# Patient Record
Sex: FEMALE | Race: WHITE | NOT HISPANIC OR LATINO
[De-identification: names, ages, dates, MRNs, and addresses within clinical notes are randomized per-mention and may not be internally consistent; named-entity substitution may affect disease eponyms.]

---

## 2017-02-06 ENCOUNTER — APPOINTMENT (OUTPATIENT)
Dept: NEUROLOGY | Facility: CLINIC | Age: 62
End: 2017-02-06
Payer: COMMERCIAL

## 2017-02-06 VITALS
DIASTOLIC BLOOD PRESSURE: 90 MMHG | SYSTOLIC BLOOD PRESSURE: 150 MMHG | BODY MASS INDEX: 44.73 KG/M2 | HEIGHT: 64 IN | WEIGHT: 262 LBS | RESPIRATION RATE: 16 BRPM | HEART RATE: 70 BPM

## 2017-02-06 DIAGNOSIS — F41.9 ANXIETY DISORDER, UNSPECIFIED: ICD-10-CM

## 2017-02-06 PROCEDURE — 99214 OFFICE O/P EST MOD 30 MIN: CPT

## 2017-07-20 ENCOUNTER — RX RENEWAL (OUTPATIENT)
Age: 62
End: 2017-07-20

## 2017-07-21 ENCOUNTER — INPATIENT (INPATIENT)
Facility: HOSPITAL | Age: 62
LOS: 0 days | Discharge: HOME | End: 2017-07-22
Attending: INTERNAL MEDICINE | Admitting: FAMILY MEDICINE
Payer: COMMERCIAL

## 2017-07-21 VITALS
SYSTOLIC BLOOD PRESSURE: 162 MMHG | OXYGEN SATURATION: 100 % | RESPIRATION RATE: 18 BRPM | DIASTOLIC BLOOD PRESSURE: 93 MMHG | TEMPERATURE: 98 F | WEIGHT: 263.89 LBS | HEART RATE: 89 BPM | HEIGHT: 64 IN

## 2017-07-21 LAB
ALBUMIN SERPL ELPH-MCNC: 3.7 G/DL — SIGNIFICANT CHANGE UP (ref 3.3–5)
ALP SERPL-CCNC: 110 U/L — SIGNIFICANT CHANGE UP (ref 40–120)
ALT FLD-CCNC: 26 U/L — SIGNIFICANT CHANGE UP (ref 12–78)
ANION GAP SERPL CALC-SCNC: 9 MMOL/L — SIGNIFICANT CHANGE UP (ref 5–17)
APTT BLD: 24.4 SEC — LOW (ref 27.5–37.4)
AST SERPL-CCNC: 15 U/L — SIGNIFICANT CHANGE UP (ref 15–37)
BASOPHILS # BLD AUTO: 0.2 K/UL — SIGNIFICANT CHANGE UP (ref 0–0.2)
BASOPHILS NFR BLD AUTO: 1.1 % — SIGNIFICANT CHANGE UP (ref 0–2)
BILIRUB SERPL-MCNC: 0.4 MG/DL — SIGNIFICANT CHANGE UP (ref 0.2–1.2)
BUN SERPL-MCNC: 13 MG/DL — SIGNIFICANT CHANGE UP (ref 7–23)
CALCIUM SERPL-MCNC: 10 MG/DL — SIGNIFICANT CHANGE UP (ref 8.5–10.1)
CHLORIDE SERPL-SCNC: 98 MMOL/L — SIGNIFICANT CHANGE UP (ref 96–108)
CK SERPL-CCNC: 71 U/L — SIGNIFICANT CHANGE UP (ref 26–192)
CO2 SERPL-SCNC: 26 MMOL/L — SIGNIFICANT CHANGE UP (ref 22–31)
CREAT SERPL-MCNC: 0.82 MG/DL — SIGNIFICANT CHANGE UP (ref 0.5–1.3)
EOSINOPHIL # BLD AUTO: 0.1 K/UL — SIGNIFICANT CHANGE UP (ref 0–0.5)
EOSINOPHIL NFR BLD AUTO: 0.4 % — SIGNIFICANT CHANGE UP (ref 0–6)
GLUCOSE SERPL-MCNC: 95 MG/DL — SIGNIFICANT CHANGE UP (ref 70–99)
HCT VFR BLD CALC: 38.8 % — SIGNIFICANT CHANGE UP (ref 34.5–45)
HGB BLD-MCNC: 13.2 G/DL — SIGNIFICANT CHANGE UP (ref 11.5–15.5)
INR BLD: 1.09 RATIO — SIGNIFICANT CHANGE UP (ref 0.88–1.16)
LYMPHOCYTES # BLD AUTO: 16.9 % — SIGNIFICANT CHANGE UP (ref 13–44)
LYMPHOCYTES # BLD AUTO: 2.3 K/UL — SIGNIFICANT CHANGE UP (ref 1–3.3)
MCHC RBC-ENTMCNC: 27.8 PG — SIGNIFICANT CHANGE UP (ref 27–34)
MCHC RBC-ENTMCNC: 34.1 GM/DL — SIGNIFICANT CHANGE UP (ref 32–36)
MCV RBC AUTO: 81.7 FL — SIGNIFICANT CHANGE UP (ref 80–100)
MONOCYTES # BLD AUTO: 0.6 K/UL — SIGNIFICANT CHANGE UP (ref 0–0.9)
MONOCYTES NFR BLD AUTO: 4.4 % — SIGNIFICANT CHANGE UP (ref 2–14)
NEUTROPHILS # BLD AUTO: 10.5 K/UL — HIGH (ref 1.8–7.4)
NEUTROPHILS NFR BLD AUTO: 77.2 % — HIGH (ref 43–77)
PLATELET # BLD AUTO: 353 K/UL — SIGNIFICANT CHANGE UP (ref 150–400)
POTASSIUM SERPL-MCNC: 4 MMOL/L — SIGNIFICANT CHANGE UP (ref 3.5–5.3)
POTASSIUM SERPL-SCNC: 4 MMOL/L — SIGNIFICANT CHANGE UP (ref 3.5–5.3)
PROT SERPL-MCNC: 7.3 GM/DL — SIGNIFICANT CHANGE UP (ref 6–8.3)
PROTHROM AB SERPL-ACNC: 11.8 SEC — SIGNIFICANT CHANGE UP (ref 9.8–12.7)
RBC # BLD: 4.75 M/UL — SIGNIFICANT CHANGE UP (ref 3.8–5.2)
RBC # FLD: 13.2 % — SIGNIFICANT CHANGE UP (ref 10.3–14.5)
SODIUM SERPL-SCNC: 133 MMOL/L — LOW (ref 135–145)
TROPONIN I SERPL-MCNC: <0.015 NG/ML — SIGNIFICANT CHANGE UP (ref 0.01–0.04)
TROPONIN I SERPL-MCNC: <0.015 NG/ML — SIGNIFICANT CHANGE UP (ref 0.01–0.04)
WBC # BLD: 13.6 K/UL — HIGH (ref 3.8–10.5)
WBC # FLD AUTO: 13.6 K/UL — HIGH (ref 3.8–10.5)

## 2017-07-21 PROCEDURE — 93010 ELECTROCARDIOGRAM REPORT: CPT

## 2017-07-21 PROCEDURE — 70551 MRI BRAIN STEM W/O DYE: CPT | Mod: 26

## 2017-07-21 PROCEDURE — 70450 CT HEAD/BRAIN W/O DYE: CPT | Mod: 26

## 2017-07-21 PROCEDURE — 99285 EMERGENCY DEPT VISIT HI MDM: CPT

## 2017-07-21 PROCEDURE — 71020: CPT | Mod: 26

## 2017-07-21 RX ORDER — HYDROCHLOROTHIAZIDE 25 MG
25 TABLET ORAL DAILY
Qty: 0 | Refills: 0 | Status: DISCONTINUED | OUTPATIENT
Start: 2017-07-21 | End: 2017-07-22

## 2017-07-21 RX ORDER — HEPARIN SODIUM 5000 [USP'U]/ML
5000 INJECTION INTRAVENOUS; SUBCUTANEOUS EVERY 12 HOURS
Qty: 0 | Refills: 0 | Status: DISCONTINUED | OUTPATIENT
Start: 2017-07-21 | End: 2017-07-22

## 2017-07-21 RX ORDER — MECLIZINE HCL 12.5 MG
12.5 TABLET ORAL
Qty: 0 | Refills: 0 | Status: DISCONTINUED | OUTPATIENT
Start: 2017-07-21 | End: 2017-07-22

## 2017-07-21 RX ORDER — METOPROLOL TARTRATE 50 MG
100 TABLET ORAL DAILY
Qty: 0 | Refills: 0 | Status: DISCONTINUED | OUTPATIENT
Start: 2017-07-21 | End: 2017-07-22

## 2017-07-21 RX ORDER — ASPIRIN/CALCIUM CARB/MAGNESIUM 324 MG
81 TABLET ORAL DAILY
Qty: 0 | Refills: 0 | Status: DISCONTINUED | OUTPATIENT
Start: 2017-07-21 | End: 2017-07-22

## 2017-07-21 RX ORDER — LOSARTAN POTASSIUM 100 MG/1
50 TABLET, FILM COATED ORAL
Qty: 0 | Refills: 0 | Status: DISCONTINUED | OUTPATIENT
Start: 2017-07-21 | End: 2017-07-22

## 2017-07-21 RX ORDER — ATORVASTATIN CALCIUM 80 MG/1
40 TABLET, FILM COATED ORAL AT BEDTIME
Qty: 0 | Refills: 0 | Status: DISCONTINUED | OUTPATIENT
Start: 2017-07-21 | End: 2017-07-22

## 2017-07-21 RX ORDER — CLOPIDOGREL BISULFATE 75 MG/1
75 TABLET, FILM COATED ORAL DAILY
Qty: 0 | Refills: 0 | Status: DISCONTINUED | OUTPATIENT
Start: 2017-07-21 | End: 2017-07-22

## 2017-07-21 RX ADMIN — LOSARTAN POTASSIUM 50 MILLIGRAM(S): 100 TABLET, FILM COATED ORAL at 21:06

## 2017-07-21 RX ADMIN — ATORVASTATIN CALCIUM 40 MILLIGRAM(S): 80 TABLET, FILM COATED ORAL at 22:47

## 2017-07-21 RX ADMIN — HEPARIN SODIUM 5000 UNIT(S): 5000 INJECTION INTRAVENOUS; SUBCUTANEOUS at 22:48

## 2017-07-21 NOTE — H&P ADULT - ASSESSMENT
Pt is admitted w/    I. TIA  - telemetry, eval for arrythmia  - Neurochecks  - Neurology consult Dr. Romero  - ASA , statin    II. Hypertensive Urgency  - metoprolol given in the office    III. DVT prophylaxis Pt is a 62y woman with PMHx HTN, HLD, CVA / TIA on plavix and baby ASA, who presents via EMS from Dr. Martinez office for severe hypertension.   She  reports she has been having increased dizziness, lightheadedness and thought she was having BPV symptoms.  She did have light headedness the day before.   She denies resp symptoms, no fever/chills, N/V/D.  No CP/SOB.  At her PCPs office her pressure was  200s/100s, she was given an additional 50mg of Metoprolol and HCTZ 25mg.   Pt reports her pressure was 160s at her pMDs office 1 year ago and her metoprolol was increased from 50mg to 100mg,  however she has not been taking the higher dose.    Pt is admitted w/    I. Dizziness, possible TIA.   NIHSS: 0  - telemetry, eval for arrythmia  - Neurochecks  - Neurology consult Dr. Iverson  - cont plavix, ASA , statin    II. Hypertensive Urgency  - additional metoprolol, hydrochlorothiazide given in the office  - cont meds, increased dose of metoprolol  - card consult Dr. Llanes  - pt needs outpt stress testing and eval for echo    III. DVT prophylaxis  - heparin

## 2017-07-21 NOTE — ED PROVIDER NOTE - ATTENDING CONTRIBUTION TO CARE
I, Abdelrahman Nicholas DO,  performed the initial face to face bedside interview with this patient regarding history of present illness, review of symptoms and relevant past medical, social and family history.  I completed an independent physical examination.  I was the initial provider who evaluated this patient. I have signed out the follow up of any pending tests (i.e. labs, radiological studies) to the ACP.  I have communicated the patient’s plan of care and disposition with the ACP.

## 2017-07-21 NOTE — H&P ADULT - NSHPREVIEWOFSYSTEMS_GEN_ALL_CORE
ICU Vital Signs Last 24 Hrs    T(F): 98.1 (21 Jul 2017 13:02), Max: 98.1 (21 Jul 2017 13:02)    HR: 89 (21 Jul 2017 13:02) (89 - 89)    BP: 162/93 (21 Jul 2017 13:02) (162/93 - 162/93)    RR: 18  SpO2: 100%

## 2017-07-21 NOTE — ED PROVIDER NOTE - OBJECTIVE STATEMENT
62y F PMH HTN, HLD, CVA/TIA, PMD Dr. Armstrong in [place], Dr. Iverson Neurology, Dr. Llanes cardiology, presents to ER today c/o HTN and being sent by Dr. Armstrong to ER for evaluation.  pt reports she has been having increased vertigo symptoms.  Denies F/C/N/V/D/CP/SOB.  No trauma or travel.  In office today had HTN 200s/100s, was given additional dose of Metoprolol and sent to ER by EMS.

## 2017-07-21 NOTE — ED STATDOCS - PROGRESS NOTE DETAILS
Spoke with Dr. Roger Armstrong [phone], discussed that pt came in to the office for dizziness and lightheadedness sine yesterday but poorly controlled HTN and was told that she was supposed to double her metoprolol dose but has not. Pt was given 2 of her metoprolol medication by her pmd at the office and was not able to lower the BP so sent her in for evaluation. -Winston Person PA-C

## 2017-07-21 NOTE — ED PROVIDER NOTE - MEDICAL DECISION MAKING DETAILS
62y F w/ PMH as noted sent to ER by PMD for eval.  I discussed with patient my concern for worsening vertigo symptoms in light of h/o HTN, TIA, and likely need for thorough evaluation and possible admission.  Patient states she does not have interest in being admitted and was told by PMD she is only coming here for BP meds.  Would plan for labs including CE's, EKG, CXR, CT Head.  Given patient disagreement with plan, will call Dr. Armstrong to discuss.

## 2017-07-21 NOTE — PATIENT PROFILE ADULT. - NS TRANSFER PATIENT BELONGINGS
purse, cash, ipad, 2 pairs glasses/Clothing/Cell Phone/PDA (specify)/Electronic Device (specify)/Other belongings/Wrist Watch

## 2017-07-21 NOTE — ED STATDOCS - OBJECTIVE STATEMENT
62y F PMH HTN, HLD, CVA/TIA, PMD Dr. Armstrong in [place], Dr. Iverson Neurology, Dr. Llanes cardiology, presents to ER today c/o HTN and being sent by Dr. Armstrong to ER for evaluation.  pt reports she has been having increased vertigo symptoms.  Denies F/C/N/V/D/CP/SOB.  No trauma or travel.  In office today had HTN 200s/100s, was given additional dose of Metoprolol and sent to ER by EMS

## 2017-07-21 NOTE — H&P ADULT - HISTORY OF PRESENT ILLNESS
Pt is a 62y F PMH HTN, HLD, CVA/TIA, presents to ER today c/o HTN and being sent by Dr. Armstrong to ER for evaluation.  pt reports she has been having increased vertigo symptoms.  Denies F/C/N/V/D/CP/SOB.  No trauma or travel.  In office today had HTN 200s/100s, was given additional dose of Metoprolol and sent to ER by EMS. Pt is a 62y woman with PMHx HTN, HLD, CVA / TIA on plavix and baby ASA, who presents via EMS from Dr. Martinez office for severe hypertension.   She  reports she has been having increased dizziness, lightheadedness and thought she was having BPV symptoms.  She did have light headedness the day before.   She denies resp symptoms, no fever/chills, N/V/D.  No CP/SOB.  At her PCPs office her pressure was  200s/100s, she was given an additional 50mg of Metoprolol and HCTZ 25mg.   Pt reports her pressure was 160s at her pMDs office 1 year ago and her metoprolol was increased from 50mg to 100mg,  however she has not been taking the higher dose.  Fam HX:  Mother HTN had CVA at 66 Father HTN Brother prostate ca dx at age 40

## 2017-07-21 NOTE — H&P ADULT - NSHPOUTPATIENTPROVIDERS_GEN_ALL_CORE
PMD Dr. Armstrong in [place], Dr. Iverson Neurology, Dr. Llanes cardiology PMD Dr. Bronson Armstrong in [place],   Dr. Iverson Neurology,   Dr. Llanes cardiology

## 2017-07-21 NOTE — ED ADULT NURSE NOTE - OBJECTIVE STATEMENT
Pt presents with HTN from PMD office. Pt bp was systolic 210 in the office. Pt took extra metoporol today. Pt sent for further eval.

## 2017-07-22 ENCOUNTER — TRANSCRIPTION ENCOUNTER (OUTPATIENT)
Age: 62
End: 2017-07-22

## 2017-07-22 VITALS — DIASTOLIC BLOOD PRESSURE: 91 MMHG | HEART RATE: 82 BPM | SYSTOLIC BLOOD PRESSURE: 171 MMHG

## 2017-07-22 LAB
ANION GAP SERPL CALC-SCNC: 8 MMOL/L — SIGNIFICANT CHANGE UP (ref 5–17)
BUN SERPL-MCNC: 14 MG/DL — SIGNIFICANT CHANGE UP (ref 7–23)
CALCIUM SERPL-MCNC: 9.5 MG/DL — SIGNIFICANT CHANGE UP (ref 8.5–10.1)
CHLORIDE SERPL-SCNC: 98 MMOL/L — SIGNIFICANT CHANGE UP (ref 96–108)
CHOLEST SERPL-MCNC: 104 MG/DL — SIGNIFICANT CHANGE UP (ref 10–199)
CO2 SERPL-SCNC: 28 MMOL/L — SIGNIFICANT CHANGE UP (ref 22–31)
CREAT SERPL-MCNC: 0.76 MG/DL — SIGNIFICANT CHANGE UP (ref 0.5–1.3)
GLUCOSE SERPL-MCNC: 85 MG/DL — SIGNIFICANT CHANGE UP (ref 70–99)
HBA1C BLD-MCNC: 5.7 % — HIGH (ref 4–5.6)
HDLC SERPL-MCNC: 31 MG/DL — LOW (ref 40–125)
LIPID PNL WITH DIRECT LDL SERPL: 31 MG/DL — SIGNIFICANT CHANGE UP
POTASSIUM SERPL-MCNC: 3.3 MMOL/L — LOW (ref 3.5–5.3)
POTASSIUM SERPL-SCNC: 3.3 MMOL/L — LOW (ref 3.5–5.3)
SODIUM SERPL-SCNC: 134 MMOL/L — LOW (ref 135–145)
TOTAL CHOLESTEROL/HDL RATIO MEASUREMENT: 3.4 RATIO — SIGNIFICANT CHANGE UP (ref 3.3–7.1)
TRIGL SERPL-MCNC: 212 MG/DL — HIGH (ref 10–149)
TROPONIN I SERPL-MCNC: <0.015 NG/ML — SIGNIFICANT CHANGE UP (ref 0.01–0.04)

## 2017-07-22 RX ORDER — METOPROLOL TARTRATE 50 MG
1 TABLET ORAL
Qty: 0 | Refills: 0 | COMMUNITY

## 2017-07-22 RX ORDER — LOSARTAN POTASSIUM 100 MG/1
1 TABLET, FILM COATED ORAL
Qty: 0 | Refills: 0 | COMMUNITY
Start: 2017-07-22

## 2017-07-22 RX ORDER — METOPROLOL TARTRATE 50 MG
1 TABLET ORAL
Qty: 0 | Refills: 0 | COMMUNITY
Start: 2017-07-22

## 2017-07-22 RX ORDER — LOSARTAN POTASSIUM 100 MG/1
1 TABLET, FILM COATED ORAL
Qty: 0 | Refills: 0 | COMMUNITY

## 2017-07-22 RX ADMIN — Medication 81 MILLIGRAM(S): at 13:12

## 2017-07-22 RX ADMIN — HEPARIN SODIUM 5000 UNIT(S): 5000 INJECTION INTRAVENOUS; SUBCUTANEOUS at 09:52

## 2017-07-22 RX ADMIN — CLOPIDOGREL BISULFATE 75 MILLIGRAM(S): 75 TABLET, FILM COATED ORAL at 13:12

## 2017-07-22 RX ADMIN — Medication 25 MILLIGRAM(S): at 05:47

## 2017-07-22 RX ADMIN — Medication 100 MILLIGRAM(S): at 09:52

## 2017-07-22 RX ADMIN — LOSARTAN POTASSIUM 50 MILLIGRAM(S): 100 TABLET, FILM COATED ORAL at 05:41

## 2017-07-22 NOTE — DISCHARGE NOTE ADULT - PLAN OF CARE
increase metoprolol to 100mg daily. follow up with Dr. Llanes within 1 week for BP monitoring. If worsens and or associated with nausea or vomiting come back to ER. Follow up with Dr. Iverson within a week to discuss MRI results. Your MRI does not reveal any strokes but is changed from previous MRI (shows area of decreased signaling).

## 2017-07-22 NOTE — DISCHARGE NOTE ADULT - HOSPITAL COURSE
Chief Complaint/Reason for Admission: dizziness	  	  Pt is a 62y woman with PMHx HTN, HLD, CVA / TIA on plavix and baby ASA, who presents via EMS from Dr. Martinez office for severe hypertension.   She  reports she has been having increased dizziness, lightheadedness and thought she was having BPV symptoms.  She did have light headedness the day before.   She denies resp symptoms, no fever/chills, N/V/D.  No CP/SOB.  At her PCPs office her pressure was  200s/100s, she was given an additional 50mg of Metoprolol and HCTZ 25mg.   Pt reports her pressure was 160s at her pMDs office 1 year ago and her metoprolol was increased from 50mg to 100mg,  however she has not been taking the higher dose. Pt monitored for 24 hours. Symptoms improved with improvement in BP.  CT shows no acute changes. MRI shows signaling changes from prior MRI.  Will send pt home for outpt MRI w/ gavino contrast and f/u with Dr. Iverson within one week.  REVIEW OF SYSTEMS: All other review of systems is negative unless indicated above.		    Pt is a 62y F PMH HTN, HLD, CVA/TIA, presents to ER today c/o HTN and being sent by Dr. Armstrong to ER for evaluation.  pt reports she has been having increased vertigo symptoms.  Denies F/C/N/V/D/CP/SOB.  No trauma or travel.  In office today had HTN 200s/100s, was given additional dose of Metoprolol and sent to ER by EMS.  Vital Signs Last 24 Hrs  T(C): 36.7 (22 Jul 2017 05:41), Max: 37 (21 Jul 2017 21:40)  T(F): 98 (22 Jul 2017 05:41), Max: 98.6 (21 Jul 2017 21:40)  HR: 82 (22 Jul 2017 09:49) (72 - 82)  BP: 171/91 (22 Jul 2017 09:49) (131/65 - 179/89)  BP(mean): --  RR: 17 (22 Jul 2017 05:41) (16 - 19)  SpO2: 97% (22 Jul 2017 05:41) (96% - 100%)  	Unable to offer due to clinical condition	      Physical Exam:  · Constitutional	detailed exam	  · Constitutional Details	well-nourished	  · Eyes	detailed exam	  · Eyes Details	PERRL; conjunctiva clear	  · ENMT	detailed exam	  · ENMT Details	nose; mouth; pharynx	  · Nose	normal	  · Mouth	moist	  · Pharynx	no redness; no discharge	  · Neck	detailed exam	  · Neck Details	supple; no JVD	  · Breasts	not examined	  · Back	No deformity or limitation of movement	  · Respiratory	detailed exam	  · Respiratory Details	clear to auscultation bilaterally	  · Cardiovascular	detailed exam	  · Cardiovascular Details	regular rate and rhythm 	  · Cardiovascular Details	positive S1; positive S2	  · Gastrointestinal	detailed exam	  · GI Normal	normal; soft; bowel sounds normal	  · Genitourinary	not examined	  · Rectal	not examined	  · Extremities	No cyanosis, clubbing or edema	  · Neurological	Alert & oriented; no sensory, motor or coordination deficits, normal reflexes	  · Skin	No lesions; no rash	      meds/labs/imaging: Reviewed.  CT head negative for acute pathology. MRI w/o con shows signaling changes.    Assessment and Plan:       62y woman with PMHx HTN, HLD, CVA / TIA on plavix and baby ASA, who presents via EMS from Dr. Martinez office for malignant hypetension.   Dizziness, possible TIA: RESOLVED. likely related to underlying uncontrolled BP.  - telemetry, eval for arrythmia - no events.  - Neurology consult Dr. Iverson as outpatient given changes in MRI (signaling changes).  Will give script for MRI head w/gavino and f/u with Dr. Iverson with in one week to discuss results.  - cont plavix, ASA , statin    II. Hypertensive Urgency/malignant hypertension: RESOLVED.  - additional metoprolol, hydrochlorothiazide given in the office  - cont meds, increased dose of metoprolol  - f/u cardiology as outpatient for close bp monitoring  - pt needs outpt stress testing and eval for echo  Pt is admitted w/    I. TIA  - telemetry, eval for arrythmia  - Neurochecks  - Neurology consult Dr. Romero  - ASA , statin    II. Hypertensive Urgency  - metoprolol given in the office    III. DVT prophylaxis      Attending Statement:  40 minutes spent on total encounter; more than 50% of the visit was spent counseling and/or coordinating care by the attending physician.

## 2017-07-22 NOTE — DISCHARGE NOTE ADULT - CARE PROVIDER_API CALL
Eneida Iverson), Neurology  General  [address] [place] [address]  Phone: [phone]  Fax: [phone]

## 2017-07-22 NOTE — DISCHARGE NOTE ADULT - CARE PLAN
Principal Discharge DX:	Malignant hypertension  Goal:	increase metoprolol to 100mg daily.  Instructions for follow-up, activity and diet:	follow up with Dr. Llanes within 1 week for BP monitoring.  Secondary Diagnosis:	Lightheadedness  Goal:	If worsens and or associated with nausea or vomiting come back to ER.  Instructions for follow-up, activity and diet:	Follow up with Dr. Iverson within a week to discuss MRI results. Your MRI does not reveal any strokes but is changed from previous MRI (shows area of decreased signaling).

## 2017-07-22 NOTE — DISCHARGE NOTE ADULT - MEDICATION SUMMARY - MEDICATIONS TO TAKE
I will START or STAY ON the medications listed below when I get home from the hospital:    aspirin 81 mg oral tablet, chewable  -- 1 tab(s) by mouth once a day  -- Indication: For secondary prevention    losartan 50 mg oral tablet  -- 1 tab(s) by mouth 2 times a day  -- Indication: For HYPERTENSION    atorvastatin 40 mg oral tablet  -- 1 tab(s) by mouth once a day (at bedtime)  -- Indication: For HYPERlipidemia    clopidogrel 75 mg oral tablet  -- 1 tab(s) by mouth once a day  -- Indication: For History of stroke    metoprolol succinate 100 mg oral tablet, extended release  -- 1 tab(s) by mouth once a day  -- Indication: For HYPERTENSION    hydroCHLOROthiazide 25 mg oral tablet  -- 1 tab(s) by mouth once a day  -- Indication: For HYPERTENSION    glucosamine  --  by mouth   -- Indication: For vitamins    multivitamin  --     -- Indication: For vitamins

## 2017-07-22 NOTE — DISCHARGE NOTE ADULT - MEDICATION SUMMARY - MEDICATIONS TO CHANGE
I will SWITCH the dose or number of times a day I take the medications listed below when I get home from the hospital:    metoprolol succinate 100 mg oral tablet, extended release  -- 1 tab(s) by mouth once a day

## 2017-07-22 NOTE — CONSULT NOTE ADULT - SUBJECTIVE AND OBJECTIVE BOX
Patient is a 62y old  Female who presents with a chief complaint of dizziness.      HPI:  Pt is a 62y woman with PMHx HTN, HLD, CVA / TIA on plavix and baby ASA, who presents via EMS from Dr. Martinez office for severe hypertension.   She  reports she has been having increased dizziness, lightheadedness and thought she was having BPV symptoms.  She did have light headedness the day before.   She denies resp symptoms, no fever/chills, N/V/D.  No CP/SOB.  At her PCPs office her pressure was  200s/100s, she was given an additional 50mg of Metoprolol and HCTZ 25mg.   Pt reports her pressure was 160s at her pMDs office 1 year ago and her metoprolol was increased from 50mg to 100mg,  however she has not been taking the higher dose.   I reviewed Dr Llanes(primary cardiologist) office notes and pt was noted to have poorly controlled HTN off and on and white coat syndrome as well on top of her anxiety.  Pt currently denies any CP or SOB or dizziness. She would like to go home since feeling ok.   Fam HX:  Mother HTN had CVA at 66 Father HTN Brother prostate ca dx at age 40.      PAST MEDICAL & SURGICAL HISTORY:  Transient cerebral ischemia, unspecified type  Essential hypertension      MEDICATIONS  (STANDING):  heparin  Injectable 5000 Unit(s) SubCutaneous every 12 hours  atorvastatin 40 milliGRAM(s) Oral at bedtime  clopidogrel Tablet 75 milliGRAM(s) Oral daily  aspirin enteric coated 81 milliGRAM(s) Oral daily  losartan 50 milliGRAM(s) Oral two times a day  metoprolol succinate  milliGRAM(s) Oral daily  hydrochlorothiazide 25 milliGRAM(s) Oral daily    MEDICATIONS  (PRN):  meclizine 12.5 milliGRAM(s) Oral two times a day PRN Dizziness      FAMILY HISTORY:  No pertinent family history in first degree relatives      SOCIAL HISTORY:  non smoker, no alcohol use      REVIEW OF SYSTEMS:  CONSTITUTIONAL:  No night sweats.  No fatigue, malaise, lethargy.  No fever or chills.  HEENT:  Eyes:  No visual changes.  No eye pain.      ENT:  No runny nose.  No epistaxis.  No sinus pain.  No sore throat.  No odynophagia.  No ear pain.  No congestion.  RESPIRATORY:  No cough.  No wheeze.  No hemoptysis.  No shortness of breath.  CARDIOVASCULAR:  No chest pains.  No palpitations. No shortness of breath, No orthopnea or PND.  GASTROINTESTINAL:  No abdominal pain.  No nausea or vomiting.  No diarrhea or constipation.  No hematemesis.  No hematochezia.  No melena.  GENITOURINARY:  No urgency.  No frequency.  No dysuria.  No hematuria.  No obstructive symptoms.  No discharge.  No pain.  No significant abnormal bleeding.  MUSCULOSKELETAL:  No musculoskeletal pain.  No joint swelling.  No arthritis.  NEUROLOGICAL:  No tingling or numbness or weakness.  PSYCHIATRIC:  No confusion  SKIN:  No rashes.  No lesions.  No wounds.  ENDOCRINE:  No unexplained weight loss.  No polydipsia.  No polyuria.  No polyphagia.  HEMATOLOGIC:  No anemia.  No purpura.  No petechiae.  No prolonged or excessive bleeding.   ALLERGIC AND IMMUNOLOGIC:  No pruritus.  No swelling.         Vital Signs Last 24 Hrs  T(C): 36.7 (22 Jul 2017 05:41), Max: 37 (21 Jul 2017 21:40)  T(F): 98 (22 Jul 2017 05:41), Max: 98.6 (21 Jul 2017 21:40)  HR: 82 (22 Jul 2017 09:49) (72 - 89)  BP: 171/91 (22 Jul 2017 09:49) (131/65 - 179/89)  BP(mean): --  RR: 17 (22 Jul 2017 05:41) (16 - 19)  SpO2: 97% (22 Jul 2017 05:41) (96% - 100%)    PHYSICAL EXAM-    Constitutional: The patient appears to be normal, well developed, well nourished and alert and oriented to time, place and person. The patient does not appear acutely ill.     Head: Head is normocephalic and atraumatic.      Neck: The patient's neck is supple without enlargement, has no palpable thyromegaly nor thyroid nodules and has no jugular venous distention. No audible carotid bruits. There are strong carotid pulses bilaterally. No JVD.     Cardiovascular: Regular rate and rhythm without S3, S4. No murmurs or rubs are appreciated.      Respiratory: Breath sounds are normal. No rales. No wheezing.    Abdomen: Soft, nontender, nondistended with positive bowel sounds.      Extremity: No tenderness. No  pitting edema No skin discoloration No clubbing No cyanosis.     Neurologic: The patient is alert and oriented.      Skin: No rash, no obvious lesions noted.      Psychiatric: The patient appears to be emotionally stable.      INTERPRETATION OF TELEMETRY: sinus rythm.    ECG: sinus rythm with poor R wave progression. Her EKG from 2014 did not have poor R wave progression but this could be from lead placement as well.    I&O's Detail      LABS:                        13.2   13.6  )-----------( 353      ( 21 Jul 2017 14:12 )             38.8     07-22    134<L>  |  98  |  14  ----------------------------<  85  3.3<L>   |  28  |  0.76    Ca    9.5      22 Jul 2017 06:01    TPro  7.3  /  Alb  3.7  /  TBili  0.4  /  DBili  x   /  AST  15  /  ALT  26  /  AlkPhos  110  07-21    CARDIAC MARKERS ( 21 Jul 2017 23:41 )  <0.015 ng/mL / x     / x     / x     / x      CARDIAC MARKERS ( 21 Jul 2017 18:04 )  <0.015 ng/mL / x     / x     / x     / x      CARDIAC MARKERS ( 21 Jul 2017 14:12 )  <0.015 ng/mL / x     / 71 U/L / x     / x          PT/INR - ( 21 Jul 2017 14:12 )   PT: 11.8 sec;   INR: 1.09 ratio         PTT - ( 21 Jul 2017 14:12 )  PTT:24.4 sec    I&O's Summary    BNP  RADIOLOGY & ADDITIONAL STUDIES:  < from: CT Head No Cont (07.21.17 @ 14:55) >    EXAM:  CT BRAIN                            PROCEDURE DATE:  07/21/2017          INTERPRETATION:  Exam Date: 7/21/2017 2:55 PM    CT head without IV contrast    CLINICAL INFORMATION:  HTN, lightheaded, dizzy        TECHNIQUE: Contiguous axial sections were obtained through the head.     This scan was performed using automatic exposure control (radiation dose   reduction software) to obtain a diagnostic image quality scan with   patient dose as low as reasonably achievable.      COMPARISON: MRI brain March 11, 2014.     FINDINGS:       There is no evidence of intraparenchymal or extraaxial hemorrhage.     There is no CT evidence of large vessel acute infarct. No mass effect is   found in the brain.  No evidence of midline shift or herniation pattern.    The ventricles, sulci and basal cisterns appear unremarkable.         Visualized paranasal sinuses are clear.      IMPRESSION:       No acute intracranial findings.                JEANNINE URBANO M.D., ATTENDING RADIOLOGIST  This document has been electronically signed. Jul 21 2017  3:00PM                < end of copied text >

## 2017-07-22 NOTE — CONSULT NOTE ADULT - ASSESSMENT
Dizziness- MRI results pending.  BP better controlled.  Her toprol was increased by primary team.  Recommend  SBP goal of about 140mm Hg if no CVA noted on MRI.  Would recommend f/u with Dr Llanes as outpt.    HTN- continue current meds.    Hyperlipidemia- continue statin.    Other medical issues- dizziness- neurological evaluation in progress, anxiety- defer management to primary team.  Obesity- advised diet and life style modifications.    Thank you for allowing me to participate in the care of this patient. Please feel free to contact me with any questions.

## 2017-07-22 NOTE — DISCHARGE NOTE ADULT - PATIENT PORTAL LINK FT
“You can access the Follow[employer] Patient Portal, offered by [facility], by registering with the following website: [email or web address]”

## 2017-07-26 DIAGNOSIS — E78.5 HYPERLIPIDEMIA, UNSPECIFIED: ICD-10-CM

## 2017-07-26 DIAGNOSIS — I16.0 HYPERTENSIVE URGENCY: ICD-10-CM

## 2017-07-26 DIAGNOSIS — Z86.73 PERSONAL HISTORY OF TRANSIENT ISCHEMIC ATTACK (TIA), AND CEREBRAL INFARCTION WITHOUT RESIDUAL DEFICITS: ICD-10-CM

## 2017-07-26 DIAGNOSIS — E66.9 OBESITY, UNSPECIFIED: ICD-10-CM

## 2017-07-26 DIAGNOSIS — I10 ESSENTIAL (PRIMARY) HYPERTENSION: ICD-10-CM

## 2017-07-26 DIAGNOSIS — Z79.82 LONG TERM (CURRENT) USE OF ASPIRIN: ICD-10-CM

## 2017-07-26 DIAGNOSIS — F41.9 ANXIETY DISORDER, UNSPECIFIED: ICD-10-CM

## 2017-07-26 DIAGNOSIS — Z79.01 LONG TERM (CURRENT) USE OF ANTICOAGULANTS: ICD-10-CM

## 2017-07-26 DIAGNOSIS — R42 DIZZINESS AND GIDDINESS: ICD-10-CM

## 2017-08-02 PROBLEM — I10 ESSENTIAL (PRIMARY) HYPERTENSION: Chronic | Status: ACTIVE | Noted: 2017-07-21

## 2017-08-04 ENCOUNTER — APPOINTMENT (OUTPATIENT)
Dept: MRI IMAGING | Facility: CLINIC | Age: 62
End: 2017-08-04
Payer: COMMERCIAL

## 2017-08-04 ENCOUNTER — OUTPATIENT (OUTPATIENT)
Dept: OUTPATIENT SERVICES | Facility: HOSPITAL | Age: 62
LOS: 1 days | End: 2017-08-04
Payer: COMMERCIAL

## 2017-08-04 DIAGNOSIS — Z00.8 ENCOUNTER FOR OTHER GENERAL EXAMINATION: ICD-10-CM

## 2017-08-04 PROCEDURE — 70553 MRI BRAIN STEM W/O & W/DYE: CPT

## 2017-08-04 PROCEDURE — A9585: CPT

## 2017-08-04 PROCEDURE — 82565 ASSAY OF CREATININE: CPT

## 2017-08-04 PROCEDURE — 70553 MRI BRAIN STEM W/O & W/DYE: CPT | Mod: 26

## 2017-08-07 ENCOUNTER — INPATIENT (INPATIENT)
Facility: HOSPITAL | Age: 62
LOS: 0 days | Discharge: ANOTHER HOSPITAL | End: 2017-08-08
Attending: FAMILY MEDICINE | Admitting: FAMILY MEDICINE
Payer: COMMERCIAL

## 2017-08-07 VITALS — WEIGHT: 261.91 LBS | HEIGHT: 64 IN

## 2017-08-07 LAB
ALBUMIN SERPL ELPH-MCNC: 3.5 G/DL — SIGNIFICANT CHANGE UP (ref 3.3–5)
ALP SERPL-CCNC: 109 U/L — SIGNIFICANT CHANGE UP (ref 40–120)
ALT FLD-CCNC: 30 U/L — SIGNIFICANT CHANGE UP (ref 12–78)
ANION GAP SERPL CALC-SCNC: 8 MMOL/L — SIGNIFICANT CHANGE UP (ref 5–17)
APTT BLD: 29.1 SEC — SIGNIFICANT CHANGE UP (ref 27.5–37.4)
AST SERPL-CCNC: 16 U/L — SIGNIFICANT CHANGE UP (ref 15–37)
BASOPHILS # BLD AUTO: 0.1 K/UL — SIGNIFICANT CHANGE UP (ref 0–0.2)
BASOPHILS NFR BLD AUTO: 0.5 % — SIGNIFICANT CHANGE UP (ref 0–2)
BILIRUB SERPL-MCNC: 0.5 MG/DL — SIGNIFICANT CHANGE UP (ref 0.2–1.2)
BLD GP AB SCN SERPL QL: SIGNIFICANT CHANGE UP
BUN SERPL-MCNC: 15 MG/DL — SIGNIFICANT CHANGE UP (ref 7–23)
CALCIUM SERPL-MCNC: 9.5 MG/DL — SIGNIFICANT CHANGE UP (ref 8.5–10.1)
CHLORIDE SERPL-SCNC: 99 MMOL/L — SIGNIFICANT CHANGE UP (ref 96–108)
CO2 SERPL-SCNC: 25 MMOL/L — SIGNIFICANT CHANGE UP (ref 22–31)
CREAT SERPL-MCNC: 0.77 MG/DL — SIGNIFICANT CHANGE UP (ref 0.5–1.3)
EOSINOPHIL # BLD AUTO: 0.1 K/UL — SIGNIFICANT CHANGE UP (ref 0–0.5)
EOSINOPHIL NFR BLD AUTO: 0.9 % — SIGNIFICANT CHANGE UP (ref 0–6)
GLUCOSE SERPL-MCNC: 85 MG/DL — SIGNIFICANT CHANGE UP (ref 70–99)
HCT VFR BLD CALC: 36.5 % — SIGNIFICANT CHANGE UP (ref 34.5–45)
HGB BLD-MCNC: 12.3 G/DL — SIGNIFICANT CHANGE UP (ref 11.5–15.5)
INR BLD: 1.15 RATIO — SIGNIFICANT CHANGE UP (ref 0.88–1.16)
LYMPHOCYTES # BLD AUTO: 16.9 % — SIGNIFICANT CHANGE UP (ref 13–44)
LYMPHOCYTES # BLD AUTO: 2.5 K/UL — SIGNIFICANT CHANGE UP (ref 1–3.3)
MCHC RBC-ENTMCNC: 27.5 PG — SIGNIFICANT CHANGE UP (ref 27–34)
MCHC RBC-ENTMCNC: 33.8 GM/DL — SIGNIFICANT CHANGE UP (ref 32–36)
MCV RBC AUTO: 81.4 FL — SIGNIFICANT CHANGE UP (ref 80–100)
MONOCYTES # BLD AUTO: 0.7 K/UL — SIGNIFICANT CHANGE UP (ref 0–0.9)
MONOCYTES NFR BLD AUTO: 4.7 % — SIGNIFICANT CHANGE UP (ref 2–14)
NEUTROPHILS # BLD AUTO: 11.4 K/UL — HIGH (ref 1.8–7.4)
NEUTROPHILS NFR BLD AUTO: 77.1 % — HIGH (ref 43–77)
PLATELET # BLD AUTO: 336 K/UL — SIGNIFICANT CHANGE UP (ref 150–400)
POTASSIUM SERPL-MCNC: 3.6 MMOL/L — SIGNIFICANT CHANGE UP (ref 3.5–5.3)
POTASSIUM SERPL-SCNC: 3.6 MMOL/L — SIGNIFICANT CHANGE UP (ref 3.5–5.3)
PROT SERPL-MCNC: 7.3 GM/DL — SIGNIFICANT CHANGE UP (ref 6–8.3)
PROTHROM AB SERPL-ACNC: 12.5 SEC — SIGNIFICANT CHANGE UP (ref 9.8–12.7)
RBC # BLD: 4.48 M/UL — SIGNIFICANT CHANGE UP (ref 3.8–5.2)
RBC # FLD: 13.3 % — SIGNIFICANT CHANGE UP (ref 10.3–14.5)
SODIUM SERPL-SCNC: 132 MMOL/L — LOW (ref 135–145)
TYPE + AB SCN PNL BLD: SIGNIFICANT CHANGE UP
WBC # BLD: 14.8 K/UL — HIGH (ref 3.8–10.5)
WBC # FLD AUTO: 14.8 K/UL — HIGH (ref 3.8–10.5)

## 2017-08-07 PROCEDURE — 71260 CT THORAX DX C+: CPT | Mod: 26

## 2017-08-07 PROCEDURE — 74177 CT ABD & PELVIS W/CONTRAST: CPT | Mod: 26

## 2017-08-07 RX ORDER — DEXAMETHASONE 0.5 MG/5ML
10 ELIXIR ORAL ONCE
Qty: 0 | Refills: 0 | Status: COMPLETED | OUTPATIENT
Start: 2017-08-07 | End: 2017-08-07

## 2017-08-07 RX ADMIN — Medication 102 MILLIGRAM(S): at 21:42

## 2017-08-07 NOTE — ED STATDOCS - PROGRESS NOTE DETAILS
CECIL Munroe: Patient has been seen, evaluated and orders have been written by the attending in intake. Patient is stable.  I will follow up the results of orders written and I will continue to evaluate/observe the patient. pt seen by dr salazar in ed, requests ct chest/abd/pelvis to r/o mass. pt will be d/c on decadron and  have outpt stress, being set up by neurosurgical pa. neurosurgical pa will be down to speak with pt again once ct's done. signed out to CECIL Simon spoke with Dr. David, pt is tba for workup, pt is amenable to staying, will order us to further characterize left adnexal mass. spoke with Dr. Barone for admission

## 2017-08-07 NOTE — ED STATDOCS - OBJECTIVE STATEMENT
61 y/o female presents to the ED for HTN with onset in the past few days with lightheadedness. States she could shake her head and feel better. PMD is Dr. Ring who was unable to treat HTN, ordered MRI of the head. Had MRI with contrast this past Friday about 3 days ago and was told to visit the ED for visit with Dr. David (neurosurgery) for findings. Denies fever, chills, leg pain, abd pain, N/V/D.

## 2017-08-07 NOTE — ED STATDOCS - ATTENDING CONTRIBUTION TO CARE
I, David Lazar MD,  performed the initial face to face bedside interview with this patient regarding history of present illness, review of symptoms and relevant past medical, social and family history.  I completed an independent physical examination.  I was the initial provider who evaluated this patient. I have signed out the follow up of any pending tests (i.e. labs, radiological studies) to the ACP.  I have communicated the patient’s plan of care and disposition with the ACP.  The history, relevant review of systems, past medical and surgical history, medical decision making, and physical examination was documented by the scribe in my presence and I attest to the accuracy of the documentation.

## 2017-08-07 NOTE — ED ADULT NURSE NOTE - OBJECTIVE STATEMENT
Pt states she had an MRI and CT scan, and PMD sent here for growth on R side of brain. Denies pain at this time. Alert and oriented x3

## 2017-08-08 ENCOUNTER — TRANSCRIPTION ENCOUNTER (OUTPATIENT)
Age: 62
End: 2017-08-08

## 2017-08-08 ENCOUNTER — INPATIENT (INPATIENT)
Facility: HOSPITAL | Age: 62
LOS: 0 days | Discharge: HOME | DRG: 392 | End: 2017-08-09
Attending: OBSTETRICS & GYNECOLOGY | Admitting: OBSTETRICS & GYNECOLOGY
Payer: COMMERCIAL

## 2017-08-08 ENCOUNTER — APPOINTMENT (OUTPATIENT)
Dept: NEUROLOGY | Facility: CLINIC | Age: 62
End: 2017-08-08

## 2017-08-08 VITALS
TEMPERATURE: 98 F | HEART RATE: 72 BPM | DIASTOLIC BLOOD PRESSURE: 84 MMHG | HEIGHT: 64 IN | WEIGHT: 259.93 LBS | SYSTOLIC BLOOD PRESSURE: 164 MMHG | OXYGEN SATURATION: 100 % | RESPIRATION RATE: 18 BRPM

## 2017-08-08 VITALS — WEIGHT: 287.92 LBS

## 2017-08-08 DIAGNOSIS — N94.9 UNSPECIFIED CONDITION ASSOCIATED WITH FEMALE GENITAL ORGANS AND MENSTRUAL CYCLE: ICD-10-CM

## 2017-08-08 DIAGNOSIS — R19.00 INTRA-ABDOMINAL AND PELVIC SWELLING, MASS AND LUMP, UNSPECIFIED SITE: ICD-10-CM

## 2017-08-08 DIAGNOSIS — I10 ESSENTIAL (PRIMARY) HYPERTENSION: ICD-10-CM

## 2017-08-08 DIAGNOSIS — G93.9 DISORDER OF BRAIN, UNSPECIFIED: ICD-10-CM

## 2017-08-08 LAB
ABO RH CONFIRMATION: SIGNIFICANT CHANGE UP
ANION GAP SERPL CALC-SCNC: 10 MMOL/L — SIGNIFICANT CHANGE UP (ref 5–17)
BUN SERPL-MCNC: 16 MG/DL — SIGNIFICANT CHANGE UP (ref 7–23)
CALCIUM SERPL-MCNC: 9.8 MG/DL — SIGNIFICANT CHANGE UP (ref 8.5–10.1)
CANCER AG125 SERPL-ACNC: 39 U/ML — HIGH
CHLORIDE SERPL-SCNC: 98 MMOL/L — SIGNIFICANT CHANGE UP (ref 96–108)
CO2 SERPL-SCNC: 25 MMOL/L — SIGNIFICANT CHANGE UP (ref 22–31)
CREAT SERPL-MCNC: 0.82 MG/DL — SIGNIFICANT CHANGE UP (ref 0.5–1.3)
GLUCOSE SERPL-MCNC: 129 MG/DL — HIGH (ref 70–99)
POTASSIUM SERPL-MCNC: 3.9 MMOL/L — SIGNIFICANT CHANGE UP (ref 3.5–5.3)
POTASSIUM SERPL-SCNC: 3.9 MMOL/L — SIGNIFICANT CHANGE UP (ref 3.5–5.3)
SODIUM SERPL-SCNC: 133 MMOL/L — LOW (ref 135–145)

## 2017-08-08 PROCEDURE — 93010 ELECTROCARDIOGRAM REPORT: CPT

## 2017-08-08 PROCEDURE — 99285 EMERGENCY DEPT VISIT HI MDM: CPT

## 2017-08-08 PROCEDURE — 99223 1ST HOSP IP/OBS HIGH 75: CPT

## 2017-08-08 PROCEDURE — 74183 MRI ABD W/O CNTR FLWD CNTR: CPT | Mod: 26

## 2017-08-08 PROCEDURE — 99284 EMERGENCY DEPT VISIT MOD MDM: CPT

## 2017-08-08 PROCEDURE — 76830 TRANSVAGINAL US NON-OB: CPT | Mod: 26

## 2017-08-08 PROCEDURE — 72197 MRI PELVIS W/O & W/DYE: CPT | Mod: 26

## 2017-08-08 RX ORDER — LOSARTAN POTASSIUM 100 MG/1
50 TABLET, FILM COATED ORAL
Qty: 0 | Refills: 0 | Status: DISCONTINUED | OUTPATIENT
Start: 2017-08-08 | End: 2017-08-08

## 2017-08-08 RX ORDER — ACETAMINOPHEN 500 MG
650 TABLET ORAL EVERY 6 HOURS
Qty: 0 | Refills: 0 | Status: DISCONTINUED | OUTPATIENT
Start: 2017-08-08 | End: 2017-08-08

## 2017-08-08 RX ORDER — DEXAMETHASONE 0.5 MG/5ML
2 ELIXIR ORAL EVERY 8 HOURS
Qty: 0 | Refills: 0 | Status: DISCONTINUED | OUTPATIENT
Start: 2017-08-08 | End: 2017-08-08

## 2017-08-08 RX ORDER — FAMOTIDINE 10 MG/ML
1 INJECTION INTRAVENOUS
Qty: 0 | Refills: 0 | COMMUNITY
Start: 2017-08-08

## 2017-08-08 RX ORDER — HYDROCHLOROTHIAZIDE 25 MG
25 TABLET ORAL DAILY
Qty: 0 | Refills: 0 | Status: DISCONTINUED | OUTPATIENT
Start: 2017-08-08 | End: 2017-08-09

## 2017-08-08 RX ORDER — HEPARIN SODIUM 5000 [USP'U]/ML
5000 INJECTION INTRAVENOUS; SUBCUTANEOUS EVERY 12 HOURS
Qty: 0 | Refills: 0 | Status: DISCONTINUED | OUTPATIENT
Start: 2017-08-08 | End: 2017-08-08

## 2017-08-08 RX ORDER — CLOPIDOGREL BISULFATE 75 MG/1
75 TABLET, FILM COATED ORAL DAILY
Qty: 0 | Refills: 0 | Status: DISCONTINUED | OUTPATIENT
Start: 2017-08-08 | End: 2017-08-08

## 2017-08-08 RX ORDER — HYDROCHLOROTHIAZIDE 25 MG
25 TABLET ORAL DAILY
Qty: 0 | Refills: 0 | Status: DISCONTINUED | OUTPATIENT
Start: 2017-08-08 | End: 2017-08-08

## 2017-08-08 RX ORDER — ATORVASTATIN CALCIUM 80 MG/1
40 TABLET, FILM COATED ORAL AT BEDTIME
Qty: 0 | Refills: 0 | Status: DISCONTINUED | OUTPATIENT
Start: 2017-08-08 | End: 2017-08-08

## 2017-08-08 RX ORDER — ONDANSETRON 8 MG/1
4 TABLET, FILM COATED ORAL EVERY 6 HOURS
Qty: 0 | Refills: 0 | Status: DISCONTINUED | OUTPATIENT
Start: 2017-08-08 | End: 2017-08-09

## 2017-08-08 RX ORDER — FAMOTIDINE 10 MG/ML
20 INJECTION INTRAVENOUS DAILY
Qty: 0 | Refills: 0 | Status: DISCONTINUED | OUTPATIENT
Start: 2017-08-08 | End: 2017-08-09

## 2017-08-08 RX ORDER — DEXAMETHASONE 0.5 MG/5ML
1 ELIXIR ORAL
Qty: 0 | Refills: 0 | COMMUNITY
Start: 2017-08-08

## 2017-08-08 RX ORDER — ACETAMINOPHEN 500 MG
2 TABLET ORAL
Qty: 0 | Refills: 0 | COMMUNITY
Start: 2017-08-08

## 2017-08-08 RX ORDER — ATORVASTATIN CALCIUM 80 MG/1
40 TABLET, FILM COATED ORAL ONCE
Qty: 0 | Refills: 0 | Status: COMPLETED | OUTPATIENT
Start: 2017-08-08 | End: 2017-08-08

## 2017-08-08 RX ORDER — ATORVASTATIN CALCIUM 80 MG/1
40 TABLET, FILM COATED ORAL AT BEDTIME
Qty: 0 | Refills: 0 | Status: DISCONTINUED | OUTPATIENT
Start: 2017-08-08 | End: 2017-08-09

## 2017-08-08 RX ORDER — ATORVASTATIN CALCIUM 80 MG/1
1 TABLET, FILM COATED ORAL
Qty: 0 | Refills: 0 | COMMUNITY
Start: 2017-08-08

## 2017-08-08 RX ORDER — METOPROLOL TARTRATE 50 MG
1 TABLET ORAL
Qty: 0 | Refills: 0 | COMMUNITY
Start: 2017-08-08

## 2017-08-08 RX ORDER — ASPIRIN/CALCIUM CARB/MAGNESIUM 324 MG
81 TABLET ORAL DAILY
Qty: 0 | Refills: 0 | Status: DISCONTINUED | OUTPATIENT
Start: 2017-08-08 | End: 2017-08-08

## 2017-08-08 RX ORDER — ATORVASTATIN CALCIUM 80 MG/1
1 TABLET, FILM COATED ORAL
Qty: 0 | Refills: 0 | COMMUNITY

## 2017-08-08 RX ORDER — METOPROLOL TARTRATE 50 MG
100 TABLET ORAL EVERY 12 HOURS
Qty: 0 | Refills: 0 | Status: DISCONTINUED | OUTPATIENT
Start: 2017-08-08 | End: 2017-08-09

## 2017-08-08 RX ORDER — FAMOTIDINE 10 MG/ML
20 INJECTION INTRAVENOUS DAILY
Qty: 0 | Refills: 0 | Status: DISCONTINUED | OUTPATIENT
Start: 2017-08-08 | End: 2017-08-08

## 2017-08-08 RX ORDER — OXYCODONE AND ACETAMINOPHEN 5; 325 MG/1; MG/1
2 TABLET ORAL EVERY 4 HOURS
Qty: 0 | Refills: 0 | Status: DISCONTINUED | OUTPATIENT
Start: 2017-08-08 | End: 2017-08-09

## 2017-08-08 RX ORDER — LOSARTAN POTASSIUM 100 MG/1
1 TABLET, FILM COATED ORAL
Qty: 0 | Refills: 0 | COMMUNITY
Start: 2017-08-08

## 2017-08-08 RX ORDER — DEXAMETHASONE 0.5 MG/5ML
2 ELIXIR ORAL EVERY 8 HOURS
Qty: 0 | Refills: 0 | Status: DISCONTINUED | OUTPATIENT
Start: 2017-08-08 | End: 2017-08-09

## 2017-08-08 RX ORDER — METOPROLOL TARTRATE 50 MG
100 TABLET ORAL
Qty: 0 | Refills: 0 | Status: DISCONTINUED | OUTPATIENT
Start: 2017-08-08 | End: 2017-08-08

## 2017-08-08 RX ORDER — OXYCODONE AND ACETAMINOPHEN 5; 325 MG/1; MG/1
1 TABLET ORAL EVERY 4 HOURS
Qty: 0 | Refills: 0 | Status: DISCONTINUED | OUTPATIENT
Start: 2017-08-08 | End: 2017-08-09

## 2017-08-08 RX ORDER — LOSARTAN POTASSIUM 100 MG/1
50 TABLET, FILM COATED ORAL EVERY 12 HOURS
Qty: 0 | Refills: 0 | Status: DISCONTINUED | OUTPATIENT
Start: 2017-08-08 | End: 2017-08-09

## 2017-08-08 RX ADMIN — FAMOTIDINE 20 MILLIGRAM(S): 10 INJECTION INTRAVENOUS at 12:49

## 2017-08-08 RX ADMIN — Medication 100 MILLIGRAM(S): at 09:26

## 2017-08-08 RX ADMIN — Medication 2 MILLIGRAM(S): at 12:49

## 2017-08-08 RX ADMIN — ATORVASTATIN CALCIUM 40 MILLIGRAM(S): 80 TABLET, FILM COATED ORAL at 05:31

## 2017-08-08 RX ADMIN — LOSARTAN POTASSIUM 50 MILLIGRAM(S): 100 TABLET, FILM COATED ORAL at 05:32

## 2017-08-08 RX ADMIN — Medication 25 MILLIGRAM(S): at 05:31

## 2017-08-08 NOTE — CONSULT NOTE ADULT - SUBJECTIVE AND OBJECTIVE BOX
Patient is a 62y old  Female who presents with a chief complaint of abnormal head  MRI scan 8/4/17      HPI:  Pt is a 62y woman with PMHx HTN, HLD, CVA / TIA on plavix and baby ASA, with recent hospitalization for Malignant hypertesion, lightheadedness and suspected TIA . Pt reports she had a follow up MRI  with contrast 3 days ago and was told by Dr. Iverson to come to the ER as she had a right sided brain mass. Pt was seen in  for uncontrolled HTN with Dizziness on 7/21/17 admitted over night CT was reported old cVA and mRI done reported abnormal rec f/u with contrast D/c home to f/u with PCP to have contrast scan which was done and yesterday I frecieved report enhancing leision Rt frontal area suggestive of Primary brain Glioma and recommended pt to come to Er for further w/u and admission after discussion with .   Pt denies fever, chills, leg pain, chest pain, abd pain, N/V/D.     In the ER she was seen by Dr. David (neurosurgery).  Pt had CT imaging of chest /abd/pelvis.     Fam HX:   Mother HTN had CVA at 66  Father HTN  Brother prostate ca dx at age 40 (08 Aug 2017 02:16)      PAST MEDICAL & SURGICAL HISTORY:  Transient cerebral ischemia, unspecified type  CVA 2014  Essential hypertension  No significant past surgical history      FAMILY HISTORY:  No pertinent family history in first degree relatives      Social Hx:  Nonsmoker, no drug or alcohol use    MEDICATIONS  (STANDING):  losartan 50 milliGRAM(s) Oral two times a day  atorvastatin 40 milliGRAM(s) Oral at bedtime  metoprolol succinate  milliGRAM(s) Oral two times a day  hydrochlorothiazide 25 milliGRAM(s) Oral daily  dexamethasone     Tablet 2 milliGRAM(s) Oral every 8 hours  famotidine    Tablet 20 milliGRAM(s) Oral daily       Allergies    No Known Allergies    ROS: Pertinent positives in HPI, all other ROS were reviewed and are negative.      Vital Signs Last 24 Hrs  T(C): 37 (08 Aug 2017 04:38), Max: 37 (07 Aug 2017 18:43)  T(F): 98.6 (08 Aug 2017 04:38), Max: 98.6 (07 Aug 2017 18:43)  HR: 88 (08 Aug 2017 09:24) (76 - 88)  BP: 146/88 (08 Aug 2017 09:24) (143/67 - 189/81)  BP(mean): --  RR: 17 (08 Aug 2017 09:24) (16 - 18)  SpO2: 95% (08 Aug 2017 04:38) (95% - 100%)        Constitutional: awake and alert.  HEENT: PERRLA, EOMI,   Neck: Supple.  Respiratory: Breath sounds are clear bilaterally  Cardiovascular: S1 and S2, regular rhythm  Gastrointestinal: soft, nontender  Extremities:  no edema  Vascular:  Carotid Bruit - no  Musculoskeletal: no joint swelling/tenderness, no abnormal movements  Skin: No rashes    Neurological exam:  HF: A x O x 3. Appropriately interactive, normal affect. Speech fluent, No Aphasia or paraphasic errors. Naming /repetition intact   CN: ANGELITA, EOMI, VFF, facial sensation normal, no NLFD, tongue midline, Palate moves equally, SCM equal bilaterally  Motor: No pronator drift, Strength 5/5 in all 4 ext, normal bulk and tone, no tremor, rigidity or bradykinesia.    Sens: Intact to light touch / PP/ VS/ JS    Reflexes: Symmetric and normal . BJ 2+, BR 2+, KJ 2+, AJ 2+, downgoing toes b/l  Coord:  Nodysmetria, IFRAH intact   Gait/Balance: Normal mild imbalance from knee problems      Labs:   08-08    133<L>  |  98  |  16  ----------------------------<  129<H>  3.9   |  25  |  0.82    Ca    9.8      08 Aug 2017 05:30    TPro  7.3  /  Alb  3.5  /  TBili  0.5  /  DBili  x   /  AST  16  /  ALT  30  /  AlkPhos  109  08-07    07-22 Chol 104 LDL 31 HDL 31<L> Trig 212<H>  07-21 [id number] 5.7                          12.3   14.8  )-----------( 336      ( 07 Aug 2017 21:48 )             36.5       Radiology:  - CT Head: 7/21/17    IMPRESSION:       No acute intracranial findings.    - MRI brain7/21/17    IMPRESSION:   2.1 cm abnormal signal within the RIGHT frontal cortex as   well as the RIGHT insular cortex with gyral expansion. This appearance is   worrisome for a primary neoplasm and intravenous gadolinium recommended   for further evaluation.

## 2017-08-08 NOTE — CONSULT NOTE ADULT - SUBJECTIVE AND OBJECTIVE BOX
61 YO WF  WITH KNOWN INTRACRANIAL LESION FOUND TO HAVE A CYSTIC LEFT ADNEXAL MASS AND AN ENLARGED FIBROID UTERUS.   PAST MEDICAL HX: HTN  PAST SURGICAL HX; KNEE SURGERY  OB GYN HX; NULL PAROUS  NO PAP HISTORY  LAST MAMMO >7 YEARS AGO  FAMILY HX; PROSTATE CANCER IN FATHER AND BROTHER. DENIES OVARIAN, COLON OR UTERINE CANCER  ALLERGIES; NKDA  ROS AS ABOVE  GENERAL WD WN WF IN NAD  Vital Signs Last 24 Hrs  T(C): 37 (08 Aug 2017 11:16), Max: 37 (07 Aug 2017 18:43)  T(F): 98.6 (08 Aug 2017 11:16), Max: 98.6 (07 Aug 2017 18:43)  HR: 88 (08 Aug 2017 11:16) (76 - 88)  BP: 154/78 (08 Aug 2017 11:16) (143/67 - 189/81)  BP(mean): --  RR: 16 (08 Aug 2017 11:16) (16 - 18)  SpO2: 95% (08 Aug 2017 11:16) (95% - 100%)  HEENT: NCAT PERRLA EOMI INTACT  CHEST CLEAR  ABDOMEN: DISTENDED, MASS PALPATED ARISING FROM THE PELVIS  EXT -C/C/E                        12.3   14.8  )-----------( 336      ( 07 Aug 2017 21:48 )             36.5   08-    133<L>  |  98  |  16  ----------------------------<  129<H>  3.9   |  25  |  0.82    Ca    9.8      08 Aug 2017 05:30    TPro  7.3  /  Alb  3.5  /  TBili  0.5  /  DBili  x   /  AST  16  /  ALT  30  /  AlkPhos  109  08-07  HPI:  Pt is a 62y woman with PMHx HTN, HLD, CVA / TIA on plavix and baby ASA, with recent hospitalization for Malignant hypertesion, lightheadedness and suspected TIA . Pt reports she had a follow up MRI  with contrast 3 days ago and was told by Dr. Iverson to come to the ER as she had a right sided brain mass.    Pt denies fever, chills, leg pain, chest pain, abd pain, N/V/D.     In the ER she was seen by Dr. David (neurosurgery).  Pt had CT imaging of chest /abd/pelvis.     Fam HX:   Mother HTN had CVA at 66  Father HTN  Brother prostate ca dx at age 40 (08 Aug 2017 02:16)      PAST MEDICAL & SURGICAL HISTORY:  Transient cerebral ischemia, unspecified type  Essential hypertension  No significant past surgical history      REVIEW OF SYSTEMS      General:	    Skin/Breast:  	    Allergic/Immunologic:	    MEDICATIONS  (STANDING):  losartan 50 milliGRAM(s) Oral two times a day  atorvastatin 40 milliGRAM(s) Oral at bedtime  metoprolol succinate  milliGRAM(s) Oral two times a day  hydrochlorothiazide 25 milliGRAM(s) Oral daily  dexamethasone     Tablet 2 milliGRAM(s) Oral every 8 hours  famotidine    Tablet 20 milliGRAM(s) Oral daily    MEDICATIONS  (PRN):  acetaminophen   Tablet. 650 milliGRAM(s) Oral every 6 hours PRN Mild Pain (1 - 3)          Vital Signs Last 24 Hrs  T(C): 37 (08 Aug 2017 11:16), Max: 37 (07 Aug 2017 18:43)  T(F): 98.6 (08 Aug 2017 11:16), Max: 98.6 (07 Aug 2017 18:43)  HR: 88 (08 Aug 2017 11:16) (76 - 88)  BP: 154/78 (08 Aug 2017 11:16) (143/67 - 189/81)  BP(mean): --  RR: 16 (08 Aug 2017 11:16) (16 - 18)  SpO2: 95% (08 Aug 2017 11:16) (95% - 100%)    PHYSICAL EXAM:      Constitutional:    Eyes:    :         LABS:                        12.3   14.8  )-----------( 336      ( 07 Aug 2017 21:48 )             36.5         133<L>  |  98  |  16  ----------------------------<  129<H>  3.9   |  25  |  0.82    Ca    9.8      08 Aug 2017 05:30    TPro  7.3  /  Alb  3.5  /  TBili  0.5  /  DBili  x   /  AST  16  /  ALT  30  /  AlkPhos  109  08-    PT/INR - ( 07 Aug 2017 21:48 )   PT: 12.5 sec;   INR: 1.15 ratio         PTT - ( 07 Aug 2017 21:48 )  PTT:29.1 sec      RADIOLOGY & ADDITIONAL STUDIES:

## 2017-08-08 NOTE — CONSULT NOTE ADULT - SUBJECTIVE AND OBJECTIVE BOX
HPI:63 yo female with a history of TIA in the past had [facility]pt [facility] for follow up with Dr Iverson and was found to have a Right frontal brain mass. Pt denies any numbness, tingling, weakness, or falls. Pt was sent to the ER for further treatment and management. Pt was seen and examined by Dr David in the ER.   Ct C/A/P obtained to r/o mets showing a Large adnexal cystic mass. Would recommend pt have medical admission, Gyn consult, oncology consult and MRI of abdomen to further work up.   Pt currently on ASA and Plavix would recommend pt having these held and pt to have a full cardiac clearance if a brain biopsy and resection is warranted. Will start pt on decadron 2mg q 8 hrs PO. If pt abdominal mass is found to be begin will plan for possible brain biopsy and resection of mass in 7-10 days.   This was all discussed with the pt and her family in the ER who are agreeable to the plan.       PAST MEDICAL & SURGICAL HISTORY:  Transient cerebral ischemia, unspecified type  Essential hypertension  No significant past surgical history      Allergies    No Known Allergies    Intolerances        MEDICATIONS  (STANDING):    MEDICATIONS  (PRN):      SOCIAL HISTORY:    FAMILY HISTORY:  No pertinent family history in first degree relatives      Vital Signs Last 24 Hrs  T(C): 37 (07 Aug 2017 18:43), Max: 37 (07 Aug 2017 18:43)  T(F): 98.6 (07 Aug 2017 18:43), Max: 98.6 (07 Aug 2017 18:43)  HR: 81 (07 Aug 2017 18:43) (81 - 81)  BP: 189/81 (07 Aug 2017 18:43) (189/81 - 189/81)  BP(mean): --  RR: 16 (07 Aug 2017 18:43) (16 - 16)  SpO2: 100% (07 Aug 2017 18:43) (100% - 100%)    LABS:                        12.3   14.8  )-----------( 336      ( 07 Aug 2017 21:48 )             36.5     08-07    132<L>  |  99  |  15  ----------------------------<  85  3.6   |  25  |  0.77    Ca    9.5      07 Aug 2017 21:48    TPro  7.3  /  Alb  3.5  /  TBili  0.5  /  DBili  x   /  AST  16  /  ALT  30  /  AlkPhos  109  08-07    PT/INR - ( 07 Aug 2017 21:48 )   PT: 12.5 sec;   INR: 1.15 ratio         PTT - ( 07 Aug 2017 21:48 )  PTT:29.1 sec      RADIOLOGY & ADDITIONAL STUDIES:  ~~~~~~~~~~~~~~~~~~~~~~~~~~~~~~ HPI:61 yo female with a history of TIA in the past had outpt [facility] for follow up with Dr Iverson and was found to have a Right frontal brain mass. Pt denies any numbness, tingling, weakness, or falls. Pt was sent to the ER for further treatment and management. Pt was seen and examined by Dr David in the ER.   Ct C/A/P obtained to r/o mets showing a Large adnexal cystic mass. Would recommend pt have medical admission, Gyn consult, oncology consult and MRI of abdomen to further work up.   Pt currently on ASA and Plavix would recommend pt having these held and pt to have a full cardiac clearance if a brain biopsy and resection is warranted. Will start pt on decadron 2mg q 8 hrs PO. If pt abdominal mass is found to be begin will plan for possible brain biopsy and resection of mass in 7-10 days.   This was all discussed with the pt and her family in the ER who are agreeable to the plan.       PAST MEDICAL & SURGICAL HISTORY:  Transient cerebral ischemia, unspecified type  Essential hypertension  HLD    Allergies    No Known Allergies    Intolerances        MEDICATIONS  (STANDING):  ASA 81mg  plavix  atorvastatin  HCTZ  Metopropol  losartan     MEDICATIONS  (PRN):      SOCIAL HISTORY:    FAMILY HISTORY:  No pertinent family history in first degree relatives      Vital Signs Last 24 Hrs  T(C): 37 (07 Aug 2017 18:43), Max: 37 (07 Aug 2017 18:43)  T(F): 98.6 (07 Aug 2017 18:43), Max: 98.6 (07 Aug 2017 18:43)  HR: 81 (07 Aug 2017 18:43) (81 - 81)  BP: 189/81 (07 Aug 2017 18:43) (189/81 - 189/81)  BP(mean): --  RR: 16 (07 Aug 2017 18:43) (16 - 16)  SpO2: 100% (07 Aug 2017 18:43) (100% - 100%)    LABS:                        12.3   14.8  )-----------( 336      ( 07 Aug 2017 21:48 )             36.5     08-07    132<L>  |  99  |  15  ----------------------------<  85  3.6   |  25  |  0.77    Ca    9.5      07 Aug 2017 21:48    TPro  7.3  /  Alb  3.5  /  TBili  0.5  /  DBili  x   /  AST  16  /  ALT  30  /  AlkPhos  109  08-07    PT/INR - ( 07 Aug 2017 21:48 )   PT: 12.5 sec;   INR: 1.15 ratio         PTT - ( 07 Aug 2017 21:48 )  PTT:29.1 sec      RADIOLOGY & ADDITIONAL STUDIES:  ~~~~~~~~~~~~~~~~~~~~~~~~~~~~~~  CT Chest/Ab/pelvis- IMPRESSION:    Indeterminate 15.1 x 11.8 x 13.1 cm left adnexal cystic lesion. Neoplasm   is included in the differential diagnosis, particularly in a   postmenopausal patient. Please note ovarian torsion cannot be excluded on   imaging given the size. Recommend clinical correlation.

## 2017-08-08 NOTE — DISCHARGE NOTE ADULT - HOSPITAL COURSE
62y woman with PMHx HTN, HLD, CVA / TIA on plavix and baby ASA, with recent hospitalization for Malignant hypertesion, lightheadedness and suspected TIA . Pt reports she had a follow up MRI  with contrast 3 days ago and was told by Dr. Iverson to come to the ER as she had a right sided brain mass. Patient admitted on 8/7/17      In the ER she was seen by Dr. David (neurosurgery).  Pt had CT imaging of chest /abd/pelvis which show an   Indeterminate 15.1 x 11.8 x 13.1 cm left adnexal cystic lesion.    I. Brain mass,  on recent MRI ,  unknown whether it is primary or secondary.   Initial eval for primary malignancy:   CT scan chest/abd/pelv done tonight.    - apprec Neurosurg consult  - decadron started by Neurosurg  - CT abd pelvis shows 15.1 x 11.8 x 13.1 cm left adnexal cystic lesion.  - pelvic US and MRI abd/pelvis with contrast pending  -  - pending  - GYN consult - will benefits from transfer to  for GYN-oncology evaluation   - Dr. Mathews accepting physician - transfer arranged  - Neurosurgery  d/w dr. David - patient needs cardiac clearance prior surgery given h/o HTN and TIA, ASA and plavix needs to be on hold at least for 4-5 days prior the surgery - brain mass resection  - Plavix and ASA on hold for possible pelvic surgery or biopsy pending GYN-oncology evaluation    II. HTN,   - cont meds    III. h/o TIA  - resume ASA , plavix if no immediate  plan for bx or surgery    III. GYN consult  - GYN- ONcoogy evaluation in     - MRI of abd/pelv - pending      Disposition - patient stable for discharge to  for further GYN-Oncology evaluation to r/o underlying ovarian malignancy   patient advised to follow up with PCP within 1 week, with Dr. David within 1 week as well as GYN-oncology for further evaluation of ovarian mass within 1 week 62y woman with PMHx HTN, HLD, CVA / TIA on plavix and baby ASA, with recent hospitalization for Malignant hypertesion, lightheadedness and suspected TIA . Pt reports she had a follow up MRI  with contrast 3 days ago and was told by Dr. Iverson to come to the ER as she had a right sided brain mass. Patient admitted on 8/7/17      In the ER she was seen by Dr. David (neurosurgery).  Pt had CT imaging of chest /abd/pelvis which show an   Indeterminate 15.1 x 11.8 x 13.1 cm left adnexal cystic lesion.    I. Brain mass,  on recent MRI ,  unknown whether it is primary or secondary.   Initial eval for primary malignancy:   CT scan chest/abd/pelv done tonight.    - apprec Neurosurg consult  - decadron started by Neurosurg  - CT abd pelvis shows 15.1 x 11.8 x 13.1 cm left adnexal cystic lesion.  - pelvic US and MRI abd/pelvis with contrast pending  -  - pending  - GYN consult - will benefits from transfer to  for GYN-oncology evaluation   - Dr. Mathews accepting physician - transfer arranged  - Neurosurgery  d/w dr. David - patient needs cardiac clearance prior surgery given h/o HTN and TIA, ASA and plavix needs to be on hold at least for 4-5 days prior the surgery - brain mass resection  - Plavix and ASA on hold for possible pelvic surgery or biopsy pending GYN-oncology evaluation    II. HTN,   - cont meds    III. h/o TIA  - resume ASA , plavix if no immediate  plan for bx or surgery    III. GYN consult  - GYN- ONcoogy evaluation in     - MRI of abd/pelv - pending      Disposition - patient stable for discharge to  for further GYN-Oncology evaluation to r/o underlying ovarian malignancy   patient advised to follow up with PCP within 1 week, with Dr. David within 1 week as well as GYN-oncology for further evaluation of ovarian mass within 1 week    Notified PCp Dr. Armstrong office , spoke with NP Robert Larios 62y woman with PMHx HTN, HLD, CVA / TIA on plavix and baby ASA, with recent hospitalization for Malignant hypertesion, lightheadedness and suspected TIA . Pt reports she had a follow up MRI  with contrast 3 days ago and was told by Dr. Iverson to come to the ER as she had a right sided brain mass. Patient admitted on 8/7/17      In the ER she was seen by Dr. David (neurosurgery).  Pt had CT imaging of chest /abd/pelvis which show an   Indeterminate 15.1 x 11.8 x 13.1 cm left adnexal cystic lesion.    8/8/17 - pt denies CP, palpitations, dyspnea, abdominal pain  T(C): 37 (08-08-17 @ 11:16), Max: 37 (08-07-17 @ 18:43)  T(F): 98.6 (08-08-17 @ 11:16), Max: 98.6 (08-07-17 @ 18:43)  HR: 88 (08-08-17 @ 11:16) (76 - 88)  BP: 154/78 (08-08-17 @ 11:16) (143/67 - 189/81)  RR: 16 (08-08-17 @ 11:16) (16 - 18)  SpO2: 95% (08-08-17 @ 11:16) (95% - 100%)  Wt(kg): --    PHYSICAL EXAM:    Constitutional: NAD, awake and alert, well-developed  HEENT: PERR, EOMI, Normal Hearing, MMM  Neck: Soft and supple, No LAD, No JVD  Respiratory: Breath sounds are clear bilaterally, No wheezing, rales or rhonchi  Cardiovascular: S1 and S2, regular rate and rhythm, no Murmurs, gallops or rubs  Gastrointestinal: Bowel Sounds present, soft, nontender, nondistended, no guarding, no rebound  Extremities: No peripheral edema  Vascular: 2+ peripheral pulses  Neurological: A/O x 3, no focal deficits  Musculoskeletal: 5/5 strength b/l upper and lower extremities  Skin: No rashes  rectal : deferred, not indicated        I. Brain mass,  on recent MRI ,  unknown whether it is primary or secondary.   Initial eval for primary malignancy:   CT scan chest/abd/pelv done tonight.    - apprec Neurosurg consult  - decadron started by Neurosurg  - CT abd pelvis shows 15.1 x 11.8 x 13.1 cm left adnexal cystic lesion.  - pelvic US and MRI abd/pelvis with contrast pending  -  - pending  - GYN consult - will benefits from transfer to  for GYN-oncology evaluation   - Dr. Mathews accepting physician - transfer arranged  - Neurosurgery  d/w dr. David - patient needs cardiac clearance prior surgery given h/o HTN and TIA, ASA and plavix needs to be on hold at least for 4-5 days prior the surgery - brain mass resection  - Plavix and ASA on hold for possible pelvic surgery or biopsy pending GYN-oncology evaluation    II. HTN,   - cont meds    III. h/o TIA  - resume ASA , plavix if no immediate  plan for bx or surgery    III. GYN consult  - GYN- ONcoogy evaluation in     - MRI of abd/pelv - pending      Disposition - patient stable for discharge to  for further GYN-Oncology evaluation to r/o underlying ovarian malignancy   patient advised to follow up with PCP within 1 week, with Dr. David within 1 week as well as GYN-oncology for further evaluation of ovarian mass within 1 week    Notified PCp Dr. Armstrong office , spoke with NP Robert Larios, discharge note faxed to PCP office 62y woman with PMHx HTN, HLD, CVA / TIA on plavix and baby ASA, with recent hospitalization for Malignant hypertesion, lightheadedness and suspected TIA . Pt reports she had a follow up MRI  with contrast 3 days ago and was told by Dr. Iverson to come to the ER as she had a right sided brain mass. Patient admitted on 8/7/17      In the ER she was seen by Dr. David (neurosurgery).  Pt had CT imaging of chest /abd/pelvis which show an   Indeterminate 15.1 x 11.8 x 13.1 cm left adnexal cystic lesion.    8/8/17 - pt denies CP, palpitations, dyspnea, abdominal pain  T(C): 37 (08-08-17 @ 11:16), Max: 37 (08-07-17 @ 18:43)  T(F): 98.6 (08-08-17 @ 11:16), Max: 98.6 (08-07-17 @ 18:43)  HR: 88 (08-08-17 @ 11:16) (76 - 88)  BP: 154/78 (08-08-17 @ 11:16) (143/67 - 189/81)  RR: 16 (08-08-17 @ 11:16) (16 - 18)  SpO2: 95% (08-08-17 @ 11:16) (95% - 100%)  Wt(kg): --    PHYSICAL EXAM:    Constitutional: NAD, awake and alert, well-developed  HEENT: PERR, EOMI, Normal Hearing, MMM  Neck: Soft and supple, No LAD, No JVD  Respiratory: Breath sounds are clear bilaterally, No wheezing, rales or rhonchi  Cardiovascular: S1 and S2, regular rate and rhythm, no Murmurs, gallops or rubs  Gastrointestinal: Bowel Sounds present, soft, nontender, nondistended, no guarding, no rebound  Extremities: No peripheral edema  Vascular: 2+ peripheral pulses  Neurological: A/O x 3, no focal deficits  Musculoskeletal: 5/5 strength b/l upper and lower extremities  Skin: No rashes  rectal : deferred, not indicated        I. Brain mass,  on recent MRI ,  unknown whether it is primary or secondary.   Initial eval for primary malignancy:   CT scan chest/abd/pelv done tonight.    - apprec Neurosurg consult  - decadron started by Neurosurg  - CT abd pelvis shows 15.1 x 11.8 x 13.1 cm left adnexal cystic lesion.  - pelvic US -18.1 cm cystic left adnexal mass containing a single septation compatible   with ovarian neoplasm.    and MRI abd/pelvis with contrast pending  -  - pending  - GYN consult - will benefits from transfer to  for GYN-oncology evaluation   - Dr. Mathews accepting physician - transfer arranged  - Neurosurgery  d/w dr. David - patient needs cardiac clearance prior surgery given h/o HTN and TIA, ASA and plavix needs to be on hold at least for 4-5 days prior the surgery - brain mass resection  - Plavix and ASA on hold for possible pelvic surgery or biopsy pending GYN-oncology evaluation    II. HTN,   - cont meds    III. h/o TIA  - resume ASA , plavix if no immediate  plan for bx or surgery    III. GYN consult  - GYN- ONcoogy evaluation in     - MRI of abd/pelv - pending      Disposition - patient stable for discharge to  for further GYN-Oncology evaluation to r/o underlying ovarian malignancy   patient advised to follow up with PCP within 1 week, with Dr. David within 1 week as well as GYN-oncology for further evaluation of ovarian mass within 1 week    Notified PCp Dr. Armstrong office , spoke with NP Robert Larios, discharge note faxed to PCP office

## 2017-08-08 NOTE — H&P ADULT - HISTORY OF PRESENT ILLNESS
63 y/o female presents to the ED for HTN with onset in the past few days with lightheadedness. States she could shake her head and feel better. PMD is Dr. Ring who was unable to treat HTN, ordered MRI of the head. Had MRI with contrast this past Friday about 3 days ago and was told to visit the ED for visit with Dr. David (neurosurgery) for findings. Denies fever, chills, leg pain, abd pain, N/V/D. Pt is a 62y woman with PMHx HTN, HLD, CVA / TIA on plavix and baby ASA, with recent hospitalization for Malignant hypertesion, lightheadedness and suspected TIA . Pt reports she had a follow up MRI  with contrast 3 days ago and was told by Dr. Iverson to come to the ER as she had a right sided brain mass.    Pt denies fever, chills, leg pain, chest pain, abd pain, N/V/D.     In the ER she was seen by Dr. David (neurosurgery).  Pt had CT imaging of chest /abd/pelvis.     Fam HX:   Mother HTN had CVA at 66  Father HTN  Brother prostate ca dx at age 40

## 2017-08-08 NOTE — ED PROVIDER NOTE - OBJECTIVE STATEMENT
63 y/o F with hx of brain mass due for upcoming sx was sent for eval with Dr Mathews for newly identified pelvic mass pt has no c/o just wants to get up to her bed

## 2017-08-08 NOTE — DISCHARGE NOTE ADULT - CARE PROVIDERS DIRECT ADDRESSES
,DirectAddress_Unknown,[email or web address]

## 2017-08-08 NOTE — CONSULT NOTE ADULT - ASSESSMENT
PT WITH SUSPECT LEFT ADNEXAL MASS AND FIBROID UTERUS. FINDINGS DISCUSSED WITH PATIENT, GYN ONCOLOGY AND HOSPITALIST. WILL ARRANGE TRANSFER TO [facility] TO THE CARE OF DR. LALI ORTA.  PENDING
Preop- Her primary cardiologist ordered a stress test for her for further evaluation for ischemia.   Pt wants to followup with him as outpt and have the stress test prior to her anticipated brain surgery.    TIA- in the past.  Per neurology team her ASA and plavix can be held for surgery.  NO other cardiac indications for her use of antiplatelet therapy at this time.    HTN - cotinue current meds.    Hyperlipidemia- continue statin.    Thank you for allowing me to participate in the care of this patient. Please feel free to contact me with any questions.
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  REC Continue Decadron 4mg q 6hrs.  Metastatic w/u.Ct abdomen/pelvis/Chest.  Biopsy /Resection depending on test results per .  Will f/u.  Discussed with pt and her Neice at bedside.  Adequate control BP.  F/u with Cardiology.  Will F/u as needed.
63 yo female with Right frontal brain lesion   - all above d/w DR David who personally reviewed pt films and formulated the plan

## 2017-08-08 NOTE — ED ADULT NURSE NOTE - CHIEF COMPLAINT QUOTE
Patient BIBA, transfer from [facility] for adnexal mass for evaluation by Dr Mathews GYN-Oncology.  Patient offering no complaints at this time, denies any pain or discomfort.

## 2017-08-08 NOTE — CONSULT NOTE ADULT - CONSULT REASON
POST MENOPAUSAL ADNEXAL MASS
Rt frontal Tumor/Glioma
preoperative assesment
Right frontal brain mass

## 2017-08-08 NOTE — CONSULT NOTE ADULT - PROBLEM SELECTOR RECOMMENDATION 9
- pt started on Decadron 2mg q8 hrs  - pt needs to have full cardiac clearance and have ASA and Plavix held   - will plan for possible brain biopsy and resection in 7-10 days

## 2017-08-08 NOTE — ED ADULT TRIAGE NOTE - CHIEF COMPLAINT QUOTE
Patient BIBA, transfer from [facility] for adnexal mass for evaluation by Dr Mathews GYN-Oncology.  Patient offering no complaints at this time, denies any pain or discomfort.

## 2017-08-08 NOTE — H&P ADULT - NSHPREVIEWOFSYSTEMS_GEN_ALL_CORE
ICU Vital Signs Last 24 Hrs    T(F): 98.6 (07 Aug 2017 18:43), Max: 98.6 (07 Aug 2017 18:43)    HR: 81 (07 Aug 2017 18:43) (81 - 81)    BP: 189/81 (07 Aug 2017 18:43) (189/81 - 189/81)    RR: 16   SpO2: 100%

## 2017-08-08 NOTE — H&P ADULT - ASSESSMENT
Pt is a 62y woman with PMHx HTN, HLD, CVA / TIA on plavix and baby ASA, with recent hospitalization for Malignant hypertesion  and lightheadedness. Pt reports she had a follow up MRI  with contrast 3 days ago and was told by Dr. Iverson to come to the ER as she had a right sided brain mass.    Pt denies fever, chills, leg pain, abd pain, N/V/D.     In the ER she was seen by Dr. David (neurosurgery).  Pt had CT imaging of chest /abd/pelvis which show an   Indeterminate 15.1 x 11.8 x 13.1 cm left adnexal cystic lesion.    Pt is admitted with    I. Brain mass,  on recent MRI after tx for suspected TIA and Malignant HTN  - apprec Neurosurg consult  - CT abd pelvis shows 15.1 x 11.8 x 13.1 cm left adnexal cystic lesion.  - pelvic US and MRI abd/pelvis with contrast  - Oncology and GYN Oncology consult  - NPO  - will hold ASA, plavix and DVT prevention dose of heparin for possible biopsy of abd mass or brain bx  - team to review plan of care with Dr. David in AM    II. HTN, TIA hx  - cont meds  - resume ASA , plavix if no immed plan for bx    III. GYN consult  - pt needs pap smear     IV. DVT prophylaxis  - SCDS  - resume heparin subQ if pt requires hospitalization and no immed plans for bx Pt is a 62y woman with PMHx HTN, HLD, CVA / TIA on plavix and baby ASA, with recent hospitalization for Malignant hypertension  and lightheadedness. Pt reports she had a follow up MRI  with contrast 3 days ago and was told by Dr. Iverson to come to the ER as she had a right sided brain mass.    Pt denies fever, chills, leg pain, abd pain, N/V/D.     In the ER she was seen by Dr. David (neurosurgery).  Pt had CT imaging of chest /abd/pelvis which show an   Indeterminate 15.1 x 11.8 x 13.1 cm left adnexal cystic lesion.    Pt is admitted with    I. Brain mass,  on recent MRI ,  unknown whether it is primary or secondary.   Initial eval for primary malignancy: CT scan chest/abd/pelv done tonight.    - apprec Neurosurg consult  - decadron started by Neurosurg  - will add protonix  - CT abd pelvis shows 15.1 x 11.8 x 13.1 cm left adnexal cystic lesion.  - pelvic US and MRI abd/pelvis with contrast pending  - Oncology and GYN Oncology consult  - NPO  - will hold ASA, plavix and DVT prevention dose of heparin for possible biopsy of abd mass or brain bx  - team to review plan of care with Dr. David in AM    II. HTN, TIA hx  - cont meds  - resume ASA , plavix if no immed plan for bx    III. GYN consult  - pt needs pap smear     IV. DVT prophylaxis  - SCDS  - resume heparin subQ if pt requires hospitalization and no immed plans for bx

## 2017-08-08 NOTE — DISCHARGE NOTE ADULT - PROVIDER TOKENS
FREE:[LAST:[bull],FIRST:[Roger ABREU],PHONE:[phone]],FAX:[phone]],ADDRESS:[[address] [address]]],TOKEN:'[id number]'

## 2017-08-08 NOTE — PATIENT PROFILE ADULT. - NS TRANSFER PATIENT BELONGINGS
Cell Phone/PDA (specify)/Other belongings/Wrist Watch/ipad, st [employer] medals, ring, watch, credit cards/gift cards in wallet/Clothing/Jewelry/Money (specify)/Electronic Device (specify)

## 2017-08-08 NOTE — ED ADULT NURSE NOTE - OBJECTIVE STATEMENT
Assumed pt care @ 2000. Pt received sitting on stretcher in NAD. Pt AOx3 transfer from [facility]. Pt reports them finding a brain mass after going in for HTN and pelvic mass and tx here for obgyn oncology. Neuro WNL.  Lungs CTA, RR even unlabored. Abd soft non tender, + bowel sounds x 4quads. Denies Nausea, Vomiting, Diarrhea. Skin warm, dry, color appropriate for age and race.

## 2017-08-08 NOTE — PROVIDER CONTACT NOTE (OTHER) - SITUATION
L adnexal mass, possible ovarian ca    left message with answering service;  aware of consult.
Left a message with the answer service
Spoke with Lady in answering service regarding consult
spoke with Azul in office
spoke with Elis in office regarding consult

## 2017-08-08 NOTE — H&P ADULT - NSHPSOCIALHISTORY_GEN_ALL_CORE
Pt lives with her boyfriend.  She does not have children.  She is the president of a company.  No tob/ETOH/drug.

## 2017-08-08 NOTE — CONSULT NOTE ADULT - SUBJECTIVE AND OBJECTIVE BOX
HPI:  This 62 year old female presents to the ER as a transfer from [facility]. Patient was sent there by her neurologist for workup of outpatient MRI of brain showing lesion. Metastatic workup revealed pelvic mass as well. Patient denies any blurry vision, headaches, weakness, numbness, neurological symptoms, pelvic pain or pressure, vaginal bleeding, fevers or any other complaints. Patient reports that her LMP was in 2011. She reports that she last took her ASA and plavix yesterday. (08 Aug 2017 18:36)      PAST MEDICAL & SURGICAL HISTORY:  High cholesterol  Transient cerebral ischemia, unspecified type  Essential hypertension  No significant past surgical history  Breast Lipoma removal  Brain Mass  Pelvic Mass/ Cyst    oxyCODONE    5 mG/acetaminophen 325 mG 1 Tablet(s) Oral every 4 hours PRN  oxyCODONE    5 mG/acetaminophen 325 mG 2 Tablet(s) Oral every 4 hours PRN  ondansetron Injectable 4 milliGRAM(s) IV Push every 6 hours PRN  atorvastatin 40 milliGRAM(s) Oral at bedtime  dexamethasone     Tablet 2 milliGRAM(s) Oral every 8 hours  famotidine    Tablet 20 milliGRAM(s) Oral daily  hydrochlorothiazide 25 milliGRAM(s) Oral daily  metoprolol 100 milliGRAM(s) Oral every 12 hours  losartan 50 milliGRAM(s) Oral every 12 hours    MEDICATIONS  (STANDING):  atorvastatin 40 milliGRAM(s) Oral at bedtime  dexamethasone     Tablet 2 milliGRAM(s) Oral every 8 hours  famotidine    Tablet 20 milliGRAM(s) Oral daily  hydrochlorothiazide 25 milliGRAM(s) Oral daily  metoprolol 100 milliGRAM(s) Oral every 12 hours  losartan 50 milliGRAM(s) Oral every 12 hours    MEDICATIONS  (PRN):  oxyCODONE    5 mG/acetaminophen 325 mG 1 Tablet(s) Oral every 4 hours PRN Moderate Pain  oxyCODONE    5 mG/acetaminophen 325 mG 2 Tablet(s) Oral every 4 hours PRN Severe Pain  ondansetron Injectable 4 milliGRAM(s) IV Push every 6 hours PRN Nausea and/or Vomiting      Allergies    No Known Allergies    Intolerances        SOCIAL HISTORY:  No S/D/IVDU    FAMILY HISTORY:  No pertinent family history in first degree relatives      LABS:                        12.3   14.8  )-----------( 336      ( 07 Aug 2017 21:48 )             36.5     08-08    133<L>  |  98  |  16  ----------------------------<  129<H>  3.9   |  25  |  0.82    Ca    9.8      08 Aug 2017 05:30    TPro  7.3  /  Alb  3.5  /  TBili  0.5  /  DBili  x   /  AST  16  /  ALT  30  /  AlkPhos  109  08-07    PT/INR - ( 07 Aug 2017 21:48 )   PT: 12.5 sec;   INR: 1.15 ratio         PTT - ( 07 Aug 2017 21:48 )  PTT:29.1 sec        ROS    Mild Headache  - Neck Stiffness  - Chest Pain  - SOB  - Abd pain  - Pelvic Pain  - Leg Pain        Vital Signs Last 24 Hrs  T(C): 37.1 (08 Aug 2017 23:27), Max: 37.1 (08 Aug 2017 23:27)  T(F): 98.7 (08 Aug 2017 23:27), Max: 98.7 (08 Aug 2017 23:27)  HR: 69 (08 Aug 2017 23:27) (69 - 88)  BP: 163/84 (08 Aug 2017 23:27) (143/67 - 168/90)  BP(mean): --  RR: 18 (08 Aug 2017 23:27) (16 - 18)  SpO2: 94% (08 Aug 2017 23:27) (94% - 100%)      HEENT: PEARLA  Neck: Supple  Cardio: S1 S2 No Murmur  Pulm: CTA No Rales or Ronchi  Abd: Soft NT ND BS+  Rectal Breast Pelvic - refused  Ext: No DCT  Skin: No Rash No SSL  Neuro: Awake Pleasant    HTN - Cont meds with parameters  Brain Mass  Pelvic Mass - Gyn onc surgery medically cleared for surgery increase risk secondary to multiple comorbidities   TIA - Will D/c Plavix and Asa for surgery risk of CVA accepted patient also received permission from neurologist

## 2017-08-08 NOTE — DISCHARGE NOTE ADULT - OTHER SIGNIFICANT FINDINGS
Comprehensive Metabolic Panel (08.07.17 @ 21:48)    Sodium, Serum: 132 mmol/L    Potassium, Serum: 3.6 mmol/L    Chloride, Serum: 99 mmol/L    Carbon Dioxide, Serum: 25 mmol/L    Anion Gap, Serum: 8 mmol/L    Blood Urea Nitrogen, Serum: 15 mg/dL    Creatinine, Serum: 0.77 mg/dL    Glucose, Serum: 85 mg/dL    Calcium, Total Serum: 9.5 mg/dL    Protein Total, Serum: 7.3 gm/dL    Albumin, Serum: 3.5 g/dL    Bilirubin Total, Serum: 0.5 mg/dL    Alkaline Phosphatase, Serum: 109 U/L    Aspartate Aminotransferase (AST/SGOT): 16 U/L    Alanine Aminotransferase (ALT/SGPT): 30 U/L        Complete Blood Count + Automated Diff (08.07.17 @ 21:48)    WBC Count: 14.8 K/uL    RBC Count: 4.48 M/uL    Hemoglobin: 12.3 g/dL    Hematocrit: 36.5 %    Mean Cell Volume: 81.4 fl    Mean Cell Hemoglobin: 27.5 pg    Mean Cell Hemoglobin Conc: 33.8 gm/dL    Red Cell Distrib Width: 13.3 %    Platelet Count - Automated: 336 K/uL    Auto Neutrophil #: 11.4 K/uL    Auto Lymphocyte #: 2.5 K/uL    Auto Monocyte #: 0.7 K/uL    Auto Eosinophil #: 0.1 K/uL    Auto Basophil #: 0.1 K/uL    Auto Neutrophil %: 77.1: Differential percentages must be correlated with absolute numbers for  clinical significance. %    Auto Lymphocyte %: 16.9 %    Auto Monocyte %: 4.7 %    Auto Eosinophil %: 0.9 %    Auto Basophil %: 0.5 %    Type + Screen (08.07.17 @ 21:48)    Type + Screen: A POS  Prothrombin Time and INR, Plasma in AM (08.07.17 @ 21:48)    Prothrombin Time, Plasma: 12.5: Effective March 21st, the reference range for PT has changed. sec    INR: 1.15 ratio    < from: US Transvaginal (08.08.17 @ 08:23) >  18.1 cm cystic left adnexal mass containing a single septation compatible   with ovarian neoplasm.    < end of copied text >  < from: CT Abdomen and Pelvis w/ IV Cont (08.07.17 @ 23:46) >  IMPRESSION:    Indeterminate 15.1 x 11.8 x 13.1 cm left adnexal cystic lesion. Neoplasm   is included in the differential diagnosis, particularly in a   postmenopausal patient. Please note ovarian torsion cannot be excluded on   imaging given the size. Recommend clinical correlation.    < end of copied text >  < from: 12 Lead ECG (08.08.17 @ 01:21) >  Normal sinus rhythm  Minimal voltage criteria for LVH, may be normal variant  Borderline ECG  When compared with ECG of 21-JUL-2017 14:26,  No significant change was found    < end of copied text > Comprehensive Metabolic Panel (08.07.17 @ 21:48)    Sodium, Serum: 132 mmol/L    Potassium, Serum: 3.6 mmol/L    Chloride, Serum: 99 mmol/L    Carbon Dioxide, Serum: 25 mmol/L    Anion Gap, Serum: 8 mmol/L    Blood Urea Nitrogen, Serum: 15 mg/dL    Creatinine, Serum: 0.77 mg/dL    Glucose, Serum: 85 mg/dL    Calcium, Total Serum: 9.5 mg/dL    Protein Total, Serum: 7.3 gm/dL    Albumin, Serum: 3.5 g/dL    Bilirubin Total, Serum: 0.5 mg/dL    Alkaline Phosphatase, Serum: 109 U/L    Aspartate Aminotransferase (AST/SGOT): 16 U/L    Alanine Aminotransferase (ALT/SGPT): 30 U/L        Complete Blood Count + Automated Diff (08.07.17 @ 21:48)    WBC Count: 14.8 K/uL    RBC Count: 4.48 M/uL    Hemoglobin: 12.3 g/dL    Hematocrit: 36.5 %    Mean Cell Volume: 81.4 fl    Mean Cell Hemoglobin: 27.5 pg    Mean Cell Hemoglobin Conc: 33.8 gm/dL    Red Cell Distrib Width: 13.3 %    Platelet Count - Automated: 336 K/uL    Auto Neutrophil #: 11.4 K/uL    Auto Lymphocyte #: 2.5 K/uL    Auto Monocyte #: 0.7 K/uL    Auto Eosinophil #: 0.1 K/uL    Auto Basophil #: 0.1 K/uL    Auto Neutrophil %: 77.1: Differential percentages must be correlated with absolute numbers for  clinical significance. %    Auto Lymphocyte %: 16.9 %    Auto Monocyte %: 4.7 %    Auto Eosinophil %: 0.9 %    Auto Basophil %: 0.5 %    Type + Screen (08.07.17 @ 21:48)    Type + Screen: A POS  Prothrombin Time and INR, Plasma in AM (08.07.17 @ 21:48)    Prothrombin Time, Plasma: 12.5: Effective March 21st, the reference range for PT has changed. sec    INR: 1.15 ratio    < from: US Transvaginal (08.08.17 @ 08:23) >  18.1 cm cystic left adnexal mass containing a single septation compatible   with ovarian neoplasm.    < end of copied text >  < from: CT Abdomen and Pelvis w/ IV Cont (08.07.17 @ 23:46) >  IMPRESSION:    Indeterminate 15.1 x 11.8 x 13.1 cm left adnexal cystic lesion. Neoplasm   is included in the differential diagnosis, particularly in a   postmenopausal patient. Please note ovarian torsion cannot be excluded on   imaging given the size. Recommend clinical correlation.  < from: CT Abdomen and Pelvis w/ IV Cont (08.07.17 @ 23:46) >  LIVER/BILE DUCTS: No biliary dilatation. Scattered cysts measuring up to   2.9 x 1.7 cm and subcentimeter hypodense foci,too small to characterize.  GALLBLADDER: No radiopaque gallstone, significant gallbladder wall   thickening or pericholecystic fluid.    PANCREAS: Unremarkable.  SPLEEN: Unremarkable.    ADRENALS: Unremarkable.  KIDNEYS/URETERS: No hydronephrosis, hydroureter or perinephric stranding.   BLADDER: Unremarkable.    REPRODUCTIVE ORGANS: Calcified uterine fibroids. A 15.1 x 11.8 x 13.1 cm   cystic left adnexal lesion.    BOWEL: No bowel obstruction. Unremarkable appendix. No significant bowel   wall thickening or inflammatory change. Colon diverticulosis.   PERITONEUM: No drainable fluid collection or intraperitoneal free air.  VESSELS: Atherosclerotic change of the abdominal aorta and its branches.   RETROPERITONEUM: No lymphadenopathy.    ABDOMINAL WALL/SOFT TISSUES: Small fat-containing umbilical hernia.     BONES: Degenerative changes of the spine. Grade 1 anterolisthesis of L4   on L5.        < end of copied text >    < end of copied text >  < from: 12 Lead ECG (08.08.17 @ 01:21) >  Normal sinus rhythm  Minimal voltage criteria for LVH, may be normal variant  Borderline ECG  When compared with ECG of 21-JUL-2017 14:26,  No significant change was found    < end of copied text > Comprehensive Metabolic Panel (08.07.17 @ 21:48)    Sodium, Serum: 132 mmol/L    Potassium, Serum: 3.6 mmol/L    Chloride, Serum: 99 mmol/L    Carbon Dioxide, Serum: 25 mmol/L    Anion Gap, Serum: 8 mmol/L    Blood Urea Nitrogen, Serum: 15 mg/dL    Creatinine, Serum: 0.77 mg/dL    Glucose, Serum: 85 mg/dL    Calcium, Total Serum: 9.5 mg/dL    Protein Total, Serum: 7.3 gm/dL    Albumin, Serum: 3.5 g/dL    Bilirubin Total, Serum: 0.5 mg/dL    Alkaline Phosphatase, Serum: 109 U/L    Aspartate Aminotransferase (AST/SGOT): 16 U/L    Alanine Aminotransferase (ALT/SGPT): 30 U/L        Complete Blood Count + Automated Diff (08.07.17 @ 21:48)    WBC Count: 14.8 K/uL    RBC Count: 4.48 M/uL    Hemoglobin: 12.3 g/dL    Hematocrit: 36.5 %    Mean Cell Volume: 81.4 fl    Mean Cell Hemoglobin: 27.5 pg    Mean Cell Hemoglobin Conc: 33.8 gm/dL    Red Cell Distrib Width: 13.3 %    Platelet Count - Automated: 336 K/uL    Auto Neutrophil #: 11.4 K/uL    Auto Lymphocyte #: 2.5 K/uL    Auto Monocyte #: 0.7 K/uL    Auto Eosinophil #: 0.1 K/uL    Auto Basophil #: 0.1 K/uL    Auto Neutrophil %: 77.1: Differential percentages must be correlated with absolute numbers for  clinical significance. %    Auto Lymphocyte %: 16.9 %    Auto Monocyte %: 4.7 %    Auto Eosinophil %: 0.9 %    Auto Basophil %: 0.5 %    < from: US Transvaginal (08.08.17 @ 08:23) >  18.1 cm cystic left adnexal mass containing a single septation compatible   with ovarian neoplasm.  < from: CT Abdomen and Pelvis w/ IV Cont (08.07.17 @ 23:46) >  < from: CT Abdomen and Pelvis w/ IV Cont (08.07.17 @ 23:46) >  LIVER/BILE DUCTS: No biliary dilatation. Scattered cysts measuring up to   2.9 x 1.7 cm and subcentimeter hypodense foci,too small to characterize.  GALLBLADDER: No radiopaque gallstone, significant gallbladder wall   thickening or pericholecystic fluid.    PANCREAS: Unremarkable.  SPLEEN: Unremarkable.  ADRENALS: Unremarkable.  KIDNEYS/URETERS: No hydronephrosis, hydroureter or perinephric stranding.   BLADDER: Unremarkable.    REPRODUCTIVE ORGANS: Calcified uterine fibroids. A 15.1 x 11.8 x 13.1 cm   cystic left adnexal lesion.  BOWEL: No bowel obstruction. Unremarkable appendix. No significant bowel   wall thickening or inflammatory change. Colon diverticulosis.   PERITONEUM: No drainable fluid collection or intraperitoneal free air.  VESSELS: Atherosclerotic change of the abdominal aorta and its branches.   RETROPERITONEUM: No lymphadenopathy.    ABDOMINAL WALL/SOFT TISSUES: Small fat-containing umbilical hernia.   BONES: Degenerative changes of the spine. Grade 1 anterolisthesis of L4   on L5.      IMPRESSION:  Indeterminate 15.1 x 11.8 x 13.1 cm left adnexal cystic lesion. Neoplasm   is included in the differential diagnosis, particularly in a   postmenopausal patient. Please note ovarian torsion cannot be excluded on   imaging given the size. Recommend clinical correlation.    < from: 12 Lead ECG (08.08.17 @ 01:21) >  Normal sinus rhythm  Minimal voltage criteria for LVH, may be normal variant  Borderline ECG  When compared with ECG of 21-JUL-2017 14:26,  No significant change was found    < from: US Transvaginal (08.08.17 @ 08:23) >  Uterus: Markedly enlarged, measuring approximately 13.4 (TR) x 14.4   (longitudinal) x 3.8 (AP) cm. Contains numerous fibroids. Representative   measurements include a posteriorly located subserosal 4.3 x 3.3 x 3.8 cm   calcified fibroid, an anterior intramural 3.1 x 3.1 x 3.2 cm fibroid and   a right-sided pedunculated 5.0 x 6.3 x 4.6 cm   fibroid.      Endometrium: 5 mm.   Right ovary: Unable to be visualized.  No right adnexal mass visualized.  Left ovary: 18.1 x 11.3 x 12.5 cm cystic mass in the left adnexa   containing a single septation. Normal left ovarian parenchyma is not   definitively visualized.  Free fluid: None.  IMPRESSION:  18.1 cm cystic left adnexal mass containing a single septation compatible   with ovarian neoplasm.

## 2017-08-08 NOTE — H&P ADULT - NSHPPHYSICALEXAM_GEN_ALL_CORE
Gen - WDWN NAD  Neuro - A& O x 3  CV - RRR S1 S2  Pulm - CTA b/l   Abd - ND NT. Positive bowel sounds  LE - no calf tenderness b/l

## 2017-08-08 NOTE — DISCHARGE NOTE ADULT - MEDICATION SUMMARY - MEDICATIONS TO TAKE
I will START or STAY ON the medications listed below when I get home from the hospital:    dexamethasone 2 mg oral tablet  -- 1 tab(s) by mouth every 8 hours  -- Indication: For Brain mass    acetaminophen 325 mg oral tablet  -- 2 tab(s) by mouth every 6 hours, As needed, Mild Pain (1 - 3)  -- Indication: For pain    aspirin 81 mg oral tablet, chewable  -- 1 tab(s) by mouth once a day  -- Indication: For TIA    losartan 50 mg oral tablet  -- 1 tab(s) by mouth 2 times a day  -- Indication: For Essential hypertension    atorvastatin 40 mg oral tablet  -- 1 tab(s) by mouth once a day (at bedtime)  -- Indication: For Hyperlipidemia    clopidogrel 75 mg oral tablet  -- 1 tab(s) by mouth once a day  -- Indication: For TIA    metoprolol succinate 100 mg oral tablet, extended release  -- 1 tab(s) by mouth 2 times a day  -- Indication: For Essential hypertension    hydroCHLOROthiazide 25 mg oral tablet  -- 1 tab(s) by mouth once a day  -- Indication: For Essential hypertension    famotidine 20 mg oral tablet  -- 1 tab(s) by mouth once a day  -- Indication: For stomach protection    glucosamine  --  by mouth   -- Indication: For OA    multivitamin  --     -- Indication: For vitamins

## 2017-08-08 NOTE — DISCHARGE NOTE ADULT - CARE PROVIDER_API CALL
Roger gottlieb  ?[address] [place] [address]  Phone: [phone]  Fax: [phone]    Chaim David; PhD), Neurosurgery  [address] [facility]  [address] [place] [address]  Phone: [phone]  Fax: [phone]

## 2017-08-08 NOTE — DISCHARGE NOTE ADULT - PLAN OF CARE
surgical removal follow up with Dr. David within 1 week for outpatient surgery transfer to  for further evaluation by GYN-ONcology Dr. Mathews c/w home meds, needs cardiac clearance , possible nuclear stress test prior surgery ASA, plavix on hold for possible pelvic surgery or biopsy , also needs to be stopped for at least 4-5 days prior brain surgery

## 2017-08-08 NOTE — CONSULT NOTE ADULT - SUBJECTIVE AND OBJECTIVE BOX
Patient is a 62y old  Female who presents with a chief complaint of abnormal head CT.     HPI:  Pt is a 62y woman with PMHx HTN, HLD, CVA / TIA on plavix and baby ASA, with recent hospitalization for Malignant hypertension, lightheadedness and suspected TIA . Pt reports she had a follow up MRI  with contrast 3 days ago and was told by Dr. Iverson to come to the ER as she had a right sided brain mass.       Pt was noted to have ovarian mass and was being transferred to [facility] for gynonc opinion.  She in the past was evaluated by Dr Llanes her cardiologist and she was to have a stress test per pt and she did not schedule it yet.    Pt denies any CP or SOB or dizziness.       Fam HX:   Mother HTN had CVA at 66  Father HTN  Brother prostate ca dx at age 40 (08 Aug 2017 02:16)      PAST MEDICAL & SURGICAL HISTORY:  Transient cerebral ischemia, unspecified type  Essential hypertension  No significant past surgical history      MEDICATIONS  (STANDING):  losartan 50 milliGRAM(s) Oral two times a day  atorvastatin 40 milliGRAM(s) Oral at bedtime  metoprolol succinate  milliGRAM(s) Oral two times a day  hydrochlorothiazide 25 milliGRAM(s) Oral daily  dexamethasone     Tablet 2 milliGRAM(s) Oral every 8 hours  famotidine    Tablet 20 milliGRAM(s) Oral daily    MEDICATIONS  (PRN):  acetaminophen   Tablet. 650 milliGRAM(s) Oral every 6 hours PRN Mild Pain (1 - 3)      FAMILY HISTORY:  No pertinent family history in first degree relatives      SOCIAL HISTORY:  non smoker, no alcohol use     REVIEW OF SYSTEMS:  CONSTITUTIONAL:  No night sweats.  No fatigue, malaise, lethargy.  No fever or chills.  HEENT:  Eyes:  No visual changes.  No eye pain.      ENT:  No runny nose.  No epistaxis.  No sinus pain.  No sore throat.  No odynophagia.  No ear pain.  No congestion.  RESPIRATORY:  No cough.  No wheeze.  No hemoptysis.  No shortness of breath.  CARDIOVASCULAR:  No chest pains.  No palpitations. No shortness of breath, No orthopnea or PND.  GASTROINTESTINAL:  No abdominal pain.  No nausea or vomiting.  No diarrhea or constipation.  No hematemesis.  No hematochezia.  No melena.  GENITOURINARY:  No urgency.  No frequency.  No dysuria.  No hematuria.  No obstructive symptoms.  No discharge.  No pain.  No significant abnormal bleeding.  MUSCULOSKELETAL:  No musculoskeletal pain.  No joint swelling.  No arthritis.  NEUROLOGICAL:  No tingling or numbness or weakness.  PSYCHIATRIC:  No confusion  SKIN:  No rashes.  No lesions.  No wounds.  ENDOCRINE:  No unexplained weight loss.  No polydipsia.  No polyuria.  No polyphagia.  HEMATOLOGIC:  No anemia.  No purpura.  No petechiae.  No prolonged or excessive bleeding.   ALLERGIC AND IMMUNOLOGIC:  No pruritus.  No swelling.         Vital Signs Last 24 Hrs  T(C): 37 (08 Aug 2017 11:16), Max: 37 (07 Aug 2017 18:43)  T(F): 98.6 (08 Aug 2017 11:16), Max: 98.6 (07 Aug 2017 18:43)  HR: 88 (08 Aug 2017 11:16) (76 - 88)  BP: 154/78 (08 Aug 2017 11:16) (143/67 - 189/81)  BP(mean): --  RR: 16 (08 Aug 2017 11:16) (16 - 18)  SpO2: 95% (08 Aug 2017 11:16) (95% - 100%)    PHYSICAL EXAM-    Constitutional: obese pt in no distress.    Head: Head is normocephalic and atraumatic.      Neck: The patient's neck is supple without enlargement, has no palpable thyromegaly nor thyroid nodules and has no jugular venous distention. No audible carotid bruits. There are strong carotid pulses bilaterally. No JVD.     Cardiovascular: Regular rate and rhythm without S3, S4. No murmurs or rubs are appreciated.      Respiratory: Breath sounds are normal. No rales. No wheezing.    Abdomen: Soft, nontender, nondistended with positive bowel sounds.      Extremity: No tenderness. No  pitting edema No skin discoloration No clubbing No cyanosis.     Neurologic: The patient is alert and oriented.      Skin: No rash, no obvious lesions noted.      Psychiatric: The patient appears to be emotionally stable.      INTERPRETATION OF TELEMETRY:not on     ECG: sinus rythm ,T wave inversion in III. normal QTc.    I&O's Detail      LABS:                        12.3   14.8  )-----------( 336      ( 07 Aug 2017 21:48 )             36.5     08-08    133<L>  |  98  |  16  ----------------------------<  129<H>  3.9   |  25  |  0.82    Ca    9.8      08 Aug 2017 05:30    TPro  7.3  /  Alb  3.5  /  TBili  0.5  /  DBili  x   /  AST  16  /  ALT  30  /  AlkPhos  109  08-07        PT/INR - ( 07 Aug 2017 21:48 )   PT: 12.5 sec;   INR: 1.15 ratio         PTT - ( 07 Aug 2017 21:48 )  PTT:29.1 sec    I&O's Summary    BNP  RADIOLOGY & ADDITIONAL STUDIES:  < from: CT Abdomen and Pelvis w/ IV Cont (08.07.17 @ 23:46) >    EXAM:  CT ABDOMEN AND PELVIS IC                            PROCEDURE DATE:  08/07/2017          INTERPRETATION:  CLINICAL INFORMATION: Brain lesion, assess metastasis.    PROCEDURE:   CT of the chest, abdomen and pelvis was performed with intravenous   contrast. 90 mls of Omnipaque-350 administered without complication. 10   mls discarded. Coronal and sagittal reconstruction images were obtained.      COMPARISON: None.    FINDINGS:    CHEST:     LUNGS AND AIRWAYS: Patent central airways. No focal consolidation.   PLEURA: No pleural effusion or pneumothorax.  HEART: Normal size. No pericardial effusion.  VESSELS: Atherosclerotic change of the thoracic aorta, great vessels and   coronary arteries. Bovine arch.  CHEST WALL AND LOWER NECK: Within normal limits.   MEDIASTINUM AND DES: Subcentimeter mediastinal lymph nodes without   lymphadenopathy.    ABDOMEN/PELVIS:     LIVER/BILE DUCTS: No biliary dilatation. Scattered cysts measuring up to   2.9 x 1.7 cm and subcentimeter hypodense foci,too small to characterize.  GALLBLADDER: No radiopaque gallstone, significant gallbladder wall   thickening or pericholecystic fluid.    PANCREAS: Unremarkable.  SPLEEN: Unremarkable.    ADRENALS: Unremarkable.  KIDNEYS/URETERS: No hydronephrosis, hydroureter or perinephric stranding.   BLADDER: Unremarkable.    REPRODUCTIVE ORGANS: Calcified uterine fibroids. A 15.1 x 11.8 x 13.1 cm   cystic left adnexal lesion.    BOWEL: No bowel obstruction. Unremarkable appendix. No significant bowel   wall thickening or inflammatory change. Colon diverticulosis.   PERITONEUM: No drainable fluid collection or intraperitoneal free air.  VESSELS: Atherosclerotic change of the abdominal aorta and its branches.   RETROPERITONEUM: No lymphadenopathy.    ABDOMINAL WALL/SOFT TISSUES: Small fat-containing umbilical hernia.     BONES: Degenerative changes of the spine. Grade 1 anterolisthesis of L4   on L5.      IMPRESSION:    Indeterminate 15.1 x 11.8 x 13.1 cm left adnexal cystic lesion. Neoplasm   is included in the differential diagnosis, particularly in a   postmenopausal patient. Please note ovarian torsion cannot be excluded on   imaging given the size. Recommend clinical correlation.                LOUIE COLLAZO   This document has been electronically signed. Aug  8 2017  1:12AM                < end of copied text >  < from: MRI Head w/o Cont (07.21.17 @ 20:59) >  EXAM:  BRAIN (MRI) W O CON                            PROCEDURE DATE:  07/21/2017          INTERPRETATION:    MR brain  without gadolinium     CLINICAL INFORMATION:   TIA hypertension and dizziness for 2 days.    TECHNIQUE:   Sagittal and axial T1-weighted images, axial FLAIR images,   axial gradient echo and T2-weighted images and axial diffusion weighted   images of the brain were obtained.         FINDINGS:   MR dated 3/11/2014 available for review.         The brain demonstrates 2.1 cm abnormal signal within the RIGHT frontal   cortex as well as the RIGHT insular cortex with gyral expansion. This   appearance is worrisome for a primary neoplasm and intravenous gadolinium   recommended for further evaluation. No acute cerebral cortical infarct is   found.   No intracranial hemorrhage is recognized. No mass effect is   found in the brain.        The ventricles, sulci and basal cisterns appear unremarkable.    The vertebral and internal carotid arteries demonstrate expected flow   voids indicating their patency.         The orbits are unremarkable.  The paranasal sinuses are clear.  The nasal   cavity appears intact.  The nasopharynx is symmetric.  The central skull   base and temporal bones are intact.  The calvarium appears unremarkable.    IMPRESSION:   2.1 cm abnormal signal within the RIGHT frontal cortex as   well as the RIGHT insular cortex with gyral expansion. This appearance is   worrisome for a primary neoplasm and intravenous gadolinium recommended   for further evaluation.                        SHELLYE CLARKE M.D., ATTENDING RADIOLOGIST  This document has been electronically signed. Jul 22 2017  2:02PM                < end of copied text >

## 2017-08-08 NOTE — DISCHARGE NOTE ADULT - CARE PLAN
Principal Discharge DX:	Brain mass  Goal:	surgical removal  Instructions for follow-up, activity and diet:	follow up with Dr. David within 1 week for outpatient surgery  Secondary Diagnosis:	Adnexal mass  Instructions for follow-up, activity and diet:	transfer to  for further evaluation by GYN-ONcology Dr. Mathews  Secondary Diagnosis:	Essential hypertension  Instructions for follow-up, activity and diet:	c/w home meds, needs cardiac clearance , possible nuclear stress test prior surgery  Secondary Diagnosis:	Transient cerebral ischemia, unspecified type  Instructions for follow-up, activity and diet:	ASA, plavix on hold for possible pelvic surgery or biopsy , also needs to be stopped for at least 4-5 days prior brain surgery

## 2017-08-08 NOTE — CONSULT NOTE ADULT - PROBLEM SELECTOR RECOMMENDATION 2
recommend  -  medical admission and work-up   -oncology consult   - MRI with IV contrast of abdomen and pelvis

## 2017-08-09 ENCOUNTER — TRANSCRIPTION ENCOUNTER (OUTPATIENT)
Age: 62
End: 2017-08-09

## 2017-08-09 VITALS
SYSTOLIC BLOOD PRESSURE: 169 MMHG | TEMPERATURE: 98 F | RESPIRATION RATE: 17 BRPM | HEART RATE: 56 BPM | DIASTOLIC BLOOD PRESSURE: 78 MMHG | OXYGEN SATURATION: 98 %

## 2017-08-09 DIAGNOSIS — R19.00 INTRA-ABDOMINAL AND PELVIC SWELLING, MASS AND LUMP, UNSPECIFIED SITE: ICD-10-CM

## 2017-08-09 LAB
ANION GAP SERPL CALC-SCNC: 17 MMOL/L — SIGNIFICANT CHANGE UP (ref 5–17)
BASOPHILS # BLD AUTO: 0 K/UL — SIGNIFICANT CHANGE UP (ref 0–0.2)
BASOPHILS NFR BLD AUTO: 0.1 % — SIGNIFICANT CHANGE UP (ref 0–2)
BUN SERPL-MCNC: 24 MG/DL — HIGH (ref 8–20)
CALCIUM SERPL-MCNC: 10 MG/DL — SIGNIFICANT CHANGE UP (ref 8.6–10.2)
CEA SERPL-MCNC: 1 NG/ML — SIGNIFICANT CHANGE UP (ref 0–3.8)
CHLORIDE SERPL-SCNC: 93 MMOL/L — LOW (ref 98–107)
CO2 SERPL-SCNC: 26 MMOL/L — SIGNIFICANT CHANGE UP (ref 22–29)
CREAT SERPL-MCNC: 0.88 MG/DL — SIGNIFICANT CHANGE UP (ref 0.5–1.3)
EOSINOPHIL # BLD AUTO: 0 K/UL — SIGNIFICANT CHANGE UP (ref 0–0.5)
EOSINOPHIL NFR BLD AUTO: 0.1 % — SIGNIFICANT CHANGE UP (ref 0–6)
GLUCOSE SERPL-MCNC: 127 MG/DL — HIGH (ref 70–115)
HCT VFR BLD CALC: 36.6 % — LOW (ref 37–47)
HGB BLD-MCNC: 12.1 G/DL — SIGNIFICANT CHANGE UP (ref 12–16)
LYMPHOCYTES # BLD AUTO: 1.6 K/UL — SIGNIFICANT CHANGE UP (ref 1–4.8)
LYMPHOCYTES # BLD AUTO: 10.1 % — LOW (ref 20–55)
MCHC RBC-ENTMCNC: 27.1 PG — SIGNIFICANT CHANGE UP (ref 27–31)
MCHC RBC-ENTMCNC: 33.1 G/DL — SIGNIFICANT CHANGE UP (ref 32–36)
MCV RBC AUTO: 82.1 FL — SIGNIFICANT CHANGE UP (ref 81–99)
MONOCYTES # BLD AUTO: 0.5 K/UL — SIGNIFICANT CHANGE UP (ref 0–0.8)
MONOCYTES NFR BLD AUTO: 3 % — SIGNIFICANT CHANGE UP (ref 3–10)
NEUTROPHILS # BLD AUTO: 13.5 K/UL — HIGH (ref 1.8–8)
NEUTROPHILS NFR BLD AUTO: 86.4 % — HIGH (ref 37–73)
PLATELET # BLD AUTO: 386 K/UL — SIGNIFICANT CHANGE UP (ref 150–400)
POTASSIUM SERPL-MCNC: 3.7 MMOL/L — SIGNIFICANT CHANGE UP (ref 3.5–5.3)
POTASSIUM SERPL-SCNC: 3.7 MMOL/L — SIGNIFICANT CHANGE UP (ref 3.5–5.3)
RBC # BLD: 4.46 M/UL — SIGNIFICANT CHANGE UP (ref 4.4–5.2)
RBC # FLD: 14.7 % — SIGNIFICANT CHANGE UP (ref 11–15.6)
SODIUM SERPL-SCNC: 136 MMOL/L — SIGNIFICANT CHANGE UP (ref 135–145)
WBC # BLD: 15.6 K/UL — HIGH (ref 4.8–10.8)
WBC # FLD AUTO: 15.6 K/UL — HIGH (ref 4.8–10.8)

## 2017-08-09 PROCEDURE — 82378 CARCINOEMBRYONIC ANTIGEN: CPT

## 2017-08-09 PROCEDURE — 86301 IMMUNOASSAY TUMOR CA 19-9: CPT

## 2017-08-09 PROCEDURE — 80048 BASIC METABOLIC PNL TOTAL CA: CPT

## 2017-08-09 PROCEDURE — 36415 COLL VENOUS BLD VENIPUNCTURE: CPT

## 2017-08-09 PROCEDURE — 85027 COMPLETE CBC AUTOMATED: CPT

## 2017-08-09 PROCEDURE — 99285 EMERGENCY DEPT VISIT HI MDM: CPT

## 2017-08-09 RX ORDER — HYDRALAZINE HCL 50 MG
20 TABLET ORAL EVERY 6 HOURS
Qty: 0 | Refills: 0 | Status: DISCONTINUED | OUTPATIENT
Start: 2017-08-09 | End: 2017-08-09

## 2017-08-09 RX ORDER — DEXAMETHASONE 0.5 MG/5ML
1 ELIXIR ORAL
Qty: 42 | Refills: 0 | OUTPATIENT
Start: 2017-08-09 | End: 2017-08-23

## 2017-08-09 RX ADMIN — Medication 2 MILLIGRAM(S): at 07:59

## 2017-08-09 RX ADMIN — Medication 100 MILLIGRAM(S): at 05:51

## 2017-08-09 RX ADMIN — Medication 2 MILLIGRAM(S): at 00:17

## 2017-08-09 RX ADMIN — FAMOTIDINE 20 MILLIGRAM(S): 10 INJECTION INTRAVENOUS at 16:36

## 2017-08-09 RX ADMIN — Medication 25 MILLIGRAM(S): at 05:51

## 2017-08-09 RX ADMIN — LOSARTAN POTASSIUM 50 MILLIGRAM(S): 100 TABLET, FILM COATED ORAL at 05:51

## 2017-08-09 RX ADMIN — Medication 2 MILLIGRAM(S): at 16:37

## 2017-08-09 NOTE — PROGRESS NOTE ADULT - PROBLEM SELECTOR PLAN 1
1. Continue reg diet   2. Continue holding ASA/plavix for now  3. OOB  4. Plan per Dr. Mathews/neurosurgery. Dr. Mathews to evaluate patient this afternoon.

## 2017-08-09 NOTE — DISCHARGE NOTE ADULT - PATIENT PORTAL LINK FT
“You can access the Follow[facility] Patient Portal, offered by [facility], by registering with the following website: [email or web address]”

## 2017-08-09 NOTE — DISCHARGE NOTE ADULT - CARE PROVIDER_API CALL
Chaim David; PhD), Neurosurgery  [address]  [address] [place] [address]  Phone: [phone]  Fax: [phone]    Hannah Francisco), Gynecologic Oncology; Obstetrics and Gynecology  [address]  [address] [place] [address]  Phone: [phone]  Fax: [phone]

## 2017-08-09 NOTE — DISCHARGE NOTE ADULT - CARE PLAN
Principal Discharge DX:	Pelvic mass  Goal:	Improve Condition  Instructions for follow-up, activity and diet:	Please make an appt to follow up with Dr. Francisco of gyn onc.  Secondary Diagnosis:	Brain mass  Goal:	Improve Condition  Instructions for follow-up, activity and diet:	Follow up with Dr. David. Follow up with him regarding steroid prescription.

## 2017-08-09 NOTE — DISCHARGE NOTE ADULT - HOSPITAL COURSE
This 62 year old female was accepted as transfer from [facility] on 8/8/17. Patient was evaluated by Dr. Mathews and determined that pelvic mass can be evaluated outpatient. Patient being discharged with Dr. Riley as well as follow up with her neurosurgeon Dr. David.

## 2017-08-09 NOTE — DISCHARGE NOTE ADULT - MEDICATION SUMMARY - MEDICATIONS TO STOP TAKING
I will STOP taking the medications listed below when I get home from the hospital:    dexamethasone 2 mg oral tablet  -- 1 tab(s) by mouth every 8 hours

## 2017-08-09 NOTE — DISCHARGE NOTE ADULT - MEDICATION SUMMARY - MEDICATIONS TO TAKE
I will START or STAY ON the medications listed below when I get home from the hospital:    dexamethasone 2 mg oral tablet  -- 1 tab(s) by mouth every 8 hours MDD:3 tabs  -- It is very important that you take or use this exactly as directed.  Do not skip doses or discontinue unless directed by your doctor.  Obtain medical advice before taking any non-prescription drugs as some may affect the action of this medication.  Take with food or milk.    -- Indication: For Brain mass    aspirin 81 mg oral tablet, chewable  -- 1 tab(s) by mouth once a day  -- Indication: For Home med    acetaminophen 325 mg oral tablet  -- 2 tab(s) by mouth every 6 hours, As needed, Mild Pain (1 - 3)  -- Indication: For Home med    losartan 50 mg oral tablet  -- 1 tab(s) by mouth 2 times a day  -- Indication: For Home med    atorvastatin 40 mg oral tablet  -- 1 tab(s) by mouth once a day (at bedtime)  -- Indication: For Home med    clopidogrel 75 mg oral tablet  -- 1 tab(s) by mouth once a day  -- Indication: For Home med    metoprolol succinate 100 mg oral tablet, extended release  -- 1 tab(s) by mouth 2 times a day  -- Indication: For Home med    hydroCHLOROthiazide 25 mg oral tablet  -- 1 tab(s) by mouth once a day  -- Indication: For Home med    famotidine 20 mg oral tablet  -- 1 tab(s) by mouth once a day  -- Indication: For Home med    glucosamine  --  by mouth   -- Indication: For Home med    multivitamin  --     -- Indication: For Home med

## 2017-08-09 NOTE — DISCHARGE NOTE ADULT - PLAN OF CARE
Improve Condition Please make an appt to follow up with Dr. Francisco of gyn onc. Follow up with Dr. David. Follow up with him regarding steroid prescription.

## 2017-08-10 DIAGNOSIS — E78.5 HYPERLIPIDEMIA, UNSPECIFIED: ICD-10-CM

## 2017-08-10 DIAGNOSIS — Z86.73 PERSONAL HISTORY OF TRANSIENT ISCHEMIC ATTACK (TIA), AND CEREBRAL INFARCTION WITHOUT RESIDUAL DEFICITS: ICD-10-CM

## 2017-08-10 DIAGNOSIS — D49.6 NEOPLASM OF UNSPECIFIED BEHAVIOR OF BRAIN: ICD-10-CM

## 2017-08-10 DIAGNOSIS — I10 ESSENTIAL (PRIMARY) HYPERTENSION: ICD-10-CM

## 2017-08-10 DIAGNOSIS — Z79.02 LONG TERM (CURRENT) USE OF ANTITHROMBOTICS/ANTIPLATELETS: ICD-10-CM

## 2017-08-10 DIAGNOSIS — D25.9 LEIOMYOMA OF UTERUS, UNSPECIFIED: ICD-10-CM

## 2017-08-10 DIAGNOSIS — G93.6 CEREBRAL EDEMA: ICD-10-CM

## 2017-08-10 DIAGNOSIS — Z79.82 LONG TERM (CURRENT) USE OF ASPIRIN: ICD-10-CM

## 2017-08-10 LAB — CANCER AG19-9 SERPL-ACNC: 25.8 U/ML — SIGNIFICANT CHANGE UP

## 2017-08-10 PROCEDURE — 99233 SBSQ HOSP IP/OBS HIGH 50: CPT

## 2017-08-11 DIAGNOSIS — N83.202 UNSPECIFIED OVARIAN CYST, LEFT SIDE: ICD-10-CM

## 2017-08-14 ENCOUNTER — OUTPATIENT (OUTPATIENT)
Dept: OUTPATIENT SERVICES | Facility: HOSPITAL | Age: 62
LOS: 1 days | Discharge: HOME | End: 2017-08-14
Payer: COMMERCIAL

## 2017-08-14 VITALS
OXYGEN SATURATION: 100 % | WEIGHT: 262.57 LBS | RESPIRATION RATE: 16 BRPM | SYSTOLIC BLOOD PRESSURE: 156 MMHG | HEART RATE: 63 BPM | TEMPERATURE: 98 F | DIASTOLIC BLOOD PRESSURE: 85 MMHG

## 2017-08-14 DIAGNOSIS — K92.2 GASTROINTESTINAL HEMORRHAGE, UNSPECIFIED: ICD-10-CM

## 2017-08-14 DIAGNOSIS — R56.9 UNSPECIFIED CONVULSIONS: ICD-10-CM

## 2017-08-14 DIAGNOSIS — I47.1 SUPRAVENTRICULAR TACHYCARDIA: ICD-10-CM

## 2017-08-14 DIAGNOSIS — D62 ACUTE POSTHEMORRHAGIC ANEMIA: ICD-10-CM

## 2017-08-14 DIAGNOSIS — D49.6 NEOPLASM OF UNSPECIFIED BEHAVIOR OF BRAIN: ICD-10-CM

## 2017-08-14 DIAGNOSIS — Z01.818 ENCOUNTER FOR OTHER PREPROCEDURAL EXAMINATION: ICD-10-CM

## 2017-08-14 DIAGNOSIS — I63.9 CEREBRAL INFARCTION, UNSPECIFIED: ICD-10-CM

## 2017-08-14 DIAGNOSIS — I95.9 HYPOTENSION, UNSPECIFIED: ICD-10-CM

## 2017-08-14 DIAGNOSIS — C71.9 MALIGNANT NEOPLASM OF BRAIN, UNSPECIFIED: ICD-10-CM

## 2017-08-14 LAB
ANION GAP SERPL CALC-SCNC: 8 MMOL/L — SIGNIFICANT CHANGE UP (ref 5–17)
APPEARANCE UR: CLEAR — SIGNIFICANT CHANGE UP
APTT BLD: 24.9 SEC — LOW (ref 27.5–37.4)
BACTERIA # UR AUTO: NEGATIVE — SIGNIFICANT CHANGE UP
BASOPHILS # BLD AUTO: 0.1 K/UL — SIGNIFICANT CHANGE UP (ref 0–0.2)
BASOPHILS NFR BLD AUTO: 0.7 % — SIGNIFICANT CHANGE UP (ref 0–2)
BILIRUB UR-MCNC: NEGATIVE — SIGNIFICANT CHANGE UP
BLD GP AB SCN SERPL QL: SIGNIFICANT CHANGE UP
BUN SERPL-MCNC: 33 MG/DL — HIGH (ref 7–23)
CALCIUM SERPL-MCNC: 9.1 MG/DL — SIGNIFICANT CHANGE UP (ref 8.5–10.1)
CHLORIDE SERPL-SCNC: 98 MMOL/L — SIGNIFICANT CHANGE UP (ref 96–108)
CO2 SERPL-SCNC: 25 MMOL/L — SIGNIFICANT CHANGE UP (ref 22–31)
COLOR SPEC: YELLOW — SIGNIFICANT CHANGE UP
CREAT SERPL-MCNC: 1.04 MG/DL — SIGNIFICANT CHANGE UP (ref 0.5–1.3)
DIFF PNL FLD: (no result)
EOSINOPHIL # BLD AUTO: 0.1 K/UL — SIGNIFICANT CHANGE UP (ref 0–0.5)
EOSINOPHIL NFR BLD AUTO: 0.3 % — SIGNIFICANT CHANGE UP (ref 0–6)
EPI CELLS # UR: SIGNIFICANT CHANGE UP
GLUCOSE SERPL-MCNC: 104 MG/DL — HIGH (ref 70–99)
GLUCOSE UR QL: NEGATIVE MG/DL — SIGNIFICANT CHANGE UP
HCT VFR BLD CALC: 38 % — SIGNIFICANT CHANGE UP (ref 34.5–45)
HGB BLD-MCNC: 13.4 G/DL — SIGNIFICANT CHANGE UP (ref 11.5–15.5)
INR BLD: 1.04 RATIO — SIGNIFICANT CHANGE UP (ref 0.88–1.16)
KETONES UR-MCNC: NEGATIVE — SIGNIFICANT CHANGE UP
LEUKOCYTE ESTERASE UR-ACNC: NEGATIVE — SIGNIFICANT CHANGE UP
LYMPHOCYTES # BLD AUTO: 1.8 K/UL — SIGNIFICANT CHANGE UP (ref 1–3.3)
LYMPHOCYTES # BLD AUTO: 10.5 % — LOW (ref 13–44)
MCHC RBC-ENTMCNC: 28.5 PG — SIGNIFICANT CHANGE UP (ref 27–34)
MCHC RBC-ENTMCNC: 35.1 GM/DL — SIGNIFICANT CHANGE UP (ref 32–36)
MCV RBC AUTO: 81 FL — SIGNIFICANT CHANGE UP (ref 80–100)
MONOCYTES # BLD AUTO: 0.8 K/UL — SIGNIFICANT CHANGE UP (ref 0–0.9)
MONOCYTES NFR BLD AUTO: 4.6 % — SIGNIFICANT CHANGE UP (ref 2–14)
NEUTROPHILS # BLD AUTO: 14.6 K/UL — HIGH (ref 1.8–7.4)
NEUTROPHILS NFR BLD AUTO: 83.9 % — HIGH (ref 43–77)
NITRITE UR-MCNC: NEGATIVE — SIGNIFICANT CHANGE UP
PH UR: 7 — SIGNIFICANT CHANGE UP (ref 5–8)
PLATELET # BLD AUTO: 404 K/UL — HIGH (ref 150–400)
POTASSIUM SERPL-MCNC: 4.2 MMOL/L — SIGNIFICANT CHANGE UP (ref 3.5–5.3)
POTASSIUM SERPL-SCNC: 4.2 MMOL/L — SIGNIFICANT CHANGE UP (ref 3.5–5.3)
PROT UR-MCNC: NEGATIVE MG/DL — SIGNIFICANT CHANGE UP
PROTHROM AB SERPL-ACNC: 11.2 SEC — SIGNIFICANT CHANGE UP (ref 9.8–12.7)
RBC # BLD: 4.7 M/UL — SIGNIFICANT CHANGE UP (ref 3.8–5.2)
RBC # FLD: 13.7 % — SIGNIFICANT CHANGE UP (ref 10.3–14.5)
RBC CASTS # UR COMP ASSIST: SIGNIFICANT CHANGE UP /HPF (ref 0–4)
SODIUM SERPL-SCNC: 131 MMOL/L — LOW (ref 135–145)
SP GR SPEC: 1.01 — SIGNIFICANT CHANGE UP (ref 1.01–1.02)
TYPE + AB SCN PNL BLD: SIGNIFICANT CHANGE UP
UROBILINOGEN FLD QL: NEGATIVE MG/DL — SIGNIFICANT CHANGE UP
WBC # BLD: 17.4 K/UL — HIGH (ref 3.8–10.5)
WBC # FLD AUTO: 17.4 K/UL — HIGH (ref 3.8–10.5)
WBC UR QL: NEGATIVE — SIGNIFICANT CHANGE UP

## 2017-08-14 NOTE — H&P PST ADULT - NSANTHOSAYNRD_GEN_A_CORE
No. LORENA screening performed.  STOP BANG Legend: 0-2 = LOW Risk; 3-4 = INTERMEDIATE Risk; 5-8 = HIGH Risk

## 2017-08-14 NOTE — H&P PST ADULT - ASSESSMENT
61 y/o female scheduled for right frontal craniotomy 8/23/17 with Dr David.    Plan:  1. Labs per Dr David  2. Discussed day of procedure instructions.

## 2017-08-14 NOTE — PATIENT PROFILE ADULT. - VISION (WITH CORRECTIVE LENSES IF THE PATIENT USUALLY WEARS THEM):
Partially impaired: cannot see medication labels or newsprint, but can see obstacles in path, and the surrounding layout; can count fingers at arm's length/Wears contact lenses for vision (presbyopia_

## 2017-08-14 NOTE — H&P PST ADULT - HISTORY OF PRESENT ILLNESS
64 y/o female presents to PST prior to proposed procedure. Reports episodes of dizziness, no syncope. Denies headaches or visual disturbances. No assistance needed with ADLs. No neurological deficits noted. Now for said procedure.

## 2017-08-14 NOTE — PATIENT PROFILE ADULT. - PMH
Essential hypertension    High cholesterol    Transient cerebral ischemia, unspecified type  3 years ago

## 2017-08-16 ENCOUNTER — RX RENEWAL (OUTPATIENT)
Age: 62
End: 2017-08-16

## 2017-08-22 RX ORDER — FAMOTIDINE 10 MG/ML
20 INJECTION INTRAVENOUS ONCE
Qty: 0 | Refills: 0 | Status: COMPLETED | OUTPATIENT
Start: 2017-08-23 | End: 2017-08-23

## 2017-08-22 RX ORDER — SODIUM CHLORIDE 9 MG/ML
1000 INJECTION INTRAMUSCULAR; INTRAVENOUS; SUBCUTANEOUS
Qty: 0 | Refills: 0 | Status: DISCONTINUED | OUTPATIENT
Start: 2017-08-23 | End: 2017-08-23

## 2017-08-23 ENCOUNTER — INPATIENT (INPATIENT)
Facility: HOSPITAL | Age: 62
LOS: 1 days | Discharge: HOME | End: 2017-08-25
Attending: SPECIALIST | Admitting: SPECIALIST
Payer: COMMERCIAL

## 2017-08-23 ENCOUNTER — APPOINTMENT (OUTPATIENT)
Dept: NEUROSURGERY | Facility: HOSPITAL | Age: 62
End: 2017-08-23

## 2017-08-23 ENCOUNTER — RESULT REVIEW (OUTPATIENT)
Age: 62
End: 2017-08-23

## 2017-08-23 VITALS
HEIGHT: 64 IN | SYSTOLIC BLOOD PRESSURE: 140 MMHG | WEIGHT: 261.91 LBS | DIASTOLIC BLOOD PRESSURE: 82 MMHG | RESPIRATION RATE: 16 BRPM | OXYGEN SATURATION: 99 % | HEART RATE: 70 BPM | TEMPERATURE: 99 F

## 2017-08-23 LAB
ANION GAP SERPL CALC-SCNC: 12 MMOL/L — SIGNIFICANT CHANGE UP (ref 5–17)
BUN SERPL-MCNC: 21 MG/DL — SIGNIFICANT CHANGE UP (ref 7–23)
CALCIUM SERPL-MCNC: 8.5 MG/DL — SIGNIFICANT CHANGE UP (ref 8.5–10.1)
CHLORIDE SERPL-SCNC: 97 MMOL/L — SIGNIFICANT CHANGE UP (ref 96–108)
CO2 SERPL-SCNC: 22 MMOL/L — SIGNIFICANT CHANGE UP (ref 22–31)
CREAT SERPL-MCNC: 0.9 MG/DL — SIGNIFICANT CHANGE UP (ref 0.5–1.3)
GLUCOSE SERPL-MCNC: 151 MG/DL — HIGH (ref 70–99)
MAGNESIUM SERPL-MCNC: 1.9 MG/DL — SIGNIFICANT CHANGE UP (ref 1.6–2.6)
PHOSPHATE SERPL-MCNC: 3.7 MG/DL — SIGNIFICANT CHANGE UP (ref 2.5–4.5)
POTASSIUM SERPL-MCNC: 4.2 MMOL/L — SIGNIFICANT CHANGE UP (ref 3.5–5.3)
POTASSIUM SERPL-SCNC: 4.2 MMOL/L — SIGNIFICANT CHANGE UP (ref 3.5–5.3)
SODIUM SERPL-SCNC: 131 MMOL/L — LOW (ref 135–145)

## 2017-08-23 PROCEDURE — 61510 CRNEC TREPH EXC BRN TUM STTL: CPT

## 2017-08-23 PROCEDURE — 88331 PATH CONSLTJ SURG 1 BLK 1SPC: CPT | Mod: 26

## 2017-08-23 PROCEDURE — 70460 CT HEAD/BRAIN W/DYE: CPT | Mod: 26

## 2017-08-23 PROCEDURE — 61781 SCAN PROC CRANIAL INTRA: CPT

## 2017-08-23 PROCEDURE — 88307 TISSUE EXAM BY PATHOLOGIST: CPT | Mod: 26

## 2017-08-23 RX ORDER — CEFAZOLIN SODIUM 1 G
2000 VIAL (EA) INJECTION EVERY 8 HOURS
Qty: 0 | Refills: 0 | Status: COMPLETED | OUTPATIENT
Start: 2017-08-23 | End: 2017-08-24

## 2017-08-23 RX ORDER — SODIUM CHLORIDE 9 MG/ML
1000 INJECTION INTRAMUSCULAR; INTRAVENOUS; SUBCUTANEOUS
Qty: 0 | Refills: 0 | Status: DISCONTINUED | OUTPATIENT
Start: 2017-08-23 | End: 2017-08-24

## 2017-08-23 RX ORDER — HYDROMORPHONE HYDROCHLORIDE 2 MG/ML
1 INJECTION INTRAMUSCULAR; INTRAVENOUS; SUBCUTANEOUS
Qty: 0 | Refills: 0 | Status: DISCONTINUED | OUTPATIENT
Start: 2017-08-23 | End: 2017-08-23

## 2017-08-23 RX ORDER — LOSARTAN POTASSIUM 100 MG/1
50 TABLET, FILM COATED ORAL DAILY
Qty: 0 | Refills: 0 | Status: DISCONTINUED | OUTPATIENT
Start: 2017-08-23 | End: 2017-08-23

## 2017-08-23 RX ORDER — METOPROLOL TARTRATE 50 MG
100 TABLET ORAL
Qty: 0 | Refills: 0 | Status: DISCONTINUED | OUTPATIENT
Start: 2017-08-23 | End: 2017-08-24

## 2017-08-23 RX ORDER — AMLODIPINE BESYLATE 2.5 MG/1
1 TABLET ORAL
Qty: 0 | Refills: 0 | COMMUNITY

## 2017-08-23 RX ORDER — ONDANSETRON 8 MG/1
4 TABLET, FILM COATED ORAL EVERY 6 HOURS
Qty: 0 | Refills: 0 | Status: DISCONTINUED | OUTPATIENT
Start: 2017-08-23 | End: 2017-08-25

## 2017-08-23 RX ORDER — DEXAMETHASONE 0.5 MG/5ML
2 ELIXIR ORAL EVERY 8 HOURS
Qty: 0 | Refills: 0 | Status: DISCONTINUED | OUTPATIENT
Start: 2017-08-23 | End: 2017-08-25

## 2017-08-23 RX ORDER — ATORVASTATIN CALCIUM 80 MG/1
1 TABLET, FILM COATED ORAL
Qty: 0 | Refills: 0 | COMMUNITY

## 2017-08-23 RX ORDER — ONDANSETRON 8 MG/1
4 TABLET, FILM COATED ORAL ONCE
Qty: 0 | Refills: 0 | Status: DISCONTINUED | OUTPATIENT
Start: 2017-08-23 | End: 2017-08-23

## 2017-08-23 RX ORDER — ACETAMINOPHEN 500 MG
1000 TABLET ORAL ONCE
Qty: 0 | Refills: 0 | Status: COMPLETED | OUTPATIENT
Start: 2017-08-23 | End: 2017-08-23

## 2017-08-23 RX ORDER — DOCUSATE SODIUM 100 MG
100 CAPSULE ORAL THREE TIMES A DAY
Qty: 0 | Refills: 0 | Status: DISCONTINUED | OUTPATIENT
Start: 2017-08-23 | End: 2017-08-25

## 2017-08-23 RX ORDER — FENTANYL CITRATE 50 UG/ML
50 INJECTION INTRAVENOUS
Qty: 0 | Refills: 0 | Status: DISCONTINUED | OUTPATIENT
Start: 2017-08-23 | End: 2017-08-23

## 2017-08-23 RX ORDER — SODIUM CHLORIDE 9 MG/ML
3 INJECTION INTRAMUSCULAR; INTRAVENOUS; SUBCUTANEOUS EVERY 8 HOURS
Qty: 0 | Refills: 0 | Status: DISCONTINUED | OUTPATIENT
Start: 2017-08-23 | End: 2017-08-23

## 2017-08-23 RX ORDER — LOSARTAN POTASSIUM 100 MG/1
50 TABLET, FILM COATED ORAL
Qty: 0 | Refills: 0 | Status: DISCONTINUED | OUTPATIENT
Start: 2017-08-23 | End: 2017-08-25

## 2017-08-23 RX ORDER — MEPERIDINE HYDROCHLORIDE 50 MG/ML
12.5 INJECTION INTRAMUSCULAR; INTRAVENOUS; SUBCUTANEOUS
Qty: 0 | Refills: 0 | Status: DISCONTINUED | OUTPATIENT
Start: 2017-08-23 | End: 2017-08-23

## 2017-08-23 RX ORDER — LEVETIRACETAM 250 MG/1
500 TABLET, FILM COATED ORAL EVERY 12 HOURS
Qty: 0 | Refills: 0 | Status: DISCONTINUED | OUTPATIENT
Start: 2017-08-23 | End: 2017-08-25

## 2017-08-23 RX ORDER — METOPROLOL TARTRATE 50 MG
100 TABLET ORAL DAILY
Qty: 0 | Refills: 0 | Status: DISCONTINUED | OUTPATIENT
Start: 2017-08-23 | End: 2017-08-23

## 2017-08-23 RX ORDER — FENTANYL CITRATE 50 UG/ML
15 INJECTION INTRAVENOUS
Qty: 0 | Refills: 0 | Status: DISCONTINUED | OUTPATIENT
Start: 2017-08-23 | End: 2017-08-24

## 2017-08-23 RX ORDER — ATORVASTATIN CALCIUM 80 MG/1
40 TABLET, FILM COATED ORAL AT BEDTIME
Qty: 0 | Refills: 0 | Status: DISCONTINUED | OUTPATIENT
Start: 2017-08-23 | End: 2017-08-25

## 2017-08-23 RX ADMIN — LOSARTAN POTASSIUM 50 MILLIGRAM(S): 100 TABLET, FILM COATED ORAL at 21:16

## 2017-08-23 RX ADMIN — Medication 1000 MILLIGRAM(S): at 14:25

## 2017-08-23 RX ADMIN — Medication 400 MILLIGRAM(S): at 14:25

## 2017-08-23 RX ADMIN — Medication 100 MILLIGRAM(S): at 21:16

## 2017-08-23 RX ADMIN — SODIUM CHLORIDE 75 MILLILITER(S): 9 INJECTION INTRAMUSCULAR; INTRAVENOUS; SUBCUTANEOUS at 14:24

## 2017-08-23 RX ADMIN — Medication 100 MILLIGRAM(S): at 21:17

## 2017-08-23 RX ADMIN — ATORVASTATIN CALCIUM 40 MILLIGRAM(S): 80 TABLET, FILM COATED ORAL at 21:16

## 2017-08-23 RX ADMIN — Medication 100 MILLIGRAM(S): at 17:43

## 2017-08-23 RX ADMIN — LEVETIRACETAM 500 MILLIGRAM(S): 250 TABLET, FILM COATED ORAL at 21:16

## 2017-08-23 RX ADMIN — Medication 2 MILLIGRAM(S): at 21:53

## 2017-08-23 NOTE — PATIENT PROFILE ADULT. - MEDICATION HERBAL REMEDIES, PROFILE
Progress Note  Neuro Critical Care    Admit Date: 3/10/2017   Length of Stay:  LOS: 2 days     SUBJECTIVE:   Follow-up For: <principal problem not specified>    Interval History:  Off precedex this morning, doing well this morning     Brief HPI:  88 yo M with PMHx of SSS and A-fib (on Elaquis), prostate cancer, and dementia who was experienced an unwitnessed fall at home earlier today. Pt was found down in his back yard by his son. CT scan demonstrated multiple parenchymal hemorrhages with superimposed SAH. Pt has dementia at baseline but according to son is markedly more confused than usual    Review of Systems:  Respiratory: no cough or shortness of breath  Cardiovascular: no chest pain or palpitations  Gastrointestinal: no nausea or vomiting, no abdominal pain or change in bowel habits    OBJECTIVE:     Vital Signs (Most Recent):  Temp: 96.8 °F (36 °C) (03/13/17 0705)  Pulse: 63 (03/13/17 0705)  Resp: 16 (03/13/17 0705)  BP: 128/78 (03/13/17 0705)  SpO2: 95 % (03/13/17 0705) Vital Signs Range (Last 24H):  Temp:  [96.8 °F (36 °C)-97.9 °F (36.6 °C)]   Pulse:  [63-98]   Resp:  [16-43]   BP: ()/(53-88)   SpO2:  [80 %-96 %]      Body mass index is 23.9 kg/(m^2).     I & O (Last 24H):    Intake/Output Summary (Last 24 hours) at 03/13/17 0829  Last data filed at 03/13/17 0705   Gross per 24 hour   Intake           875.86 ml   Output              335 ml   Net           540.86 ml        Physical Exam:  · Gen: Well-developed, non-distressed, not diaphoretic  · HEENT/Neck: NC/AT, OP clear, MMM, PERRL, EOMI, no scleral icterus; neck supple, no tracheal deviation, JVD  · CV: RRR, no m/r/g   · Pulm/Chest: CTAB, no w/r/r/crackles  · GI: S/NT/ND, BS normoactive  · Neuro/Psych: alert and oriented to himself, no lateralizing findings in CNs, sensation, strength or tone throughout; nml affect, behavior, thought content and judgement.  · Skin/MSK: warm, dry, no erythema, rash, pallor; no c/c/e, no  atrophy    Laboratory:    Recent Labs  Lab 03/11/17  0238 03/12/17  0345 03/13/17  0401   WBC 5.41 4.83 4.56   HGB 13.5* 12.8* 12.5*   HCT 38.6* 37.8* 36.7*   * 142* 140*         Recent Labs  Lab 03/11/17  0238 03/12/17  0345 03/13/17  0401    139 141   K 4.2 3.9 3.6    104 107   CO2 26 25 26   BUN 15 13 12   CREATININE 0.8 0.8 0.9   CALCIUM 9.3 9.0 8.7   PROT 6.7 6.2 6.1   BILITOT 1.2* 1.7* 1.3*   ALKPHOS 65 62 62   ALT 11 9* 8*   AST 19 15 16         Recent Labs  Lab 03/11/17  0238 03/12/17  0345 03/13/17  0401   MG 1.9 1.7 1.8   PHOS 3.0 3.2 2.7         Recent Labs  Lab 03/11/17  1239 03/12/17  0345 03/13/17  0401   INR 1.1 1.2 1.1         Microbiology Results (last 7 days)     ** No results found for the last 168 hours. **          ASSESSMENT/PLAN:   Hospital Problems:  Active Hospital Problems    Diagnosis  POA    Agitation requiring sedation protocol [R45.1]  Yes    Contusion of left side of brain without loss of consciousness [S06.320A]  Yes    Contusion of right side of brain without loss of consciousness [S06.310A]  Yes    Abnormal coagulation profile [R79.1]  Yes    Traumatic subarachnoid hematoma with loss of consciousness [S06.6X9A]  Yes    Atrial fibrillation [I48.91]  Yes      Resolved Hospital Problems    Diagnosis Date Resolved POA    SAH (subarachnoid hemorrhage) [I60.9] 03/11/2017 Yes       Primary Diagnosis:  Traumatic subarachnoid hematoma with loss of consciousness    Plan:  Neuro:     Subarachnoid bleed  - traumatic; small  - on eliquis at home; received kaycentra on admit  - CT head without contrast (3/12) - stable   - CT spine - no active issues    Vascular dementia  - sees marlyn burns in clinic  - lexapro - continued home med here    Pulmonary:     On RA, no active issues  CXR (3/11): no active issues    Cardiac:   ECHO (3/11): EF 55%, +diastolic dysfunction    A fib:   metoprolol 25mg qd  Holding eliquis    Renal:   I & O (Last 24H):    Intake/Output Summary  (Last 24 hours) at 03/13/17 0829  Last data filed at 03/13/17 0705   Gross per 24 hour   Intake           875.86 ml   Output              335 ml   Net           540.86 ml     BUN/Cr 12/0.9  - output is not accurate - as patient has condom catheter    Infectious Disease:   No leukocytosis, afebrile    Hematology/Oncology:  H/H stable  Platelets: [id number]  PT/INR: 1.1/11.9     Endocrine:   Hba1c 6.0  TSH WNL  POCT glucose     Fluids/Electrolytes/Nutrition/GI:   IVF d/c, on regular diet    Prophylaxis:  lovenox    Lines:  Peripheral IV  Condom catheter 3/11    Dispo: patient to be stepped down to the floor today. PT/OT recommending SNF. Discussed with NSGY; with plans to restart Eliquis 2 weeks from today.    Terrance San  PGY 2         no

## 2017-08-24 LAB
ALBUMIN SERPL ELPH-MCNC: 2.6 G/DL — LOW (ref 3.3–5)
ALP SERPL-CCNC: 72 U/L — SIGNIFICANT CHANGE UP (ref 40–120)
ALT FLD-CCNC: 29 U/L — SIGNIFICANT CHANGE UP (ref 12–78)
ANION GAP SERPL CALC-SCNC: 9 MMOL/L — SIGNIFICANT CHANGE UP (ref 5–17)
AST SERPL-CCNC: 20 U/L — SIGNIFICANT CHANGE UP (ref 15–37)
BASOPHILS # BLD AUTO: 0.2 K/UL — SIGNIFICANT CHANGE UP (ref 0–0.2)
BASOPHILS NFR BLD AUTO: 0.7 % — SIGNIFICANT CHANGE UP (ref 0–2)
BILIRUB SERPL-MCNC: 0.7 MG/DL — SIGNIFICANT CHANGE UP (ref 0.2–1.2)
BUN SERPL-MCNC: 16 MG/DL — SIGNIFICANT CHANGE UP (ref 7–23)
BURR CELLS BLD QL SMEAR: PRESENT — SIGNIFICANT CHANGE UP
CALCIUM SERPL-MCNC: 8.7 MG/DL — SIGNIFICANT CHANGE UP (ref 8.5–10.1)
CHLORIDE SERPL-SCNC: 100 MMOL/L — SIGNIFICANT CHANGE UP (ref 96–108)
CO2 SERPL-SCNC: 24 MMOL/L — SIGNIFICANT CHANGE UP (ref 22–31)
CREAT SERPL-MCNC: 0.84 MG/DL — SIGNIFICANT CHANGE UP (ref 0.5–1.3)
ELLIPTOCYTES BLD QL SMEAR: SLIGHT — SIGNIFICANT CHANGE UP
EOSINOPHIL # BLD AUTO: 0.1 K/UL — SIGNIFICANT CHANGE UP (ref 0–0.5)
EOSINOPHIL NFR BLD AUTO: 0.2 % — SIGNIFICANT CHANGE UP (ref 0–6)
GLUCOSE SERPL-MCNC: 125 MG/DL — HIGH (ref 70–99)
HCT VFR BLD CALC: 41.3 % — SIGNIFICANT CHANGE UP (ref 34.5–45)
HGB BLD-MCNC: 14.2 G/DL — SIGNIFICANT CHANGE UP (ref 11.5–15.5)
LYMPHOCYTES # BLD AUTO: 1.2 K/UL — SIGNIFICANT CHANGE UP (ref 1–3.3)
LYMPHOCYTES # BLD AUTO: 5.3 % — LOW (ref 13–44)
MANUAL DIF COMMENT BLD-IMP: SIGNIFICANT CHANGE UP
MCHC RBC-ENTMCNC: 28.4 PG — SIGNIFICANT CHANGE UP (ref 27–34)
MCHC RBC-ENTMCNC: 34.4 GM/DL — SIGNIFICANT CHANGE UP (ref 32–36)
MCV RBC AUTO: 82.5 FL — SIGNIFICANT CHANGE UP (ref 80–100)
MONOCYTES # BLD AUTO: 1.1 K/UL — HIGH (ref 0–0.9)
MONOCYTES NFR BLD AUTO: 4.9 % — SIGNIFICANT CHANGE UP (ref 2–14)
NEUTROPHILS # BLD AUTO: 19.7 K/UL — HIGH (ref 1.8–7.4)
NEUTROPHILS NFR BLD AUTO: 88.8 % — HIGH (ref 43–77)
OVALOCYTES BLD QL SMEAR: SLIGHT — SIGNIFICANT CHANGE UP
PLAT MORPH BLD: NORMAL — SIGNIFICANT CHANGE UP
PLATELET # BLD AUTO: 344 K/UL — SIGNIFICANT CHANGE UP (ref 150–400)
POTASSIUM SERPL-MCNC: 4.2 MMOL/L — SIGNIFICANT CHANGE UP (ref 3.5–5.3)
POTASSIUM SERPL-SCNC: 4.2 MMOL/L — SIGNIFICANT CHANGE UP (ref 3.5–5.3)
PROT SERPL-MCNC: 5.8 GM/DL — LOW (ref 6–8.3)
RBC # BLD: 5 M/UL — SIGNIFICANT CHANGE UP (ref 3.8–5.2)
RBC # FLD: 13.8 % — SIGNIFICANT CHANGE UP (ref 10.3–14.5)
RBC BLD AUTO: SIGNIFICANT CHANGE UP
SODIUM SERPL-SCNC: 133 MMOL/L — LOW (ref 135–145)
WBC # BLD: 22.2 K/UL — HIGH (ref 3.8–10.5)
WBC # FLD AUTO: 22.2 K/UL — HIGH (ref 3.8–10.5)

## 2017-08-24 PROCEDURE — 70450 CT HEAD/BRAIN W/O DYE: CPT | Mod: 26

## 2017-08-24 RX ORDER — METOPROLOL TARTRATE 50 MG
100 TABLET ORAL EVERY 12 HOURS
Qty: 0 | Refills: 0 | Status: DISCONTINUED | OUTPATIENT
Start: 2017-08-24 | End: 2017-08-25

## 2017-08-24 RX ORDER — OXYCODONE HYDROCHLORIDE 5 MG/1
5 TABLET ORAL EVERY 4 HOURS
Qty: 0 | Refills: 0 | Status: DISCONTINUED | OUTPATIENT
Start: 2017-08-24 | End: 2017-08-25

## 2017-08-24 RX ORDER — SODIUM CHLORIDE 9 MG/ML
3 INJECTION INTRAMUSCULAR; INTRAVENOUS; SUBCUTANEOUS EVERY 8 HOURS
Qty: 0 | Refills: 0 | Status: DISCONTINUED | OUTPATIENT
Start: 2017-08-24 | End: 2017-08-25

## 2017-08-24 RX ORDER — ACETAMINOPHEN 500 MG
650 TABLET ORAL EVERY 6 HOURS
Qty: 0 | Refills: 0 | Status: DISCONTINUED | OUTPATIENT
Start: 2017-08-24 | End: 2017-08-25

## 2017-08-24 RX ADMIN — Medication 100 MILLIGRAM(S): at 01:27

## 2017-08-24 RX ADMIN — SODIUM CHLORIDE 3 MILLILITER(S): 9 INJECTION INTRAMUSCULAR; INTRAVENOUS; SUBCUTANEOUS at 13:15

## 2017-08-24 RX ADMIN — LEVETIRACETAM 500 MILLIGRAM(S): 250 TABLET, FILM COATED ORAL at 21:33

## 2017-08-24 RX ADMIN — LOSARTAN POTASSIUM 50 MILLIGRAM(S): 100 TABLET, FILM COATED ORAL at 17:24

## 2017-08-24 RX ADMIN — SODIUM CHLORIDE 3 MILLILITER(S): 9 INJECTION INTRAMUSCULAR; INTRAVENOUS; SUBCUTANEOUS at 21:33

## 2017-08-24 RX ADMIN — Medication 2 MILLIGRAM(S): at 06:00

## 2017-08-24 RX ADMIN — LOSARTAN POTASSIUM 50 MILLIGRAM(S): 100 TABLET, FILM COATED ORAL at 06:00

## 2017-08-24 RX ADMIN — Medication 100 MILLIGRAM(S): at 21:33

## 2017-08-24 RX ADMIN — LEVETIRACETAM 500 MILLIGRAM(S): 250 TABLET, FILM COATED ORAL at 09:43

## 2017-08-24 RX ADMIN — Medication 100 MILLIGRAM(S): at 06:00

## 2017-08-24 RX ADMIN — Medication 2 MILLIGRAM(S): at 16:01

## 2017-08-24 RX ADMIN — ATORVASTATIN CALCIUM 40 MILLIGRAM(S): 80 TABLET, FILM COATED ORAL at 21:32

## 2017-08-24 RX ADMIN — Medication 100 MILLIGRAM(S): at 17:24

## 2017-08-24 RX ADMIN — Medication 2 MILLIGRAM(S): at 21:33

## 2017-08-24 RX ADMIN — SODIUM CHLORIDE 75 MILLILITER(S): 9 INJECTION INTRAMUSCULAR; INTRAVENOUS; SUBCUTANEOUS at 01:39

## 2017-08-24 RX ADMIN — Medication 100 MILLIGRAM(S): at 09:43

## 2017-08-24 RX ADMIN — Medication 100 MILLIGRAM(S): at 16:01

## 2017-08-24 NOTE — PROGRESS NOTE ADULT - ASSESSMENT
62 y/o female PMH HTN, HLD, TIA presented for elective right frontal craniotomy and resection of tumor with stealth.     -Advance diet  -OOB / increase activities  -PT  -Cont Keppra  -Venodynes for DVT ppx  -possible d/c home in AM    Discussed with

## 2017-08-24 NOTE — PHYSICAL THERAPY INITIAL EVALUATION ADULT - ADDITIONAL COMMENTS
Pt. resides in a one level house w/ 2 JOHNNY w/ HR w/ sig. other. Pt. ambulates independently w/out AD at baseline.

## 2017-08-24 NOTE — CONSULT NOTE ADULT - SUBJECTIVE AND OBJECTIVE BOX
Patient is a 62y old  Female who presents with a chief complaint of right frontal crainotomy and resection of tumor.       HPI:63 yo obese female with prior medical history of HTN, hyperlipidemia, recently diagnosed brain mass presented for tumor resection.  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  She was having poorly controlled HTN postop and cardiology team was consulted.  Xiomara from the past has off and on poorly controlled HTN as far as I know.  She denies any CP or SOB.        PAST MEDICAL & SURGICAL HISTORY:  High cholesterol  Transient cerebral ischemia, unspecified type: 3 years ago  Essential hypertension  No significant past surgical history      MEDICATIONS  (STANDING):  levETIRAcetam 500 milliGRAM(s) Oral every 12 hours  docusate sodium 100 milliGRAM(s) Oral three times a day  dexamethasone     Tablet 2 milliGRAM(s) Oral every 8 hours  atorvastatin 40 milliGRAM(s) Oral at bedtime  ceFAZolin   IVPB 2000 milliGRAM(s) IV Intermittent every 8 hours  losartan 50 milliGRAM(s) Oral two times a day  sodium chloride 0.9% lock flush 3 milliLiter(s) IV Push every 8 hours  metoprolol succinate  milliGRAM(s) Oral every 12 hours    MEDICATIONS  (PRN):  ondansetron Injectable 4 milliGRAM(s) IV Push every 6 hours PRN Nausea and/or Vomiting  acetaminophen   Tablet. 650 milliGRAM(s) Oral every 6 hours PRN Mild Pain (1 - 3)  oxyCODONE    IR 5 milliGRAM(s) Oral every 4 hours PRN Moderate Pain (4 - 6)      FAMILY HISTORY:  No pertinent family history in first degree relatives      SOCIAL HISTORY:  non smoker, no alcohol use   REVIEW OF SYSTEMS:  CONSTITUTIONAL:  No night sweats.  No fatigue, malaise, lethargy.  No fever or chills.  HEENT:  Eyes:  No visual changes.  No eye pain.      ENT:  No runny nose.  No epistaxis.  No sinus pain.  No sore throat.  No odynophagia.  No ear pain.  No congestion.  RESPIRATORY:  No cough.  No wheeze.  No hemoptysis.  No shortness of breath.  CARDIOVASCULAR:  No chest pains.  No palpitations. No shortness of breath, No orthopnea or PND.  GASTROINTESTINAL:  No abdominal pain.  No nausea or vomiting.  No diarrhea or constipation.  No hematemesis.  No hematochezia.  No melena.  GENITOURINARY:  No urgency.  No frequency.  No dysuria.  No hematuria.  No obstructive symptoms.  No discharge.  No pain.  No significant abnormal bleeding.  MUSCULOSKELETAL:  No musculoskeletal pain.  No joint swelling.  No arthritis.  NEUROLOGICAL:  No tingling or numbness or weakness.  PSYCHIATRIC:  No confusion  SKIN:  No rashes.  No lesions.  No wounds.  ENDOCRINE:  No unexplained weight loss.  No polydipsia.  No polyuria.  No polyphagia.  HEMATOLOGIC:  No anemia.  No purpura.  No petechiae.  No prolonged or excessive bleeding.   ALLERGIC AND IMMUNOLOGIC:  No pruritus.  No swelling.         Vital Signs Last 24 Hrs  T(C): 36.1 (24 Aug 2017 08:00), Max: 36.5 (23 Aug 2017 16:15)  T(F): 96.9 (24 Aug 2017 08:00), Max: 97.7 (23 Aug 2017 16:15)  HR: 65 (24 Aug 2017 09:00) (52 - 74)  BP: 155/71 (24 Aug 2017 08:00) (142/53 - 164/67)  BP(mean): 90 (24 Aug 2017 08:00) (75 - 92)  RR: 18 (24 Aug 2017 09:00) (14 - 27)  SpO2: 100% (24 Aug 2017 09:00) (96% - 100%)    PHYSICAL EXAM-    Constitutional: obese, in no distress    Head: wrapped in guaze.    Neck: The patient's neck is supple without enlargement, has no palpable thyromegaly nor thyroid nodules and has no jugular venous distention. No audible carotid bruits. There are strong carotid pulses bilaterally. No JVD.     Cardiovascular: Regular rate and rhythm without S3, S4. No murmurs or rubs are appreciated.      Respiratory: Breath sounds are normal. No rales. No wheezing.    Abdomen: Soft, nontender, nondistended with positive bowel sounds.      Extremity: No tenderness. No  pitting edema No skin discoloration No clubbing No cyanosis.     Neurologic: The patient is alert and oriented.      Skin: No rash, no obvious lesions noted.      Psychiatric: The patient appears to be emotionally stable.      INTERPRETATION OF TELEMETRY: sinus rythm.    ECG: not in chart.  Her EKG from earlier August during stress test showed sinus rythm, T wave inversion in III.   Will order one.    I&O's Detail    23 Aug 2017 07:01  -  24 Aug 2017 07:00  --------------------------------------------------------  IN:    IV PiggyBack: 100 mL    Oral Fluid: 300 mL    sodium chloride 0.9%: 1200 mL  Total IN: 1600 mL    OUT:    Indwelling Catheter - Urethral: 5275 mL    Other: 1400 mL  Total OUT: 6675 mL    Total NET: -5075 mL          LABS:    08-23    131<L>  |  97  |  21  ----------------------------<  151<H>  4.2   |  22  |  0.90    Ca    8.5      23 Aug 2017 19:14  Phos  3.7     08-23  Mg     1.9     08-23              I&O's Summary    23 Aug 2017 07:01  -  24 Aug 2017 07:00  --------------------------------------------------------  IN: 1600 mL / OUT: 6675 mL / NET: -5075 mL      BNP  RADIOLOGY & ADDITIONAL STUDIES:  < from: CT Chest w/ IV Cont (08.07.17 @ 23:46) >  EXAM:  CT CHEST IC                            PROCEDURE DATE:  08/07/2017          INTERPRETATION:  CLINICAL INFORMATION: Brain lesion, assess metastasis.    PROCEDURE:   CT of the chest, abdomen and pelvis was performed with intravenous   contrast. 90 mls of Omnipaque-350 administered without complication. 10   mls discarded. Coronal and sagittal reconstruction images were obtained.      COMPARISON: None.    FINDINGS:    CHEST:     LUNGS AND AIRWAYS: Patent central airways. No focal consolidation.   PLEURA: No pleural effusion or pneumothorax.  HEART: Normal size. No pericardial effusion.  VESSELS: Atherosclerotic change of the thoracic aorta, great vessels and   coronary arteries. Bovine arch.  CHEST WALL AND LOWER NECK: Within normal limits.   MEDIASTINUM AND DES: Subcentimeter mediastinal lymph nodes without   lymphadenopathy.    ABDOMEN/PELVIS:     LIVER/BILE DUCTS: No biliary dilatation. Scattered cysts measuring up to   2.9 x 1.7 cm and subcentimeter hypodense foci, too small tocharacterize.  GALLBLADDER: No radiopaque gallstone, significant gallbladder wall   thickening or pericholecystic fluid.    PANCREAS: Unremarkable.  SPLEEN: Unremarkable.    ADRENALS: Unremarkable.  KIDNEYS/URETERS: No hydronephrosis, hydroureter or perinephric stranding.   BLADDER: Unremarkable.    REPRODUCTIVE ORGANS: Calcified uterine fibroids. A 15.1 x 11.8 x 13.1 cm   cystic left adnexal lesion.    BOWEL: No bowel obstruction. Unremarkable appendix. No significant bowel   wall thickening or inflammatory change. Colon diverticulosis.   PERITONEUM: No drainable fluid collection or intraperitoneal free air.  VESSELS: Atherosclerotic change of the abdominal aorta and its branches.   RETROPERITONEUM: No lymphadenopathy.    ABDOMINAL WALL/SOFTTISSUES: Small fat-containing umbilical hernia.     BONES: Degenerative changes of the spine. Grade 1 anterolisthesis of L4   on L5.      IMPRESSION:    Indeterminate 15.1 x 11.8 x 13.1 cm left adnexal cystic lesion. Neoplasm   is included in the differential diagnosis, particularly in a   postmenopausal patient. Please note ovarian torsion cannot be excluded on   imaging given the size. Recommend clinical correlation.                LOUIE COLLAZO   This document has been electronically signed. Aug  8 2017  1:12AM                < end of copied text >      Cardiac stress test from 8/17- From  did not reveal any ischemia.

## 2017-08-24 NOTE — CONSULT NOTE ADULT - ASSESSMENT
Poorly controlled HTN- will increase losartan to 100 mg po BID. WIll continue toprol at current dose.  Close monitoring for now.  Check EKG.    Hyperlipidemia- continue statin.    Other medical and surgical issues- Management pre primary team.     Thank you for allowing me to participate in the care of this patient. Please feel free to contact me with any questions.

## 2017-08-24 NOTE — PROGRESS NOTE ADULT - SUBJECTIVE AND OBJECTIVE BOX
Patient is a 62y old  Female who presents with a chief complaint of right frontal crainotomy and resection of tumor with stealth (23 Aug 2017 07:20)      HPI:  62 y/o female PMH HTN, HLD, TIA presented for elective right frontal craniotomy and resection of tumor with stealth. Pt had history of TIA in the past had outpt brain MRI for follow up with Dr Iverson and was found to have a Right frontal brain mass. Pt then sent to ER for evaluation where she was seen and examined by Dr David. She was admitted for met w/u and possible sx. CT C/A/P obtained to r/o mets showing a Large adnexal cystic mass. Pt was on ASA and Plavix so it was recommended to postpone surgery for optimization of pt. She was started on decadron and discharged for close outpt f/u.    8/24 - POD#1 Pt seen and examined, in NAD. no overnight events, Denies pain, no new symptoms, has been OOB, tolerating PO.        Allergies  MSG (Rash)        levETIRAcetam 500 milliGRAM(s) Oral every 12 hours  ondansetron Injectable 4 milliGRAM(s) IV Push every 6 hours PRN  docusate sodium 100 milliGRAM(s) Oral three times a day  dexamethasone     Tablet 2 milliGRAM(s) Oral every 8 hours  atorvastatin 40 milliGRAM(s) Oral at bedtime  losartan 50 milliGRAM(s) Oral two times a day  sodium chloride 0.9% lock flush 3 milliLiter(s) IV Push every 8 hours  acetaminophen   Tablet. 650 milliGRAM(s) Oral every 6 hours PRN  oxyCODONE    IR 5 milliGRAM(s) Oral every 4 hours PRN  metoprolol succinate  milliGRAM(s) Oral every 12 hours        Vital Signs Last 24 Hrs  T(C): 36.9 (24 Aug 2017 17:22), Max: 37.2 (24 Aug 2017 10:40)  T(F): 98.4 (24 Aug 2017 17:22), Max: 98.9 (24 Aug 2017 10:40)  HR: 75 (24 Aug 2017 17:22) (52 - 75)  BP: 141/57 (24 Aug 2017 17:22) (135/68 - 164/67)  BP(mean): 90 (24 Aug 2017 08:00) (75 - 92)  RR: 17 (24 Aug 2017 17:22) (16 - 27)  SpO2: 99% (24 Aug 2017 17:22) (96% - 100%)                            14.2   22.2  )-----------( 344      ( 24 Aug 2017 09:11 )             41.3       08-24    133<L>  |  100  |  16  ----------------------------<  125<H>  4.2   |  24  |  0.84    Ca    8.7      24 Aug 2017 09:11  Phos  3.7     08-23  Mg     1.9     08-23    TPro  5.8<L>  /  Alb  2.6<L>  /  TBili  0.7  /  DBili  x   /  AST  20  /  ALT  29  /  AlkPhos  72  08-24        CT head - No significant interval change since the prior exam. Reidentified is the   right frontotemporal craniotomy for resection of a lesion in the right   lateral frontal lobe. Postsurgical air within the surgical cavity and   within the extra-axial space of the right frontal temporal lobes appears   essentially unchanged. Mild mass effect on the underlying parenchyma is   unchanged. No kathryn midline shift is present.         Constitutional: awake and alert.  HEENT: PERRLA, EOMI  Neck: Supple  Respiratory: Breath sounds are clear bilaterally  Cardiovascular: S1 and S2, regular rhythm  Gastrointestinal: morbidly obese, soft, nontender  Extremities:  no edema  Musculoskeletal: no joint swelling/tenderness, no abnormal movements  Skin: No rashes    Neurological exam:  HF: A x O x 3. Appropriately interactive, normal affect. Speech fluent, No Aphasia or paraphasic errors. Naming /repetition intact   CN: ANGELITA, EOMI, VFF, facial sensation normal, no NLFD, tongue midline  Motor: No pronator drift, Strength 5/5 in all 4 ext, normal bulk and tone, no tremor, rigidity or bradykinesia.    Sens: Intact to light touch  Reflexes: Symmetric and normal, downgoing toes b/l  Coord:  No FNFA, dysmetria, IFRAH intact   Gait/Balance: Cannot test          Imaging;

## 2017-08-25 ENCOUNTER — TRANSCRIPTION ENCOUNTER (OUTPATIENT)
Age: 62
End: 2017-08-25

## 2017-08-25 VITALS
HEART RATE: 73 BPM | OXYGEN SATURATION: 99 % | TEMPERATURE: 98 F | RESPIRATION RATE: 18 BRPM | SYSTOLIC BLOOD PRESSURE: 145 MMHG | DIASTOLIC BLOOD PRESSURE: 61 MMHG

## 2017-08-25 RX ORDER — CLOPIDOGREL BISULFATE 75 MG/1
1 TABLET, FILM COATED ORAL
Qty: 0 | Refills: 0 | COMMUNITY

## 2017-08-25 RX ORDER — DEXAMETHASONE 0.5 MG/5ML
1 ELIXIR ORAL
Qty: 4 | Refills: 0 | OUTPATIENT
Start: 2017-08-25 | End: 2017-08-29

## 2017-08-25 RX ORDER — LEVETIRACETAM 250 MG/1
1 TABLET, FILM COATED ORAL
Qty: 60 | Refills: 0 | OUTPATIENT
Start: 2017-08-25 | End: 2017-09-24

## 2017-08-25 RX ORDER — ASPIRIN/CALCIUM CARB/MAGNESIUM 324 MG
1 TABLET ORAL
Qty: 0 | Refills: 0 | COMMUNITY

## 2017-08-25 RX ORDER — ACETAMINOPHEN 500 MG
2 TABLET ORAL
Qty: 0 | Refills: 0 | COMMUNITY
Start: 2017-08-25

## 2017-08-25 RX ADMIN — Medication 100 MILLIGRAM(S): at 06:42

## 2017-08-25 RX ADMIN — Medication 2 MILLIGRAM(S): at 05:21

## 2017-08-25 RX ADMIN — LOSARTAN POTASSIUM 50 MILLIGRAM(S): 100 TABLET, FILM COATED ORAL at 05:21

## 2017-08-25 RX ADMIN — SODIUM CHLORIDE 3 MILLILITER(S): 9 INJECTION INTRAMUSCULAR; INTRAVENOUS; SUBCUTANEOUS at 05:39

## 2017-08-25 RX ADMIN — LEVETIRACETAM 500 MILLIGRAM(S): 250 TABLET, FILM COATED ORAL at 11:51

## 2017-08-25 NOTE — DISCHARGE NOTE ADULT - PATIENT PORTAL LINK FT
“You can access the Follow[employer] Patient Portal, offered by [facility], by registering with the following website: [email or web address]”

## 2017-08-25 NOTE — PROGRESS NOTE ADULT - SUBJECTIVE AND OBJECTIVE BOX
Cardiology Progress Note:    HPI:61 yo obese female with prior medical history of HTN, hyperlipidemia, recently diagnosed brain mass presented for tumor resection.  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 She was having poorly controlled HTN postop and cardiology team was consulted.  She denies any CP or SOB.    8/25- No events last pm. Not on tele. No new complaints. No CP/SOB.     PAST MEDICAL & SURGICAL HISTORY:  High cholesterol  Transient cerebral ischemia, unspecified type: 3 years ago  Essential hypertension  No significant past surgical history    MEDICATIONS  (STANDING):  levETIRAcetam 500 milliGRAM(s) Oral every 12 hours  docusate sodium 100 milliGRAM(s) Oral three times a day  dexamethasone     Tablet 2 milliGRAM(s) Oral every 8 hours  atorvastatin 40 milliGRAM(s) Oral at bedtime  ceFAZolin   IVPB 2000 milliGRAM(s) IV Intermittent every 8 hours  losartan 50 milliGRAM(s) Oral two times a day  sodium chloride 0.9% lock flush 3 milliLiter(s) IV Push every 8 hours  metoprolol succinate  milliGRAM(s) Oral every 12 hours    MEDICATIONS  (PRN):  ondansetron Injectable 4 milliGRAM(s) IV Push every 6 hours PRN Nausea and/or Vomiting  acetaminophen   Tablet. 650 milliGRAM(s) Oral every 6 hours PRN Mild Pain (1 - 3)  oxyCODONE    IR 5 milliGRAM(s) Oral every 4 hours PRN Moderate Pain (4 - 6)    FAMILY HISTORY: No pertinent family history in first degree relatives    SOCIAL HISTORY: non smoker, no alcohol use     REVIEW OF SYSTEMS:  CONSTITUTIONAL:  No night sweats.  No fatigue, malaise, lethargy.  No fever or chills.  HEENT:  Eyes:  No visual changes.  No eye pain.      ENT:  No runny nose.  No epistaxis.  No sinus pain.  No sore throat.  No odynophagia.  No ear pain.  No congestion.  RESPIRATORY:  No cough.  No wheeze.  No hemoptysis.  No shortness of breath.  CARDIOVASCULAR:  No chest pains.  No palpitations. No shortness of breath, No orthopnea or PND.  GASTROINTESTINAL:  No abdominal pain.  No nausea or vomiting.  No diarrhea or constipation.  No hematemesis.  No hematochezia.  No melena.  GENITOURINARY:  No urgency.  No frequency.  No dysuria.  No hematuria.  No obstructive symptoms.  No discharge.  No pain.  No significant abnormal bleeding.  MUSCULOSKELETAL:  No musculoskeletal pain.  No joint swelling.  No arthritis.  NEUROLOGICAL:  No tingling or numbness or weakness.    Vital Signs Last 24 Hrs  T(C): 36.1 (24 Aug 2017 08:00), Max: 36.5 (23 Aug 2017 16:15)  T(F): 96.9 (24 Aug 2017 08:00), Max: 97.7 (23 Aug 2017 16:15)  HR: 65 (24 Aug 2017 09:00) (52 - 74)  BP: 155/71 (24 Aug 2017 08:00) (142/53 - 164/67)  BP(mean): 90 (24 Aug 2017 08:00) (75 - 92)  RR: 18 (24 Aug 2017 09:00) (14 - 27)  SpO2: 100% (24 Aug 2017 09:00) (96% - 100%)    PHYSICAL EXAM-    Constitutional: obese, in no distress  Head: wrapped in guaze.  Neck: The patient's neck is supple without enlargement, has no palpable thyromegaly nor thyroid nodules and has no jugular venous distention. No audible carotid bruits. There are strong carotid pulses bilaterally. No JVD.   Cardiovascular: Regular rate and rhythm without S3, S4. No murmurs or rubs are appreciated.    Respiratory: Breath sounds are normal. No rales. No wheezing.  Abdomen: Soft, nontender, nondistended with positive bowel sounds.    Extremity: No tenderness. No  pitting edema No skin discoloration No clubbing No cyanosis.   Neurologic: The patient is alert and oriented.      INTERPRETATION OF TELEMETRY: sinus rhythm    ECG: Her EKG from earlier August during stress test showed sinus rythm, T wave inversion in III.   Will order one.    I&O's Detail    23 Aug 2017 07:01  -  24 Aug 2017 07:00  --------------------------------------------------------  IN:    IV PiggyBack: 100 mL    Oral Fluid: 300 mL    sodium chloride 0.9%: 1200 mL  Total IN: 1600 mL    OUT:    Indwelling Catheter - Urethral: 5275 mL    Other: 1400 mL  Total OUT: 6675 mL    Total NET: -5075 mL    LABS:    08-23    131<L>  |  97  |  21  ----------------------------<  151<H>  4.2   |  22  |  0.90    Ca    8.5      23 Aug 2017 19:14  Phos  3.7     08-23  Mg     1.9     08-23    I&O's Summary    23 Aug 2017 07:01  -  24 Aug 2017 07:00  --------------------------------------------------------  IN: 1600 mL / OUT: 6675 mL / NET: -5075 mL    BNP  RADIOLOGY & ADDITIONAL STUDIES:  < from: CT Chest w/ IV Cont (08.07.17 @ 23:46) >  EXAM:  CT CHEST IC                        Indeterminate 15.1 x 11.8 x 13.1 cm left adnexal cystic lesion. Neoplasm   is included in the differential diagnosis, particularly in a   postmenopausal patient. Please note ovarian torsion cannot be excluded on   imaging given the size. Recommend clinical correlation.    Cardiac stress test from 8/17- From [facility] did not reveal any ischemia.

## 2017-08-25 NOTE — CDI QUERY NOTE - NSCDIOTHERTXTBX_GEN_ALL_CORE_HH
As per lab values serum sodium level 131. IV normal saline 75 ml/hr ordered. Please clarify the diagnosis treated:  A. hyponatremia  B. hypernatremia  C. other

## 2017-08-25 NOTE — DISCHARGE NOTE ADULT - CARE PROVIDERS DIRECT ADDRESSES
[email or web address]

## 2017-08-25 NOTE — DISCHARGE NOTE ADULT - CARE PLAN
Principal Discharge DX:	Brain tumor  Goal:	Return to normal function  Instructions for follow-up, activity and diet:	Follow up with Dr. David in 10-12 days in his office  May shower, use shampoo, keep incision clean and dry, no dressing required  If you develop fever, chills, nausea, vomiting, or headache please call the office   No heavy lifting, no driving  Secondary Diagnosis:	Essential hypertension  Goal:	Good Blood pressure control  Instructions for follow-up, activity and diet:	Continue home medications,  follow up with cardiology and primary care physician as needed  Secondary Diagnosis:	Transient cerebral ischemia, unspecified type  Goal:	Prevent Stroke  Instructions for follow-up, activity and diet:	May restart Aspirin on Sunday 8/27 and restart Plavix on Wednesday 8/30  Follow up with Dr. Iverson as needed  Secondary Diagnosis:	High cholesterol  Goal:	normal cholesterol  Instructions for follow-up, activity and diet:	Continue current medications

## 2017-08-25 NOTE — DISCHARGE NOTE ADULT - HOSPITAL COURSE
Pt is a 63 year old women who was admitted through [facility] for a planned craniotomy and resection of tumor.  Pt tolerated procedure well, she was transferred to the PACU and then to the ICU for observation overnight.  Repeat CT of the head showed resection of tumor, no hemorrhage was seen, BP was well controlled, pt was transferred out of the ICU to .  Pt was seen by cardiology and physical therapy. She was ambulating well. Complained of minimal pain, no nausea or vomiting.  Post-op day #2 pt is set for discharge home. Will continue Keppra until follow up with Dr. David. Will taper steroids over the next four days.  Pt  instructed not to drive and not to return to work until she follows up with Dr. David in his office in 10-12 days.

## 2017-08-25 NOTE — DISCHARGE NOTE ADULT - MEDICATION SUMMARY - MEDICATIONS TO TAKE
I will START or STAY ON the medications listed below when I get home from the hospital:    dexamethasone 4 mg oral tablet  -- 1 tab(s) by mouth once a day  -- It is very important that you take or use this exactly as directed.  Do not skip doses or discontinue unless directed by your doctor.  Obtain medical advice before taking any non-prescription drugs as some may affect the action of this medication.  Take with food or milk.    -- Indication: For edema/swelling in the brain     acetaminophen 325 mg oral tablet  -- 2 tab(s) by mouth every 6 hours, As needed, Mild Pain (1 - 3)  -- Indication: For Pain     aspirin 81 mg oral tablet, chewable  -- 1 tab(s) by mouth once a day    RESTART ASPIRIN ON SUNDAY 8/27  -- Indication: For cardiovascular disease    losartan 50 mg oral tablet  --  by mouth   -- Indication: For Blood Pressure    losartan 50 mg oral tablet  -- 1 tab(s) by mouth 2 times a day  -- Indication: For Blood Pressure    levETIRAcetam 500 mg oral tablet  -- 1 tab(s) by mouth every 12 hours  -- Indication: For seizure precaustions     atorvastatin 40 mg oral tablet  -- 1 tab(s) by mouth once a day (at bedtime)  -- Indication: For High Cholesterol     clopidogrel 75 mg oral tablet  -- 1 tab(s) by mouth once a day    RESTART PLAVIX ON WEDNEDAY 8/30  -- Indication: For cardiovascular disease    metoprolol succinate 100 mg oral tablet, extended release  -- 1 tab(s) by mouth 2 times a day  -- Indication: For Blood Pressure     famotidine 20 mg oral tablet  -- 1 tab(s) by mouth once a day  -- Indication: For Acid Reflux     glucosamine  --  by mouth   -- Indication: For joint health     multivitamin  --     -- Indication: For Multivitamin

## 2017-08-25 NOTE — DISCHARGE NOTE ADULT - CARE PROVIDER_API CALL
Chaim David; PhD), Neurosurgery  [address]  [address] [place] [address]  Phone: [phone]  Fax: [phone]    Abdelrahman Llanes (DO), Cardiology; Internal Medicine  [address] [place] [address]  Phone: [phone]  Fax: [phone]    Roger Armstrong (MD), Family Medicine  [address] [place] [address]  Phone: [phone]  Fax: [phone]    Eneida Iverson), Neurology  General  [address] [place] [address]  Phone: [phone]  Fax: [phone]

## 2017-08-25 NOTE — DISCHARGE NOTE ADULT - MEDICATION SUMMARY - MEDICATIONS TO CHANGE
I will SWITCH the dose or number of times a day I take the medications listed below when I get home from the hospital:    clopidogrel 75 mg oral tablet  -- 1 tab(s) by mouth once a day    aspirin 81 mg oral tablet, chewable  -- 1 tab(s) by mouth once a day    dexamethasone 2 mg oral tablet  -- 1 tab(s) by mouth every 8 hours MDD:3 tabs  -- It is very important that you take or use this exactly as directed.  Do not skip doses or discontinue unless directed by your doctor.  Obtain medical advice before taking any non-prescription drugs as some may affect the action of this medication.  Take with food or milk.

## 2017-08-25 NOTE — PROGRESS NOTE ADULT - ASSESSMENT
1. HTN- -140s now. Continue current therapy. Will adjust as needed as outpt.    2. Hyperlipidemia- continue statin.    3. Pt tolerate procedure well. D/C planning, outpt f/u with me upon discarge. Pt has recent NST that was negative as outpt.     4. DVT proph.

## 2017-08-25 NOTE — DISCHARGE NOTE ADULT - NS AS ACTIVITY OBS
Do not make important decisions/Walking-Indoors allowed/Showering allowed/Walking-Outdoors allowed/Bathing allowed/Stairs allowed/No Heavy lifting/straining/Do not drive or operate machinery

## 2017-08-25 NOTE — DISCHARGE NOTE ADULT - PLAN OF CARE
Return to normal function Follow up with Dr. David in 10-12 days in his office  May shower, use shampoo, keep incision clean and dry, no dressing required  If you develop fever, chills, nausea, vomiting, or headache please call the office   No heavy lifting, no driving Good Blood pressure control Continue home medications,  follow up with cardiology and primary care physician as needed Prevent Stroke May restart Aspirin on Sunday 8/27 and restart Plavix on Wednesday 8/30  Follow up with Dr. Iverson as needed normal cholesterol Continue current medications

## 2017-08-29 DIAGNOSIS — C71.9 MALIGNANT NEOPLASM OF BRAIN, UNSPECIFIED: ICD-10-CM

## 2017-08-29 DIAGNOSIS — E66.01 MORBID (SEVERE) OBESITY DUE TO EXCESS CALORIES: ICD-10-CM

## 2017-08-29 DIAGNOSIS — E78.5 HYPERLIPIDEMIA, UNSPECIFIED: ICD-10-CM

## 2017-08-29 DIAGNOSIS — D49.6 NEOPLASM OF UNSPECIFIED BEHAVIOR OF BRAIN: ICD-10-CM

## 2017-08-29 DIAGNOSIS — D49.59 NEOPLASM OF UNSPECIFIED BEHAVIOR OF OTHER GENITOURINARY ORGAN: ICD-10-CM

## 2017-08-29 DIAGNOSIS — I10 ESSENTIAL (PRIMARY) HYPERTENSION: ICD-10-CM

## 2017-08-29 DIAGNOSIS — Z88.8 ALLERGY STATUS TO OTHER DRUGS, MEDICAMENTS AND BIOLOGICAL SUBSTANCES: ICD-10-CM

## 2017-08-29 DIAGNOSIS — Z86.73 PERSONAL HISTORY OF TRANSIENT ISCHEMIC ATTACK (TIA), AND CEREBRAL INFARCTION WITHOUT RESIDUAL DEFICITS: ICD-10-CM

## 2017-08-29 LAB — SURGICAL PATHOLOGY FINAL REPORT - CH: SIGNIFICANT CHANGE UP

## 2017-09-05 ENCOUNTER — EMERGENCY (EMERGENCY)
Facility: HOSPITAL | Age: 62
LOS: 0 days | Discharge: HOME | End: 2017-09-06
Attending: FAMILY MEDICINE | Admitting: FAMILY MEDICINE
Payer: COMMERCIAL

## 2017-09-05 VITALS
HEART RATE: 109 BPM | RESPIRATION RATE: 18 BRPM | WEIGHT: 201.94 LBS | TEMPERATURE: 99 F | DIASTOLIC BLOOD PRESSURE: 58 MMHG | SYSTOLIC BLOOD PRESSURE: 116 MMHG | OXYGEN SATURATION: 100 % | HEIGHT: 64 IN

## 2017-09-05 DIAGNOSIS — R04.0 EPISTAXIS: ICD-10-CM

## 2017-09-05 DIAGNOSIS — Z86.73 PERSONAL HISTORY OF TRANSIENT ISCHEMIC ATTACK (TIA), AND CEREBRAL INFARCTION WITHOUT RESIDUAL DEFICITS: ICD-10-CM

## 2017-09-05 PROCEDURE — 99284 EMERGENCY DEPT VISIT MOD MDM: CPT | Mod: 25

## 2017-09-05 PROCEDURE — 30901 CONTROL OF NOSEBLEED: CPT

## 2017-09-05 RX ORDER — LIDOCAINE 4 G/100G
10 CREAM TOPICAL ONCE
Qty: 0 | Refills: 0 | Status: COMPLETED | OUTPATIENT
Start: 2017-09-05 | End: 2017-09-05

## 2017-09-05 RX ORDER — RACEPINEPHRINE HCL 2.25 %
1 SOLUTION, NON-ORAL TOPICAL ONCE
Qty: 0 | Refills: 0 | Status: DISCONTINUED | OUTPATIENT
Start: 2017-09-05 | End: 2017-09-05

## 2017-09-05 RX ORDER — AMOXICILLIN 250 MG/5ML
500 SUSPENSION, RECONSTITUTED, ORAL (ML) ORAL ONCE
Qty: 0 | Refills: 0 | Status: COMPLETED | OUTPATIENT
Start: 2017-09-05 | End: 2017-09-05

## 2017-09-05 RX ADMIN — LIDOCAINE 10 MILLILITER(S): 4 CREAM TOPICAL at 23:48

## 2017-09-05 NOTE — ED ADULT NURSE NOTE - CHIEF COMPLAINT QUOTE
hx of brain tumor removal 10 days ago, pt c/o nose bleed on and off day.  as per EMS brother called EMS because patient sounded confused on phone.  EMS states patient was altered, but no resolved.

## 2017-09-05 NOTE — ED PROVIDER NOTE - OBJECTIVE STATEMENT
61 y/o PMHx TIA in 2013, 2 weeks ago had brain resection from right side, presents to the ED s/p nose bleed. The pt provides that she started having nose bleed today morning. The pt notes that she applied pressure, with no change. Pt adds that she had a similar episode in 2014 and had the area cauterized. No h/o trauma, syncope, headache, fever, chills, dizziness, abd pain, nvd, cp, cough, sob, rash, or urinary incontinence. 63 y/o PMHx TIA in 2013, 2 weeks ago had brain tumor resection from right side, presents to the ED s/p nose bleed. The pt provides that she started having nose bleed today morning. The pt notes that she applied pressure, with no change. Pt adds that she had a similar episode in 2014 and had the area cauterized. No h/o trauma, syncope, headache, fever, chills, dizziness, abd pain, nvd, cp, cough, sob, rash, or urinary incontinence.

## 2017-09-05 NOTE — ED ADULT NURSE NOTE - OBJECTIVE STATEMENT
Pt presents to ER c/o epistaxis. Onset of symptoms began this morning at 9am. Pt reports recent sx for removal of brain tumor. Pt reports anticoagulant use. On arrival there was no active bleeding, pt reports feeling like she is swallowing blood Pt presents to ER c/o epistaxis. Onset of symptoms began this morning at 9am. Pt reports recent sx for removal of brain tumor. Pt reports anticoagulant use. On arrival there was no active bleeding, pt reports feeling like she is swallowing blood. Airway patent, head of bed elevated

## 2017-09-05 NOTE — ED PROVIDER NOTE - ENMT, MLM
Right nare: active bleed over ant nare, Airway patent, Mouth with normal mucosa. Throat has no vesicles, no oropharyngeal exudates and uvula is midline.

## 2017-09-05 NOTE — ED PROVIDER NOTE - CHPI ED SYMPTOMS NEG
no loss of consciousness/no nausea/no weakness/no vomiting/no blurred vision/no fever/no numbness/no syncope/no change in level of consciousness/no chills

## 2017-09-06 ENCOUNTER — INPATIENT (INPATIENT)
Facility: HOSPITAL | Age: 62
LOS: 6 days | Discharge: HOME HEALTH | End: 2017-09-13
Attending: INTERNAL MEDICINE | Admitting: INTERNAL MEDICINE
Payer: COMMERCIAL

## 2017-09-06 VITALS
OXYGEN SATURATION: 99 % | TEMPERATURE: 99 F | SYSTOLIC BLOOD PRESSURE: 128 MMHG | RESPIRATION RATE: 17 BRPM | HEART RATE: 91 BPM | DIASTOLIC BLOOD PRESSURE: 52 MMHG

## 2017-09-06 VITALS
RESPIRATION RATE: 16 BRPM | SYSTOLIC BLOOD PRESSURE: 127 MMHG | HEART RATE: 66 BPM | DIASTOLIC BLOOD PRESSURE: 100 MMHG | TEMPERATURE: 99 F | OXYGEN SATURATION: 97 %

## 2017-09-06 LAB
ADD ON TEST-SPECIMEN IN LAB: SIGNIFICANT CHANGE UP
ALBUMIN SERPL ELPH-MCNC: 2.6 G/DL — LOW (ref 3.3–5)
ALP SERPL-CCNC: 68 U/L — SIGNIFICANT CHANGE UP (ref 40–120)
ALT FLD-CCNC: 22 U/L — SIGNIFICANT CHANGE UP (ref 12–78)
ANION GAP SERPL CALC-SCNC: 9 MMOL/L — SIGNIFICANT CHANGE UP (ref 5–17)
ANISOCYTOSIS BLD QL: SLIGHT — SIGNIFICANT CHANGE UP
APTT BLD: 23.3 SEC — LOW (ref 27.5–37.4)
AST SERPL-CCNC: 10 U/L — LOW (ref 15–37)
BASOPHILS # BLD AUTO: 0.2 K/UL — SIGNIFICANT CHANGE UP (ref 0–0.2)
BILIRUB SERPL-MCNC: 0.2 MG/DL — SIGNIFICANT CHANGE UP (ref 0.2–1.2)
BUN SERPL-MCNC: 51 MG/DL — HIGH (ref 7–23)
CALCIUM SERPL-MCNC: 9.4 MG/DL — SIGNIFICANT CHANGE UP (ref 8.5–10.1)
CHLORIDE SERPL-SCNC: 104 MMOL/L — SIGNIFICANT CHANGE UP (ref 96–108)
CO2 SERPL-SCNC: 26 MMOL/L — SIGNIFICANT CHANGE UP (ref 22–31)
CREAT SERPL-MCNC: 1.02 MG/DL — SIGNIFICANT CHANGE UP (ref 0.5–1.3)
EOSINOPHIL # BLD AUTO: 0 K/UL — SIGNIFICANT CHANGE UP (ref 0–0.5)
GLUCOSE SERPL-MCNC: 131 MG/DL — HIGH (ref 70–99)
HCT VFR BLD CALC: 21.4 % — LOW (ref 34.5–45)
HCT VFR BLD CALC: 25.8 % — LOW (ref 34.5–45)
HGB BLD-MCNC: 7.3 G/DL — LOW (ref 11.5–15.5)
HGB BLD-MCNC: 8.8 G/DL — LOW (ref 11.5–15.5)
HYPOCHROMIA BLD QL: SLIGHT — SIGNIFICANT CHANGE UP
INR BLD: 1.17 RATIO — HIGH (ref 0.88–1.16)
LYMPHOCYTES # BLD AUTO: 1.9 K/UL — SIGNIFICANT CHANGE UP (ref 1–3.3)
LYMPHOCYTES # BLD AUTO: 15 % — SIGNIFICANT CHANGE UP (ref 13–44)
MANUAL DIF COMMENT BLD-IMP: SIGNIFICANT CHANGE UP
MCHC RBC-ENTMCNC: 28.3 PG — SIGNIFICANT CHANGE UP (ref 27–34)
MCHC RBC-ENTMCNC: 34 GM/DL — SIGNIFICANT CHANGE UP (ref 32–36)
MCV RBC AUTO: 83.1 FL — SIGNIFICANT CHANGE UP (ref 80–100)
MICROCYTES BLD QL: SLIGHT — SIGNIFICANT CHANGE UP
MONOCYTES # BLD AUTO: 1.1 K/UL — HIGH (ref 0–0.9)
MONOCYTES NFR BLD AUTO: 3 % — SIGNIFICANT CHANGE UP (ref 2–14)
NEUTROPHILS # BLD AUTO: 16.9 K/UL — HIGH (ref 1.8–7.4)
NEUTROPHILS NFR BLD AUTO: 78 % — HIGH (ref 43–77)
NEUTS BAND # BLD: 4 % — SIGNIFICANT CHANGE UP (ref 0–8)
PLAT MORPH BLD: NORMAL — SIGNIFICANT CHANGE UP
PLATELET # BLD AUTO: 309 K/UL — SIGNIFICANT CHANGE UP (ref 150–400)
POIKILOCYTOSIS BLD QL AUTO: SLIGHT — SIGNIFICANT CHANGE UP
POTASSIUM SERPL-MCNC: 3.6 MMOL/L — SIGNIFICANT CHANGE UP (ref 3.5–5.3)
POTASSIUM SERPL-SCNC: 3.6 MMOL/L — SIGNIFICANT CHANGE UP (ref 3.5–5.3)
PROT SERPL-MCNC: 6.2 GM/DL — SIGNIFICANT CHANGE UP (ref 6–8.3)
PROTHROM AB SERPL-ACNC: 12.7 SEC — SIGNIFICANT CHANGE UP (ref 9.8–12.7)
RBC # BLD: 3.11 M/UL — LOW (ref 3.8–5.2)
RBC # FLD: 14.3 % — SIGNIFICANT CHANGE UP (ref 10.3–14.5)
RBC BLD AUTO: (no result)
SODIUM SERPL-SCNC: 139 MMOL/L — SIGNIFICANT CHANGE UP (ref 135–145)
TROPONIN I SERPL-MCNC: 0.03 NG/ML — SIGNIFICANT CHANGE UP (ref 0.01–0.04)
WBC # BLD: 20.2 K/UL — HIGH (ref 3.8–10.5)
WBC # FLD AUTO: 20.2 K/UL — HIGH (ref 3.8–10.5)

## 2017-09-06 PROCEDURE — 70450 CT HEAD/BRAIN W/O DYE: CPT | Mod: 26

## 2017-09-06 PROCEDURE — 93010 ELECTROCARDIOGRAM REPORT: CPT

## 2017-09-06 PROCEDURE — 99285 EMERGENCY DEPT VISIT HI MDM: CPT

## 2017-09-06 RX ORDER — LOSARTAN POTASSIUM 100 MG/1
0 TABLET, FILM COATED ORAL
Qty: 0 | Refills: 0 | COMMUNITY

## 2017-09-06 RX ORDER — PANTOPRAZOLE SODIUM 20 MG/1
80 TABLET, DELAYED RELEASE ORAL ONCE
Qty: 0 | Refills: 0 | Status: COMPLETED | OUTPATIENT
Start: 2017-09-06 | End: 2017-09-06

## 2017-09-06 RX ORDER — SENNA PLUS 8.6 MG/1
2 TABLET ORAL AT BEDTIME
Qty: 0 | Refills: 0 | Status: DISCONTINUED | OUTPATIENT
Start: 2017-09-06 | End: 2017-09-13

## 2017-09-06 RX ORDER — SODIUM CHLORIDE 9 MG/ML
1000 INJECTION INTRAMUSCULAR; INTRAVENOUS; SUBCUTANEOUS ONCE
Qty: 0 | Refills: 0 | Status: COMPLETED | OUTPATIENT
Start: 2017-09-06 | End: 2017-09-06

## 2017-09-06 RX ORDER — ATORVASTATIN CALCIUM 80 MG/1
40 TABLET, FILM COATED ORAL AT BEDTIME
Qty: 0 | Refills: 0 | Status: DISCONTINUED | OUTPATIENT
Start: 2017-09-06 | End: 2017-09-13

## 2017-09-06 RX ORDER — LEVETIRACETAM 250 MG/1
1000 TABLET, FILM COATED ORAL EVERY 12 HOURS
Qty: 0 | Refills: 0 | Status: DISCONTINUED | OUTPATIENT
Start: 2017-09-06 | End: 2017-09-09

## 2017-09-06 RX ORDER — PANTOPRAZOLE SODIUM 20 MG/1
40 TABLET, DELAYED RELEASE ORAL
Qty: 0 | Refills: 0 | Status: DISCONTINUED | OUTPATIENT
Start: 2017-09-06 | End: 2017-09-13

## 2017-09-06 RX ORDER — SODIUM CHLORIDE 9 MG/ML
1000 INJECTION INTRAMUSCULAR; INTRAVENOUS; SUBCUTANEOUS
Qty: 0 | Refills: 0 | Status: DISCONTINUED | OUTPATIENT
Start: 2017-09-06 | End: 2017-09-08

## 2017-09-06 RX ORDER — LEVETIRACETAM 250 MG/1
1000 TABLET, FILM COATED ORAL ONCE
Qty: 0 | Refills: 0 | Status: COMPLETED | OUTPATIENT
Start: 2017-09-06 | End: 2017-09-06

## 2017-09-06 RX ORDER — AMOXICILLIN 250 MG/5ML
1 SUSPENSION, RECONSTITUTED, ORAL (ML) ORAL
Qty: 15 | Refills: 0 | OUTPATIENT
Start: 2017-09-06 | End: 2017-09-11

## 2017-09-06 RX ORDER — ONDANSETRON 8 MG/1
4 TABLET, FILM COATED ORAL EVERY 6 HOURS
Qty: 0 | Refills: 0 | Status: DISCONTINUED | OUTPATIENT
Start: 2017-09-06 | End: 2017-09-13

## 2017-09-06 RX ORDER — ACETAMINOPHEN 500 MG
650 TABLET ORAL EVERY 6 HOURS
Qty: 0 | Refills: 0 | Status: DISCONTINUED | OUTPATIENT
Start: 2017-09-06 | End: 2017-09-13

## 2017-09-06 RX ADMIN — Medication 500 MILLIGRAM(S): at 00:06

## 2017-09-06 RX ADMIN — PANTOPRAZOLE SODIUM 80 MILLIGRAM(S): 20 TABLET, DELAYED RELEASE ORAL at 14:45

## 2017-09-06 RX ADMIN — SODIUM CHLORIDE 1000 MILLILITER(S): 9 INJECTION INTRAMUSCULAR; INTRAVENOUS; SUBCUTANEOUS at 18:25

## 2017-09-06 RX ADMIN — SODIUM CHLORIDE 1000 MILLILITER(S): 9 INJECTION INTRAMUSCULAR; INTRAVENOUS; SUBCUTANEOUS at 14:08

## 2017-09-06 RX ADMIN — LEVETIRACETAM 400 MILLIGRAM(S): 250 TABLET, FILM COATED ORAL at 15:39

## 2017-09-06 NOTE — ED PROCEDURE NOTE - CPROC ED NASAL DETAIL1
The source of the bleeding was clearly identified. The source of the bleeding was clearly identified./The anatomic location was packed.

## 2017-09-06 NOTE — H&P ADULT - NSHPLABSRESULTS_GEN_ALL_CORE
CT head  IMPRESSION:   RIGHT frontal craniotomy. Minimal pneumocephalus is seen within the surgical bed in the RIGHT frontal lobe.

## 2017-09-06 NOTE — ED ADULT TRIAGE NOTE - CHIEF COMPLAINT QUOTE
collapse in bathroom witnessed by brother, syncope vs seizure, recent brain surgery, here yesterday for nose bleed, nasal rocket in place  EMT reports confusion on scene,now resolved

## 2017-09-06 NOTE — H&P ADULT - NSHPOUTPATIENTPROVIDERS_GEN_ALL_CORE
PCP: Roger Armstrong  Neuro: SEJAL Iverson  Neurosx: Frankie  ENT: Osman Leon  Cardio: Mario Alberto

## 2017-09-06 NOTE — H&P ADULT - HISTORY OF PRESENT ILLNESS
62 y.o. female with PMH brain ca s/p craniotomy (8/23/17), TIA on ASA/plavix 2013, HLD, HTN presents with found on floor in the bathroom. Last night, pt was in ED for eval of epistaxis right nares x 24 hours, was packed and sent home. Last night, per brother at bedside, pt had "eyes rolled back" for ~1 min and resolved. States went to bed and woke up this AM. States this AM, had another episode of eyes rolled back and pt became confused both before and after the episode. Pt denies LH or dizziness. Denies any pain, CP, SOB, abd pain, dysuria, or diarrhea. Didn't have BM today, but states last few days, was brown BM. Pt denies tongue biting, denies incontinence. Per brother, pt was "stiff" during the time, but no extremities movement.    PMH: as above  PSH: Right frontal craniotomy  Social Hx: former smoker, quit>10 yr ago, no EtOH, no drugs  Family Hx: noncontrib to current presentation  ROS: per HPI

## 2017-09-06 NOTE — ED PROVIDER NOTE - MEDICAL DECISION MAKING DETAILS
63 y/o F c/o near syncopal episode with plans to receive CT head, labs, CBC, EKG, IV fluids for further eval and tx.

## 2017-09-06 NOTE — H&P ADULT - NSHPPHYSICALEXAM_GEN_ALL_CORE
Vital Signs Last 24 Hrs  T(C): 36.8 (06 Sep 2017 16:22), Max: 37.3 (05 Sep 2017 21:24)  T(F): 98.3 (06 Sep 2017 16:22), Max: 99.1 (05 Sep 2017 21:24)  HR: 79 (06 Sep 2017 18:18) (65 - 109)  BP: 102/67 (06 Sep 2017 18:18) (80/50 - 128/52)  BP(mean): --  RR: 16 (06 Sep 2017 16:45) (16 - 18)  SpO2: 100% (06 Sep 2017 16:30) (97% - 100%)    GEN: appears comfortable  Neuro: AAOx3, nonfocal at this time  HEENT: NC/AT, EOMI  Neck: no thyroidmegaly, no JVD  Cardiovascular: S1S2 present, regular rhythm, no murmur  Respiratory: breath sounds normal bilaterally, no wheezing, no rales, no rhonchi  Gastrointestinal: bowel sounds normal, soft, no abdominal tenderness  Musculoskeletal: no muscle tenderness  Extremities: No edema  Skin: No rash

## 2017-09-06 NOTE — ED PROVIDER NOTE - OBJECTIVE STATEMENT
61 y/o F with a PMHx of TIA, HTN, brain CA with recent R brain resection by Frankie presents to the ED c/o fall while in shower that occurred earlier this morning PTA. Pt adamant that she did not have LOC, recently seen here last night for epistaxis, currently on ASA, Plavix. Pt currently awake, alert, able to give adequate hx and denies fever, HA, CP, SOB, NVD or any other acute c/o at this time. Pt not a good historian at baseline with short term memory due to brain condition.

## 2017-09-06 NOTE — ED PROCEDURE NOTE - PROCEDURE ADDITIONAL DETAILS
Right nares suctioned, lidocaine viscous applied, installed cotton ball in right nares, neoceptin cotton ball placed  in right nares. 5.5 rapid rhino placed in right nares, pt tolerated well. Right nares suctioned, lidocaine viscous applied, installed cotton ball in right nares, neosynephrine cotton ball placed  in right nares. 5.5 rapid rhino placed in right nares, pt tolerated well.

## 2017-09-06 NOTE — H&P ADULT - ASSESSMENT
62 y.o. female with PMH brain ca s/p craniotomy (8/23/17), TIA on ASA/plavix 2013, HLD, HTN presents with found on floor in the bathroom. Last night, pt was in ED for eval of epistaxis right nares x 24 hours, was packed and sent home. Last night, per brother at bedside, pt had "eyes rolled back" for ~1 min and resolved. States went to bed and woke up this AM. States this AM, had another episode of eyes rolled back and pt became confused both before and after the episode. Pt denies LH or dizziness. Denies any pain, CP, SOB, abd pain, dysuria, or diarrhea. Didn't have BM today, but states last few days, was brown BM. Pt denies tongue biting, denies incontinence. Per brother, pt was "stiff" during the time, but no extremities movement.    #"eyes rolled back", post ictal state suggestive of seizure  #recent brain sx (glioma) s/p craniotomy  -admit to hospitalist service  -keppra increased to 100mg BID  -prn ativan for seizure activities  -CT head noted  -EEG  -neuro checks  -neurosurg consult appreciated  -neuro consult with Dr Iverson    #hypotension   -likely from anemia  -iv hydration  -hold home BP meds (losartan 50mg BID, HCTZ 25mg day, Toprol XL 100mg BID)  -restart home BP meds when BP improves    #anemia, acute likely from Epistaxis, ?GI bleed (guiac positive)  -H/H 14.2 on 8/24, currently 8.8  -serial H/H monitoring and transfuse PRN  -protonix IV BID  -GI consult  -hold ASA, plavix  -ENT consult for epistaxis, with packing, ?cauterization  -check iron panel, ferritin    #HTN, HLD  -hold BP meds  -cont statin    #leukocytosis  -likely from recent brain surgery and on steroids  -monitor CBC  -check UA    #TIA  -hold ASA, plavix  -neuro consult    #DVT ppx  -SCDs

## 2017-09-06 NOTE — ED PROVIDER NOTE - PROGRESS NOTE DETAILS
AJM: Pt feeling well. drop in hgb 5 points from last visit 2 weeks ago. rectal exam shows black stool guaic +. Possible UGIB vs swallowed contents from epistaxix. given history will admit for potential UGIB AJM: Pt feeling well. drop in hgb 5 points from last visit 2 weeks ago. rectal exam shows black stool guaic +. Possible UGIB vs swallowed contents from epistaxix. given history will admit for potential UGIB. chaperoned by PCA cameron de luna AJM: pt seen by KIMBERLY JACOB who requests keppra for possible seizure. admit to med surg. signed out to dr mckeon

## 2017-09-07 LAB
ANION GAP SERPL CALC-SCNC: 10 MMOL/L — SIGNIFICANT CHANGE UP (ref 5–17)
APPEARANCE UR: CLEAR — SIGNIFICANT CHANGE UP
BACTERIA # UR AUTO: (no result)
BASOPHILS # BLD AUTO: 0.2 K/UL — SIGNIFICANT CHANGE UP (ref 0–0.2)
BASOPHILS NFR BLD AUTO: 1.6 % — SIGNIFICANT CHANGE UP (ref 0–2)
BILIRUB UR-MCNC: NEGATIVE — SIGNIFICANT CHANGE UP
BLD GP AB SCN SERPL QL: SIGNIFICANT CHANGE UP
BUN SERPL-MCNC: 38 MG/DL — HIGH (ref 7–23)
CALCIUM SERPL-MCNC: 8.7 MG/DL — SIGNIFICANT CHANGE UP (ref 8.5–10.1)
CHLORIDE SERPL-SCNC: 107 MMOL/L — SIGNIFICANT CHANGE UP (ref 96–108)
CO2 SERPL-SCNC: 22 MMOL/L — SIGNIFICANT CHANGE UP (ref 22–31)
COLOR SPEC: YELLOW — SIGNIFICANT CHANGE UP
CREAT SERPL-MCNC: 0.86 MG/DL — SIGNIFICANT CHANGE UP (ref 0.5–1.3)
DIFF PNL FLD: NEGATIVE — SIGNIFICANT CHANGE UP
EOSINOPHIL # BLD AUTO: 0.1 K/UL — SIGNIFICANT CHANGE UP (ref 0–0.5)
EOSINOPHIL NFR BLD AUTO: 1.5 % — SIGNIFICANT CHANGE UP (ref 0–6)
EPI CELLS # UR: SIGNIFICANT CHANGE UP
FERRITIN SERPL-MCNC: 169 NG/ML — HIGH (ref 15–150)
GLUCOSE SERPL-MCNC: 94 MG/DL — SIGNIFICANT CHANGE UP (ref 70–99)
GLUCOSE UR QL: 50 MG/DL
HCT VFR BLD CALC: 21.7 % — LOW (ref 34.5–45)
HCT VFR BLD CALC: 21.9 % — LOW (ref 34.5–45)
HGB BLD-MCNC: 7.1 G/DL — LOW (ref 11.5–15.5)
HGB BLD-MCNC: 7.3 G/DL — LOW (ref 11.5–15.5)
HYPOCHROMIA BLD QL: SLIGHT — SIGNIFICANT CHANGE UP
IRON SATN MFR SERPL: 31 % — SIGNIFICANT CHANGE UP (ref 14–50)
IRON SATN MFR SERPL: 57 UG/DL — SIGNIFICANT CHANGE UP (ref 30–160)
KETONES UR-MCNC: NEGATIVE — SIGNIFICANT CHANGE UP
LEUKOCYTE ESTERASE UR-ACNC: NEGATIVE — SIGNIFICANT CHANGE UP
LYMPHOCYTES # BLD AUTO: 2.5 K/UL — SIGNIFICANT CHANGE UP (ref 1–3.3)
LYMPHOCYTES # BLD AUTO: 25.7 % — SIGNIFICANT CHANGE UP (ref 13–44)
MANUAL DIF COMMENT BLD-IMP: SIGNIFICANT CHANGE UP
MCHC RBC-ENTMCNC: 27.6 PG — SIGNIFICANT CHANGE UP (ref 27–34)
MCHC RBC-ENTMCNC: 33.2 GM/DL — SIGNIFICANT CHANGE UP (ref 32–36)
MCV RBC AUTO: 83 FL — SIGNIFICANT CHANGE UP (ref 80–100)
MICROCYTES BLD QL: SLIGHT — SIGNIFICANT CHANGE UP
MONOCYTES # BLD AUTO: 0.8 K/UL — SIGNIFICANT CHANGE UP (ref 0–0.9)
MONOCYTES NFR BLD AUTO: 8.3 % — SIGNIFICANT CHANGE UP (ref 2–14)
NEUTROPHILS # BLD AUTO: 6.1 K/UL — SIGNIFICANT CHANGE UP (ref 1.8–7.4)
NEUTROPHILS NFR BLD AUTO: 62.9 % — SIGNIFICANT CHANGE UP (ref 43–77)
NITRITE UR-MCNC: NEGATIVE — SIGNIFICANT CHANGE UP
PH UR: 6 — SIGNIFICANT CHANGE UP (ref 5–8)
PLAT MORPH BLD: NORMAL — SIGNIFICANT CHANGE UP
PLATELET # BLD AUTO: 289 K/UL — SIGNIFICANT CHANGE UP (ref 150–400)
POIKILOCYTOSIS BLD QL AUTO: SLIGHT — SIGNIFICANT CHANGE UP
POLYCHROMASIA BLD QL SMEAR: SLIGHT — SIGNIFICANT CHANGE UP
POTASSIUM SERPL-MCNC: 3.2 MMOL/L — LOW (ref 3.5–5.3)
POTASSIUM SERPL-SCNC: 3.2 MMOL/L — LOW (ref 3.5–5.3)
PROT UR-MCNC: 15 MG/DL
RBC # BLD: 2.64 M/UL — LOW (ref 3.8–5.2)
RBC # FLD: 14.2 % — SIGNIFICANT CHANGE UP (ref 10.3–14.5)
RBC BLD AUTO: (no result)
RBC CASTS # UR COMP ASSIST: NEGATIVE /HPF — SIGNIFICANT CHANGE UP (ref 0–4)
SCHISTOCYTES BLD QL AUTO: SLIGHT — SIGNIFICANT CHANGE UP
SODIUM SERPL-SCNC: 139 MMOL/L — SIGNIFICANT CHANGE UP (ref 135–145)
SP GR SPEC: 1.01 — SIGNIFICANT CHANGE UP (ref 1.01–1.02)
SPHEROCYTES BLD QL SMEAR: SLIGHT — SIGNIFICANT CHANGE UP
TIBC SERPL-MCNC: 183 UG/DL — LOW (ref 220–430)
TYPE + AB SCN PNL BLD: SIGNIFICANT CHANGE UP
UIBC SERPL-MCNC: 126 UG/DL — SIGNIFICANT CHANGE UP (ref 110–370)
UROBILINOGEN FLD QL: NEGATIVE MG/DL — SIGNIFICANT CHANGE UP
WBC # BLD: 9.7 K/UL — SIGNIFICANT CHANGE UP (ref 3.8–10.5)
WBC # FLD AUTO: 9.7 K/UL — SIGNIFICANT CHANGE UP (ref 3.8–10.5)
WBC UR QL: SIGNIFICANT CHANGE UP

## 2017-09-07 PROCEDURE — 70553 MRI BRAIN STEM W/O & W/DYE: CPT | Mod: 26

## 2017-09-07 PROCEDURE — 99223 1ST HOSP IP/OBS HIGH 75: CPT

## 2017-09-07 PROCEDURE — 93010 ELECTROCARDIOGRAM REPORT: CPT

## 2017-09-07 PROCEDURE — 71010: CPT | Mod: 26

## 2017-09-07 PROCEDURE — 95816 EEG AWAKE AND DROWSY: CPT | Mod: 26

## 2017-09-07 RX ORDER — FUROSEMIDE 40 MG
20 TABLET ORAL ONCE
Qty: 0 | Refills: 0 | Status: COMPLETED | OUTPATIENT
Start: 2017-09-07 | End: 2017-09-07

## 2017-09-07 RX ORDER — POTASSIUM CHLORIDE 20 MEQ
40 PACKET (EA) ORAL ONCE
Qty: 0 | Refills: 0 | Status: COMPLETED | OUTPATIENT
Start: 2017-09-07 | End: 2017-09-07

## 2017-09-07 RX ADMIN — SODIUM CHLORIDE 75 MILLILITER(S): 9 INJECTION INTRAMUSCULAR; INTRAVENOUS; SUBCUTANEOUS at 14:21

## 2017-09-07 RX ADMIN — ATORVASTATIN CALCIUM 40 MILLIGRAM(S): 80 TABLET, FILM COATED ORAL at 21:23

## 2017-09-07 RX ADMIN — PANTOPRAZOLE SODIUM 40 MILLIGRAM(S): 20 TABLET, DELAYED RELEASE ORAL at 21:23

## 2017-09-07 RX ADMIN — Medication 20 MILLIGRAM(S): at 14:20

## 2017-09-07 RX ADMIN — PANTOPRAZOLE SODIUM 40 MILLIGRAM(S): 20 TABLET, DELAYED RELEASE ORAL at 05:43

## 2017-09-07 RX ADMIN — Medication 40 MILLIEQUIVALENT(S): at 09:24

## 2017-09-07 RX ADMIN — LEVETIRACETAM 400 MILLIGRAM(S): 250 TABLET, FILM COATED ORAL at 21:23

## 2017-09-07 RX ADMIN — LEVETIRACETAM 400 MILLIGRAM(S): 250 TABLET, FILM COATED ORAL at 05:43

## 2017-09-07 NOTE — PROGRESS NOTE ADULT - SUBJECTIVE AND OBJECTIVE BOX
HOSPITALIST PROGRESS NOTE:  SUBJECTIVE:  PCP: PCP: Roger Armstrong  Neuro: S Kishor  Neurosx: Frankie  ENT: Osman Leon  Cardio: Mario Alberto    Chief Complaint: Patient is a 62y old  Female who presents with a chief complaint of eyes rolled back, post ictal state (06 Sep 2017 18:17)    HPI:  62 y.o. female with PMH brain ca s/p craniotomy (8/23/17), TIA on ASA/plavix 2013, HLD, HTN presents with found on floor in the bathroom. Last night, pt was in ED for eval of epistaxis right nares x 24 hours, was packed and sent home. Last night, per brother at bedside, pt had "eyes rolled back" for ~1 min and resolved. States went to bed and woke up this AM. States this AM, had another episode of eyes rolled back and pt became confused both before and after the episode. Pt denies LH or dizziness. Denies any pain, CP, SOB, abd pain, dysuria, or diarrhea. Didn't have BM today, but states last few days, was brown BM. Pt denies tongue biting, denies incontinence. Per brother, pt was "stiff" during the time, but no extremities movement.      Allergies:  MSG (Rash)  No Known Drug Allergies    REVIEW OF SYSTEMS:  See HPI. All other review of systems is negative unless indicated above.     OBJECTIVE  Physical Exam:  Vital Signs:    Weight (kg): 119.6 (09-06 @ 21:29)  Vital Signs Last 24 Hrs  T(C): 37.1 (07 Sep 2017 05:25), Max: 37.1 (06 Sep 2017 12:58)  T(F): 98.7 (07 Sep 2017 05:25), Max: 98.7 (06 Sep 2017 12:58)  HR: 78 (07 Sep 2017 05:25) (65 - 92)  BP: 133/54 (07 Sep 2017 05:25) (80/50 - 133/54)  BP(mean): --  RR: 18 (07 Sep 2017 05:25) (16 - 19)  SpO2: 100% (07 Sep 2017 05:25) (97% - 100%)  I&O's Summary    GEN: appears comfortable  Neuro: AAOx3, nonfocal at this time  HEENT: NC/AT, EOMI  Neck: no thyroidmegaly, no JVD  Cardiovascular: S1S2 present, regular rhythm, no murmur  Respiratory: breath sounds normal bilaterally, no wheezing, no rales, no rhonchi  Gastrointestinal: bowel sounds normal, soft, no abdominal tenderness  Musculoskeletal: no muscle tenderness  Extremities: No edema  Skin: No rashes  Breast: Deferred  Rectal: Deferred    MEDICATIONS  (STANDING):  levETIRAcetam  IVPB 1000 milliGRAM(s) IV Intermittent every 12 hours  atorvastatin 40 milliGRAM(s) Oral at bedtime  sodium chloride 0.9%. 1000 milliLiter(s) (75 mL/Hr) IV Continuous <Continuous>  pantoprazole  Injectable 40 milliGRAM(s) IV Push two times a day  potassium chloride    Tablet ER 40 milliEquivalent(s) Oral once      LABS: All Labs Reviewed:                        7.3    9.7   )-----------( 289      ( 07 Sep 2017 07:59 )             21.9     09-07    139  |  107  |  38<H>  ----------------------------<  94  3.2<L>   |  22  |  0.86    Ca    8.7      07 Sep 2017 07:59    TPro  6.2  /  Alb  2.6<L>  /  TBili  0.2  /  DBili  x   /  AST  10<L>  /  ALT  22  /  AlkPhos  68  09-06    PT/INR - ( 06 Sep 2017 16:39 )   PT: 12.7 sec;   INR: 1.17 ratio         PTT - ( 06 Sep 2017 16:39 )  PTT:23.3 sec  CARDIAC MARKERS ( 06 Sep 2017 13:52 )  0.026 ng/mL / x     / x     / x     / x          CT head  IMPRESSION:   RIGHT frontal craniotomy. Minimal pneumocephalus is seen within the surgical bed in the RIGHT frontal lobe.      #"eyes rolled back", post ictal state suggestive of seizure  #recent brain sx (glioma) s/p craniotomy  -admit to hospitalist service  -keppra increased to 100mg BID  -prn ativan for seizure activities  -CT head noted  -EEG  -neuro checks  -neurosurg consult appreciated  -neuro consult with Dr Iverson    #hypotension   -likely from anemia  -iv hydration  -hold home BP meds (losartan 50mg BID, HCTZ 25mg day, Toprol XL 100mg BID)  -restart home BP meds when BP improves    #anemia, acute likely from Epistaxis, ?GI bleed (guiac positive)  -H/H 14.2 on 8/24, currently 8.8  -serial H/H monitoring and transfuse PRN  -protonix IV BID  -GI consult  -hold ASA, plavix  -ENT consult for epistaxis, with packing, ?cauterization  -check iron panel, ferritin    #HTN, HLD  -hold BP meds  -cont statin    #leukocytosis  -likely from recent brain surgery and on steroids  -monitor CBC  -check UA    #TIA  -hold ASA, plavix  -neuro consult    #DVT ppx  -SCDsRADIOLOGY/EKG: HOSPITALIST PROGRESS NOTE:  SUBJECTIVE:  PCP: PCP: Roger Armstrong  Neuro: SEJAL Iverson  Neurosx: Frankie  ENT: Osman Leon  Cardio: Mario Alberto    Chief Complaint: Patient is a 62y old  Female who presents with a chief complaint of eyes rolled back, post ictal state (06 Sep 2017 18:17)    HPI:  62 y.o. female with PMH brain ca s/p craniotomy (17), TIA on ASA/plavix , HLD, HTN presents with found on floor in the bathroom. Last night, pt was in ED for eval of epistaxis right nares x 24 hours, was packed and sent home. Last night, per brother at bedside, pt had "eyes rolled back" for ~1 min and resolved. States went to bed and woke up this AM. States this AM, had another episode of eyes rolled back and pt became confused both before and after the episode. Pt denies LH or dizziness. Denies any pain, CP, SOB, abd pain, dysuria, or diarrhea. Didn't have BM today, but states last few days, was brown BM. Pt denies tongue biting, denies incontinence. Per brother, pt was "stiff" during the time, but no extremities movement.    : Unsure of patients baseline. she is not oriented to place or time, only person. She has no complaints. Hgb still low. Discussed plan with son Scotty Reno who states that he is the HCP      Allergies:  MSG (Rash)  No Known Drug Allergies    REVIEW OF SYSTEMS:  See HPI. All other review of systems is negative unless indicated above.     OBJECTIVE  Physical Exam:  ICU Vital Signs Last 24 Hrs  T(C): 37.1 (07 Sep 2017 15:37), Max: 37.2 (07 Sep 2017 10:45)  T(F): 98.8 (07 Sep 2017 15:37), Max: 98.9 (07 Sep 2017 10:45)  HR: 100 (07 Sep 2017 15:37) (65 - 103)  BP: 116/88 (07 Sep 2017 15:37) (80/50 - 140/61)  BP(mean): --  ABP: --  ABP(mean): --  RR: 20 (07 Sep 2017 15:37) (16 - 20)  SpO2: 99% (07 Sep 2017 15:37) (99% - 100%)    GEN: appears comfortable  Neuro: AAOx3, nonfocal at this time  HEENT: NC/AT, EOMI  Neck: no thyroidmegaly, no JVD  Cardiovascular: S1S2 present, regular rhythm, no murmur  Respiratory: breath sounds normal bilaterally, no wheezing, no rales, no rhonchi  Gastrointestinal: bowel sounds normal, soft, no abdominal tenderness  Musculoskeletal: no muscle tenderness  Extremities: No edema  Skin: No rashes  Breast: Deferred  Rectal: Deferred    MEDICATIONS  (STANDING):  levETIRAcetam  IVPB 1000 milliGRAM(s) IV Intermittent every 12 hours  atorvastatin 40 milliGRAM(s) Oral at bedtime  sodium chloride 0.9%. 1000 milliLiter(s) (75 mL/Hr) IV Continuous <Continuous>  pantoprazole  Injectable 40 milliGRAM(s) IV Push two times a day  potassium chloride    Tablet ER 40 milliEquivalent(s) Oral once    Lab Results:  CBC  CBC Full  -  ( 07 Sep 2017 07:59 )  WBC Count : 9.7 K/uL  Hemoglobin : 7.3 g/dL  Hematocrit : 21.9 %  Platelet Count - Automated : 289 K/uL  Mean Cell Volume : 83.0 fl  Mean Cell Hemoglobin : 27.6 pg  Mean Cell Hemoglobin Concentration : 33.2 gm/dL  Auto Neutrophil # : 6.1 K/uL  Auto Lymphocyte # : 2.5 K/uL  Auto Monocyte # : 0.8 K/uL  Auto Eosinophil # : 0.1 K/uL  Auto Basophil # : 0.2 K/uL  Auto Neutrophil % : 62.9 %  Auto Lymphocyte % : 25.7 %  Auto Monocyte % : 8.3 %  Auto Eosinophil % : 1.5 %  Auto Basophil % : 1.6 %    .		Differential:	[] Automated		[] Manual  Chemistry                        7.3    9.7   )-----------( 289      ( 07 Sep 2017 07:59 )             21.9     09-07    139  |  107  |  38<H>  ----------------------------<  94  3.2<L>   |  22  |  0.86    Ca    8.7      07 Sep 2017 07:59    TPro  6.2  /  Alb  2.6<L>  /  TBili  0.2  /  DBili  x   /  AST  10<L>  /  ALT  22  /  AlkPhos  68  09-06    LIVER FUNCTIONS - ( 06 Sep 2017 13:52 )  Alb: 2.6 g/dL / Pro: 6.2 gm/dL / ALK PHOS: 68 U/L / ALT: 22 U/L / AST: 10 U/L / GGT: x           PT/INR - ( 06 Sep 2017 16:39 )   PT: 12.7 sec;   INR: 1.17 ratio         PTT - ( 06 Sep 2017 16:39 )  PTT:23.3 sec  Urinalysis Basic - ( 06 Sep 2017 15:20 )    Color: Yellow / Appearance: Clear / S.015 / pH: x  Gluc: x / Ketone: Negative  / Bili: Negative / Urobili: Negative mg/dL   Blood: x / Protein: 15 mg/dL / Nitrite: Negative   Leuk Esterase: Negative / RBC: x / WBC x   Sq Epi: x / Non Sq Epi: x / Bacteria: x    RADIOLOGY RESULTS:    CT head  IMPRESSION:   RIGHT frontal craniotomy. Minimal pneumocephalus is seen within the surgical bed in the RIGHT frontal lobe.

## 2017-09-07 NOTE — PROGRESS NOTE ADULT - SUBJECTIVE AND OBJECTIVE BOX
Patient is a 62y old  Female who presents with a chief complaint of eyes rolled back, post ictal state (06 Sep 2017 18:17)      HPI:  64 yo female known to our service s/p recent Right frontal craniotomy for tumor resection 8/23/17 (frozen section intraoperative consistent with glioma) with a PMH of HTN, HLD, TIAs on ASA/Plavix presents to the ER with report of questionable seizure x 2 at home. As per son at bedside, last night pt went to the ER for epistaxis which began several days prior and "was never under control". Pt's nose was packed and she was sent home. That night when getting into bed she had a witnessed episode in which "her eyes rolled up in her head and she was shaking a little" and after which she was unresponsive. The whole event lasted ~2 minutes. Pt was helped into bed after and fell asleep. This AM pt appeared to be in her usual state of health. She went to take a shower and afterward, when she was getting out, she states "she felt so tired i just had to sit down". She was found on the floor by her son and again had an episode of her eyes rolling back followed by decreased responsiveness and confusion. CT head in ER with postop changes / no acute path. At the time of my exam pt appears comfortable, denies complaints, no HA, visual changes, focal numb / ting / weak, neck pain. In ER pt found with drop in H/H, black stools, and guaiac Admitted to medicine for GI bleed w/u    9/7 - Patient seen and examined, in NAD. Appears comfortable, denies new complaints, no overnight events. H/H 7/22 - ordered for 2u PRBC         levETIRAcetam  IVPB 1000 milliGRAM(s) IV Intermittent every 12 hours  atorvastatin 40 milliGRAM(s) Oral at bedtime  sodium chloride 0.9%. 1000 milliLiter(s) IV Continuous <Continuous>  acetaminophen   Tablet 650 milliGRAM(s) Oral every 6 hours PRN  ondansetron Injectable 4 milliGRAM(s) IV Push every 6 hours PRN  senna 2 Tablet(s) Oral at bedtime PRN  pantoprazole  Injectable 40 milliGRAM(s) IV Push two times a day  LORazepam   Injectable 2 milliGRAM(s) IV Push every 4 hours PRN  furosemide   Injectable 20 milliGRAM(s) IV Push once        Vital Signs Last 24 Hrs  T(C): 37.2 (07 Sep 2017 10:45), Max: 37.2 (07 Sep 2017 10:45)  T(F): 98.9 (07 Sep 2017 10:45), Max: 98.9 (07 Sep 2017 10:45)  HR: 103 (07 Sep 2017 10:45) (65 - 103)  BP: 119/59 (07 Sep 2017 10:45) (80/50 - 133/54)  BP(mean): --  RR: 18 (07 Sep 2017 10:45) (16 - 19)  SpO2: 103% (07 Sep 2017 10:45) (97% - 103%)                            7.3    9.7   )-----------( 289      ( 07 Sep 2017 07:59 )             21.9     09-07    139  |  107  |  38<H>  ----------------------------<  94  3.2<L>   |  22  |  0.86    Ca    8.7      07 Sep 2017 07:59    TPro  6.2  /  Alb  2.6<L>  /  TBili  0.2  /  DBili  x   /  AST  10<L>  /  ALT  22  /  AlkPhos  68  09-06    PT/INR - ( 06 Sep 2017 16:39 )   PT: 12.7 sec;   INR: 1.17 ratio          Constitutional: awake and alert.  HEENT: PERRLA, EOMI,   Neck: Supple.  Respiratory: Breath sounds are clear bilaterally  Cardiovascular: S1 and S2, regular rhythm  Gastrointestinal: soft, nontender  Extremities:  no edema  Musculoskeletal: no joint swelling/tenderness, no abnormal movements  Skin: No rashes        Neurological exam:  HF: A x O x 3. Appropriately interactive, normal affect. Speech fluent, No Aphasia or paraphasic errors. Naming /repetition intact   CN: ANGELITA, EOMI, VFF, facial sensation normal, no NLFD, tongue midline, Palate moves equally, SCM equal bilaterally  Motor: No pronator drift, Strength 5/5 in all 4 ext, normal bulk and tone, no tremor, rigidity or bradykinesia.    Sens: Intact to light touch / PP/ VS/ JS    Reflexes: UEs 3+, no hoffmans, LEs Symmetric and normal, downgoing toes b/l  Coord:  No FNFA, dysmetria, IFRAH intact   Gait/Balance: Cannot test

## 2017-09-07 NOTE — CHART NOTE - NSCHARTNOTEFT_GEN_A_CORE
Called by radiology about MRI result. 62F with PMH brain ca s/p craniotomy (8/23/17), TIA on ASA/plavix 2013, HLD, HTN admitted for possible seizures. 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 Pt evaluated at bedside Called by radiology about MRI result. 62F with PMH brain ca s/p craniotomy (8/23/17), TIA on ASA/plavix 2013, HLD, HTN admitted for possible seizures. 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 Pt evaluated at bedside. Pt has no focal deficits and was articulate, with only complaint of episodes in the morning of difficulty remembering occasional words. Called by radiology about MRI result. 62F with PMH brain ca s/p craniotomy (8/23/17), TIA on ASA/plavix 2013, HLD, HTN admitted for possible seizures. 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 Pt evaluated at bedside. Pt has no focal deficits and was articulate, with only complaint of episodes in the morning of difficulty remembering occasional words. Denies CP, HA, dizziness, blurred vision. Spoke to Neurosurgery PA who is aware. Spoke to neurologist Dr Iverson who recommended cause of H/H drop to be determined and controlled and then pt be placed back on aspirin and Plavix. No other recommendations for treatment tonight from neurology/neurosurgery.    Vital Signs Last 24 Hrs  T(C): 37.2 (07 Sep 2017 19:42), Max: 37.2 (07 Sep 2017 10:45)  T(F): 99 (07 Sep 2017 19:42), Max: 99 (07 Sep 2017 19:42)  HR: 112 (07 Sep 2017 19:42) (78 - 112)  BP: 126/68 (07 Sep 2017 19:42) (116/88 - 140/61)  BP(mean): --  RR: 18 (07 Sep 2017 19:42) (18 - 20)  SpO2: 98% (07 Sep 2017 19:42) (98% - 100%)    GEN: Pt awake and alert in bed, NAD  NEURO: AOx3, CN 2 – 12 non focal, strength 5/5 and symmetric upper and lower extremities, sensory exam grossly intact  HEENT: PERRLA  HEART: S1S2+, RRR  LUNGS: CTABL    62F s/p 8/23 craniotomy admitted for seizures w/ acute infarct of L. temporal/occipital lobe    #L. temporal/occipital lobe infarct  -No focal deficits noted, patient articulate. Neurology and neurosurgery aware.  -Determine and control H/H drop then place pt back on aspirin and Plavix

## 2017-09-07 NOTE — PROGRESS NOTE ADULT - ASSESSMENT
62 yo female known to our service s/p recent Right frontal craniotomy for tumor resection 8/23/17 (frozen section intraoperative consistent with glioma) with a PMH of HTN, HLD, TIAs on ASA/Plavix presents to the ER with report of questionable seizure x 2 at home. CT head in ER with postop changes / no acute path.     -No acute neurosurgical intervention  -Neurology consult appreciated  -Pt may f/u in office as outpt medically cleared for discharge  -Will sign off for now - reconsult PRN    Discussed with Dr David

## 2017-09-07 NOTE — PROGRESS NOTE ADULT - ASSESSMENT
#"eyes rolled back", post ictal state suggestive of seizure  #recent brain sx (glioma) s/p craniotomy  -keppra increased to 1000mg BID  -prn ativan for seizure activities   -CT head noted  -EEG  -neuro checks  -neurosurg consult appreciated - -No acute neurosurgical intervention  -neuro consult with Dr Iverson    #hypotension   -likely from anemia  -iv hydration  -hold home BP meds (losartan 50mg BID, HCTZ 25mg day, Toprol XL 100mg BID)  -restart home BP meds when BP improves    #Anemia of acute blood looss 2ndry to Epistaxis and GI bleed (guiac positive)   -H/H 14.2 on 8/24, currently 8.8  -transfuse 2 units of PRBC (9/7)  -serial H/H monitoring and transfuse PRN  -protonix IV BID  -GI consult  -hold ASA, plavix  -ENT consult for epistaxis, with packing, ?cauterization  -check iron panel, ferritin    #HTN, HLD  -hold BP meds  -cont statin    #leukocytosis  -likely from recent brain surgery and on steroids  -monitor CBC  -check UA    #TIA  -hold ASA, plavix  -neuro consult    #DVT ppx  -SCDs #"eyes rolled back", post ictal state suggestive of seizure  #recent brain sx (glioma) s/p craniotomy  -keppra increased to 1000mg BID; eventually can be placed on keppra 500mg in AM and 1000mg PO QHS  -prn ativan for seizure activities   -CT head noted  -EEG  -neuro checks  -neurosurg consult appreciated - -No acute neurosurgical intervention  -neuro consult appreciated - If patient develops Side effects from keppra can consider changing to Depakote 500 mg q 12hr.  -FU MRI of Brain with Martin    #hypotension   -likely from anemia  -iv hydration  -hold home BP meds (losartan 50mg BID, HCTZ 25mg day, Toprol XL 100mg BID)  -restart home BP meds when BP improves    #Anemia of acute blood loss 2ndry to Epistaxis and GI bleed (guaiac positive)   -H/H 14.2 on 8/24, currently 8.8  -transfuse 2 units of PRBC (9/7)  -serial H/H monitoring and transfuse PRN  -protonix IV BID  -GI consult  -hold ASA, plavix  -ENT consult for epistaxis, with packing, ?cauterization  -check iron panel, ferritin    #HTN, HLD  -hold BP meds  -cont statin    #leukocytosis  -likely from recent brain surgery and on steroids  -monitor CBC  -UA - neg  -FU CXR    #TIA  -hold ASA, plavix  -neuro consult    #DVT ppx  -SCDs

## 2017-09-07 NOTE — CHART NOTE - NSCHARTNOTEFT_GEN_A_CORE
H/H 7.3/21.4 from 8.8/25.8.  Pt seen at bedside. Pt denies any abdominal pain.  Denies LH or dizziness. Will obtain another H/H and if Hb < 7, will transfuse PRBC. Consented for blood products, risk/benefits explained. All questions answered. Consent in chart.

## 2017-09-08 LAB
ANION GAP SERPL CALC-SCNC: 9 MMOL/L — SIGNIFICANT CHANGE UP (ref 5–17)
APCR PPP: 2.92 RATIO — SIGNIFICANT CHANGE UP
AT III ACT/NOR PPP CHRO: 117 % — SIGNIFICANT CHANGE UP (ref 85–135)
BUN SERPL-MCNC: 31 MG/DL — HIGH (ref 7–23)
CALCIUM SERPL-MCNC: 8.6 MG/DL — SIGNIFICANT CHANGE UP (ref 8.5–10.1)
CHLORIDE SERPL-SCNC: 108 MMOL/L — SIGNIFICANT CHANGE UP (ref 96–108)
CO2 SERPL-SCNC: 26 MMOL/L — SIGNIFICANT CHANGE UP (ref 22–31)
CREAT SERPL-MCNC: 0.75 MG/DL — SIGNIFICANT CHANGE UP (ref 0.5–1.3)
GLUCOSE SERPL-MCNC: 92 MG/DL — SIGNIFICANT CHANGE UP (ref 70–99)
HCT VFR BLD CALC: 25.6 % — LOW (ref 34.5–45)
HCT VFR BLD CALC: 26.3 % — LOW (ref 34.5–45)
HGB BLD-MCNC: 8.6 G/DL — LOW (ref 11.5–15.5)
HGB BLD-MCNC: 9.2 G/DL — LOW (ref 11.5–15.5)
MCHC RBC-ENTMCNC: 28.2 PG — SIGNIFICANT CHANGE UP (ref 27–34)
MCHC RBC-ENTMCNC: 33.6 GM/DL — SIGNIFICANT CHANGE UP (ref 32–36)
MCV RBC AUTO: 84 FL — SIGNIFICANT CHANGE UP (ref 80–100)
PLATELET # BLD AUTO: 266 K/UL — SIGNIFICANT CHANGE UP (ref 150–400)
POTASSIUM SERPL-MCNC: 3.5 MMOL/L — SIGNIFICANT CHANGE UP (ref 3.5–5.3)
POTASSIUM SERPL-SCNC: 3.5 MMOL/L — SIGNIFICANT CHANGE UP (ref 3.5–5.3)
PROT C ACT/NOR PPP: 126 % — SIGNIFICANT CHANGE UP (ref 74–150)
RBC # BLD: 3.05 M/UL — LOW (ref 3.8–5.2)
RBC # FLD: 14.1 % — SIGNIFICANT CHANGE UP (ref 10.3–14.5)
SODIUM SERPL-SCNC: 143 MMOL/L — SIGNIFICANT CHANGE UP (ref 135–145)
TROPONIN I SERPL-MCNC: <0.015 NG/ML — SIGNIFICANT CHANGE UP (ref 0.01–0.04)
TROPONIN I SERPL-MCNC: <0.015 NG/ML — SIGNIFICANT CHANGE UP (ref 0.01–0.04)
WBC # BLD: 9.1 K/UL — SIGNIFICANT CHANGE UP (ref 3.8–10.5)
WBC # FLD AUTO: 9.1 K/UL — SIGNIFICANT CHANGE UP (ref 3.8–10.5)

## 2017-09-08 PROCEDURE — 99233 SBSQ HOSP IP/OBS HIGH 50: CPT

## 2017-09-08 PROCEDURE — 93306 TTE W/DOPPLER COMPLETE: CPT | Mod: 26

## 2017-09-08 RX ORDER — METOPROLOL TARTRATE 50 MG
100 TABLET ORAL ONCE
Qty: 0 | Refills: 0 | Status: COMPLETED | OUTPATIENT
Start: 2017-09-08 | End: 2017-09-08

## 2017-09-08 RX ORDER — METOPROLOL TARTRATE 50 MG
25 TABLET ORAL ONCE
Qty: 0 | Refills: 0 | Status: COMPLETED | OUTPATIENT
Start: 2017-09-08 | End: 2017-09-08

## 2017-09-08 RX ORDER — LOSARTAN POTASSIUM 100 MG/1
50 TABLET, FILM COATED ORAL DAILY
Qty: 0 | Refills: 0 | Status: DISCONTINUED | OUTPATIENT
Start: 2017-09-08 | End: 2017-09-13

## 2017-09-08 RX ORDER — METOPROLOL TARTRATE 50 MG
100 TABLET ORAL ONCE
Qty: 0 | Refills: 0 | Status: DISCONTINUED | OUTPATIENT
Start: 2017-09-08 | End: 2017-09-08

## 2017-09-08 RX ORDER — ASPIRIN/CALCIUM CARB/MAGNESIUM 324 MG
81 TABLET ORAL DAILY
Qty: 0 | Refills: 0 | Status: DISCONTINUED | OUTPATIENT
Start: 2017-09-08 | End: 2017-09-13

## 2017-09-08 RX ADMIN — ATORVASTATIN CALCIUM 40 MILLIGRAM(S): 80 TABLET, FILM COATED ORAL at 21:28

## 2017-09-08 RX ADMIN — PANTOPRAZOLE SODIUM 40 MILLIGRAM(S): 20 TABLET, DELAYED RELEASE ORAL at 17:13

## 2017-09-08 RX ADMIN — Medication 100 MILLIGRAM(S): at 18:49

## 2017-09-08 RX ADMIN — LEVETIRACETAM 400 MILLIGRAM(S): 250 TABLET, FILM COATED ORAL at 18:27

## 2017-09-08 RX ADMIN — Medication 81 MILLIGRAM(S): at 09:15

## 2017-09-08 RX ADMIN — PANTOPRAZOLE SODIUM 40 MILLIGRAM(S): 20 TABLET, DELAYED RELEASE ORAL at 05:26

## 2017-09-08 RX ADMIN — Medication 25 MILLIGRAM(S): at 02:02

## 2017-09-08 RX ADMIN — LEVETIRACETAM 400 MILLIGRAM(S): 250 TABLET, FILM COATED ORAL at 05:26

## 2017-09-08 NOTE — PROGRESS NOTE ADULT - ASSESSMENT
#"eyes rolled back", post ictal state suggestive of seizure  #recent brain sx (glioma) s/p craniotomy  -keppra increased to 1000mg BID; eventually can be placed on keppra 500mg in AM and 1000mg PO QHS  -prn ativan for seizure activities   -CT head noted  -EEG  -neurosurg consult appreciated - -No acute neurosurgical intervention  -neuro consult appreciated - If patient develops Side effects from keppra can consider changing to Depakote 500 mg q 12hr.    #Acute Stroke  -transfer to tele (9/8)  -MRI of Brain - Acute infarction involving the left posterior temporal-lateral occipital   -Neuro checks  -restart ASA  -started on Statin  -Hold plavix till seen by ENT in AM  -PT/OT  -FU Hypercoagulable work up    #Anemia of acute blood loss 2ndry to Epistaxis and GI bleed (guaiac positive)   -H/H 14.2 on 8/24, during hospital stay patients hgb dropped to 7.1  -transfused 2 units of PRBC (9/7)  -serial H/H monitoring and transfuse PRN  -protonix IV BID  -hold plavix, resume ASA  -ENT consult appreciated   -GI consult appreciated - Will need EGD, timing to be determined based on clinical course  -check iron panel, ferritin    #hypotension   -likely from anemia  -iv hydration  -hold home BP meds (losartan 50mg BID, HCTZ 25mg day, Toprol XL 100mg BID)  -restart home BP meds when BP improves    #HTN, HLD  -hold BP meds  -cont statin    #leukocytosis - Resolved   -likely from recent brain surgery and on steroids  -monitor CBC  -UA - neg  -CXR - clear    #TIA  -hold plavix  -continue ASA    #DVT ppx  -SCDs

## 2017-09-08 NOTE — SWALLOW BEDSIDE ASSESSMENT ADULT - PHARYNGEAL PHASE
Swallow triggered in an acceptable time frame for age. Laryngeal lift on palpation during swallow trials was functional and within age acceptable parameters. No behavioral aspiration signs were exhibited on exam. Odynophagia was denied.

## 2017-09-08 NOTE — SWALLOW BEDSIDE ASSESSMENT ADULT - ORAL PREPARATORY PHASE
Pt was oriented to feeding situ, readily accepted Po and demonstrated functional labial grading on utensils. Pt was oriented to feeding situ, readily accepted PO and demonstrated functional labial grading on utensils.

## 2017-09-08 NOTE — SWALLOW BEDSIDE ASSESSMENT ADULT - COMMENTS
Pt was admitted to  with onset eye rolling/possible seizure activity. Hospital course was notable for epistaxis, and communication difficulties. Brain MRI was notable for an acute left parietal occipital infarct. This profile is superimposed upon a history of recent c/o black stools which resolved, HLD, HTN, seizures, glioma status post resection/craniotomy in 8/17 and past TIA. Pt was admitted to [facility] with onset eye rolling/possible seizure activity. Hospital course was notable for epistaxis, and communication difficulties. Brain MRI was notable for an acute left parietal occipital infarct. This profile is superimposed upon a history of recent c/o black stools which resolved, HLD, HTN, seizures, glioma status post resection/craniotomy in 8/17 and past TIA.

## 2017-09-08 NOTE — PROGRESS NOTE ADULT - SUBJECTIVE AND OBJECTIVE BOX
HOSPITALIST PROGRESS NOTE:  SUBJECTIVE:  PCP: PCP: Roger Armstrong  Neuro: SEJAL Iverson  Neurosx: Frankie  ENT: Osman Leon  Cardio: Mario Alberto    Chief Complaint: Patient is a 62y old  Female who presents with a chief complaint of eyes rolled back, post ictal state (06 Sep 2017 18:17)    HPI:  62 y.o. female with PMH brain ca s/p craniotomy (17), TIA on ASA/plavix , HLD, HTN presents with found on floor in the bathroom. Last night, pt was in ED for eval of epistaxis right nares x 24 hours, was packed and sent home. Last night, per brother at bedside, pt had "eyes rolled back" for ~1 min and resolved. States went to bed and woke up this AM. States this AM, had another episode of eyes rolled back and pt became confused both before and after the episode. Pt denies LH or dizziness. Denies any pain, CP, SOB, abd pain, dysuria, or diarrhea. Didn't have BM today, but states last few days, was brown BM. Pt denies tongue biting, denies incontinence. Per brother, pt was "stiff" during the time, but no extremities movement.    : Unsure of patients baseline. she is not oriented to place or time, only person. She has no complaints. Hgb still low. Discussed plan with son Scotty Reno who states that he is the HCP  : Last night patient had an MRI that was +Stroke.  Patient was then trasnferred to Tele this morning.  Spoke with Dr Deleon who prefers we start ASA now and then manuel after he takes out the rhino rocketn then start plavix.  Patient is awake but confused. Not able to tell me place or time. Discussed plan with brother who is still undecided about her advance directives.     Allergies:  MSG (Rash)  No Known Drug Allergies    REVIEW OF SYSTEMS:  See HPI. All other review of systems is negative unless indicated above.     OBJECTIVE  Physical Exam:  ICU Vital Signs Last 24 Hrs  T(C): 36.8 (08 Sep 2017 10:24), Max: 37.2 (07 Sep 2017 19:42)  T(F): 98.2 (08 Sep 2017 10:24), Max: 99 (07 Sep 2017 19:42)  HR: 102 (08 Sep 2017 10:24) (102 - 128)  BP: 145/58 (08 Sep 2017 10:24) (120/58 - 145/58)  BP(mean): --  ABP: --  ABP(mean): --  RR: 18 (08 Sep 2017 10:24) (16 - 18)  SpO2: 100% (08 Sep 2017 10:24) (96% - 100%)    GEN: appears comfortable  Neuro: AAOx1, confused,  nonfocal at this time  HEENT: NC/AT, EOMI  Neck: no thyroidmegaly, no JVD  Cardiovascular: S1S2 present, regular rhythm, no murmur  Respiratory: breath sounds normal bilaterally, no wheezing, no rales, no rhonchi  Gastrointestinal: bowel sounds normal, soft, no abdominal tenderness, obese  Musculoskeletal: no muscle tenderness  Extremities: No edema  Skin: No rashes  Breast: Deferred  Rectal: Deferred    MEDICATIONS  (STANDING):  levETIRAcetam  IVPB 1000 milliGRAM(s) IV Intermittent every 12 hours  atorvastatin 40 milliGRAM(s) Oral at bedtime  sodium chloride 0.9%. 1000 milliLiter(s) (75 mL/Hr) IV Continuous <Continuous>  pantoprazole  Injectable 40 milliGRAM(s) IV Push two times a day  potassium chloride    Tablet ER 40 milliEquivalent(s) Oral once    Lab Results:  CBC  CBC Full  -  ( 08 Sep 2017 05:39 )  WBC Count : 9.1 K/uL  Hemoglobin : 8.6 g/dL  Hematocrit : 25.6 %  Platelet Count - Automated : 266 K/uL  Mean Cell Volume : 84.0 fl  Mean Cell Hemoglobin : 28.2 pg  Mean Cell Hemoglobin Concentration : 33.6 gm/dL  Auto Neutrophil # : x  Auto Lymphocyte # : x  Auto Monocyte # : x  Auto Eosinophil # : x  Auto Basophil # : x  Auto Neutrophil % : x  Auto Lymphocyte % : x  Auto Monocyte % : x  Auto Eosinophil % : x  Auto Basophil % : x    .		Differential:	[] Automated		[] Manual  Chemistry                        8.6    9.1   )-----------( 266      ( 08 Sep 2017 05:39 )             25.6     -    143  |  108  |  31<H>  ----------------------------<  92  3.5   |  26  |  0.75    Ca    8.6      08 Sep 2017 05:39        PT/INR - ( 06 Sep 2017 16:39 )   PT: 12.7 sec;   INR: 1.17 ratio         PTT - ( 06 Sep 2017 16:39 )  PTT:23.3 sec    Urinalysis Basic - ( 06 Sep 2017 15:20 )    Color: Yellow / Appearance: Clear / S.015 / pH: x  Gluc: x / Ketone: Negative  / Bili: Negative / Urobili: Negative mg/dL   Blood: x / Protein: 15 mg/dL / Nitrite: Negative   Leuk Esterase: Negative / RBC: x / WBC x   Sq Epi: x / Non Sq Epi: x / Bacteria: x    RADIOLOGY RESULTS:    CT head  IMPRESSION:   RIGHT frontal craniotomy. Minimal pneumocephalus is seen within the surgical bed in the RIGHT frontal lobe.      < from: MRI Head w/wo Cont (17 @ 21:16) >    IMPRESSION:    Acute infarction involves the left posterior temporal-lateral occipital   location, without hemorrhagic transformation.    Evolving postoperative changes as described in the right frontal lobe   status post resection of glioma.    < end of copied text >    < from: Xray Chest 1 View AP/PA. (17 @ 17:56) >    IMPRESSION:    Clear lungs    < end of copied text >

## 2017-09-08 NOTE — PROGRESS NOTE ADULT - SUBJECTIVE AND OBJECTIVE BOX
· Reason for Referral/Consultation:	Seizure/Epistaxis s/p Brain tumor resection	      · Subjective and Objective: 	  Patient is a 62y old  Female who presents with a chief complaint of eyes rolled back,  with possible seizure and Nose bleed.Admitted for evaluation.      HPI:  62 y.o. female with PMH brain ca s/p craniotomy (8/23/17), TIA on ASA/plavix 2013, HLD, HTN presents with found on floor in the bathroom. Last night, pt was in ED for eval of epistaxis right nares x 24 hours, was packed and sent home. Last night, per brother at bedside, pt had "eyes rolled back" for ~1 min and resolved. States went to bed and woke up this AM. States this AM, had another episode of eyes rolled back and pt became confused both before and after the episode. Pt denies LH or dizziness. Denies any pain, CP, SOB, abd pain, dysuria, or diarrhea. Didn't have BM Yesterday  but states last few days, was brown BM. Pt denies tongue biting, denies incontinence. Per brother, pt was "stiff" during the time, but no extremities movement. Was given Ativan and Keppra in ED after seen by KIMBERLY JACOB  and Noted to have nose bleed with low H& H .     9/8/17 : Pt feeling better Nose bleed stopped. Still has mild speech difficulties. No confusion .No recurrent seizures. No focal weakness. MRI scan done yesterday reported Acute CVA left parieto occipital area. No Headaches dizziness.     :PMH: as above  PSH: Right frontal craniotomy  Social Hx: former smoker, quit>10 yr ago, no EtOH, no drugs  Family Hx: noncontrib to current presentation    ROS: per HPI (06 Sep 2017 18:17)      PAST MEDICAL & SURGICAL HISTORY:  High cholesterol  Transient cerebral ischemia, unspecified type: 3 years ago  Essential hypertension  No significant past surgical history      FAMILY HISTORY:  No pertinent family history in first degree relatives      Social Hx:  Nonsmoker, no drug or alcohol use     MEDICATIONS  (STANDING):  levETIRAcetam  IVPB 1000 milliGRAM(s) IV Intermittent every 12 hours  atorvastatin 40 milliGRAM(s) Oral at bedtime  sodium chloride 0.9%. 1000 milliLiter(s) (75 mL/Hr) IV Continuous <Continuous>  pantoprazole  Injectable 40 milliGRAM(s) IV Push two times a day    Allergies    MSG (Rash)  No Known Drug Allergies      ROS: Pertinent positives in HPI, all other ROS were reviewed and are negative.      Vital Signs Last 24 Hrs  T(C): 37.1 (08 Sep 2017 04:52), Max: 37.2 (07 Sep 2017 10:45)  T(F): 98.8 (08 Sep 2017 04:52), Max: 99 (07 Sep 2017 19:42)  HR: 104 (08 Sep 2017 04:52) (96 - 128)  BP: 139/72 (08 Sep 2017 04:52) (116/88 - 142/62)  BP(mean): --  RR: 17 (08 Sep 2017 04:52) (16 - 20)  SpO2: 97% (08 Sep 2017 04:52) (96% - 99%)    Constitutional: awake and alert with some speech difficulties noted.  HEENT: PERRLA, EOMI, Rt nose bleed with nose pack  Neck: Supple.  Respiratory: Breath sounds are clear bilaterally  Cardiovascular: S1 and S2, regular  rhythm  Gastrointestinal: soft, nontender  Extremities:  no edema  Vascular:  Carotid Bruit - no  Musculoskeletal: no joint swelling/tenderness, no abnormal movements  Skin: No rashes    Neurological exam:  HF: A x O x 3. Anxious Appropriately interactive, normal affect. Speech  some word finding difficulties noted.   CN: ANGELITA, EOMI, VFF, facial sensation normal, no NLFD, tongue midline, Palate moves equally, SCM equal bilaterally  Motor: No pronator drift, Strength 5/5 in all 4 ext, normal bulk and tone, no tremor, rigidity or bradykinesia.    Sens: Intact to light touch / PP/ VS/ JS    Reflexes: Symmetric and normal .  Brisk left > than Rt. Plantars down going.  Coord:  No  dysmetria, IFRAH intact   Gait/Balance: Cannot test                        8.6    9.1   )-----------( 266      ( 08 Sep 2017 05:39 )             25.6     09-08    143  |  108  |  31<H>  ----------------------------<  92  3.5   |  26  |  0.75    Ca    8.6      08 Sep 2017 05:39    TPro  6.2  /  Alb  2.6<L>  /  TBili  0.2  /  DBili  x   /  AST  10<L>  /  ALT  22  /  AlkPhos  68  09-06          Radiology report:  - CT Head 9/6/17    IMPRESSION:   RIGHT frontal craniotomy. Minimal pneumocephalus is seen   within thesurgical bed in the RIGHT frontal lobe.                - MRI brain 9/7/17    Official results pending.    Acute Left side infarct F/u with official results.    - EEG 9/7/17    Impression : Abnormal EEG with patient awake and drowsy because of the   right hemispheric slow activity with intermittent rare sharp activity   suggestive of underlying structural pathology. Clinical correlation   recommended.

## 2017-09-08 NOTE — PROGRESS NOTE ADULT - ASSESSMENT
Assessment and Recommendation:   · Assessment		  Pt with H/o TIAs ,HTN, S/P  Brain tumor resection 8/17 RT frontal area admitted with Possible seizure and Epistaxis agree with   increasing Keppra for now.    Pt not c/o any side effects at this time. If need to change meds consider changing to Depakote 500 mg q 12hr.      EEG pending.    H& H low being given Transfusion.    Noted to have some speech difficulties ?  MRI + ve for acute Left CVA .    Needs Tele monitoring and cardiology F/u with .    Restart ASA/Plavix.    F/u with H& H.    Needs clearance from ENT.    Hyper coag w/u.    Speech and swallow consult.    Discussed with Pt and Dr. Vogel.

## 2017-09-08 NOTE — SWALLOW BEDSIDE ASSESSMENT ADULT - SWALLOW EVAL: RECOMMENDED DIET
Continue Regular Texture Diet with Thin Liquid Consistencies as tolerated as patient appears clinically tolerant of these food consistencies from an oropharyngeal swallowing perspective at this time.

## 2017-09-08 NOTE — SWALLOW BEDSIDE ASSESSMENT ADULT - ORAL PHASE
Bolus formation/transfer were mechanically functional for age and no overt oral motor focality was evident. Pt cleared oral debris on won after age acceptable piecemeal deglutition. Bolus formation/transfer were mechanically functional for age and no overt oral motor focality was evident. Pt cleared oral debris on own after age acceptable piecemeal deglutition.

## 2017-09-08 NOTE — SWALLOW BEDSIDE ASSESSMENT ADULT - SLP GENERAL OBSERVATIONS
Pt was alert and interactive but somewhat anxious. She was able to follow 1 step verbal commands and comprehend concrete Wh/yes-no questions. However, she followed 2 step commands with less then 50% accuracy and task comprehension of moderately complex interrogatives was often decreased. Reading was commensurate with auditory comprehension. Expressively, pt's motor speech abilities were preserved when attempting to verbalize during tasks. She was able to produce automatized verbal sequences/repeat simple phrases without error. Though, periodic verbal fragmentations/perseverations were noted when asked to repeat lengthier sentences Pt was alert and interactive but somewhat anxious. She was able to follow 1 step verbal commands and comprehend concrete Wh/yes-no questions. However, she followed 2 step commands with less then 50% accuracy and task comprehension of moderately complex interrogatives was often decreased. Reading was commensurate with auditory comprehension. Expressively, pt's motor speech abilities were preserved when attempting to verbalize during tasks. She was able to produce automatized verbal sequences/repeat simple phrases without error. Though, periodic verbal fragmentations/perseverations were noted when asked to repeat lengthier sentences. Additionally, pt was able to confrontationally name common objects with approximately 65% accuracy and perseverations were often noted during naming tasks. Verbalizations during more elaborative tasks/in conversation were typically fluent but often brief/vague and marked by periodic formulation halts/perseverations. Writing was commensurate with verbal expression. Pt's awareness of communicative deficits seemed variably reduced. Pt seen at bedside, On encounter, nasal packing was noted to be in place. She was alert and interactive but somewhat anxious at times.  She was able to follow 1 step verbal commands and comprehend concrete Wh/yes-no questions. However, she followed 2 step commands with less then 50% accuracy and task comprehension of moderately complex interrogatives was often decreased. Reading was commensurate with auditory comprehension. Expressively, pt's motor speech abilities were preserved when attempting to verbalize during tasks. She was able to produce automatized verbal sequences/repeat simple phrases without error. Though, periodic verbal fragmentations/perseverations were noted when asked to repeat lengthier sentences. Additionally, pt was able to confrontationally name common objects with approximately 65% accuracy and perseverations were often noted during naming tasks. Verbalizations during more elaborative tasks/in conversation were typically fluent but often brief/vague and marked by periodic formulation halts/perseverations. Writing was commensurate with verbal expression. Pt's awareness of communicative deficits seemed variably reduced.

## 2017-09-08 NOTE — PROGRESS NOTE ADULT - ASSESSMENT
61 yo woman admitted with epistaxis, anemia  Possible seizure activity at home with concern for acute CVA    No more dark stools  Hb stable  Continue PPI  Patient needs to restart asa and plavix  Monitor CBC  Will need EGD, timing to be determined based on clinical course. Will follow.

## 2017-09-08 NOTE — PROGRESS NOTE ADULT - SUBJECTIVE AND OBJECTIVE BOX
HPI:  61 yo woman was admitted with possible seizure activity. 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 Also seen in ED last night for evaluation of epistaxis over the past 24 hours. Hb decreased from baseline of 13 to ~8. Patient reports that she believes she had black stools 1-2 weeks ago, but over the past few days, she has had brown bowel movements. Has been on ASA and Plavix. Never had endoscopy or colonoscopy. No heartburn, n/v. No abdominal pain. No diarrhea or constipation.    Concern for acute CVA. Restarting asa and plavix.      MEDICATIONS  (STANDING):  levETIRAcetam  IVPB 1000 milliGRAM(s) IV Intermittent every 12 hours  atorvastatin 40 milliGRAM(s) Oral at bedtime  sodium chloride 0.9%. 1000 milliLiter(s) (75 mL/Hr) IV Continuous <Continuous>  pantoprazole  Injectable 40 milliGRAM(s) IV Push two times a day  aspirin  chewable 81 milliGRAM(s) Oral daily    MEDICATIONS  (PRN):  acetaminophen   Tablet 650 milliGRAM(s) Oral every 6 hours PRN For Temp greater than 38 C (100.4 F)  ondansetron Injectable 4 milliGRAM(s) IV Push every 6 hours PRN Nausea  senna 2 Tablet(s) Oral at bedtime PRN Constipation  LORazepam   Injectable 2 milliGRAM(s) IV Push every 4 hours PRN seizure activities      Allergies    MSG (Rash)  No Known Drug Allergies    Intolerances        REVIEW OF SYSTEMS    General: no fever    HEENT: no icterus    Respiratory and Thorax: no SOB  	  Cardiovascular: no CP    Gastrointestinal: as above    Skin: no jaundice      Vital Signs Last 24 Hrs  T(C): 36.8 (08 Sep 2017 10:24), Max: 37.2 (07 Sep 2017 10:45)  T(F): 98.2 (08 Sep 2017 10:24), Max: 99 (07 Sep 2017 19:42)  HR: 102 (08 Sep 2017 10:24) (96 - 128)  BP: 145/58 (08 Sep 2017 10:24) (116/88 - 145/58)  BP(mean): --  RR: 18 (08 Sep 2017 10:24) (16 - 20)  SpO2: 100% (08 Sep 2017 10:24) (96% - 100%)    PHYSICAL EXAM:    Constitutional: NAD    HEENT: anicteric    Respiratory: CTA BL    Cardiovascular:  RRR    Gastrointestinal: soft ND +BS NTTP    Extremities: no LE edema    Skin: no jaundice      LABS:                        8.6    9.1   )-----------( 266      ( 08 Sep 2017 05:39 )             25.6     09-08    143  |  108  |  31<H>  ----------------------------<  92  3.5   |  26  |  0.75    Ca    8.6      08 Sep 2017 05:39    TPro  6.2  /  Alb  2.6<L>  /  TBili  0.2  /  DBili  x   /  AST  10<L>  /  ALT  22  /  AlkPhos  68  09-06    PT/INR - ( 06 Sep 2017 16:39 )   PT: 12.7 sec;   INR: 1.17 ratio         PTT - ( 06 Sep 2017 16:39 )  PTT:23.3 sec  LIVER FUNCTIONS - ( 06 Sep 2017 13:52 )  Alb: 2.6 g/dL / Pro: 6.2 gm/dL / ALK PHOS: 68 U/L / ALT: 22 U/L / AST: 10 U/L / GGT: x             RADIOLOGY & ADDITIONAL STUDIES:

## 2017-09-08 NOTE — SWALLOW BEDSIDE ASSESSMENT ADULT - SWALLOW EVAL: DIAGNOSIS
1) Pt demonstrates acute mixed Fluent Aphasia with underlying motor speech preservation in setting of acute left CVA.  Expressive deficits > receptive deficits,  Awareness of deficits was variably reduced.  2) Patient demonstrates Oropharyngeal Swallowing which subjectively appears to be stable and within functional parameters for age, despite acute CVA. No behavioral aspiration signs were exhibited on exam.

## 2017-09-09 LAB
HCT VFR BLD CALC: 24.2 % — LOW (ref 34.5–45)
HCT VFR BLD CALC: 24.7 % — LOW (ref 34.5–45)
HCT VFR BLD CALC: 28 % — LOW (ref 34.5–45)
HCT VFR BLD CALC: 29.2 % — LOW (ref 34.5–45)
HGB BLD-MCNC: 8.3 G/DL — LOW (ref 11.5–15.5)
HGB BLD-MCNC: 8.3 G/DL — LOW (ref 11.5–15.5)
HGB BLD-MCNC: 9.6 G/DL — LOW (ref 11.5–15.5)
HGB BLD-MCNC: 9.9 G/DL — LOW (ref 11.5–15.5)
LACTATE SERPL-SCNC: 1.2 MMOL/L — SIGNIFICANT CHANGE UP (ref 0.7–2)
MCHC RBC-ENTMCNC: 28.1 PG — SIGNIFICANT CHANGE UP (ref 27–34)
MCHC RBC-ENTMCNC: 33.6 GM/DL — SIGNIFICANT CHANGE UP (ref 32–36)
MCV RBC AUTO: 83.5 FL — SIGNIFICANT CHANGE UP (ref 80–100)
PLATELET # BLD AUTO: 271 K/UL — SIGNIFICANT CHANGE UP (ref 150–400)
RBC # BLD: 2.96 M/UL — LOW (ref 3.8–5.2)
RBC # FLD: 14.2 % — SIGNIFICANT CHANGE UP (ref 10.3–14.5)
TROPONIN I SERPL-MCNC: <0.015 NG/ML — SIGNIFICANT CHANGE UP (ref 0.01–0.04)
WBC # BLD: 9.6 K/UL — SIGNIFICANT CHANGE UP (ref 3.8–10.5)
WBC # FLD AUTO: 9.6 K/UL — SIGNIFICANT CHANGE UP (ref 3.8–10.5)

## 2017-09-09 PROCEDURE — 93010 ELECTROCARDIOGRAM REPORT: CPT

## 2017-09-09 PROCEDURE — 99233 SBSQ HOSP IP/OBS HIGH 50: CPT

## 2017-09-09 RX ORDER — LEVETIRACETAM 250 MG/1
1000 TABLET, FILM COATED ORAL AT BEDTIME
Qty: 0 | Refills: 0 | Status: DISCONTINUED | OUTPATIENT
Start: 2017-09-09 | End: 2017-09-13

## 2017-09-09 RX ORDER — CLOPIDOGREL BISULFATE 75 MG/1
75 TABLET, FILM COATED ORAL DAILY
Qty: 0 | Refills: 0 | Status: DISCONTINUED | OUTPATIENT
Start: 2017-09-09 | End: 2017-09-13

## 2017-09-09 RX ORDER — LEVETIRACETAM 250 MG/1
500 TABLET, FILM COATED ORAL DAILY
Qty: 0 | Refills: 0 | Status: DISCONTINUED | OUTPATIENT
Start: 2017-09-10 | End: 2017-09-13

## 2017-09-09 RX ORDER — METOPROLOL TARTRATE 50 MG
100 TABLET ORAL
Qty: 0 | Refills: 0 | Status: DISCONTINUED | OUTPATIENT
Start: 2017-09-09 | End: 2017-09-13

## 2017-09-09 RX ADMIN — LOSARTAN POTASSIUM 50 MILLIGRAM(S): 100 TABLET, FILM COATED ORAL at 12:50

## 2017-09-09 RX ADMIN — CLOPIDOGREL BISULFATE 75 MILLIGRAM(S): 75 TABLET, FILM COATED ORAL at 14:44

## 2017-09-09 RX ADMIN — PANTOPRAZOLE SODIUM 40 MILLIGRAM(S): 20 TABLET, DELAYED RELEASE ORAL at 16:41

## 2017-09-09 RX ADMIN — PANTOPRAZOLE SODIUM 40 MILLIGRAM(S): 20 TABLET, DELAYED RELEASE ORAL at 12:50

## 2017-09-09 RX ADMIN — Medication 100 MILLIGRAM(S): at 16:43

## 2017-09-09 RX ADMIN — ATORVASTATIN CALCIUM 40 MILLIGRAM(S): 80 TABLET, FILM COATED ORAL at 22:20

## 2017-09-09 RX ADMIN — LEVETIRACETAM 400 MILLIGRAM(S): 250 TABLET, FILM COATED ORAL at 12:50

## 2017-09-09 RX ADMIN — Medication 81 MILLIGRAM(S): at 11:47

## 2017-09-09 RX ADMIN — LEVETIRACETAM 1000 MILLIGRAM(S): 250 TABLET, FILM COATED ORAL at 22:20

## 2017-09-09 NOTE — PROGRESS NOTE ADULT - ASSESSMENT
Pt with H/o TIAs ,HTN, S/P  Brain tumor resection 8/17 RT frontal area admitted with Possible seizure and Epistaxis agree with   increasing Keppra for now.    Pt not c/o any side effects at this time. If need to change meds consider changing to Depakote 500 mg q 12hr.      EEG as above. continue keppra    H& H low being given Transfusion.    Noted to have some speech difficulties ?  MRI + ve for acute Left CVA .    Needs Tele monitoring and cardiology F/u with .    Restart ASA/Plavix.    F/u with H& H.    Needs clearance from ENT.    Hyper coag w/u.    Speech and swallow consult.    F/u In Office after D/c.    D/c planning.

## 2017-09-09 NOTE — PROGRESS NOTE ADULT - SUBJECTIVE AND OBJECTIVE BOX
Patient is a 62y old  Female who presents with a chief complaint of eyes rolled back,  with possible seizure and Nose bleed.Admitted for evaluation.      HPI:  62 y.o. female with PMH brain ca s/p craniotomy (8/23/17), TIA on ASA/plavix 2013, HLD, HTN presents with found on floor in the bathroom. Last night, pt was in ED for eval of epistaxis right nares x 24 hours, was packed and sent home. Last night, per brother at bedside, pt had "eyes rolled back" for ~1 min and resolved. States went to bed and woke up this AM. States this AM, had another episode of eyes rolled back and pt became confused both before and after the episode. Pt denies LH or dizziness. Denies any pain, CP, SOB, abd pain, dysuria, or diarrhea. Didn't have BM Yesterday  but states last few days, was brown BM. Pt denies tongue biting, denies incontinence. Per brother, pt was "stiff" during the time, but no extremities movement. Was given Ativan and Keppra in ED after seen by KIMBERLY JACOB  and Noted to have nose bleed with low H& H .     9/8/17 : Pt feeling better Nose bleed stopped. Still has mild speech difficulties. No confusion .No recurrent seizures. No focal weakness. MRI scan done yesterday reported Acute CVA left parieto occipital area. No Headaches dizziness.     9/9/17 : Pt OOB, feeling better. No new c/o. still has speech difficulties. NO Ha, dizziness focal weakness.    :PMH: as above  PSH: Right frontal craniotomy  Social Hx: former smoker, quit>10 yr ago, no EtOH, no drugs  Family Hx: noncontrib to current presentation  PAST MEDICAL & SURGICAL HISTORY:  High cholesterol  Transient cerebral ischemia, unspecified type: 3 years ago  Essential hypertension  No significant past surgical history      FAMILY HISTORY:  No pertinent family history in first degree relatives      Social Hx:  Nonsmoker, no drug or alcohol use   ROS: per HPI (06 Sep 2017 18:17)  MEDICATIONS  (STANDING):  levETIRAcetam  IVPB 1000 milliGRAM(s) IV Intermittent every 12 hours  atorvastatin 40 milliGRAM(s) Oral at bedtime  pantoprazole  Injectable 40 milliGRAM(s) IV Push two times a day  aspirin  chewable 81 milliGRAM(s) Oral daily  losartan 50 milliGRAM(s) Oral daily      Vital Signs Last 24 Hrs  T(C): 37.1 (09 Sep 2017 11:10), Max: 37.1 (08 Sep 2017 20:12)  T(F): 98.7 (09 Sep 2017 11:10), Max: 98.8 (08 Sep 2017 20:12)  HR: 90 (09 Sep 2017 11:10) (90 - 127)  BP: 134/55 (09 Sep 2017 11:10) (134/55 - 173/74)  BP(mean): --  RR: 16 (09 Sep 2017 11:10) (16 - 18)  SpO2: 100% (09 Sep 2017 11:10) (99% - 100%)      Constitutional: awake and alert with some speech difficulties noted.  HEENT: PERRLA, EOMI, Rt nose bleed with nose pack  Neck: Supple.  Respiratory: Breath sounds are clear bilaterally  Cardiovascular: S1 and S2, regular  rhythm  Gastrointestinal: soft, nontender  Extremities:  no edema  Vascular:  Carotid Bruit - no  Musculoskeletal: no joint swelling/tenderness, no abnormal movements  Skin: No rashes    Neurological exam:  HF: A x O x 3. Anxious Appropriately interactive, normal affect. Speech  some word finding difficulties noted.   CN: ANGELITA, EOMI, VFF, facial sensation normal, no NLFD, tongue midline, Palate moves equally, SCM equal bilaterally  Motor: No pronator drift, Strength 5/5 in all 4 ext, normal bulk and tone, no tremor, rigidity or bradykinesia.    Sens: Intact to light touch / PP/ VS/ JS    Reflexes: Symmetric and normal .  Brisk left > than Rt. Plantars down going.  Coord:  No  dysmetria, IFRAH intact   Gait/Balance: Normal                  8.3    x     )-----------( x        ( 09 Sep 2017 03:42 )             24.2     09-08    143  |  108  |  31<H>  ----------------------------<  92  3.5   |  26  |  0.75    Ca    8.6      08 Sep 2017 05:39            Radiology report:  - CT Head 9/6/17  IMPRESSION:   RIGHT frontal craniotomy. Minimal pneumocephalus is seen   within thesurgical bed in the RIGHT frontal lobe.                - MRI brain 9/7/17    IMPRESSION:    Acute infarction involves the left posterior temporal-lateral occipital   location, without hemorrhagic transformation.    Evolving postoperative changes as described in the right frontal lobe   status post resection of glioma.    - EEG 9/7/17  Impression : Abnormal EEG with patient awake and drowsy because of the   right hemispheric slow activity with intermittent rare sharp activity   suggestive of underlying structural pathology. Clinical correlation   recommended.

## 2017-09-09 NOTE — PROGRESS NOTE ADULT - SUBJECTIVE AND OBJECTIVE BOX
HOSPITALIST PROGRESS NOTE:  SUBJECTIVE:  PCP: PCP: Roger Armstrong  Neuro: SEJAL Iverson  Neurosx: Frankie  ENT: Osman Leon  Cardio: Mario Alberto    Chief Complaint: Patient is a 62y old  Female who presents with a chief complaint of eyes rolled back, post ictal state (06 Sep 2017 18:17)    HPI:  62 y.o. female with PMH brain ca s/p craniotomy (17), TIA on ASA/plavix , HLD, HTN presents with found on floor in the bathroom. Last night, pt was in ED for eval of epistaxis right nares x 24 hours, was packed and sent home. Last night, per brother at bedside, pt had "eyes rolled back" for ~1 min and resolved. States went to bed and woke up this AM. States this AM, had another episode of eyes rolled back and pt became confused both before and after the episode. Pt denies LH or dizziness. Denies any pain, CP, SOB, abd pain, dysuria, or diarrhea. Didn't have BM today, but states last few days, was brown BM. Pt denies tongue biting, denies incontinence. Per brother, pt was "stiff" during the time, but no extremities movement.    : Unsure of patients baseline. she is not oriented to place or time, only person. She has no complaints. Hgb still low. Discussed plan with son Scotty Reno who states that he is the HCP  : Last night patient had an MRI that was +Stroke.  Patient was then trasnferred to Tele this morning.  Spoke with Dr Deleon who prefers we start ASA now and then manuel after he takes out the rhino rocketn then start plavix.  Patient is awake but confused. Not able to tell me place or time. Discussed plan with brother who is still undecided about her advance directives.   : mental status much improved; Seen by ENT this morning and rhino rocket removed. No complaints.     Allergies:  MSG (Rash)  No Known Drug Allergies    REVIEW OF SYSTEMS:  See HPI. All other review of systems is negative unless indicated above.     OBJECTIVE  Physical Exam:  ICU Vital Signs Last 24 Hrs  T(C): 37.1 (09 Sep 2017 11:10), Max: 37.1 (08 Sep 2017 20:12)  T(F): 98.7 (09 Sep 2017 11:10), Max: 98.8 (08 Sep 2017 20:12)  HR: 90 (09 Sep 2017 11:10) (90 - 127)  BP: 134/55 (09 Sep 2017 11:10) (134/55 - 173/74)  BP(mean): --  ABP: --  ABP(mean): --  RR: 16 (09 Sep 2017 11:10) (16 - 18)  SpO2: 100% (09 Sep 2017 11:10) (99% - 100%)    GEN: appears comfortable  Neuro: AAOx1, confused,  nonfocal at this time  HEENT: NC/AT, EOMI  Neck: no thyroidmegaly, no JVD  Cardiovascular: S1S2 present, regular rhythm, no murmur  Respiratory: breath sounds normal bilaterally, no wheezing, no rales, no rhonchi  Gastrointestinal: bowel sounds normal, soft, no abdominal tenderness, obese  Musculoskeletal: no muscle tenderness  Extremities: No edema  Skin: No rashes  Breast: Deferred  Rectal: Deferred    MEDICATIONS  (STANDING):  levETIRAcetam  IVPB 1000 milliGRAM(s) IV Intermittent every 12 hours  atorvastatin 40 milliGRAM(s) Oral at bedtime  sodium chloride 0.9%. 1000 milliLiter(s) (75 mL/Hr) IV Continuous <Continuous>  pantoprazole  Injectable 40 milliGRAM(s) IV Push two times a day  potassium chloride    Tablet ER 40 milliEquivalent(s) Oral once    Lab Results:  Lab Results:  CBC  CBC Full  -  ( 09 Sep 2017 12:37 )  WBC Count : x  Hemoglobin : 9.9 g/dL  Hematocrit : 29.2 %  Platelet Count - Automated : x  Mean Cell Volume : x  Mean Cell Hemoglobin : x  Mean Cell Hemoglobin Concentration : x  Auto Neutrophil # : x  Auto Lymphocyte # : x  Auto Monocyte # : x  Auto Eosinophil # : x  Auto Basophil # : x  Auto Neutrophil % : x  Auto Lymphocyte % : x  Auto Monocyte % : x  Auto Eosinophil % : x  Auto Basophil % : x    .		Differential:	[] Automated		[] Manual  Chemistry                        9.9    x     )-----------( x        ( 09 Sep 2017 12:37 )             29.2     -    143  |  108  |  31<H>  ----------------------------<  92  3.5   |  26  |  0.75    Ca    8.6      08 Sep 2017 05:39      PT/INR - ( 06 Sep 2017 16:39 )   PT: 12.7 sec;   INR: 1.17 ratio         PTT - ( 06 Sep 2017 16:39 )  PTT:23.3 sec    Urinalysis Basic - ( 06 Sep 2017 15:20 )    Color: Yellow / Appearance: Clear / S.015 / pH: x  Gluc: x / Ketone: Negative  / Bili: Negative / Urobili: Negative mg/dL   Blood: x / Protein: 15 mg/dL / Nitrite: Negative   Leuk Esterase: Negative / RBC: x / WBC x   Sq Epi: x / Non Sq Epi: x / Bacteria: x    RADIOLOGY RESULTS:    CT head  IMPRESSION:   RIGHT frontal craniotomy. Minimal pneumocephalus is seen within the surgical bed in the RIGHT frontal lobe.      < from: MRI Head w/wo Cont (17 @ 21:16) >    IMPRESSION:    Acute infarction involves the left posterior temporal-lateral occipital   location, without hemorrhagic transformation.    Evolving postoperative changes as described in the right frontal lobe   status post resection of glioma.    < end of copied text >    < from: Xray Chest 1 View AP/PA. (17 @ 17:56) >    IMPRESSION:    Clear lungs    < end of copied text >

## 2017-09-10 LAB
HCT VFR BLD CALC: 25.9 % — LOW (ref 34.5–45)
HGB BLD-MCNC: 8.6 G/DL — LOW (ref 11.5–15.5)

## 2017-09-10 PROCEDURE — 93010 ELECTROCARDIOGRAM REPORT: CPT

## 2017-09-10 RX ADMIN — PANTOPRAZOLE SODIUM 40 MILLIGRAM(S): 20 TABLET, DELAYED RELEASE ORAL at 17:34

## 2017-09-10 RX ADMIN — LEVETIRACETAM 1000 MILLIGRAM(S): 250 TABLET, FILM COATED ORAL at 21:45

## 2017-09-10 RX ADMIN — PANTOPRAZOLE SODIUM 40 MILLIGRAM(S): 20 TABLET, DELAYED RELEASE ORAL at 06:09

## 2017-09-10 RX ADMIN — Medication 81 MILLIGRAM(S): at 11:33

## 2017-09-10 RX ADMIN — LOSARTAN POTASSIUM 50 MILLIGRAM(S): 100 TABLET, FILM COATED ORAL at 06:09

## 2017-09-10 RX ADMIN — Medication 100 MILLIGRAM(S): at 06:09

## 2017-09-10 RX ADMIN — LEVETIRACETAM 500 MILLIGRAM(S): 250 TABLET, FILM COATED ORAL at 11:34

## 2017-09-10 RX ADMIN — ATORVASTATIN CALCIUM 40 MILLIGRAM(S): 80 TABLET, FILM COATED ORAL at 21:45

## 2017-09-10 RX ADMIN — CLOPIDOGREL BISULFATE 75 MILLIGRAM(S): 75 TABLET, FILM COATED ORAL at 11:33

## 2017-09-10 NOTE — CONSULT NOTE ADULT - SUBJECTIVE AND OBJECTIVE BOX
63 y/o F with a PMHx of TIA, HTN, brain CA with recent R brain resection by Frankie presents to the ED c/o fall while in shower that occurred earlier this morning PTA. Pt adamant that she did not have LOC, recently seen here last night for epistaxis, currently on ASA, Plavix. Pt currently awake, alert, able to give adequate hx and denies fever, HA, CP, SOB, NVD or any other acute c/o at this time. Pt not a good historian at baseline with short term memory due to brain condition.  rx rhinorocket 9/5 er  returned 9/6 patient states ther was prolonged period of bleeding.  However poor historian    right rhinoballoon in place, no active bleeding, np dry
CHIEF COMPLAINT: Patient is a 62y old  Female who presents with a chief complaint of eyes rolled back, post ictal state (06 Sep 2017 18:17)      HPI:  62 y.o. female with PMH brain ca s/p craniotomy (8/23/17), TIA on ASA/plavix 2013, HLD, HTN presents with found on floor in the bathroom. Last night, pt was in ED for eval of epistaxis right nares x 24 hours, was packed and sent home. Last night, per brother at bedside, pt had "eyes rolled back" for ~1 min and resolved. States went to bed and woke up this AM. States this AM, had another episode of eyes rolled back and pt became confused both before and after the episode. Pt denies LH or dizziness. Denies any pain, CP, SOB, abd pain, dysuria, or diarrhea. Didn't have BM today, but states last few days, was brown BM. Pt denies tongue biting, denies incontinence. Per brother, pt was "stiff" during the time, but no extremities movement.    9/8/17 : Pt feeling better Nose bleed stopped. Still has mild speech difficulties. No confusion .No recurrent seizures. No focal weakness. MRI scan done yesterday reported Acute CVA left parieto occipital area. No Headaches dizziness.  Unsure of patients baseline. she is not oriented to place or time, only person. She has no complaints. Hgb still low. Discussed plan with son Scotty Reno who states that he is the HCP    9/8: Pt feeling better Nose bleed stopped. Still has mild speech difficulties. No confusion .No recurrent seizures. No focal weakness. MRI scan done yesterday reported Acute CVA left parieto occipital area. No Headaches dizziness.  Last night patient had an MRI that was +Stroke.  Patient was then trasnferred to Tele this morning.  Spoke with Dr Deleon who prefers we start ASA now and then manuel after he takes out the rhino rocketn then start plavix.  Patient is awake but confused. Not able to tell me place or time. Discussed plan with brother who is still undecided about her advance directives.     9/9: mental status much improved; Seen by ENT this morning and rhino rocket removed. No complaints.  Pt OOB, feeling better, still has speech difficulties. NO Ha, dizziness focal weakness.    9/10: No further complaints. Patient with difficulty with memory overnight-not able to state why she is here- on tele patient had 7 beats of ventricular Aberrancy. Sinus tachycaria resolved. No bleeding noticed. Plavix restarted yesterday.  Called today for further cardiac evaluation for VT.  States she had nuclear with Dr. Llanes 4 weeks ago-normal.  No recent ECHO.    PMH: as above  PSH: Right frontal craniotomy  Social Hx: former smoker, quit>10 yr ago, no EtOH, no drugs  Family Hx: noncontrib to current presentation  ROS: per HPI (06 Sep 2017 18:17)      PMHx: PAST MEDICAL & SURGICAL HISTORY:  High cholesterol  Transient cerebral ischemia, unspecified type: 3 years ago  Essential hypertension    Soc Hx:       Allergies: Allergies    MSG (Rash)  No Known Drug Allergies    Intolerances          REVIEW OF SYSTEMS:    CONSTITUTIONAL: No weakness, fevers or chills  RESPIRATORY: No cough, wheezing, hemoptysis; No shortness of breath  CARDIOVASCULAR: No chest pain or palpitations  GASTROINTESTINAL: No abdominal or epigastric pain. No nausea, vomiting, or hematemesis; No diarrhea or constipation. No melena or hematochezia.  GENITOURINARY: No dysuria, frequency or hematuria      Vital Signs Last 24 Hrs  T(C): 36.6 (10 Sep 2017 11:29), Max: 37.1 (09 Sep 2017 16:43)  T(F): 97.8 (10 Sep 2017 11:29), Max: 98.8 (09 Sep 2017 16:43)  HR: 95 (10 Sep 2017 11:29) (95 - 106)  BP: 140/54 (10 Sep 2017 11:29) (139/69 - 151/68)  BP(mean): 70 (10 Sep 2017 11:29) (70 - 70)  RR: 17 (10 Sep 2017 05:55) (17 - 17)  SpO2: 98% (10 Sep 2017 11:29) (97% - 99%)    I&O's Summary      CAPILLARY BLOOD GLUCOSE          PHYSICAL EXAM:   Constitutional: NAD, awake and alert, well-developed  HEENT: PERR, EOMI, Normal Hearing, MMM  Neck:  JVD  Respiratory: Breath sounds are clear bilaterally, No wheezing, rales or rhonchi  Cardiovascular: S1 and S2, regular rate and rhythm,  Murmurs,   Gastrointestinal: Bowel Sounds present, soft, nontender, nondistended, no guarding, no rebound  Extremities:  peripheral edema  Vascular: 2+ peripheral pulses  Neurological: A/O x 1-2      MEDICATIONS:  MEDICATIONS  (STANDING):  atorvastatin 40 milliGRAM(s) Oral at bedtime  pantoprazole  Injectable 40 milliGRAM(s) IV Push two times a day  aspirin  chewable 81 milliGRAM(s) Oral daily  losartan 50 milliGRAM(s) Oral daily  clopidogrel Tablet 75 milliGRAM(s) Oral daily  levETIRAcetam 1000 milliGRAM(s) Oral at bedtime  levETIRAcetam 500 milliGRAM(s) Oral daily  metoprolol succinate  milliGRAM(s) Oral two times a day      LABS: All Labs Reviewed:                        8.6    x     )-----------( x        ( 10 Sep 2017 05:49 )             25.9       CARDIAC MARKERS ( 09 Sep 2017 03:42 )  <0.015 ng/mL / x     / x     / x     / x      CARDIAC MARKERS ( 08 Sep 2017 18:06 )  <0.015 ng/mL / x     / x     / x     / x            Blood Culture:   lipid profile       RADIOLOGY:    EKG:    Telemetry:    ECHO:   Normal appearing mitral valve structure and function.   Moderate posterior mitral annular calcification is present.   Mild (1+) mitral regurgitation is present.   Fibrocalcific changes noted to the Aortic valve leaflets with preserved   leaflet excursion.   No aortic regurgitation is present.   Mild (1+) tricuspid valve regurgitation is present.   Pulmonic valve not well seen, probably normal pulmonic valve function.   Normal appearing left atrium.   The left ventricle is normal in size, wall thickness, wall motion and   contractility.   Estimated left ventricular ejection fraction is 60-65 %.   Normal appearing right atrium.   Normal appearing right ventricle structure and function.
HPI:  63 yo woman was admitted with possible seizure activity. 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 Also seen in ED last night for evaluation of epistaxis over the past 24 hours. Hb decreased from baseline of 13 to ~8. Patient reports that she believes she had black stools 1-2 weeks ago, but over the past few days, she has had brown bowel movements. Has been on ASA and Plavix. Never had endoscopy or colonoscopy. No heartburn, n/v. No abdominal pain. No diarrhea or constipation.      PAST MEDICAL & SURGICAL HISTORY:  High cholesterol  Transient cerebral ischemia, unspecified type: 3 years ago  Essential hypertension  No significant past surgical history      MEDICATIONS  (STANDING):  levETIRAcetam  IVPB 1000 milliGRAM(s) IV Intermittent every 12 hours  atorvastatin 40 milliGRAM(s) Oral at bedtime  sodium chloride 0.9%. 1000 milliLiter(s) (75 mL/Hr) IV Continuous <Continuous>  pantoprazole  Injectable 40 milliGRAM(s) IV Push two times a day  furosemide   Injectable 20 milliGRAM(s) IV Push once    MEDICATIONS  (PRN):  acetaminophen   Tablet 650 milliGRAM(s) Oral every 6 hours PRN For Temp greater than 38 C (100.4 F)  ondansetron Injectable 4 milliGRAM(s) IV Push every 6 hours PRN Nausea  senna 2 Tablet(s) Oral at bedtime PRN Constipation  LORazepam   Injectable 2 milliGRAM(s) IV Push every 4 hours PRN seizure activities      Allergies    MSG (Rash)  No Known Drug Allergies    Intolerances        SOCIAL HISTORY:  No smoking, social alcohol use, no recreational drug use    FAMILY HISTORY:  No pertinent family history in first degree relatives      REVIEW OF SYSTEMS    General: no fever    HEENT: no icterus    Respiratory and Thorax: no SOB  	  Cardiovascular: no CP    Gastrointestinal: as above    : no dysuria    Musculoskeletal: no myalgias    Skin: no jaundice    Neuro: as above    Vital Signs Last 24 Hrs  T(C): 37.1 (07 Sep 2017 05:25), Max: 37.1 (06 Sep 2017 12:58)  T(F): 98.7 (07 Sep 2017 05:25), Max: 98.7 (06 Sep 2017 12:58)  HR: 78 (07 Sep 2017 05:25) (65 - 92)  BP: 133/54 (07 Sep 2017 05:25) (80/50 - 133/54)  BP(mean): --  RR: 18 (07 Sep 2017 05:25) (16 - 19)  SpO2: 100% (07 Sep 2017 05:25) (97% - 100%)    PHYSICAL EXAM:    Constitutional: NAD    Head: NCAT    HEENT: anicteric    Neck: no abnormal lymphadenopathy    Respiratory: CTA BL    Cardiovascular:  RRR    Gastrointestinal: soft ND +BS NTTP    Extremities: no LE edema    Skin: no jaundice      LABS:                        7.3    9.7   )-----------( 289      ( 07 Sep 2017 07:59 )             21.9     09-07    139  |  107  |  38<H>  ----------------------------<  94  3.2<L>   |  22  |  0.86    Ca    8.7      07 Sep 2017 07:59    TPro  6.2  /  Alb  2.6<L>  /  TBili  0.2  /  DBili  x   /  AST  10<L>  /  ALT  22  /  AlkPhos  68  09-06    PT/INR - ( 06 Sep 2017 16:39 )   PT: 12.7 sec;   INR: 1.17 ratio         PTT - ( 06 Sep 2017 16:39 )  PTT:23.3 sec  LIVER FUNCTIONS - ( 06 Sep 2017 13:52 )  Alb: 2.6 g/dL / Pro: 6.2 gm/dL / ALK PHOS: 68 U/L / ALT: 22 U/L / AST: 10 U/L / GGT: x             RADIOLOGY & ADDITIONAL STUDIES:
Patient is a 62y old  Female who presents with a chief complaint of Hx of recent brain sx now with falls x 2    HPI:  62 yo female known to our service s/p recent Right frontal craniotomy for tumor resection 8/23/17 (frozen section intraoperative consistent with glioma) with a PMH of HTN, HLD, TIAs on ASA/Plavix presents to the ER with report of questionable seizure x 2 at home. As per son at bedside, last night pt went to the ER for epistaxis which began several days prior and "was never under control". Pt's nose was packed and she was sent home. That night when getting into bed she had a witnessed episode in which "her eyes rolled up in her head and she was shaking a little" and after which she was unresponsive. The whole event lasted ~2 minutes. Pt was helped into bed after and fell asleep. This AM pt appeared to be in her usual state of health. She went to take a shower and afterward, when she was getting out, she states "she felt so tired i just had to sit down". She was found on the floor by her son and again had an episode of her eyes rolling back followed by decreased responsiveness and confusion. CT head in ER with postop changes / no acute path. At the time of my exam pt appears comfortable, denies complaints, no HA, visual changes, focal numb / ting / weak, neck pain.      In ER pt found with drop in H/H, black stools, and guaiac Admitted to medicine for GI bleed w/u      PAST MEDICAL & SURGICAL HISTORY:  High cholesterol  Transient cerebral ischemia, unspecified type: 3 years ago  Essential hypertension  Right frontal craniotomy for tumor resection 8/23/17 (frozen section intraoperative consistent with glioma        FAMILY HISTORY:  No pertinent family history in first degree relatives          Social Hx:  Nonsmoker, no drug or alcohol use      Allergies  MSG (Rash)        MEDICATIONS Reviewed        ROS: Pertinent positives in HPI, all other ROS were reviewed and are negative.          Vital Signs Last 24 Hrs  T(C): 37.1 (06 Sep 2017 12:58), Max: 37.3 (05 Sep 2017 21:24)  T(F): 98.7 (06 Sep 2017 12:58), Max: 99.1 (05 Sep 2017 21:24)  HR: 66 (06 Sep 2017 12:58) (66 - 109)  BP: 127/100 (06 Sep 2017 12:58) (116/58 - 128/52)  BP(mean): --  RR: 16 (06 Sep 2017 12:58) (16 - 18)  SpO2: 97% (06 Sep 2017 12:58) (97% - 100%)        Labs:                        8.8    20.2  )-----------( 309      ( 06 Sep 2017 13:52 )             25.8     09-06    139  |  104  |  51<H>  ----------------------------<  131<H>  3.6   |  26  |  1.02    Ca    9.4      06 Sep 2017 13:52    TPro  6.2  /  Alb  2.6<L>  /  TBili  0.2  /  DBili  x   /  AST  10<L>  /  ALT  22  /  AlkPhos  68  09-06        CT head:  RIGHT frontal craniotomy. Minimal pneumocephalus is seen   within thesurgical bed in the RIGHT frontal lobe.          Constitutional: awake and alert.  HEENT: PERRLA, EOMI,   Neck: Supple.  Respiratory: Breath sounds are clear bilaterally  Cardiovascular: S1 and S2, regular rhythm  Gastrointestinal: soft, nontender  Extremities:  no edema  Musculoskeletal: no joint swelling/tenderness, no abnormal movements  Skin: No rashes        Neurological exam:  HF: A x O x 3. Appropriately interactive, normal affect. Speech fluent, No Aphasia or paraphasic errors. Naming /repetition intact   CN: ANGELITA, EOMI, VFF, facial sensation normal, no NLFD, tongue midline, Palate moves equally, SCM equal bilaterally  Motor: No pronator drift, Strength 5/5 in all 4 ext, normal bulk and tone, no tremor, rigidity or bradykinesia.    Sens: Intact to light touch / PP/ VS/ JS    Reflexes: UEs 3+, no hoffmans, LEs Symmetric and normal, downgoing toes b/l  Coord:  No FNFA, dysmetria, IFRAH intact   Gait/Balance: Cannot test
Patient is a 62y old  Female who presents with a chief complaint of eyes rolled back,  with possible seizure and Nose bleed.Admitted for evaluation.      HPI:  62 y.o. female with PMH brain ca s/p craniotomy (8/23/17), TIA on ASA/plavix 2013, HLD, HTN presents with found on floor in the bathroom. Last night, pt was in ED for eval of epistaxis right nares x 24 hours, was packed and sent home. Last night, per brother at bedside, pt had "eyes rolled back" for ~1 min and resolved. States went to bed and woke up this AM. States this AM, had another episode of eyes rolled back and pt became confused both before and after the episode. Pt denies LH or dizziness. Denies any pain, CP, SOB, abd pain, dysuria, or diarrhea. Didn't have BM Yesterday  but states last few days, was brown BM. Pt denies tongue biting, denies incontinence. Per brother, pt was "stiff" during the time, but no extremities movement. Was given Ativan and Keppra in ED after seen by KIMBERLY JACOB  and Noted to have nose bleed with low H& H .     PMH: as above  PSH: Right frontal craniotomy  Social Hx: former smoker, quit>10 yr ago, no EtOH, no drugs  Family Hx: noncontrib to current presentation  ROS: per HPI (06 Sep 2017 18:17)      PAST MEDICAL & SURGICAL HISTORY:  High cholesterol  Transient cerebral ischemia, unspecified type: 3 years ago  Essential hypertension  No significant past surgical history      FAMILY HISTORY:  No pertinent family history in first degree relatives      Social Hx:  Nonsmoker, no drug or alcohol use    MEDICATIONS  (STANDING):  levETIRAcetam  IVPB 1000 milliGRAM(s) IV Intermittent every 12 hours  atorvastatin 40 milliGRAM(s) Oral at bedtime  sodium chloride 0.9%. 1000 milliLiter(s) (75 mL/Hr) IV Continuous <Continuous>  pantoprazole  Injectable 40 milliGRAM(s) IV Push two times a day  furosemide   Injectable 20 milliGRAM(s) IV Push once       Allergies    MSG (Rash)  No Known Drug Allergies      ROS: Pertinent positives in HPI, all other ROS were reviewed and are negative.      Vital Signs Last 24 Hrs  T(C): 37.2 (07 Sep 2017 10:45), Max: 37.2 (07 Sep 2017 10:45)  T(F): 98.9 (07 Sep 2017 10:45), Max: 98.9 (07 Sep 2017 10:45)  HR: 103 (07 Sep 2017 10:45) (65 - 103)  BP: 119/59 (07 Sep 2017 10:45) (80/50 - 133/54)  BP(mean): --  RR: 18 (07 Sep 2017 10:45) (16 - 19)  SpO2: 103% (07 Sep 2017 10:45) (97% - 103%)        Constitutional: awake and alert with some speech difficulties noted.  HEENT: PERRLA, EOMI, Rt nose bleed with nose pack  Neck: Supple.  Respiratory: Breath sounds are clear bilaterally  Cardiovascular: S1 and S2, regular  rhythm  Gastrointestinal: soft, nontender  Extremities:  no edema  Vascular:  Carotid Bruit - no  Musculoskeletal: no joint swelling/tenderness, no abnormal movements  Skin: No rashes    Neurological exam:  HF: A x O x 3. Anxious Appropriately interactive, normal affect. Speech  some word finding difficulties noted.   CN: ANGELITA, EOMI, VFF, facial sensation normal, no NLFD, tongue midline, Palate moves equally, SCM equal bilaterally  Motor: No pronator drift, Strength 5/5 in all 4 ext, normal bulk and tone, no tremor, rigidity or bradykinesia.    Sens: Intact to light touch / PP/ VS/ JS    Reflexes: Symmetric and normal .  Brisk left > than Rt. Plantars down going.  Coord:  No  dysmetria, IFRAH intact   Gait/Balance: Cannot test      Labs:   09-07    139  |  107  |  38<H>  ----------------------------<  94  3.2<L>   |  22  |  0.86    Ca    8.7      07 Sep 2017 07:59    TPro  6.2  /  Alb  2.6<L>  /  TBili  0.2  /  DBili  x   /  AST  10<L>  /  ALT  22  /  AlkPhos  68  09-06                              7.3    9.7   )-----------( 289      ( 07 Sep 2017 07:59 )             21.9       Radiology:  - CT Head: 9/6/17    IMPRESSION:   RIGHT frontal craniotomy. Minimal pneumocephalus is seen   within thesurgical bed in the RIGHT frontal lobe.

## 2017-09-10 NOTE — CONSULT NOTE ADULT - CONSULT REASON
epistaxis
Anemia
CVA
Hx of recent brain sx now with falls x 2
Seizure/Epistaxis s/p Brain tumor resection

## 2017-09-10 NOTE — PROGRESS NOTE ADULT - SUBJECTIVE AND OBJECTIVE BOX
HOSPITALIST PROGRESS NOTE:  SUBJECTIVE:  PCP: PCP: Roger Armstrong  Neuro: SEJAL Iverson  Neurosx: Frankie  ENT: Osman Leon  Cardio: Mario Alberto    Chief Complaint: Patient is a 62y old  Female who presents with a chief complaint of eyes rolled back, post ictal state (06 Sep 2017 18:17)    HPI:  62 y.o. female with PMH brain ca s/p craniotomy (17), TIA on ASA/plavix , HLD, HTN presents with found on floor in the bathroom. Last night, pt was in ED for eval of epistaxis right nares x 24 hours, was packed and sent home. Last night, per brother at bedside, pt had "eyes rolled back" for ~1 min and resolved. States went to bed and woke up this AM. States this AM, had another episode of eyes rolled back and pt became confused both before and after the episode. Pt denies LH or dizziness. Denies any pain, CP, SOB, abd pain, dysuria, or diarrhea. Didn't have BM today, but states last few days, was brown BM. Pt denies tongue biting, denies incontinence. Per brother, pt was "stiff" during the time, but no extremities movement.    : Unsure of patients baseline. she is not oriented to place or time, only person. She has no complaints. Hgb still low. Discussed plan with son Scotty Reno who states that he is the HCP  : Last night patient had an MRI that was +Stroke.  Patient was then trasnferred to Tele this morning.  Spoke with Dr Deleon who prefers we start ASA now and then manuel after he takes out the rhino rocketn then start plavix.  Patient is awake but confused. Not able to tell me place or time. Discussed plan with brother who is still undecided about her advance directives.   : mental status much improved; Seen by ENT this morning and rhino rocket removed. No complaints.   9/10: No further complaints. overnight on tele patient had 7 beats of ventricular Aberrancy. she is also sinus tachy. No bleeding noticed. Plavix restarted yesterday    Allergies:  MSG (Rash)  No Known Drug Allergies    REVIEW OF SYSTEMS:  See HPI. All other review of systems is negative unless indicated above.     OBJECTIVE  Physical Exam:  ICU Vital Signs Last 24 Hrs  T(C): 37.1 (10 Sep 2017 05:55), Max: 37.1 (09 Sep 2017 16:43)  T(F): 98.7 (10 Sep 2017 05:55), Max: 98.8 (09 Sep 2017 16:43)  HR: 95 (10 Sep 2017 05:55) (95 - 106)  BP: 139/69 (10 Sep 2017 05:55) (139/69 - 151/68)  BP(mean): --  ABP: --  ABP(mean): --  RR: 17 (10 Sep 2017 05:55) (17 - 17)  SpO2: 97% (10 Sep 2017 05:55) (97% - 99%)      GEN: appears comfortable  Neuro: AAOx1, confused,  nonfocal at this time  HEENT: NC/AT, EOMI  Neck: no thyroidmegaly, no JVD  Cardiovascular: S1S2 present, regular rhythm, no murmur  Respiratory: breath sounds normal bilaterally, no wheezing, no rales, no rhonchi  Gastrointestinal: bowel sounds normal, soft, no abdominal tenderness, obese  Musculoskeletal: no muscle tenderness  Extremities: No edema  Skin: No rashes  Breast: Deferred  Rectal: Deferred    MEDICATIONS  (STANDING):  levETIRAcetam  IVPB 1000 milliGRAM(s) IV Intermittent every 12 hours  atorvastatin 40 milliGRAM(s) Oral at bedtime  sodium chloride 0.9%. 1000 milliLiter(s) (75 mL/Hr) IV Continuous <Continuous>  pantoprazole  Injectable 40 milliGRAM(s) IV Push two times a day  potassium chloride    Tablet ER 40 milliEquivalent(s) Oral once    Lab Results:  Lab Results:  CBC  CBC Full  -  ( 09 Sep 2017 12:37 )  WBC Count : x  Hemoglobin : 9.9 g/dL  Hematocrit : 29.2 %  Platelet Count - Automated : x  Mean Cell Volume : x  Mean Cell Hemoglobin : x  Mean Cell Hemoglobin Concentration : x  Auto Neutrophil # : x  Auto Lymphocyte # : x  Auto Monocyte # : x  Auto Eosinophil # : x  Auto Basophil # : x  Auto Neutrophil % : x  Auto Lymphocyte % : x  Auto Monocyte % : x  Auto Eosinophil % : x  Auto Basophil % : x    .		Differential:	[] Automated		[] Manual  Chemistry                        9.9    x     )-----------( x        ( 09 Sep 2017 12:37 )             29.2     09-08    143  |  108  |  31<H>  ----------------------------<  92  3.5   |  26  |  0.75    Ca    8.6      08 Sep 2017 05:39      PT/INR - ( 06 Sep 2017 16:39 )   PT: 12.7 sec;   INR: 1.17 ratio         PTT - ( 06 Sep 2017 16:39 )  PTT:23.3 sec    Urinalysis Basic - ( 06 Sep 2017 15:20 )    Color: Yellow / Appearance: Clear / S.015 / pH: x  Gluc: x / Ketone: Negative  / Bili: Negative / Urobili: Negative mg/dL   Blood: x / Protein: 15 mg/dL / Nitrite: Negative   Leuk Esterase: Negative / RBC: x / WBC x   Sq Epi: x / Non Sq Epi: x / Bacteria: x    RADIOLOGY RESULTS:    CT head  IMPRESSION:   RIGHT frontal craniotomy. Minimal pneumocephalus is seen within the surgical bed in the RIGHT frontal lobe.      < from: MRI Head w/wo Cont (17 @ 21:16) >    IMPRESSION:    Acute infarction involves the left posterior temporal-lateral occipital   location, without hemorrhagic transformation.    Evolving postoperative changes as described in the right frontal lobe   status post resection of glioma.    < end of copied text >    < from: Xray Chest 1 View AP/PA. (17 @ 17:56) >    IMPRESSION:    Clear lungs    < end of copied text >    < from: Transthoracic Echocardiogram (17 @ 09:58) >     Impression     Summary     Normal appearing mitral valve structure and function.   Moderate posterior mitral annular calcification is present.   Mild (1+) mitral regurgitation is present.   Fibrocalcific changes noted to the Aortic valve leaflets with preserved   leaflet excursion.   No aortic regurgitation is present.   Mild (1+) tricuspid valve regurgitation is present.   Pulmonic valve not well seen, probably normal pulmonic valve function.   Normal appearing left atrium.   The left ventricle is normal in size, wall thickness, wall motion and   contractility.   Estimated left ventricular ejection fraction is 60-65 %.   Normal appearing right atrium.   Normal appearing right ventricle structure and function.     Signature    < end of copied text >

## 2017-09-10 NOTE — PROGRESS NOTE ADULT - ASSESSMENT
#"eyes rolled back", post ictal state suggestive of seizure  #recent brain sx (glioma) s/p craniotomy  -S/P keppra 1000mg IV BID; switched to keppra 500mg in AM and 1000mg PO QHS  -prn ativan for seizure activities   -CT head noted  -EEG noted  -neurosurg consult appreciated - -No acute neurosurgical intervention  -neuro consult appreciated - If patient develops Side effects from keppra can consider changing to Depakote 500 mg q 12hr.    #Acute Stroke  -transfer to [facility] (9/8) -  7 beats of ventricular Aberrancy (9/9)  -MRI of Brain - Acute infarction involving the left posterior temporal-lateral occipital   -Neuro checks  -restart ASA and plavix   -started on Statin  -PT/OT  -FU Hypercoagulable work up; factor V leiden still not drawn yet; Brother has to give consent  -ECHO - Estimated left ventricular ejection fraction is 60-65 %.    #Anemia of acute blood loss 2ndry to Epistaxis and GI bleed (guaiac positive)   -H/H 14.2 on 8/24, during hospital stay patients hgb dropped to 7.1  -transfused 2 units of PRBC (9/7)  -serial H/H monitoring and transfuse PRN  -protonix IV BID  -resume plavix and ASA  -ENT consult appreciated   -GI consult appreciated - Will need EGD, timing to be determined based on clinical course  -check iron panel, ferritin    #hypotension - resolved  -likely from anemia  -iv hydration  -home BP meds initially held (losartan 50mg BID, HCTZ 25mg day, Toprol XL 100mg BID)    #HTN, HLD  -resume Toprol XL 100mg BID and losartan 50mg po QD  -patients home BP meds (losartan 50mg BID, HCTZ 25mg day, Toprol XL 100mg BID)  -cont statin    #leukocytosis - Resolved   -likely from recent brain surgery and on steroids  -monitor CBC  -UA - neg  -CXR - clear    #TIA  -continue ASA and plavix    #DVT ppx  -SCDs

## 2017-09-10 NOTE — CONSULT NOTE ADULT - ASSESSMENT
right epistaxis now under control x 24 h, hard to access if of all blood loss is from epistaxis.  Off plavix and asa now  Will leave pack in place for another 48 h and remove 9/9 am.
62 yo female known to our service s/p recent Right frontal craniotomy for tumor resection 8/23/17 (frozen section intraoperative consistent with glioma) with a PMH of HTN, HLD, TIAs on ASA/Plavix presents to the ER with report of questionable seizure x 2 at home. CT head in ER with postop changes / no acute path.     -No acute neurosurgical intervention  -Would load with Keppra, 1 gram IV, then cont 1 gram IV 2 x day while in hospital  -Neurology consult to determine if a different AED would be better suited  -DVT ppx  -no neurosurgical contraindication to Ambien or other sleep aid (pt c/o insomnia)    Discussed with Dr David
63 yo woman admitted with possible seizure activity.  Epistaxis over the past 24 hours requiring packing  New anemia  Recent black stools  Recommend ENT eval  Hold asa, plavix  Monitor CBC and tranfuse prn  PPI  Diet as tolerated  Patient should have EGD when acute issues resolve, either as inpt later next week or as outpt next week.
Patient with admission for nose bleed-anemia.  Mental status is limited.  Neurological episodes at home-evluated and found to be CVA.  Patient at home on ASA 81 and plavix, but in hospital, antiplatelets were held due to bleeding.    1-CVA-should get carotid US if not done.  Continue tele although no Afib noted.  May need AC.  Get hypercoag w/u.  If AC needed, should figure out why she had 7 day nose bleed.  2-WCT-normal ECHo and reported normal nuclear.  WCT appears to be VT(but may be afib with aberrancy-slightly changing cycle length.   Would ask EPS to see tmr.  If afib, would then need AC without question.   Dr. Llanes to see patient tomorrow
Pt with H/o TIAs ,HTN, S/P  Brain tumor resection 8/17 RT frontal area admitted with Possible seizure and Epistaxis agree with   increasing Keppra for now.    Pt not c/o any side effects at this time. If need to change meds consider changing to Depakote 500 mg q 12hr.      EEG pending.    H& H low being given Transfusion.    Noted to have some speech difficulties ? new Rec repeat MRI with gavino .    will f/u.    Discussed with Pt and .

## 2017-09-11 LAB
ANION GAP SERPL CALC-SCNC: 9 MMOL/L — SIGNIFICANT CHANGE UP (ref 5–17)
BUN SERPL-MCNC: 18 MG/DL — SIGNIFICANT CHANGE UP (ref 7–23)
CALCIUM SERPL-MCNC: 8.4 MG/DL — LOW (ref 8.5–10.1)
CHLORIDE SERPL-SCNC: 105 MMOL/L — SIGNIFICANT CHANGE UP (ref 96–108)
CO2 SERPL-SCNC: 26 MMOL/L — SIGNIFICANT CHANGE UP (ref 22–31)
CREAT SERPL-MCNC: 0.76 MG/DL — SIGNIFICANT CHANGE UP (ref 0.5–1.3)
GLUCOSE SERPL-MCNC: 100 MG/DL — HIGH (ref 70–99)
HCT VFR BLD CALC: 25.4 % — LOW (ref 34.5–45)
HGB BLD-MCNC: 8.4 G/DL — LOW (ref 11.5–15.5)
MCHC RBC-ENTMCNC: 28.3 PG — SIGNIFICANT CHANGE UP (ref 27–34)
MCHC RBC-ENTMCNC: 33 GM/DL — SIGNIFICANT CHANGE UP (ref 32–36)
MCV RBC AUTO: 85.8 FL — SIGNIFICANT CHANGE UP (ref 80–100)
PLATELET # BLD AUTO: 332 K/UL — SIGNIFICANT CHANGE UP (ref 150–400)
POTASSIUM SERPL-MCNC: 3.8 MMOL/L — SIGNIFICANT CHANGE UP (ref 3.5–5.3)
POTASSIUM SERPL-SCNC: 3.8 MMOL/L — SIGNIFICANT CHANGE UP (ref 3.5–5.3)
PROT S FREE AG PPP IA-ACNC: 74 % — SIGNIFICANT CHANGE UP (ref 61–131)
RBC # BLD: 2.96 M/UL — LOW (ref 3.8–5.2)
RBC # FLD: 15.1 % — HIGH (ref 10.3–14.5)
SODIUM SERPL-SCNC: 140 MMOL/L — SIGNIFICANT CHANGE UP (ref 135–145)
WBC # BLD: 10.3 K/UL — SIGNIFICANT CHANGE UP (ref 3.8–10.5)
WBC # FLD AUTO: 10.3 K/UL — SIGNIFICANT CHANGE UP (ref 3.8–10.5)

## 2017-09-11 PROCEDURE — 93880 EXTRACRANIAL BILAT STUDY: CPT | Mod: 26

## 2017-09-11 PROCEDURE — 93010 ELECTROCARDIOGRAM REPORT: CPT

## 2017-09-11 PROCEDURE — 99233 SBSQ HOSP IP/OBS HIGH 50: CPT

## 2017-09-11 RX ADMIN — CLOPIDOGREL BISULFATE 75 MILLIGRAM(S): 75 TABLET, FILM COATED ORAL at 11:14

## 2017-09-11 RX ADMIN — LEVETIRACETAM 1000 MILLIGRAM(S): 250 TABLET, FILM COATED ORAL at 21:18

## 2017-09-11 RX ADMIN — Medication 100 MILLIGRAM(S): at 17:18

## 2017-09-11 RX ADMIN — Medication 100 MILLIGRAM(S): at 05:06

## 2017-09-11 RX ADMIN — ATORVASTATIN CALCIUM 40 MILLIGRAM(S): 80 TABLET, FILM COATED ORAL at 21:18

## 2017-09-11 RX ADMIN — PANTOPRAZOLE SODIUM 40 MILLIGRAM(S): 20 TABLET, DELAYED RELEASE ORAL at 05:06

## 2017-09-11 RX ADMIN — PANTOPRAZOLE SODIUM 40 MILLIGRAM(S): 20 TABLET, DELAYED RELEASE ORAL at 17:18

## 2017-09-11 RX ADMIN — Medication 81 MILLIGRAM(S): at 11:14

## 2017-09-11 RX ADMIN — LEVETIRACETAM 500 MILLIGRAM(S): 250 TABLET, FILM COATED ORAL at 11:14

## 2017-09-11 RX ADMIN — LOSARTAN POTASSIUM 50 MILLIGRAM(S): 100 TABLET, FILM COATED ORAL at 05:06

## 2017-09-11 NOTE — PROGRESS NOTE ADULT - SUBJECTIVE AND OBJECTIVE BOX
CHIEF COMPLAINT: Patient is a 62y old  Female who presents with a chief complaint of eyes rolled back, post ictal state (06 Sep 2017 18:17)    HPI: 62 y.o. female with PMH brain ca s/p craniotomy (8/23/17), TIA on ASA/plavix 2013, HLD, HTN presents with found on floor in the bathroom. Last night, pt was in ED for eval of epistaxis right nares x 24 hours, was packed and sent home. Last night, per brother at bedside, pt had "eyes rolled back" for ~1 min and resolved. States went to bed and woke up this AM. States this AM, had another episode of eyes rolled back and pt became confused both before and after the episode. Pt denies LH or dizziness. Denies any pain, CP, SOB, abd pain, dysuria, or diarrhea. Didn't have BM today, but states last few days, was brown BM. Pt denies tongue biting, denies incontinence. Per brother, pt was "stiff" during the time, but no extremities movement.    9/8/17 : Pt feeling better Nose bleed stopped. Still has mild speech difficulties. No confusion .No recurrent seizures. No focal weakness. MRI scan done yesterday reported Acute CVA left parieto occipital area. No Headaches dizziness.  Unsure of patients baseline. she is not oriented to place or time, only person. She has no complaints. Hgb still low. Discussed plan with son Scotty Reno who states that he is the HCP.     9/8: Pt feeling better Nose bleed stopped. Still has mild speech difficulties. No confusion .No recurrent seizures. No focal weakness. MRI scan done yesterday reported Acute CVA left parieto occipital area. No Headaches dizziness.  Last night patient had an MRI that was +Stroke.  Patient was then trasnferred to Tele this morning.  Spoke with Dr Deleon who prefers we start ASA now and then manuel after he takes out the rhino rocketn then start plavix.      9/9: mental status much improved; Seen by ENT this morning and rhino rocket removed. No complaints.  Pt OOB, feeling better, still has speech difficulties. NO Ha, dizziness focal weakness.    9/10: No further complaints. Patient with difficulty with memory overnight-not able to state why she is here- on tele patient had 7 beats of ventricular Aberrancy. Sinus tachycaria resolved. No bleeding noticed. Plavix restarted yesterday.  Called today for further cardiac evaluation for VT.  States she had nuclear with Dr. Llanes 4 weeks ago-normal.  No recent ECHO.    9/11- Feels well today. No CP/SOB, no further epistaxis. SR on tele.     PMH: as above  PSH: Right frontal craniotomy  Social Hx: former smoker, quit>10 yr ago, no EtOH, no drugs  Family Hx: noncontrib to current presentation  ROS: per HPI (06 Sep 2017 18:17)    PMHx: PAST MEDICAL & SURGICAL HISTORY:  High cholesterol  Transient cerebral ischemia, unspecified type: 3 years ago  Essential hypertension    Allergies: Allergies  MSG (Rash)  No Known Drug Allergies    REVIEW OF SYSTEMS:    CONSTITUTIONAL: No weakness, fevers or chills  RESPIRATORY: No cough, wheezing, hemoptysis; No shortness of breath  CARDIOVASCULAR: No chest pain or palpitations  GASTROINTESTINAL: No abdominal or epigastric pain. No nausea, vomiting, or hematemesis; No diarrhea or constipation. No melena or hematochezia.  GENITOURINARY: No dysuria, frequency or hematuria    Vital Signs Last 24 Hrs  T(C): 36.6 (10 Sep 2017 11:29), Max: 37.1 (09 Sep 2017 16:43)  T(F): 97.8 (10 Sep 2017 11:29), Max: 98.8 (09 Sep 2017 16:43)  HR: 95 (10 Sep 2017 11:29) (95 - 106)  BP: 140/54 (10 Sep 2017 11:29) (139/69 - 151/68)  BP(mean): 70 (10 Sep 2017 11:29) (70 - 70)  RR: 17 (10 Sep 2017 05:55) (17 - 17)  SpO2: 98% (10 Sep 2017 11:29) (97% - 99%)    PHYSICAL EXAM:   Constitutional: NAD, awake and alert, well-developed  HEENT: PERR, EOMI, Normal Hearing, MMM  Neck:  JVD  Respiratory: Breath sounds are clear bilaterally, No wheezing, rales or rhonchi  Cardiovascular: S1 and S2, regular rate and rhythm,  Murmurs,   Gastrointestinal: Bowel Sounds present, soft, nontender, nondistended, no guarding, no rebound  Extremities:  peripheral edema  Vascular: 2+ peripheral pulses  Neurological: A/O x 3    MEDICATIONS:  MEDICATIONS  (STANDING):  atorvastatin 40 milliGRAM(s) Oral at bedtime  pantoprazole  Injectable 40 milliGRAM(s) IV Push two times a day  aspirin  chewable 81 milliGRAM(s) Oral daily  losartan 50 milliGRAM(s) Oral daily  clopidogrel Tablet 75 milliGRAM(s) Oral daily  levETIRAcetam 1000 milliGRAM(s) Oral at bedtime  levETIRAcetam 500 milliGRAM(s) Oral daily  metoprolol succinate  milliGRAM(s) Oral two times a day    LABS: All Labs Reviewed:                        8.6    x     )-----------( x        ( 10 Sep 2017 05:49 )             25.9     CARDIAC MARKERS ( 09 Sep 2017 03:42 )  <0.015 ng/mL / x     / x     / x     / x      CARDIAC MARKERS ( 08 Sep 2017 18:06 )  <0.015 ng/mL / x     / x     / x     / x        RADIOLOGY: MRI brain- < from: MRI Head w/wo Cont (09.07.17 @ 21:16) >  Acute infarction involves the left posterior temporal-lateral occipital   location, without hemorrhagic transformation.  Evolving postoperative changes as described in the right frontal lobe   status post resection of glioma.    EKG: < from: 12 Lead ECG (09.11.17 @ 06:58) >  Normal sinus rhythm  Normal ECG    Telemetry: SR    ECHO:   Normal appearing mitral valve structure and function.   Moderate posterior mitral annular calcification is present.   Mild (1+) mitral regurgitation is present.   Fibrocalcific changes noted to the Aortic valve leaflets with preserved   leaflet excursion.   No aortic regurgitation is present.   Mild (1+) tricuspid valve regurgitation is present.   Pulmonic valve not well seen, probably normal pulmonic valve function.   Normal appearing left atrium.   The left ventricle is normal in size, wall thickness, wall motion and   contractility.   Estimated left ventricular ejection fraction is 60-65 %.   Normal appearing right atrium.   Normal appearing right ventricle structure and function.

## 2017-09-11 NOTE — PROGRESS NOTE ADULT - ASSESSMENT
#"eyes rolled back", post ictal state suggestive of seizure  #recent brain sx (glioma) s/p craniotomy  -S/P keppra 1000mg IV BID; switched to keppra 500mg in AM and 1000mg PO QHS  -prn ativan for seizure activities   -CT head noted  -EEG noted  -neurosurg consult appreciated - -No acute neurosurgical intervention  -neuro consult appreciated - If patient develops Side effects from keppra can consider changing to Depakote 500 mg q 12hr.    #Acute Stroke  -transfer to tele (9/8) -  WCT (9/9)  -MRI of Brain - Acute infarction involving the left posterior temporal-lateral occipital   -Neuro checks  -restart ASA and plavix   -started on Statin  -PT/OT  -ECHO - Estimated left ventricular ejection fraction is 60-65 %.  -FU Hypercoagulable work up; factor V leiden, Protein C&S  -FU carotid Doppler    #Wide complex Tachycardia on tele  -WCT appears to be NSVT(but may be afib with aberrancy-slightly changing cycle length)   -EP consult appreciated   -NPO after MN for KRISHNA, LINQ placment.     #Anemia of acute blood loss 2ndry to Epistaxis and GI bleed (guaiac positive)   -H/H 14.2 on 8/24, during hospital stay patients hgb dropped to 7.1  -transfused 2 units of PRBC (9/7)  -serial H/H monitoring and transfuse PRN  -protonix IV BID  -resume plavix and ASA  -ENT consult appreciated   -GI consult appreciated - Will need EGD is patient continues to drop in H/H, timing to be determined based on clinical course  -check iron panel, ferritin    #hypotension - resolved  -likely from anemia  -iv hydration  -home BP meds initially held (losartan 50mg BID, HCTZ 25mg day, Toprol XL 100mg BID)    #HTN, HLD  -resume Toprol XL 100mg BID and losartan 50mg po QD  -patients home BP meds (losartan 50mg BID, HCTZ 25mg day, Toprol XL 100mg BID)  -cont statin    #leukocytosis - Resolved   -likely from recent brain surgery and on steroids  -monitor CBC  -UA - neg  -CXR - clear    #TIA  -continue ASA and plavix    #DVT ppx  -SCDs

## 2017-09-11 NOTE — PROGRESS NOTE ADULT - SUBJECTIVE AND OBJECTIVE BOX
HOSPITALIST PROGRESS NOTE:  SUBJECTIVE:  PCP: PCP: Roger Armstrong  Neuro: SEJAL Iverson  Neurosx: Frankie  ENT: Osmna Leon  Cardio: Mario Alberto    Chief Complaint: Patient is a 62y old  Female who presents with a chief complaint of eyes rolled back, post ictal state (06 Sep 2017 18:17)    HPI:  62 y.o. female with PMH brain ca s/p craniotomy (17), TIA on ASA/plavix , HLD, HTN presents with found on floor in the bathroom. Last night, pt was in ED for eval of epistaxis right nares x 24 hours, was packed and sent home. Last night, per brother at bedside, pt had "eyes rolled back" for ~1 min and resolved. States went to bed and woke up this AM. States this AM, had another episode of eyes rolled back and pt became confused both before and after the episode. Pt denies LH or dizziness. Denies any pain, CP, SOB, abd pain, dysuria, or diarrhea. Didn't have BM today, but states last few days, was brown BM. Pt denies tongue biting, denies incontinence. Per brother, pt was "stiff" during the time, but no extremities movement.    : Unsure of patients baseline. she is not oriented to place or time, only person. She has no complaints. Hgb still low. Discussed plan with son Scotty Reno who states that he is the HCP  : Last night patient had an MRI that was +Stroke.  Patient was then trasnferred to Tele this morning.  Spoke with Dr Deleon who prefers we start ASA now and then manuel after he takes out the rhino rocketn then start plavix.  Patient is awake but confused. Not able to tell me place or time. Discussed plan with brother who is still undecided about her advance directives.   : mental status much improved; Seen by ENT this morning and rhino rocket removed. No complaints.   9/10: No further complaints. overnight on tele patient had 7 beats of ventricular Aberrancy. she is also sinus tachy. No bleeding noticed. Plavix restarted yesterday  :  No complaints. Seen by cardio ?AFib; Patient will be NPO for KRISHNA and Link manuel    Allergies:  MSG (Rash)  No Known Drug Allergies    REVIEW OF SYSTEMS:  See HPI. All other review of systems is negative unless indicated above.     OBJECTIVE  Physical Exam:  Vital Signs Last 24 Hrs  T(C): 36.8 (11 Sep 2017 11:10), Max: 36.9 (11 Sep 2017 04:52)  T(F): 98.2 (11 Sep 2017 11:10), Max: 98.4 (11 Sep 2017 04:52)  HR: 100 (11 Sep 2017 11:10) (92 - 102)  BP: 145/65 (11 Sep 2017 11:10) (115/71 - 145/65)  BP(mean): --  RR: 18 (11 Sep 2017 11:10) (16 - 18)  SpO2: 100% (11 Sep 2017 11:10) (95% - 100%)      GEN: appears comfortable  Neuro: AAOx1, confused,  nonfocal at this time  HEENT: NC/AT, EOMI  Neck: no thyroidmegaly, no JVD  Cardiovascular: S1S2 present, regular rhythm, no murmur  Respiratory: breath sounds normal bilaterally, no wheezing, no rales, no rhonchi  Gastrointestinal: bowel sounds normal, soft, no abdominal tenderness, obese  Musculoskeletal: no muscle tenderness  Extremities: No edema  Skin: No rashes  Breast: Deferred  Rectal: Deferred    MEDICATIONS  (STANDING):  levETIRAcetam  IVPB 1000 milliGRAM(s) IV Intermittent every 12 hours  atorvastatin 40 milliGRAM(s) Oral at bedtime  sodium chloride 0.9%. 1000 milliLiter(s) (75 mL/Hr) IV Continuous <Continuous>  pantoprazole  Injectable 40 milliGRAM(s) IV Push two times a day  potassium chloride    Tablet ER 40 milliEquivalent(s) Oral once    Lab Results:  CBC  CBC Full  -  ( 11 Sep 2017 06:35 )  WBC Count : 10.3 K/uL  Hemoglobin : 8.4 g/dL  Hematocrit : 25.4 %  Platelet Count - Automated : 332 K/uL  Mean Cell Volume : 85.8 fl  Mean Cell Hemoglobin : 28.3 pg  Mean Cell Hemoglobin Concentration : 33.0 gm/dL  Auto Neutrophil # : x  Auto Lymphocyte # : x  Auto Monocyte # : x  Auto Eosinophil # : x  Auto Basophil # : x  Auto Neutrophil % : x  Auto Lymphocyte % : x  Auto Monocyte % : x  Auto Eosinophil % : x  Auto Basophil % : x    .		Differential:	[] Automated		[] Manual  Chemistry                        8.4    10.3  )-----------( 332      ( 11 Sep 2017 06:35 )             25.4     -    140  |  105  |  18  ----------------------------<  100<H>  3.8   |  26  |  0.76    Ca    8.4<L>      11 Sep 2017 06:35        Urinalysis Basic - ( 06 Sep 2017 15:20 )    Color: Yellow / Appearance: Clear / S.015 / pH: x  Gluc: x / Ketone: Negative  / Bili: Negative / Urobili: Negative mg/dL   Blood: x / Protein: 15 mg/dL / Nitrite: Negative   Leuk Esterase: Negative / RBC: x / WBC x   Sq Epi: x / Non Sq Epi: x / Bacteria: x    RADIOLOGY RESULTS:    CT head  IMPRESSION:   RIGHT frontal craniotomy. Minimal pneumocephalus is seen within the surgical bed in the RIGHT frontal lobe.      < from: MRI Head w/wo Cont (17 @ 21:16) >    IMPRESSION:    Acute infarction involves the left posterior temporal-lateral occipital   location, without hemorrhagic transformation.    Evolving postoperative changes as described in the right frontal lobe   status post resection of glioma.    < end of copied text >    < from: Xray Chest 1 View AP/PA. (17 @ 17:56) >    IMPRESSION:    Clear lungs    < end of copied text >    < from: Transthoracic Echocardiogram (17 @ 09:58) >     Impression     Summary     Normal appearing mitral valve structure and function.   Moderate posterior mitral annular calcification is present.   Mild (1+) mitral regurgitation is present.   Fibrocalcific changes noted to the Aortic valve leaflets with preserved   leaflet excursion.   No aortic regurgitation is present.   Mild (1+) tricuspid valve regurgitation is present.   Pulmonic valve not well seen, probably normal pulmonic valve function.   Normal appearing left atrium.   The left ventricle is normal in size, wall thickness, wall motion and   contractility.   Estimated left ventricular ejection fraction is 60-65 %.   Normal appearing right atrium.   Normal appearing right ventricle structure and function.     Signature    < end of copied text >

## 2017-09-11 NOTE — PROGRESS NOTE ADULT - SUBJECTIVE AND OBJECTIVE BOX
· Subjective and Objective: 	  Patient is a 62y old  Female who presents with a chief complaint of eyes rolled back,  with possible seizure and Nose bleed.Admitted for evaluation.      HPI:  62 y.o. female with PMH brain ca s/p craniotomy (8/23/17), TIA on ASA/plavix 2013, HLD, HTN presents with found on floor in the bathroom. Last night, pt was in ED for eval of epistaxis right nares x 24 hours, was packed and sent home. Last night, per brother at bedside, pt had "eyes rolled back" for ~1 min and resolved. States went to bed and woke up this AM. States this AM, had another episode of eyes rolled back and pt became confused both before and after the episode. Pt denies LH or dizziness. Denies any pain, CP, SOB, abd pain, dysuria, or diarrhea. Didn't have BM Yesterday  but states last few days, was brown BM. Pt denies tongue biting, denies incontinence. Per brother, pt was "stiff" during the time, but no extremities movement. Was given Ativan and Keppra in ED after seen by KIMBERLY JACOB  and Noted to have nose bleed with low H& H .     9/8/17 : Pt feeling better Nose bleed stopped. Still has mild speech difficulties. No confusion .No recurrent seizures. No focal weakness. MRI scan done yesterday reported Acute CVA left parieto occipital area. No Headaches dizziness.     9/9/17 : Pt OOB, feeling better. No new c/o. still has speech difficulties. NO Ha, dizziness focal weakness.    9/11/17 : Pt awake ,alert, Nose bleed stopped ,nose angela removed. Offers no c/o. Still has difficulties with speech. No weakness.    :PMH: as above  PSH: Right frontal craniotomy  Social Hx: former smoker, quit>10 yr ago, no EtOH, no drugs  Family Hx: non contrib to current presentation  PAST MEDICAL & SURGICAL HISTORY:  High cholesterol  Transient cerebral ischemia, unspecified type: 3 years ago  Essential hypertension  No significant past surgical history      FAMILY HISTORY:  No pertinent family history in first degree relatives      Social Hx:  Nonsmoker, no drug or alcohol use   ROS: per HPI (06 Sep 2017 18:17)MEDICATIONS  (STANDING):  atorvastatin 40 milliGRAM(s) Oral at bedtime  pantoprazole  Injectable 40 milliGRAM(s) IV Push two times a day  aspirin  chewable 81 milliGRAM(s) Oral daily  losartan 50 milliGRAM(s) Oral daily  clopidogrel Tablet 75 milliGRAM(s) Oral daily  levETIRAcetam 1000 milliGRAM(s) Oral at bedtime  levETIRAcetam 500 milliGRAM(s) Oral daily  metoprolol succinate  milliGRAM(s) Oral two times a day      Vital Signs Last 24 Hrs  T(C): 36.8 (11 Sep 2017 11:10), Max: 36.9 (11 Sep 2017 04:52)  T(F): 98.2 (11 Sep 2017 11:10), Max: 98.4 (11 Sep 2017 04:52)  HR: 100 (11 Sep 2017 11:10) (92 - 102)  BP: 145/65 (11 Sep 2017 11:10) (115/71 - 145/65)  BP(mean): 70 (10 Sep 2017 11:29) (70 - 70)  RR: 18 (11 Sep 2017 11:10) (16 - 18)  SpO2: 100% (11 Sep 2017 11:10) (95% - 100%)         Constitutional: awake and alert with some speech difficulties noted.  HEENT: PERRLA, EOMI, Rt nose bleed with nose pack  Neck: Supple.  Respiratory: Breath sounds are clear bilaterally  Cardiovascular: S1 and S2, regular  rhythm  Gastrointestinal: soft, nontender  Extremities:  no edema  Vascular:  Carotid Bruit - no  Musculoskeletal: no joint swelling/tenderness, no abnormal movements  Skin: No rashes    Neurological exam:  HF: A x O x 3. Anxious Appropriately interactive, normal affect. Speech  some word finding difficulties noted.   CN: ANGELITA, EOMI, VFF, facial sensation normal, no NLFD, tongue midline, Palate moves equally, SCM equal bilaterally  Motor: No pronator drift, Strength 5/5 in all 4 ext, normal bulk and tone, no tremor, rigidity or bradykinesia.    Sens: Intact to light touch / PP/ VS/ JS    Reflexes: Symmetric and normal .  Brisk left > than Rt. Plantars down going.  Coord:  No  dysmetria, IFRAH intact   Gait/Balance: Normal                            8.4    10.3  )-----------( 332      ( 11 Sep 2017 06:35 )             25.4     09-11    140  |  105  |  18  ----------------------------<  100<H>  3.8   |  26  |  0.76    Ca    8.4<L>      11 Sep 2017 06:35            Radiology report:  - CT Head 9/6/17    IMPRESSION:   RIGHT frontal craniotomy. Minimal pneumocephalus is seen   within thesurgical bed in the RIGHT frontal lobe.              - MRI brain 9/7/17    IMPRESSION:    Acute infarction involves the left posterior temporal-lateral occipital   location, without hemorrhagic transformation.    Evolving postoperative changes as described in the right frontal lobe   status post resection of glioma.    - EEG 9/7/17  Impression : Abnormal EEG with patient awake and drowsy because of the   right hemispheric slow activity with intermittent rare sharp activity   suggestive of underlying structural pathology. Clinical correlation   recommended.

## 2017-09-11 NOTE — PROGRESS NOTE ADULT - ASSESSMENT
62 y.o. female with PMH brain ca s/p craniotomy (8/23/17), TIA on ASA/plavix 2013, HLD, HTN presents with found on floor in the bathroom. Last night, pt was in ED for eval of epistaxis right nares x 24 hours, was packed and sent home. Pt returned to hos[pital with confusion.  MRI scan done yesterday reported Acute CVA left parieto occipital area.     1. Epistaxis- ENT following, no further bleeding. Continue asa/plavix in setting of acute CVA.     2. CVA- Patient with admission for nose bleed-anemia.  Mental status is limited.  Neurological episodes at home-evluated and found to be CVA.  Patient at home on ASA 81 and plavix, but in hospital, antiplatelets were held due to bleeding. Will need carotid imaging. Neuro following. This is her second CVA- suggest hypercoag w/u if not already done. Continue tele- will likely need KRISHNA, LINQ placment. Keep NPO after MN.     3. WCT- normal LV fxn and no ischemic on recent NST. WCT appears to be NSVT(but may be afib with aberrancy-slightly changing cycle length). EP consult pending. If afib, would then need AC without question and can hold on KRISHNA.     4. DVT proph, replete lytes as needed

## 2017-09-11 NOTE — PROGRESS NOTE ADULT - ASSESSMENT
Assessment and Plan:   · Assessment		  Pt with H/o TIAs ,HTN, S/P  Brain tumor resection 8/17 RT frontal area admitted with Possible seizure and Epistaxis agree with   increasing Keppra for now.    Pt not c/o any side effects at this time. If need to change meds consider changing to Depakote 500 mg q 12hr.      EEG as above. continue keppra    H& H low being given Transfusion.    Noted to have some speech difficulties ?  MRI + ve for acute Left CVA .    Needs Tele monitoring and cardiology F/u with .    Restart ASA/Plavix.    F/u with H& H.    Needs clearance from ENT.    Hyper coag w/u.    Discussed with Pt and hospitalist.    D/c planning when Stable.

## 2017-09-12 LAB
HCT VFR BLD CALC: 24.6 % — LOW (ref 34.5–45)
HGB BLD-MCNC: 8.5 G/DL — LOW (ref 11.5–15.5)

## 2017-09-12 PROCEDURE — 99233 SBSQ HOSP IP/OBS HIGH 50: CPT

## 2017-09-12 PROCEDURE — 93312 ECHO TRANSESOPHAGEAL: CPT | Mod: 26

## 2017-09-12 RX ORDER — CEFAZOLIN SODIUM 1 G
1000 VIAL (EA) INJECTION ONCE
Qty: 0 | Refills: 0 | Status: COMPLETED | OUTPATIENT
Start: 2017-09-12 | End: 2017-09-12

## 2017-09-12 RX ADMIN — PANTOPRAZOLE SODIUM 40 MILLIGRAM(S): 20 TABLET, DELAYED RELEASE ORAL at 17:30

## 2017-09-12 RX ADMIN — LEVETIRACETAM 1000 MILLIGRAM(S): 250 TABLET, FILM COATED ORAL at 21:07

## 2017-09-12 RX ADMIN — CLOPIDOGREL BISULFATE 75 MILLIGRAM(S): 75 TABLET, FILM COATED ORAL at 11:32

## 2017-09-12 RX ADMIN — Medication 100 MILLIGRAM(S): at 08:25

## 2017-09-12 RX ADMIN — ATORVASTATIN CALCIUM 40 MILLIGRAM(S): 80 TABLET, FILM COATED ORAL at 21:08

## 2017-09-12 RX ADMIN — LEVETIRACETAM 500 MILLIGRAM(S): 250 TABLET, FILM COATED ORAL at 11:32

## 2017-09-12 RX ADMIN — Medication 81 MILLIGRAM(S): at 11:32

## 2017-09-12 RX ADMIN — Medication 100 MILLIGRAM(S): at 17:30

## 2017-09-12 NOTE — BEDSIDE PROCEDURE TIME OUT CHECKLIST - NSPOSTCOMMENTSFT_GEN_ALL_CORE
Sedation initiated at 0806. Probe passed at 812. Sedation initiated at 0806. Probe passed at 812. Procedure ends 820. transferred to EP lab 2 for loop recorder

## 2017-09-12 NOTE — PHYSICAL THERAPY INITIAL EVALUATION ADULT - PERTINENT HX OF CURRENT PROBLEM, REHAB EVAL
Pt. presents to  after being found on floor in bathroom. Pt. was in ED for evaluation of epistaxis for 24 hours and was sent home. At home, pt. had "eyes roll back" x2 which resolved and pt. returned to  and was admitted. MRI reveals acute infarct of L temporal/occipital lobe. Pt. also had recent craniotomy.

## 2017-09-12 NOTE — PROGRESS NOTE ADULT - SUBJECTIVE AND OBJECTIVE BOX
HOSPITALIST PROGRESS NOTE:  SUBJECTIVE:  PCP: PCP: Roger Armstrong  Neuro: SEJAL Iverson  Neurosx: Frankie  ENT: Osman Leon  Cardio: Mario Alberto    Chief Complaint: Patient is a 62y old  Female who presents with a chief complaint of eyes rolled back, post ictal state (06 Sep 2017 18:17)    HPI:  62 y.o. female with PMH brain ca s/p craniotomy (8/23/17), TIA on ASA/plavix 2013, HLD, HTN presents with found on floor in the bathroom. Last night, pt was in ED for eval of epistaxis right nares x 24 hours, was packed and sent home. Last night, per brother at bedside, pt had "eyes rolled back" for ~1 min and resolved. States went to bed and woke up this AM. States this AM, had another episode of eyes rolled back and pt became confused both before and after the episode. Pt denies LH or dizziness. Denies any pain, CP, SOB, abd pain, dysuria, or diarrhea. Didn't have BM today, but states last few days, was brown BM. Pt denies tongue biting, denies incontinence. Per brother, pt was "stiff" during the time, but no extremities movement.    9/7: Unsure of patients baseline. she is not oriented to place or time, only person. She has no complaints. Hgb still low. Discussed plan with son Scotty Reno who states that he is the HCP  9/8: Last night patient had an MRI that was +Stroke.  Patient was then trasnferred to Tele this morning.  Spoke with Dr Deleon who prefers we start ASA now and then manuel after he takes out the rhino rocketn then start plavix.  Patient is awake but confused. Not able to tell me place or time. Discussed plan with brother who is still undecided about her advance directives.   9/9: mental status much improved; Seen by ENT this morning and rhino rocket removed. No complaints.   9/10: No further complaints. overnight on tele patient had 7 beats of ventricular Aberrancy. she is also sinus tachy. No bleeding noticed. Plavix restarted yesterday  9/11:  No complaints. Seen by cardio ?AFib; Patient will be NPO for KRISHNA and Link manuel  09/12/17: Patient seen and examined. She denies any new complaints. S/P KRISHNA in am.     Allergies:  MSG (Rash)  No Known Drug Allergies    REVIEW OF SYSTEMS:  See HPI. All other review of systems is negative unless indicated above.     OBJECTIVE  Physical Exam:    Vital Signs Last 24 Hrs  T(C): 36.7 (12 Sep 2017 10:44), Max: 36.9 (12 Sep 2017 05:13)  T(F): 98 (12 Sep 2017 10:44), Max: 98.5 (12 Sep 2017 05:13)  HR: 93 (12 Sep 2017 10:44) (93 - 110)  BP: 149/64 (12 Sep 2017 10:44) (123/54 - 162/62)  BP(mean): --  RR: 17 (12 Sep 2017 10:44) (16 - 17)  SpO2: 96% (12 Sep 2017 10:44) (96% - 97%)      GEN: appears comfortable  Neuro: AAOx1, confused,  nonfocal at this time  HEENT: NC/AT, EOMI  Neck: no thyroidmegaly, no JVD  Cardiovascular: S1S2 present, regular rhythm, no murmur  Respiratory: breath sounds normal bilaterally, no wheezing, no rales, no rhonchi  Gastrointestinal: bowel sounds normal, soft, no abdominal tenderness, obese  Musculoskeletal: no muscle tenderness  Extremities: No edema  Skin: No rashes  Breast: Deferred  Rectal: Deferred    MEDICATIONS  (STANDING):  levETIRAcetam  IVPB 1000 milliGRAM(s) IV Intermittent every 12 hours  atorvastatin 40 milliGRAM(s) Oral at bedtime  sodium chloride 0.9%. 1000 milliLiter(s) (75 mL/Hr) IV Continuous <Continuous>  pantoprazole  Injectable 40 milliGRAM(s) IV Push two times a day  potassium chloride    Tablet ER 40 milliEquivalent(s) Oral once    Lab Results:        .		          CT head  IMPRESSION:   RIGHT frontal craniotomy. Minimal pneumocephalus is seen within the surgical bed in the RIGHT frontal lobe.      < from: MRI Head w/wo Cont (09.07.17 @ 21:16) >    IMPRESSION:    Acute infarction involves the left posterior temporal-lateral occipital   location, without hemorrhagic transformation.    Evolving postoperative changes as described in the right frontal lobe   status post resection of glioma.    < end of copied text >    < from: Xray Chest 1 View AP/PA. (09.07.17 @ 17:56) >    IMPRESSION:    Clear lungs    < end of copied text >    < from: Transthoracic Echocardiogram (09.08.17 @ 09:58) >     Impression     Summary     Normal appearing mitral valve structure and function.   Moderate posterior mitral annular calcification is present.   Mild (1+) mitral regurgitation is present.   Fibrocalcific changes noted to the Aortic valve leaflets with preserved   leaflet excursion.   No aortic regurgitation is present.   Mild (1+) tricuspid valve regurgitation is present.   Pulmonic valve not well seen, probably normal pulmonic valve function.   Normal appearing left atrium.   The left ventricle is normal in size, wall thickness, wall motion and   contractility.   Estimated left ventricular ejection fraction is 60-65 %.   Normal appearing right atrium.   Normal appearing right ventricle structure and function.     Signature    < end of copied text >

## 2017-09-12 NOTE — PROGRESS NOTE ADULT - ASSESSMENT
#"eyes rolled back", post ictal state suggestive of seizure  #recent brain sx (glioma) s/p craniotomy  -S/P keppra 1000mg IV BID; switched to keppra 500mg in AM and 1000mg PO QHS  -prn ativan for seizure activities   -CT head noted  -EEG noted  -neurosurg consult appreciated - -No acute neurosurgical intervention  -neuro consult appreciated - If patient develops Side effects from keppra can consider changing to Depakote 500 mg q 12hr.    #Acute Stroke  -transfer to tele (9/8) -  WCT (9/9)  -MRI of Brain - Acute infarction involving the left posterior temporal-lateral occipital   -Neuro checks  -restarted ASA and plavix   -started on Statin  -PT/OT  -ECHO - Estimated left ventricular ejection fraction is 60-65 %.  -FU Hypercoagulable work up; factor V leiden, Protein C&S    #Wide complex Tachycardia on tele  -WCT appears to be NSVT(but may be afib with aberrancy-slightly changing cycle length)   -EP consult appreciated       #Anemia of acute blood loss 2ndry to Epistaxis and GI bleed (guaiac positive)   -H/H 14.2 on 8/24, during hospital stay patients hgb dropped to 7.1  -transfused 2 units of PRBC (9/7)  -serial H/H monitoring and transfuse PRN  -protonix IV BID  -resume plavix and ASA  -ENT consult appreciated   -GI consult appreciated - Will need EGD is patient continues to drop in H/H, timing to be determined based on clinical course    #hypotension - resolved  -likely from anemia  -iv hydration  -home BP meds initially held (losartan 50mg BID, HCTZ 25mg day, Toprol XL 100mg BID)    #HTN, HLD  -resume Toprol XL 100mg BID and losartan 50mg po QD  -patients home BP meds (losartan 50mg BID, HCTZ 25mg day, Toprol XL 100mg BID)  -cont statin    #leukocytosis - Resolved   -likely from recent brain surgery and on steroids  -monitor CBC  -UA - neg  -CXR - clear      #DVT ppx  -SCDs

## 2017-09-12 NOTE — PROGRESS NOTE ADULT - ASSESSMENT
· Assessment		  Pt with H/o TIAs ,HTN, S/P  Brain tumor resection 8/17 RT frontal area admitted with Possible seizure and Epistaxis agree with   increasing Keppra for now.    Pt not c/o any side effects at this time. If need to change meds consider changing to Depakote 500 mg q 12hr.      EEG as above. continue keppra    H& H low being given Transfusion.    Noted to have some speech difficulties ?  MRI + ve for acute Left CVA .    Needs Tele monitoring and cardiology F/u with .    Restart ASA/Plavix.    F/u with H& H.    Needs clearance from ENT.    Hyper coag w/u.    Discussed with Pt and hospitalist.    D/c planning when Stable.

## 2017-09-12 NOTE — PROGRESS NOTE ADULT - SUBJECTIVE AND OBJECTIVE BOX
· Subjective and Objective: 	     · Subjective and Objective: 	  Patient is a 62y old  Female who presents with a chief complaint of eyes rolled back,  with possible seizure and Nose bleed.Admitted for evaluation.      HPI:  62 y.o. female with PMH brain ca s/p craniotomy (8/23/17), TIA on ASA/plavix 2013, HLD, HTN presents with found on floor in the bathroom. Last night, pt was in ED for eval of epistaxis right nares x 24 hours, was packed and sent home. Last night, per brother at bedside, pt had "eyes rolled back" for ~1 min and resolved. States went to bed and woke up this AM. States this AM, had another episode of eyes rolled back and pt became confused both before and after the episode. Pt denies LH or dizziness. Denies any pain, CP, SOB, abd pain, dysuria, or diarrhea. Didn't have BM Yesterday  but states last few days, was brown BM. Pt denies tongue biting, denies incontinence. Per brother, pt was "stiff" during the time, but no extremities movement. Was given Ativan and Keppra in ED after seen by KIMBERLY JACOB  and Noted to have nose bleed with low H& H .     9/8/17 : Pt feeling better Nose bleed stopped. Still has mild speech difficulties. No confusion .No recurrent seizures. No focal weakness. MRI scan done yesterday reported Acute CVA left parieto occipital area. No Headaches dizziness.     9/9/17 : Pt OOB, feeling better. No new c/o. still has speech difficulties. NO Ha, dizziness focal weakness.    9/11/17 : Pt awake ,alert, Nose bleed stopped ,nose angela removed. Offers no c/o. Still has difficulties with speech. No weakness.    9/12/17 : Pt not in the room went for cath lab for KRISHNA and Linq monitor placement. Hyper coag w/u pending.      :PMH: as above  PSH: Right frontal craniotomy  Social Hx: former smoker, quit>10 yr ago, no EtOH, no drugs  Family Hx: non contrib to current presentation  PAST MEDICAL & SURGICAL HISTORY:  High cholesterol  Transient cerebral ischemia, unspecified type: 3 years ago  Essential hypertension  No significant past surgical history      FAMILY HISTORY:  No pertinent family history in first degree relatives      Social Hx:  Nonsmoker, no drug or alcohol use   MEDICATIONS  (STANDING):  atorvastatin 40 milliGRAM(s) Oral at bedtime  pantoprazole  Injectable 40 milliGRAM(s) IV Push two times a day  aspirin  chewable 81 milliGRAM(s) Oral daily  losartan 50 milliGRAM(s) Oral daily  clopidogrel Tablet 75 milliGRAM(s) Oral daily  levETIRAcetam 1000 milliGRAM(s) Oral at bedtime  levETIRAcetam 500 milliGRAM(s) Oral daily  metoprolol succinate  milliGRAM(s) Oral two times a day  ceFAZolin   IVPB 1000 milliGRAM(s) IV Intermittent once      Vital Signs Last 24 Hrs  T(C): 36.7 (12 Sep 2017 07:52), Max: 36.9 (12 Sep 2017 05:13)  T(F): 98 (12 Sep 2017 07:52), Max: 98.5 (12 Sep 2017 05:13)  HR: 110 (12 Sep 2017 07:52) (100 - 110)  BP: 154/66 (12 Sep 2017 07:52) (123/54 - 154/66)  BP(mean): --  RR: 16 (12 Sep 2017 05:13) (16 - 18)  SpO2: 97% (12 Sep 2017 07:52) (96% - 100%)      Constitutional: awake and alert with some speech difficulties noted.  HEENT: PERRLA, EOMI, Rt nose bleed with nose pack  Neck: Supple.  Respiratory: Breath sounds are clear bilaterally  Cardiovascular: S1 and S2, regular  rhythm  Gastrointestinal: soft, nontender  Extremities:  no edema  Vascular:  Carotid Bruit - no  Musculoskeletal: no joint swelling/tenderness, no abnormal movements  Skin: No rashes    Neurological exam:  HF: A x O x 3. Anxious Appropriately interactive, normal affect. Speech  some word finding difficulties noted.   CN: ANGELITA, EOMI, VFF, facial sensation normal, no NLFD, tongue midline, Palate moves equally, SCM equal bilaterally  Motor: No pronator drift, Strength 5/5 in all 4 ext, normal bulk and tone, no tremor, rigidity or bradykinesia.    Sens: Intact to light touch / PP/ VS/ JS    Reflexes: Symmetric and normal .  Brisk left > than Rt. Plantars down going.  Coord:  No  dysmetria, IFRAH intact   Gait/Balance: Normal                        8.5    x     )-----------( x        ( 12 Sep 2017 06:00 )             24.6     09-11    140  |  105  |  18  ----------------------------<  100<H>  3.8   |  26  |  0.76    Ca    8.4<L>      11 Sep 2017 06:35            Radiology report:  - CT Head 9/6/17  IMPRESSION:   RIGHT frontal craniotomy. Minimal pneumocephalus is seen   within thesurgical bed in the RIGHT frontal lobe.                 - MRI brain 9/7/17  IMPRESSION:    Acute infarction involves the left posterior temporal-lateral occipital   location, without hemorrhagic transformation.    Evolving postoperative changes as described in the right frontal lobe   status post resection of glioma.    - EEG 9/7/17    Impression : Abnormal EEG with patient awake and drowsy because of the   right hemispheric slow activity with intermittent rare sharp activity   suggestive of underlying structural pathology. Clinical correlation   recommended.

## 2017-09-13 ENCOUNTER — TRANSCRIPTION ENCOUNTER (OUTPATIENT)
Age: 62
End: 2017-09-13

## 2017-09-13 VITALS
TEMPERATURE: 98 F | SYSTOLIC BLOOD PRESSURE: 146 MMHG | HEART RATE: 86 BPM | RESPIRATION RATE: 18 BRPM | OXYGEN SATURATION: 100 % | DIASTOLIC BLOOD PRESSURE: 65 MMHG

## 2017-09-13 LAB
ANION GAP SERPL CALC-SCNC: 2 MMOL/L — LOW (ref 5–17)
BUN SERPL-MCNC: 19 MG/DL — SIGNIFICANT CHANGE UP (ref 7–23)
CALCIUM SERPL-MCNC: 8.7 MG/DL — SIGNIFICANT CHANGE UP (ref 8.5–10.1)
CHLORIDE SERPL-SCNC: 108 MMOL/L — SIGNIFICANT CHANGE UP (ref 96–108)
CO2 SERPL-SCNC: 28 MMOL/L — SIGNIFICANT CHANGE UP (ref 22–31)
CREAT SERPL-MCNC: 0.67 MG/DL — SIGNIFICANT CHANGE UP (ref 0.5–1.3)
GLUCOSE SERPL-MCNC: 92 MG/DL — SIGNIFICANT CHANGE UP (ref 70–99)
HCT VFR BLD CALC: 26.3 % — LOW (ref 34.5–45)
HGB BLD-MCNC: 8.7 G/DL — LOW (ref 11.5–15.5)
MCHC RBC-ENTMCNC: 28.2 PG — SIGNIFICANT CHANGE UP (ref 27–34)
MCHC RBC-ENTMCNC: 33.1 GM/DL — SIGNIFICANT CHANGE UP (ref 32–36)
MCV RBC AUTO: 85.3 FL — SIGNIFICANT CHANGE UP (ref 80–100)
PLATELET # BLD AUTO: 386 K/UL — SIGNIFICANT CHANGE UP (ref 150–400)
POTASSIUM SERPL-MCNC: 4.1 MMOL/L — SIGNIFICANT CHANGE UP (ref 3.5–5.3)
POTASSIUM SERPL-SCNC: 4.1 MMOL/L — SIGNIFICANT CHANGE UP (ref 3.5–5.3)
RBC # BLD: 3.08 M/UL — LOW (ref 3.8–5.2)
RBC # FLD: 14.7 % — HIGH (ref 10.3–14.5)
SODIUM SERPL-SCNC: 138 MMOL/L — SIGNIFICANT CHANGE UP (ref 135–145)
WBC # BLD: 10.5 K/UL — SIGNIFICANT CHANGE UP (ref 3.8–10.5)
WBC # FLD AUTO: 10.5 K/UL — SIGNIFICANT CHANGE UP (ref 3.8–10.5)

## 2017-09-13 PROCEDURE — 93010 ELECTROCARDIOGRAM REPORT: CPT

## 2017-09-13 RX ORDER — LEVETIRACETAM 250 MG/1
1 TABLET, FILM COATED ORAL
Qty: 30 | Refills: 0 | OUTPATIENT
Start: 2017-09-13 | End: 2017-10-13

## 2017-09-13 RX ORDER — PANTOPRAZOLE SODIUM 20 MG/1
1 TABLET, DELAYED RELEASE ORAL
Qty: 30 | Refills: 0 | OUTPATIENT
Start: 2017-09-13 | End: 2017-10-13

## 2017-09-13 RX ORDER — LEVETIRACETAM 250 MG/1
1 TABLET, FILM COATED ORAL
Qty: 0 | Refills: 0 | COMMUNITY
Start: 2017-09-13 | End: 2017-10-13

## 2017-09-13 RX ADMIN — Medication 100 MILLIGRAM(S): at 05:32

## 2017-09-13 RX ADMIN — Medication 81 MILLIGRAM(S): at 11:53

## 2017-09-13 RX ADMIN — LOSARTAN POTASSIUM 50 MILLIGRAM(S): 100 TABLET, FILM COATED ORAL at 05:32

## 2017-09-13 RX ADMIN — CLOPIDOGREL BISULFATE 75 MILLIGRAM(S): 75 TABLET, FILM COATED ORAL at 11:53

## 2017-09-13 RX ADMIN — LEVETIRACETAM 500 MILLIGRAM(S): 250 TABLET, FILM COATED ORAL at 11:53

## 2017-09-13 RX ADMIN — PANTOPRAZOLE SODIUM 40 MILLIGRAM(S): 20 TABLET, DELAYED RELEASE ORAL at 05:34

## 2017-09-13 NOTE — DISCHARGE NOTE ADULT - MEDICATION SUMMARY - MEDICATIONS TO STOP TAKING
I will STOP taking the medications listed below when I get home from the hospital:    levETIRAcetam 500 mg oral tablet  -- 1 tab(s) by mouth every 12 hours

## 2017-09-13 NOTE — PROGRESS NOTE ADULT - ASSESSMENT
62 y.o. female with PMH brain ca s/p craniotomy (8/23/17), TIA on ASA/plavix 2013, HLD, HTN presents with found on floor in the bathroom. Last night, pt was in ED for eval of epistaxis right nares x 24 hours, was packed and sent home. Pt returned to hos[pital with confusion.  MRI scan done yesterday reported Acute CVA left parieto occipital area.     1. Epistaxis- ENT following, no further bleeding. Continue asa/plavix in setting of acute CVA. Follow H/H closely.     2. CVA- Patient with admission for nose bleed-anemia.  Neurological episodes at home-evluated and found to be CVA.  Patient at home on ASA 81 and plavix, but in hospital, antiplatelets were held due to bleeding. Will need carotid imaging. Neuro following. Pt is now s/p KRISHNA and LINQ placement. EP following as well.     3. WCT- normal LV fxn and no ischemic on recent NST. WCT appears to be NSVT. For now, continue asa/plavix. No further events noted.     4. DVT proph, replete lytes as needed.

## 2017-09-13 NOTE — DISCHARGE NOTE ADULT - MEDICATION SUMMARY - MEDICATIONS TO TAKE
I will START or STAY ON the medications listed below when I get home from the hospital:    aspirin 81 mg oral tablet, chewable  -- 1 tab(s) by mouth once a day  -- Indication: For CVA    losartan 50 mg oral tablet  -- 1 tab(s) by mouth 2 times a day  -- Indication: For HTN    Keppra 500 mg oral tablet  -- 1 tab(s) by mouth once a day in am  -- Check with your doctor before becoming pregnant.  It is very important that you take or use this exactly as directed.  Do not skip doses or discontinue unless directed by your doctor.  May cause drowsiness or dizziness.  Obtain medical advice before taking any non-prescription drugs as some may affect the action of this medication.  Swallow whole.  Do not crush.  This drug may impair the ability to drive or operate machinery.  Use care until you become familiar with its effects.    -- Indication: For Seizure    Keppra 1000 mg oral tablet  -- 1 tab(s) by mouth once a day (at bedtime)   -- Check with your doctor before becoming pregnant.  It is very important that you take or use this exactly as directed.  Do not skip doses or discontinue unless directed by your doctor.  May cause drowsiness or dizziness.  Obtain medical advice before taking any non-prescription drugs as some may affect the action of this medication.  Swallow whole.  Do not crush.  This drug may impair the ability to drive or operate machinery.  Use care until you become familiar with its effects.    -- Indication: For seizure    atorvastatin 40 mg oral tablet  -- 1 tab(s) by mouth once a day (at bedtime)  -- Indication: For CVA    clopidogrel 75 mg oral tablet  -- 1 tab(s) by mouth once a day  -- Indication: For CVA    metoprolol succinate 100 mg oral tablet, extended release  -- 1 tab(s) by mouth 2 times a day  -- Indication: For HTN    hydroCHLOROthiazide 25 mg oral tablet  -- 1 tab(s) by mouth once a day  -- Indication: For HTN    Protonix 40 mg oral delayed release tablet  -- 1 tab(s) by mouth once a day   -- It is very important that you take or use this exactly as directed.  Do not skip doses or discontinue unless directed by your doctor.  Obtain medical advice before taking any non-prescription drugs as some may affect the action of this medication.  Swallow whole.  Do not crush.    -- Indication: For Gastrointestinal hemorrhage, unspecified gastrointestinal hemorrhage type    multivitamin  -- 1 tab(s) by mouth once a day  -- Indication: For supplement

## 2017-09-13 NOTE — DISCHARGE NOTE ADULT - NS AS DC STROKE ED MATERIALS
Stroke Education Booklet/Call 911 for Stroke/Prescribed Medications/Stroke Warning Signs and Symptoms/Risk Factors for Stroke/Need for Followup After Discharge

## 2017-09-13 NOTE — DIETITIAN INITIAL EVALUATION ADULT. - OTHER INFO
Pt with good po and appetite. Tolerating current diet well with no n/v/d/c-noted. Pt reports over consumption of high fat/cholesterol/simple carb/calorie foods with a BMI of 45.5. Skin intact with no edema documented. S/P SLP with recommendations of regular consistency and thin liquids. Diet instruction provided with educational materials on DASH/TLC diet.

## 2017-09-13 NOTE — DISCHARGE NOTE ADULT - CARE PROVIDER_API CALL
Eneida Iverson), Neurology  General  [address] [place] [id number]  Phone: [phone]  Fax: [phone]    Roger Armstrong), Family Medicine  [address] [place] [address]  Phone: [phone]  Fax: [phone]

## 2017-09-13 NOTE — PROGRESS NOTE ADULT - ASSESSMENT
63 yo female with multiple medical issues; no evidence of ongoing bleeding and stable hct, Patient at high risk for elective upper endoscopy. Would pursue if evidence of ongoing bleeding. Please call with questions - thanks!

## 2017-09-13 NOTE — DIETITIAN INITIAL EVALUATION ADULT. - ETIOLOGY
Pt reports consumes foods high in sodium/calories/fat/cholesterol/simple carbs. No diet followed PTA

## 2017-09-13 NOTE — PROGRESS NOTE ADULT - SUBJECTIVE AND OBJECTIVE BOX
Patient is a 62y old  Female who presents with a chief complaint of eyes rolled back, post ictal state (06 Sep 2017 18:17)      HPI:  62 y.o. female with PMH brain ca s/p craniotomy (8/23/17), TIA on ASA/plavix 2013, HLD, HTN presents with found on floor in the bathroom. Last night, pt was in ED for eval of epistaxis right nares x 24 hours, was packed and sent home. Last night, per brother at bedside, pt had "eyes rolled back" for ~1 min and resolved. States went to bed and woke up this AM. States this AM, had another episode of eyes rolled back and pt became confused both before and after the episode. Pt denies LH or dizziness. Denies any pain, CP, SOB, abd pain, dysuria, or diarrhea. Didn't have BM today, but states last few days, was brown BM. Pt denies tongue biting, denies incontinence. Per brother, pt was "stiff" during the time, but no extremities movement.    Patient currently out of room. No bleeding by report. Hct unchanged.     PMH: as above  PSH: Right frontal craniotomy  Social Hx: former smoker, quit>10 yr ago, no EtOH, no drugs  Family Hx: noncontrib to current presentation  ROS: per HPI (06 Sep 2017 18:17)      PAST MEDICAL & SURGICAL HISTORY:  High cholesterol  Transient cerebral ischemia, unspecified type: 3 years ago  Essential hypertension  No significant past surgical history      MEDICATIONS  (STANDING):  atorvastatin 40 milliGRAM(s) Oral at bedtime  pantoprazole  Injectable 40 milliGRAM(s) IV Push two times a day  aspirin  chewable 81 milliGRAM(s) Oral daily  losartan 50 milliGRAM(s) Oral daily  clopidogrel Tablet 75 milliGRAM(s) Oral daily  levETIRAcetam 1000 milliGRAM(s) Oral at bedtime  levETIRAcetam 500 milliGRAM(s) Oral daily  metoprolol succinate  milliGRAM(s) Oral two times a day    MEDICATIONS  (PRN):  acetaminophen   Tablet 650 milliGRAM(s) Oral every 6 hours PRN For Temp greater than 38 C (100.4 F)  ondansetron Injectable 4 milliGRAM(s) IV Push every 6 hours PRN Nausea  senna 2 Tablet(s) Oral at bedtime PRN Constipation  LORazepam   Injectable 2 milliGRAM(s) IV Push every 4 hours PRN seizure activities      Allergies    MSG (Rash)  No Known Drug Allergies    Intolerances        REVIEW OF SYSTEMS:    CONSTITUTIONAL: No weakness, fevers or chills  RESPIRATORY: No cough, wheezing, hemoptysis; No shortness of breath  CARDIOVASCULAR: No chest pain or palpitations  GASTROINTESTINAL: No abdominal or epigastric pain. No nausea, vomiting, or hematemesis; No diarrhea or constipation. No melena or hematochezia.  All other review of systems is negative unless indicated above.    Vital Signs Last 24 Hrs  T(C): 37.1 (13 Sep 2017 05:28), Max: 37.1 (13 Sep 2017 05:28)  T(F): 98.7 (13 Sep 2017 05:28), Max: 98.7 (13 Sep 2017 05:28)  HR: 97 (13 Sep 2017 05:28) (93 - 97)  BP: 152/74 (13 Sep 2017 05:28) (146/69 - 162/62)  BP(mean): 95 (13 Sep 2017 05:28) (89 - 95)  RR: 18 (12 Sep 2017 17:49) (16 - 18)  SpO2: 95% (13 Sep 2017 05:28) (95% - 97%)      LABS:                        8.7    10.5  )-----------( 386      ( 13 Sep 2017 05:47 )             26.3     09-13    138  |  108  |  19  ----------------------------<  92  4.1   |  28  |  0.67    Ca    8.7      13 Sep 2017 05:47            RADIOLOGY & ADDITIONAL STUDIES:

## 2017-09-13 NOTE — PROGRESS NOTE ADULT - PROVIDER SPECIALTY LIST ADULT
Cardiology
Gastroenterology
Gastroenterology
Hospitalist
Neurology
Neurosurgery
Cardiology

## 2017-09-13 NOTE — DISCHARGE NOTE ADULT - CARE PLAN
Principal Discharge DX:	Gastrointestinal hemorrhage, unspecified gastrointestinal hemorrhage type  Goal:	Follow with your GI physician  Instructions for follow-up, activity and diet:	activity as tolerated

## 2017-09-13 NOTE — DISCHARGE NOTE ADULT - PATIENT PORTAL LINK FT
“You can access the [facility] Patient Portal, offered by [facility], by registering with the following website: [email or web address]”

## 2017-09-13 NOTE — DISCHARGE NOTE ADULT - HOSPITAL COURSE
62 y.o. female with PMH brain ca s/p craniotomy (8/23/17), TIA on ASA/plavix 2013, HLD, HTN presents with found on floor in the bathroom. Last night, pt was in ED for eval of epistaxis right nares x 24 hours, was packed and sent home. Last night, per brother at bedside, pt had "eyes rolled back" for ~1 min and resolved. States went to bed and woke up this AM. States this AM, had another episode of eyes rolled back and pt became confused both before and after the episode. Pt denies LH or dizziness. Denies any pain, CP, SOB, abd pain, dysuria, or diarrhea. Didn't have BM today, but states last few days, was brown BM. Pt denies tongue biting, denies incontinence. Per brother, pt was "stiff" during the time, but no extremities movement.      09/13/17: Patient seen and examined. She denies any new complaints. Discussed with patient in length regarding d/c plan.       Vital Signs Last 24 Hrs  T(C): 36.7 (13 Sep 2017 10:35), Max: 37.1 (13 Sep 2017 05:28)  T(F): 98.1 (13 Sep 2017 10:35), Max: 98.7 (13 Sep 2017 05:28)  HR: 86 (13 Sep 2017 10:35) (86 - 97)  BP: 146/65 (13 Sep 2017 10:35) (146/65 - 152/74)  BP(mean): 95 (13 Sep 2017 05:28) (89 - 95)  RR: 18 (13 Sep 2017 10:35) (18 - 18)  SpO2: 100% (13 Sep 2017 10:35) (95% - 100%)          GEN: appears comfortable  Neuro: AAOx1, confused,  nonfocal at this time  HEENT: NC/AT, EOMI  Neck: no thyromegaly, no JVD  Cardiovascular: S1S2 present, regular rhythm, no murmur  Respiratory: breath sounds normal bilaterally, no wheezing, no rales, no rhonchi  Gastrointestinal: bowel sounds normal, soft, no abdominal tenderness, obese  Musculoskeletal: no muscle tenderness  Extremities: No edema  Skin: No rashes  Breast: Deferred  Rectal: Deferred                Assessment and Plan:   · Assessment		    #"eyes rolled back", post ictal state suggestive of seizure  #recent brain sx (glioma) s/p craniotomy  -S/P keppra 1000mg IV BID; switched to keppra 500mg in AM and 1000mg PO QHS  -EEG noted  -neurosurg consult appreciated - -No acute neurosurgical intervention  -neuro consult appreciated     #Acute Stroke  -MRI of Brain - Acute infarction involving the left posterior temporal-lateral occipital   -Continue ASA and plavix   and Statin  -ECHO - Estimated left ventricular ejection fraction is 60-65 %.  -FU Hypercoagulable work up as an outpatient  factor V leiden, Protein C&S    #Wide complex Tachycardia on tele  -WCT appears to be NSVT(but may be afib with aberrancy-slightly changing cycle length)   -EP consult appreciated  -S/P KRISHNA and lynq yesterday  -Follow up with EP as an outpatient, patient was instructed       #Anemia of acute blood loss 2ndry to Epistaxis and GI bleed (guaiac positive)   -H/H 14.2 on 8/24, during hospital stay patients hgb dropped to 7.1  -transfused 2 units of PRBC (9/7)  stable  -protonix   -On plavix and ASA  -ENT consult appreciated   -GI consult appreciated - out patient follow up    #hypotension - resolved  -likely from anemia      #HTN, HLD  -continue Toprol XL 100mg BID and losartan 50mg po QD  -cont statin    #leukocytosis - Resolved   -likely from recent brain surgery and on steroids  -monitor CBC  -CXR - clear      Home today  Spent more than 30 minutes to prepare the discharge.

## 2017-09-13 NOTE — PROGRESS NOTE ADULT - SUBJECTIVE AND OBJECTIVE BOX
Cardiology Progress Note:    HPI: 62 y.o. female with PMH brain ca s/p craniotomy (8/23/17), TIA on ASA/plavix 2013, HLD, HTN presents with found on floor in the bathroom. Last night, pt was in ED for eval of epistaxis right nares x 24 hours, was packed and sent home. Last night, per brother at bedside, pt had "eyes rolled back" for ~1 min and resolved. States went to bed and woke up this AM. States this AM, had another episode of eyes rolled back and pt became confused both before and after the episode. Pt denies LH or dizziness. Denies any pain, CP, SOB, abd pain, dysuria, or diarrhea. Didn't have BM today, but states last few days, was brown BM. Pt denies tongue biting, denies incontinence. Per brother, pt was "stiff" during the time, but no extremities movement.    9/8/17 : Pt feeling better Nose bleed stopped. Still has mild speech difficulties. No confusion .No recurrent seizures. No focal weakness. MRI scan done yesterday reported Acute CVA left parieto occipital area. No Headaches dizziness.  Unsure of patients baseline. she is not oriented to place or time, only person. She has no complaints. Hgb still low. Discussed plan with son Scotty Reno who states that he is the HCP.     9/8: Pt feeling better Nose bleed stopped. Still has mild speech difficulties. No confusion .No recurrent seizures. No focal weakness. MRI scan done yesterday reported Acute CVA left parieto occipital area. No Headaches dizziness.  Last night patient had an MRI that was +Stroke.  Patient was then trasnferred to Tele this morning.  Spoke with Dr Deleon who prefers we start ASA now and then manuel after he takes out the rhino rocketn then start plavix.      9/9: mental status much improved; Seen by ENT this morning and rhino rocket removed. No complaints.  Pt OOB, feeling better, still has speech difficulties. NO Ha, dizziness focal weakness.    9/10: No further complaints. Patient with difficulty with memory overnight-not able to state why she is here- on tele patient had 7 beats of ventricular Aberrancy. Sinus tachycaria resolved. No bleeding noticed. Plavix restarted yesterday.  Called today for further cardiac evaluation for VT.  States she had nuclear with Dr. Llanes 4 weeks ago-normal.  No recent ECHO.    9/11- Feels well today. No CP/SOB, no further epistaxis. SR on tele.     9/13- No CP/SOB. Anxious. SR on tele.     PMH: as above  PSH: Right frontal craniotomy  Social Hx: former smoker, quit>10 yr ago, no EtOH, no drugs  Family Hx: noncontrib to current presentation  ROS: per HPI (06 Sep 2017 18:17)    PMHx: PAST MEDICAL & SURGICAL HISTORY:  High cholesterol  Transient cerebral ischemia, unspecified type: 3 years ago  Essential hypertension    Allergies: Allergies  MSG (Rash)  No Known Drug Allergies    REVIEW OF SYSTEMS:    CONSTITUTIONAL: No weakness, fevers or chills  RESPIRATORY: No cough, wheezing, hemoptysis; No shortness of breath  CARDIOVASCULAR: No chest pain or palpitations  GASTROINTESTINAL: No abdominal or epigastric pain. No nausea, vomiting, or hematemesis; No diarrhea or constipation. No melena or hematochezia.  GENITOURINARY: No dysuria, frequency or hematuria    Vital Signs Last 24 Hrs  T(C): 36.6 (10 Sep 2017 11:29), Max: 37.1 (09 Sep 2017 16:43)  T(F): 97.8 (10 Sep 2017 11:29), Max: 98.8 (09 Sep 2017 16:43)  HR: 95 (10 Sep 2017 11:29) (95 - 106)  BP: 140/54 (10 Sep 2017 11:29) (139/69 - 151/68)  BP(mean): 70 (10 Sep 2017 11:29) (70 - 70)  RR: 17 (10 Sep 2017 05:55) (17 - 17)  SpO2: 98% (10 Sep 2017 11:29) (97% - 99%)    PHYSICAL EXAM:   Constitutional: NAD, awake and alert, well-developed  HEENT: PERR, EOMI, Normal Hearing, MMM  Neck:  JVD  Respiratory: Breath sounds are clear bilaterally, No wheezing, rales or rhonchi  Cardiovascular: S1 and S2, regular rate and rhythm,  Murmurs,   Gastrointestinal: Bowel Sounds present, soft, nontender, nondistended, no guarding, no rebound  Extremities:  peripheral edema  Vascular: 2+ peripheral pulses  Neurological: A/O x 3    MEDICATIONS:  MEDICATIONS  (STANDING):  atorvastatin 40 milliGRAM(s) Oral at bedtime  pantoprazole  Injectable 40 milliGRAM(s) IV Push two times a day  aspirin  chewable 81 milliGRAM(s) Oral daily  losartan 50 milliGRAM(s) Oral daily  clopidogrel Tablet 75 milliGRAM(s) Oral daily  levETIRAcetam 1000 milliGRAM(s) Oral at bedtime  levETIRAcetam 500 milliGRAM(s) Oral daily  metoprolol succinate  milliGRAM(s) Oral two times a day    LABS: All Labs Reviewed:                        8.6    x     )-----------( x        ( 10 Sep 2017 05:49 )             25.9     CARDIAC MARKERS ( 09 Sep 2017 03:42 )  <0.015 ng/mL / x     / x     / x     / x      CARDIAC MARKERS ( 08 Sep 2017 18:06 )  <0.015 ng/mL / x     / x     / x     / x        RADIOLOGY: MRI brain- < from: MRI Head w/wo Cont (09.07.17 @ 21:16) >  Acute infarction involves the left posterior temporal-lateral occipital   location, without hemorrhagic transformation.  Evolving postoperative changes as described in the right frontal lobe   status post resection of glioma.    EKG: < from: 12 Lead ECG (09.11.17 @ 06:58) >  Normal sinus rhythm  Normal ECG    Telemetry: SR    ECHO:   Normal appearing mitral valve structure and function.   Moderate posterior mitral annular calcification is present.   Mild (1+) mitral regurgitation is present.   Fibrocalcific changes noted to the Aortic valve leaflets with preserved   leaflet excursion.   No aortic regurgitation is present.   Mild (1+) tricuspid valve regurgitation is present.   Pulmonic valve not well seen, probably normal pulmonic valve function.   Normal appearing left atrium.   The left ventricle is normal in size, wall thickness, wall motion and   contractility.   Estimated left ventricular ejection fraction is 60-65 %.   Normal appearing right atrium.   Normal appearing right ventricle structure and function.

## 2017-09-18 DIAGNOSIS — I34.0 NONRHEUMATIC MITRAL (VALVE) INSUFFICIENCY: ICD-10-CM

## 2017-09-18 DIAGNOSIS — Z86.73 PERSONAL HISTORY OF TRANSIENT ISCHEMIC ATTACK (TIA), AND CEREBRAL INFARCTION WITHOUT RESIDUAL DEFICITS: ICD-10-CM

## 2017-09-18 DIAGNOSIS — R56.9 UNSPECIFIED CONVULSIONS: ICD-10-CM

## 2017-09-18 DIAGNOSIS — E78.00 PURE HYPERCHOLESTEROLEMIA, UNSPECIFIED: ICD-10-CM

## 2017-09-18 DIAGNOSIS — Z79.52 LONG TERM (CURRENT) USE OF SYSTEMIC STEROIDS: ICD-10-CM

## 2017-09-18 DIAGNOSIS — I48.91 UNSPECIFIED ATRIAL FIBRILLATION: ICD-10-CM

## 2017-09-18 DIAGNOSIS — Z79.02 LONG TERM (CURRENT) USE OF ANTITHROMBOTICS/ANTIPLATELETS: ICD-10-CM

## 2017-09-18 DIAGNOSIS — K92.2 GASTROINTESTINAL HEMORRHAGE, UNSPECIFIED: ICD-10-CM

## 2017-09-18 DIAGNOSIS — I47.1 SUPRAVENTRICULAR TACHYCARDIA: ICD-10-CM

## 2017-09-18 DIAGNOSIS — Z79.82 LONG TERM (CURRENT) USE OF ASPIRIN: ICD-10-CM

## 2017-09-18 DIAGNOSIS — Z87.891 PERSONAL HISTORY OF NICOTINE DEPENDENCE: ICD-10-CM

## 2017-09-18 DIAGNOSIS — R04.0 EPISTAXIS: ICD-10-CM

## 2017-09-18 DIAGNOSIS — I63.9 CEREBRAL INFARCTION, UNSPECIFIED: ICD-10-CM

## 2017-09-18 DIAGNOSIS — D72.829 ELEVATED WHITE BLOOD CELL COUNT, UNSPECIFIED: ICD-10-CM

## 2017-09-18 DIAGNOSIS — D62 ACUTE POSTHEMORRHAGIC ANEMIA: ICD-10-CM

## 2017-09-18 DIAGNOSIS — I95.9 HYPOTENSION, UNSPECIFIED: ICD-10-CM

## 2017-09-18 DIAGNOSIS — I10 ESSENTIAL (PRIMARY) HYPERTENSION: ICD-10-CM

## 2017-09-18 DIAGNOSIS — C71.9 MALIGNANT NEOPLASM OF BRAIN, UNSPECIFIED: ICD-10-CM

## 2017-09-20 ENCOUNTER — APPOINTMENT (OUTPATIENT)
Dept: NEUROSURGERY | Facility: CLINIC | Age: 62
End: 2017-09-20
Payer: COMMERCIAL

## 2017-09-20 VITALS
WEIGHT: 257 LBS | DIASTOLIC BLOOD PRESSURE: 85 MMHG | SYSTOLIC BLOOD PRESSURE: 137 MMHG | HEIGHT: 64 IN | RESPIRATION RATE: 16 BRPM | BODY MASS INDEX: 43.87 KG/M2 | HEART RATE: 78 BPM | TEMPERATURE: 98.1 F

## 2017-09-20 PROCEDURE — 99024 POSTOP FOLLOW-UP VISIT: CPT

## 2017-09-25 ENCOUNTER — APPOINTMENT (OUTPATIENT)
Dept: HEMATOLOGY ONCOLOGY | Facility: CLINIC | Age: 62
End: 2017-09-25
Payer: COMMERCIAL

## 2017-09-25 ENCOUNTER — LABORATORY RESULT (OUTPATIENT)
Age: 62
End: 2017-09-25

## 2017-09-25 ENCOUNTER — APPOINTMENT (OUTPATIENT)
Dept: MRI IMAGING | Facility: CLINIC | Age: 62
End: 2017-09-25
Payer: COMMERCIAL

## 2017-09-25 VITALS
SYSTOLIC BLOOD PRESSURE: 146 MMHG | TEMPERATURE: 98.2 F | WEIGHT: 265.38 LBS | HEIGHT: 64 IN | DIASTOLIC BLOOD PRESSURE: 85 MMHG | BODY MASS INDEX: 45.3 KG/M2 | HEART RATE: 106 BPM

## 2017-09-25 DIAGNOSIS — E66.01 MORBID (SEVERE) OBESITY DUE TO EXCESS CALORIES: ICD-10-CM

## 2017-09-25 DIAGNOSIS — Z78.9 OTHER SPECIFIED HEALTH STATUS: ICD-10-CM

## 2017-09-25 DIAGNOSIS — S83.209A UNSPECIFIED TEAR OF UNSPECIFIED MENISCUS, CURRENT INJURY, UNSPECIFIED KNEE, INITIAL ENCOUNTER: ICD-10-CM

## 2017-09-25 DIAGNOSIS — Z87.19 PERSONAL HISTORY OF OTHER DISEASES OF THE DIGESTIVE SYSTEM: ICD-10-CM

## 2017-09-25 DIAGNOSIS — Z80.42 FAMILY HISTORY OF MALIGNANT NEOPLASM OF PROSTATE: ICD-10-CM

## 2017-09-25 DIAGNOSIS — Z87.891 PERSONAL HISTORY OF NICOTINE DEPENDENCE: ICD-10-CM

## 2017-09-25 DIAGNOSIS — Z92.89 PERSONAL HISTORY OF OTHER MEDICAL TREATMENT: ICD-10-CM

## 2017-09-25 DIAGNOSIS — Z86.19 PERSONAL HISTORY OF OTHER INFECTIOUS AND PARASITIC DISEASES: ICD-10-CM

## 2017-09-25 DIAGNOSIS — Z86.73 PERSONAL HISTORY OF TRANSIENT ISCHEMIC ATTACK (TIA), AND CEREBRAL INFARCTION W/OUT RESIDUAL DEFICITS: ICD-10-CM

## 2017-09-25 LAB
HCT VFR BLD CALC: 32.2 %
HGB BLD-MCNC: 10.8 G/DL
MCHC RBC-ENTMCNC: 28 PG
MCHC RBC-ENTMCNC: 33.4 GM/DL
MCV RBC AUTO: 83.8 FL
PLATELET # BLD AUTO: 476 K/UL
RBC # BLD: 3.84 M/UL
RBC # FLD: 14.8 %
WBC # FLD AUTO: 10.3 K/UL

## 2017-09-25 PROCEDURE — 36415 COLL VENOUS BLD VENIPUNCTURE: CPT

## 2017-09-25 PROCEDURE — 85025 COMPLETE CBC W/AUTO DIFF WBC: CPT

## 2017-09-25 PROCEDURE — 99244 OFF/OP CNSLTJ NEW/EST MOD 40: CPT | Mod: 25

## 2017-09-25 RX ORDER — METOPROLOL SUCCINATE 200 MG/1
200 TABLET, EXTENDED RELEASE ORAL
Qty: 30 | Refills: 0 | Status: DISCONTINUED | COMMUNITY
Start: 2017-08-20 | End: 2017-09-25

## 2017-09-25 RX ORDER — AMLODIPINE BESYLATE 5 MG/1
5 TABLET ORAL
Qty: 30 | Refills: 0 | Status: DISCONTINUED | COMMUNITY
Start: 2017-08-11 | End: 2017-09-25

## 2017-09-25 RX ORDER — METOPROLOL SUCCINATE 100 MG/1
100 TABLET, EXTENDED RELEASE ORAL
Qty: 30 | Refills: 0 | Status: DISCONTINUED | COMMUNITY
Start: 2017-07-10 | End: 2017-09-25

## 2017-09-25 RX ORDER — DEXAMETHASONE 4 MG/1
4 TABLET ORAL
Qty: 4 | Refills: 0 | Status: DISCONTINUED | COMMUNITY
Start: 2017-08-25 | End: 2017-09-25

## 2017-09-25 RX ORDER — LOSARTAN POTASSIUM 50 MG/1
50 TABLET, FILM COATED ORAL TWICE DAILY
Refills: 0 | Status: ACTIVE | COMMUNITY

## 2017-09-25 RX ORDER — DEXAMETHASONE 2 MG/1
2 TABLET ORAL 3 TIMES DAILY
Qty: 90 | Refills: 0 | Status: DISCONTINUED | COMMUNITY
Start: 2017-08-16 | End: 2017-09-25

## 2017-09-26 ENCOUNTER — APPOINTMENT (OUTPATIENT)
Dept: MRI IMAGING | Facility: CLINIC | Age: 62
End: 2017-09-26
Payer: COMMERCIAL

## 2017-09-26 ENCOUNTER — RX RENEWAL (OUTPATIENT)
Age: 62
End: 2017-09-26

## 2017-09-26 LAB
ANION GAP SERPL CALC-SCNC: 16 MMOL/L
BUN SERPL-MCNC: 17 MG/DL
CALCIUM SERPL-MCNC: 9.6 MG/DL
CHLORIDE SERPL-SCNC: 100 MMOL/L
CO2 SERPL-SCNC: 26 MMOL/L
CREAT SERPL-MCNC: 1.02 MG/DL
GLUCOSE SERPL-MCNC: 104 MG/DL
POTASSIUM SERPL-SCNC: 3.6 MMOL/L
SODIUM SERPL-SCNC: 142 MMOL/L

## 2017-09-27 ENCOUNTER — RX RENEWAL (OUTPATIENT)
Age: 62
End: 2017-09-27

## 2017-09-27 ENCOUNTER — FORM ENCOUNTER (OUTPATIENT)
Age: 62
End: 2017-09-27

## 2017-09-28 ENCOUNTER — OUTPATIENT (OUTPATIENT)
Dept: OUTPATIENT SERVICES | Facility: HOSPITAL | Age: 62
LOS: 1 days | End: 2017-09-28
Payer: COMMERCIAL

## 2017-09-28 ENCOUNTER — APPOINTMENT (OUTPATIENT)
Dept: MRI IMAGING | Facility: CLINIC | Age: 62
End: 2017-09-28
Payer: COMMERCIAL

## 2017-09-28 ENCOUNTER — APPOINTMENT (OUTPATIENT)
Dept: HEMATOLOGY ONCOLOGY | Facility: CLINIC | Age: 62
End: 2017-09-28
Payer: COMMERCIAL

## 2017-09-28 VITALS
DIASTOLIC BLOOD PRESSURE: 87 MMHG | HEIGHT: 64 IN | WEIGHT: 266 LBS | HEART RATE: 82 BPM | BODY MASS INDEX: 45.41 KG/M2 | TEMPERATURE: 97.6 F | SYSTOLIC BLOOD PRESSURE: 157 MMHG

## 2017-09-28 DIAGNOSIS — D64.9 ANEMIA, UNSPECIFIED: ICD-10-CM

## 2017-09-28 DIAGNOSIS — Z00.8 ENCOUNTER FOR OTHER GENERAL EXAMINATION: ICD-10-CM

## 2017-09-28 PROCEDURE — 70553 MRI BRAIN STEM W/O & W/DYE: CPT

## 2017-09-28 PROCEDURE — 70553 MRI BRAIN STEM W/O & W/DYE: CPT | Mod: 26

## 2017-09-28 PROCEDURE — 99215 OFFICE O/P EST HI 40 MIN: CPT

## 2017-09-28 PROCEDURE — A9585: CPT

## 2017-09-29 PROBLEM — D64.9 ANEMIA: Status: ACTIVE | Noted: 2017-09-29

## 2017-10-03 ENCOUNTER — RX RENEWAL (OUTPATIENT)
Age: 62
End: 2017-10-03

## 2017-10-20 ENCOUNTER — APPOINTMENT (OUTPATIENT)
Dept: HEMATOLOGY ONCOLOGY | Facility: CLINIC | Age: 62
End: 2017-10-20
Payer: COMMERCIAL

## 2017-10-20 ENCOUNTER — LABORATORY RESULT (OUTPATIENT)
Age: 62
End: 2017-10-20

## 2017-10-20 VITALS — TEMPERATURE: 98.5 F | DIASTOLIC BLOOD PRESSURE: 84 MMHG | HEART RATE: 75 BPM | SYSTOLIC BLOOD PRESSURE: 154 MMHG

## 2017-10-20 LAB
HCT VFR BLD CALC: 37.5 %
HGB BLD-MCNC: 12.4 G/DL
MCHC RBC-ENTMCNC: 27.7 PG
MCHC RBC-ENTMCNC: 32.9 GM/DL
MCV RBC AUTO: 84.1 FL
PLATELET # BLD AUTO: 414 K/UL
RBC # BLD: 4.46 M/UL
RBC # FLD: 15.1 %
WBC # FLD AUTO: 10.2 K/UL

## 2017-10-20 PROCEDURE — 36415 COLL VENOUS BLD VENIPUNCTURE: CPT

## 2017-10-20 PROCEDURE — 85025 COMPLETE CBC W/AUTO DIFF WBC: CPT

## 2017-10-23 LAB
ERYTHROCYTE [SEDIMENTATION RATE] IN BLOOD BY WESTERGREN METHOD: 41 MM/HR
FERRITIN SERPL-MCNC: 101 NG/ML
FOLATE SERPL-MCNC: 19.1 NG/ML
IRON SATN MFR SERPL: 12 %
IRON SERPL-MCNC: 38 UG/DL
TIBC SERPL-MCNC: 320 UG/DL
UIBC SERPL-MCNC: 282 UG/DL
VIT B12 SERPL-MCNC: 729 PG/ML

## 2017-10-24 ENCOUNTER — RX RENEWAL (OUTPATIENT)
Age: 62
End: 2017-10-24

## 2017-10-27 ENCOUNTER — LABORATORY RESULT (OUTPATIENT)
Age: 62
End: 2017-10-27

## 2017-10-27 ENCOUNTER — APPOINTMENT (OUTPATIENT)
Dept: HEMATOLOGY ONCOLOGY | Facility: CLINIC | Age: 62
End: 2017-10-27
Payer: COMMERCIAL

## 2017-10-27 VITALS — HEART RATE: 71 BPM | SYSTOLIC BLOOD PRESSURE: 145 MMHG | TEMPERATURE: 98.4 F | DIASTOLIC BLOOD PRESSURE: 86 MMHG

## 2017-10-27 PROCEDURE — 36415 COLL VENOUS BLD VENIPUNCTURE: CPT

## 2017-10-27 PROCEDURE — 85025 COMPLETE CBC W/AUTO DIFF WBC: CPT

## 2017-10-31 ENCOUNTER — APPOINTMENT (OUTPATIENT)
Dept: HEMATOLOGY ONCOLOGY | Facility: CLINIC | Age: 62
End: 2017-10-31
Payer: COMMERCIAL

## 2017-10-31 VITALS
DIASTOLIC BLOOD PRESSURE: 82 MMHG | HEIGHT: 64 IN | SYSTOLIC BLOOD PRESSURE: 161 MMHG | BODY MASS INDEX: 45.75 KG/M2 | HEART RATE: 86 BPM | TEMPERATURE: 98.3 F | WEIGHT: 268 LBS

## 2017-10-31 LAB
ALBUMIN SERPL ELPH-MCNC: 4.5 G/DL
ALP BLD-CCNC: 123 U/L
ALT SERPL-CCNC: 39 U/L
ANION GAP SERPL CALC-SCNC: 17 MMOL/L
AST SERPL-CCNC: 31 U/L
BILIRUB SERPL-MCNC: 0.2 MG/DL
BUN SERPL-MCNC: 16 MG/DL
CALCIUM SERPL-MCNC: 9.9 MG/DL
CHLORIDE SERPL-SCNC: 96 MMOL/L
CO2 SERPL-SCNC: 25 MMOL/L
CREAT SERPL-MCNC: 0.94 MG/DL
GLUCOSE SERPL-MCNC: 88 MG/DL
HCT VFR BLD CALC: 38 %
HGB BLD-MCNC: 12.7 G/DL
MCHC RBC-ENTMCNC: 27.7 PG
MCHC RBC-ENTMCNC: 33.5 GM/DL
MCV RBC AUTO: 82.9 FL
PLATELET # BLD AUTO: 409 K/UL
POTASSIUM SERPL-SCNC: 4.2 MMOL/L
PROT SERPL-MCNC: 7.4 G/DL
RBC # BLD: 4.58 M/UL
RBC # FLD: 14.9 %
SODIUM SERPL-SCNC: 138 MMOL/L
WBC # FLD AUTO: 9.5 K/UL

## 2017-10-31 PROCEDURE — 99215 OFFICE O/P EST HI 40 MIN: CPT

## 2017-10-31 RX ORDER — LEVETIRACETAM 500 MG/1
500 TABLET, FILM COATED ORAL
Qty: 60 | Refills: 0 | Status: DISCONTINUED | COMMUNITY
Start: 2017-08-25 | End: 2017-10-31

## 2017-11-02 ENCOUNTER — RX RENEWAL (OUTPATIENT)
Age: 62
End: 2017-11-02

## 2017-11-03 ENCOUNTER — LABORATORY RESULT (OUTPATIENT)
Age: 62
End: 2017-11-03

## 2017-11-03 ENCOUNTER — APPOINTMENT (OUTPATIENT)
Dept: HEMATOLOGY ONCOLOGY | Facility: CLINIC | Age: 62
End: 2017-11-03
Payer: COMMERCIAL

## 2017-11-03 VITALS — SYSTOLIC BLOOD PRESSURE: 152 MMHG | TEMPERATURE: 98.6 F | DIASTOLIC BLOOD PRESSURE: 85 MMHG | HEART RATE: 76 BPM

## 2017-11-03 LAB
HCT VFR BLD CALC: 35.2 %
HGB BLD-MCNC: 11.7 G/DL
MCHC RBC-ENTMCNC: 27.8 PG
MCHC RBC-ENTMCNC: 33.4 GM/DL
MCV RBC AUTO: 83.2 FL
PLATELET # BLD AUTO: 429 K/UL
RBC # BLD: 4.22 M/UL
RBC # FLD: 15.3 %
WBC # FLD AUTO: 10.2 K/UL

## 2017-11-03 PROCEDURE — 36415 COLL VENOUS BLD VENIPUNCTURE: CPT

## 2017-11-03 PROCEDURE — 85025 COMPLETE CBC W/AUTO DIFF WBC: CPT

## 2017-11-10 ENCOUNTER — LABORATORY RESULT (OUTPATIENT)
Age: 62
End: 2017-11-10

## 2017-11-10 ENCOUNTER — APPOINTMENT (OUTPATIENT)
Dept: HEMATOLOGY ONCOLOGY | Facility: CLINIC | Age: 62
End: 2017-11-10
Payer: COMMERCIAL

## 2017-11-10 VITALS — SYSTOLIC BLOOD PRESSURE: 134 MMHG | TEMPERATURE: 98.4 F | HEART RATE: 71 BPM | DIASTOLIC BLOOD PRESSURE: 80 MMHG

## 2017-11-10 PROCEDURE — 85025 COMPLETE CBC W/AUTO DIFF WBC: CPT

## 2017-11-10 PROCEDURE — 36415 COLL VENOUS BLD VENIPUNCTURE: CPT

## 2017-11-13 LAB
HCT VFR BLD CALC: 37.3 %
HGB BLD-MCNC: 11.9 G/DL
MCHC RBC-ENTMCNC: 27.8 PG
MCHC RBC-ENTMCNC: 31.9 GM/DL
MCV RBC AUTO: 87.2 FL
PLATELET # BLD AUTO: 361 K/UL
RBC # BLD: 4.28 M/UL
RBC # FLD: 15.3 %
WBC # FLD AUTO: 8.9 K/UL

## 2017-11-17 ENCOUNTER — APPOINTMENT (OUTPATIENT)
Dept: HEMATOLOGY ONCOLOGY | Facility: CLINIC | Age: 62
End: 2017-11-17
Payer: COMMERCIAL

## 2017-11-17 ENCOUNTER — LABORATORY RESULT (OUTPATIENT)
Age: 62
End: 2017-11-17

## 2017-11-17 VITALS — DIASTOLIC BLOOD PRESSURE: 77 MMHG | HEART RATE: 86 BPM | TEMPERATURE: 98.4 F | SYSTOLIC BLOOD PRESSURE: 150 MMHG

## 2017-11-17 LAB
HCT VFR BLD CALC: 36.1 %
HGB BLD-MCNC: 11.6 G/DL
MCHC RBC-ENTMCNC: 27.3 PG
MCHC RBC-ENTMCNC: 32.1 GM/DL
MCV RBC AUTO: 85.1 FL
PLATELET # BLD AUTO: 258 K/UL
RBC # BLD: 4.25 M/UL
RBC # FLD: 15.8 %
WBC # FLD AUTO: 8.4 K/UL

## 2017-11-17 PROCEDURE — 36415 COLL VENOUS BLD VENIPUNCTURE: CPT

## 2017-11-17 PROCEDURE — 85025 COMPLETE CBC W/AUTO DIFF WBC: CPT

## 2017-11-20 LAB
ALBUMIN SERPL ELPH-MCNC: 4 G/DL
ALP BLD-CCNC: 136 U/L
ALT SERPL-CCNC: 69 U/L
ANION GAP SERPL CALC-SCNC: 16 MMOL/L
AST SERPL-CCNC: 42 U/L
BILIRUB SERPL-MCNC: 0.3 MG/DL
BUN SERPL-MCNC: 22 MG/DL
CALCIUM SERPL-MCNC: 10.4 MG/DL
CHLORIDE SERPL-SCNC: 103 MMOL/L
CO2 SERPL-SCNC: 23 MMOL/L
CREAT SERPL-MCNC: 1.1 MG/DL
GLUCOSE SERPL-MCNC: 112 MG/DL
POTASSIUM SERPL-SCNC: 4 MMOL/L
PROT SERPL-MCNC: 6.7 G/DL
SODIUM SERPL-SCNC: 142 MMOL/L

## 2017-11-21 ENCOUNTER — APPOINTMENT (OUTPATIENT)
Dept: HEMATOLOGY ONCOLOGY | Facility: CLINIC | Age: 62
End: 2017-11-21
Payer: COMMERCIAL

## 2017-11-21 VITALS
HEART RATE: 78 BPM | SYSTOLIC BLOOD PRESSURE: 141 MMHG | BODY MASS INDEX: 46.44 KG/M2 | DIASTOLIC BLOOD PRESSURE: 79 MMHG | WEIGHT: 272 LBS | HEIGHT: 64 IN | TEMPERATURE: 98.4 F

## 2017-11-21 PROCEDURE — 99215 OFFICE O/P EST HI 40 MIN: CPT

## 2017-11-21 RX ORDER — TEMOZOLOMIDE 140 MG/1
140 CAPSULE ORAL
Qty: 42 | Refills: 0 | Status: DISCONTINUED | COMMUNITY
Start: 2017-09-25 | End: 2017-11-21

## 2017-11-21 RX ORDER — SULFAMETHOXAZOLE AND TRIMETHOPRIM 800; 160 MG/1; MG/1
800-160 TABLET ORAL
Qty: 36 | Refills: 1 | Status: DISCONTINUED | COMMUNITY
Start: 2017-09-25 | End: 2017-11-21

## 2017-11-21 RX ORDER — TEMOZOLOMIDE 5 MG/1
5 CAPSULE ORAL
Qty: 21 | Refills: 0 | Status: DISCONTINUED | COMMUNITY
Start: 2017-09-25 | End: 2017-11-21

## 2017-11-21 RX ORDER — TEMOZOLOMIDE 20 MG/1
20 CAPSULE ORAL
Qty: 21 | Refills: 0 | Status: DISCONTINUED | COMMUNITY
Start: 2017-09-25 | End: 2017-11-21

## 2017-11-24 ENCOUNTER — APPOINTMENT (OUTPATIENT)
Dept: HEMATOLOGY ONCOLOGY | Facility: CLINIC | Age: 62
End: 2017-11-24

## 2017-11-27 ENCOUNTER — RX RENEWAL (OUTPATIENT)
Age: 62
End: 2017-11-27

## 2017-11-29 ENCOUNTER — APPOINTMENT (OUTPATIENT)
Dept: NEUROSURGERY | Facility: CLINIC | Age: 62
End: 2017-11-29
Payer: COMMERCIAL

## 2017-11-29 PROCEDURE — 99214 OFFICE O/P EST MOD 30 MIN: CPT

## 2017-12-07 ENCOUNTER — RX RENEWAL (OUTPATIENT)
Age: 62
End: 2017-12-07

## 2017-12-11 ENCOUNTER — RX RENEWAL (OUTPATIENT)
Age: 62
End: 2017-12-11

## 2017-12-11 ENCOUNTER — FORM ENCOUNTER (OUTPATIENT)
Age: 62
End: 2017-12-11

## 2017-12-12 ENCOUNTER — APPOINTMENT (OUTPATIENT)
Dept: MRI IMAGING | Facility: CLINIC | Age: 62
End: 2017-12-12
Payer: COMMERCIAL

## 2017-12-12 ENCOUNTER — OUTPATIENT (OUTPATIENT)
Dept: OUTPATIENT SERVICES | Facility: HOSPITAL | Age: 62
LOS: 1 days | End: 2017-12-12
Payer: COMMERCIAL

## 2017-12-12 DIAGNOSIS — C71.9 MALIGNANT NEOPLASM OF BRAIN, UNSPECIFIED: ICD-10-CM

## 2017-12-12 PROCEDURE — 70553 MRI BRAIN STEM W/O & W/DYE: CPT

## 2017-12-12 PROCEDURE — 70553 MRI BRAIN STEM W/O & W/DYE: CPT | Mod: 26

## 2017-12-12 PROCEDURE — A9585: CPT

## 2017-12-15 VITALS
HEART RATE: 73 BPM | BODY MASS INDEX: 46.44 KG/M2 | RESPIRATION RATE: 16 BRPM | HEIGHT: 64 IN | DIASTOLIC BLOOD PRESSURE: 90 MMHG | TEMPERATURE: 98.4 F | WEIGHT: 272 LBS | SYSTOLIC BLOOD PRESSURE: 161 MMHG

## 2017-12-18 ENCOUNTER — APPOINTMENT (OUTPATIENT)
Dept: HEMATOLOGY ONCOLOGY | Facility: CLINIC | Age: 62
End: 2017-12-18

## 2017-12-18 VITALS
SYSTOLIC BLOOD PRESSURE: 139 MMHG | HEIGHT: 64 IN | DIASTOLIC BLOOD PRESSURE: 75 MMHG | TEMPERATURE: 98.1 F | HEART RATE: 69 BPM | WEIGHT: 270 LBS | BODY MASS INDEX: 46.1 KG/M2

## 2017-12-18 DIAGNOSIS — Z79.899 OTHER LONG TERM (CURRENT) DRUG THERAPY: ICD-10-CM

## 2017-12-18 DIAGNOSIS — Z78.9 OTHER LONG TERM (CURRENT) DRUG THERAPY: ICD-10-CM

## 2017-12-18 DIAGNOSIS — Z86.79 PERSONAL HISTORY OF OTHER DISEASES OF THE CIRCULATORY SYSTEM: ICD-10-CM

## 2017-12-18 RX ORDER — PANTOPRAZOLE 40 MG/1
40 TABLET, DELAYED RELEASE ORAL
Refills: 0 | Status: DISCONTINUED | COMMUNITY
End: 2017-12-18

## 2017-12-18 RX ORDER — LEVETIRACETAM 500 MG/1
500 TABLET, FILM COATED ORAL DAILY
Qty: 30 | Refills: 0 | Status: DISCONTINUED | COMMUNITY
Start: 2017-10-03 | End: 2017-12-18

## 2017-12-27 ENCOUNTER — OTHER (OUTPATIENT)
Age: 62
End: 2017-12-27

## 2018-01-01 ENCOUNTER — RX RENEWAL (OUTPATIENT)
Age: 63
End: 2018-01-01

## 2018-01-01 ENCOUNTER — RESULT REVIEW (OUTPATIENT)
Age: 63
End: 2018-01-01

## 2018-01-01 ENCOUNTER — LABORATORY RESULT (OUTPATIENT)
Age: 63
End: 2018-01-01

## 2018-01-01 ENCOUNTER — APPOINTMENT (OUTPATIENT)
Dept: HEMATOLOGY ONCOLOGY | Facility: CLINIC | Age: 63
End: 2018-01-01

## 2018-01-01 ENCOUNTER — APPOINTMENT (OUTPATIENT)
Dept: INFUSION THERAPY | Facility: HOSPITAL | Age: 63
End: 2018-01-01

## 2018-01-01 ENCOUNTER — FORM ENCOUNTER (OUTPATIENT)
Age: 63
End: 2018-01-01

## 2018-01-01 ENCOUNTER — TRANSCRIPTION ENCOUNTER (OUTPATIENT)
Age: 63
End: 2018-01-01

## 2018-01-01 ENCOUNTER — OUTPATIENT (OUTPATIENT)
Dept: OUTPATIENT SERVICES | Facility: HOSPITAL | Age: 63
LOS: 1 days | Discharge: HOME | End: 2018-01-01

## 2018-01-01 ENCOUNTER — APPOINTMENT (OUTPATIENT)
Dept: MRI IMAGING | Facility: CLINIC | Age: 63
End: 2018-01-01
Payer: COMMERCIAL

## 2018-01-01 ENCOUNTER — APPOINTMENT (OUTPATIENT)
Dept: NEUROSURGERY | Facility: CLINIC | Age: 63
End: 2018-01-01
Payer: COMMERCIAL

## 2018-01-01 ENCOUNTER — APPOINTMENT (OUTPATIENT)
Dept: MRI IMAGING | Facility: CLINIC | Age: 63
End: 2018-01-01

## 2018-01-01 ENCOUNTER — EMERGENCY (EMERGENCY)
Facility: HOSPITAL | Age: 63
LOS: 0 days | Discharge: HOME | End: 2018-07-07
Attending: EMERGENCY MEDICINE | Admitting: EMERGENCY MEDICINE
Payer: COMMERCIAL

## 2018-01-01 ENCOUNTER — CHART COPY (OUTPATIENT)
Age: 63
End: 2018-01-01

## 2018-01-01 ENCOUNTER — OUTPATIENT (OUTPATIENT)
Dept: OUTPATIENT SERVICES | Facility: HOSPITAL | Age: 63
LOS: 1 days | End: 2018-01-01
Payer: COMMERCIAL

## 2018-01-01 ENCOUNTER — APPOINTMENT (OUTPATIENT)
Dept: NEUROLOGY | Facility: CLINIC | Age: 63
End: 2018-01-01
Payer: COMMERCIAL

## 2018-01-01 ENCOUNTER — OUTPATIENT (OUTPATIENT)
Dept: OUTPATIENT SERVICES | Facility: HOSPITAL | Age: 63
LOS: 1 days | Discharge: HOME | End: 2018-01-01
Payer: COMMERCIAL

## 2018-01-01 ENCOUNTER — INPATIENT (INPATIENT)
Facility: HOSPITAL | Age: 63
LOS: 1 days | Discharge: HOME | End: 2018-06-09
Attending: SPECIALIST | Admitting: SPECIALIST
Payer: COMMERCIAL

## 2018-01-01 ENCOUNTER — APPOINTMENT (OUTPATIENT)
Dept: NEUROSURGERY | Facility: HOSPITAL | Age: 63
End: 2018-01-01

## 2018-01-01 ENCOUNTER — INPATIENT (INPATIENT)
Facility: HOSPITAL | Age: 63
LOS: 3 days | Discharge: NURSING, REHAB OR LONG-TERM CARE | End: 2018-08-07
Attending: FAMILY MEDICINE | Admitting: FAMILY MEDICINE
Payer: MEDICARE

## 2018-01-01 ENCOUNTER — APPOINTMENT (OUTPATIENT)
Dept: NEUROLOGY | Facility: CLINIC | Age: 63
End: 2018-01-01

## 2018-01-01 ENCOUNTER — INPATIENT (INPATIENT)
Facility: HOSPITAL | Age: 63
LOS: 7 days | Discharge: NURSING, REHAB OR LONG-TERM CARE | End: 2018-12-28
Attending: FAMILY MEDICINE | Admitting: FAMILY MEDICINE
Payer: COMMERCIAL

## 2018-01-01 VITALS
HEART RATE: 71 BPM | DIASTOLIC BLOOD PRESSURE: 67 MMHG | HEART RATE: 115 BPM | RESPIRATION RATE: 16 BRPM | OXYGEN SATURATION: 100 % | DIASTOLIC BLOOD PRESSURE: 106 MMHG | TEMPERATURE: 98 F | TEMPERATURE: 99 F | SYSTOLIC BLOOD PRESSURE: 168 MMHG | OXYGEN SATURATION: 95 % | RESPIRATION RATE: 18 BRPM | SYSTOLIC BLOOD PRESSURE: 157 MMHG

## 2018-01-01 VITALS
OXYGEN SATURATION: 98 % | BODY MASS INDEX: 45.41 KG/M2 | HEART RATE: 88 BPM | DIASTOLIC BLOOD PRESSURE: 78 MMHG | SYSTOLIC BLOOD PRESSURE: 136 MMHG | WEIGHT: 266 LBS | RESPIRATION RATE: 16 BRPM | HEIGHT: 64 IN

## 2018-01-01 VITALS
RESPIRATION RATE: 18 BRPM | DIASTOLIC BLOOD PRESSURE: 74 MMHG | WEIGHT: 272.05 LBS | TEMPERATURE: 98 F | HEIGHT: 64 IN | SYSTOLIC BLOOD PRESSURE: 140 MMHG | HEART RATE: 89 BPM | OXYGEN SATURATION: 100 %

## 2018-01-01 VITALS
OXYGEN SATURATION: 95 % | RESPIRATION RATE: 16 BRPM | WEIGHT: 245 LBS | DIASTOLIC BLOOD PRESSURE: 86 MMHG | BODY MASS INDEX: 41.83 KG/M2 | HEART RATE: 92 BPM | HEIGHT: 64 IN | SYSTOLIC BLOOD PRESSURE: 138 MMHG

## 2018-01-01 VITALS
SYSTOLIC BLOOD PRESSURE: 148 MMHG | OXYGEN SATURATION: 98 % | TEMPERATURE: 97 F | RESPIRATION RATE: 18 BRPM | HEART RATE: 77 BPM | DIASTOLIC BLOOD PRESSURE: 76 MMHG

## 2018-01-01 VITALS
HEART RATE: 87 BPM | SYSTOLIC BLOOD PRESSURE: 138 MMHG | RESPIRATION RATE: 16 BRPM | DIASTOLIC BLOOD PRESSURE: 90 MMHG | WEIGHT: 245 LBS | HEIGHT: 64 IN | OXYGEN SATURATION: 97 % | BODY MASS INDEX: 41.83 KG/M2

## 2018-01-01 VITALS
WEIGHT: 264 LBS | BODY MASS INDEX: 45.07 KG/M2 | OXYGEN SATURATION: 96 % | SYSTOLIC BLOOD PRESSURE: 140 MMHG | RESPIRATION RATE: 16 BRPM | HEIGHT: 64 IN | DIASTOLIC BLOOD PRESSURE: 86 MMHG | HEART RATE: 86 BPM

## 2018-01-01 VITALS
HEIGHT: 64 IN | DIASTOLIC BLOOD PRESSURE: 87 MMHG | WEIGHT: 265 LBS | TEMPERATURE: 98.6 F | SYSTOLIC BLOOD PRESSURE: 167 MMHG | BODY MASS INDEX: 45.24 KG/M2 | RESPIRATION RATE: 16 BRPM | HEART RATE: 66 BPM

## 2018-01-01 VITALS
WEIGHT: 259.93 LBS | OXYGEN SATURATION: 98 % | TEMPERATURE: 98 F | HEIGHT: 65 IN | HEART RATE: 84 BPM | DIASTOLIC BLOOD PRESSURE: 75 MMHG | SYSTOLIC BLOOD PRESSURE: 107 MMHG | RESPIRATION RATE: 18 BRPM

## 2018-01-01 VITALS
HEIGHT: 64 IN | WEIGHT: 272.05 LBS | SYSTOLIC BLOOD PRESSURE: 125 MMHG | DIASTOLIC BLOOD PRESSURE: 68 MMHG | TEMPERATURE: 98 F | RESPIRATION RATE: 16 BRPM | HEART RATE: 84 BPM | OXYGEN SATURATION: 100 %

## 2018-01-01 VITALS
HEART RATE: 95 BPM | RESPIRATION RATE: 16 BRPM | TEMPERATURE: 98 F | OXYGEN SATURATION: 100 % | WEIGHT: 261.91 LBS | SYSTOLIC BLOOD PRESSURE: 156 MMHG | DIASTOLIC BLOOD PRESSURE: 79 MMHG | HEIGHT: 64 IN

## 2018-01-01 VITALS — SYSTOLIC BLOOD PRESSURE: 143 MMHG | DIASTOLIC BLOOD PRESSURE: 85 MMHG

## 2018-01-01 VITALS — RESPIRATION RATE: 16 BRPM | OXYGEN SATURATION: 98 % | HEART RATE: 78 BPM

## 2018-01-01 VITALS — HEART RATE: 95 BPM | RESPIRATION RATE: 16 BRPM

## 2018-01-01 DIAGNOSIS — S43.004A UNSPECIFIED DISLOCATION OF RIGHT SHOULDER JOINT, INITIAL ENCOUNTER: ICD-10-CM

## 2018-01-01 DIAGNOSIS — C71.9 MALIGNANT NEOPLASM OF BRAIN, UNSPECIFIED: ICD-10-CM

## 2018-01-01 DIAGNOSIS — Z00.8 ENCOUNTER FOR OTHER GENERAL EXAMINATION: ICD-10-CM

## 2018-01-01 DIAGNOSIS — Z98.890 OTHER SPECIFIED POSTPROCEDURAL STATES: Chronic | ICD-10-CM

## 2018-01-01 DIAGNOSIS — D49.0 NEOPLASM OF UNSPECIFIED BEHAVIOR OF DIGESTIVE SYSTEM: ICD-10-CM

## 2018-01-01 DIAGNOSIS — Z79.82 LONG TERM (CURRENT) USE OF ASPIRIN: ICD-10-CM

## 2018-01-01 DIAGNOSIS — G81.94 HEMIPLEGIA, UNSPECIFIED AFFECTING LEFT NONDOMINANT SIDE: ICD-10-CM

## 2018-01-01 DIAGNOSIS — M17.0 BILATERAL PRIMARY OSTEOARTHRITIS OF KNEE: ICD-10-CM

## 2018-01-01 DIAGNOSIS — E66.01 MORBID (SEVERE) OBESITY DUE TO EXCESS CALORIES: ICD-10-CM

## 2018-01-01 DIAGNOSIS — E66.9 OBESITY, UNSPECIFIED: ICD-10-CM

## 2018-01-01 DIAGNOSIS — Z51.11 ENCOUNTER FOR ANTINEOPLASTIC CHEMOTHERAPY: ICD-10-CM

## 2018-01-01 DIAGNOSIS — I10 ESSENTIAL (PRIMARY) HYPERTENSION: ICD-10-CM
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
## 2018-01-01 DIAGNOSIS — Z86.73 PERSONAL HISTORY OF TRANSIENT ISCHEMIC ATTACK (TIA), AND CEREBRAL INFARCTION WITHOUT RESIDUAL DEFICITS: ICD-10-CM

## 2018-01-01 DIAGNOSIS — F41.9 ANXIETY DISORDER, UNSPECIFIED: ICD-10-CM

## 2018-01-01 DIAGNOSIS — C71.3 MALIGNANT NEOPLASM OF PARIETAL LOBE: ICD-10-CM

## 2018-01-01 DIAGNOSIS — G40.909 EPILEPSY, UNSPECIFIED, NOT INTRACTABLE, WITHOUT STATUS EPILEPTICUS: ICD-10-CM

## 2018-01-01 DIAGNOSIS — Z45.2 ENCOUNTER FOR ADJUSTMENT AND MANAGEMENT OF VASCULAR ACCESS DEVICE: ICD-10-CM

## 2018-01-01 DIAGNOSIS — E78.00 PURE HYPERCHOLESTEROLEMIA, UNSPECIFIED: ICD-10-CM

## 2018-01-01 DIAGNOSIS — Z87.440 PERSONAL HISTORY OF URINARY (TRACT) INFECTIONS: ICD-10-CM

## 2018-01-01 DIAGNOSIS — E78.5 HYPERLIPIDEMIA, UNSPECIFIED: ICD-10-CM

## 2018-01-01 DIAGNOSIS — D69.9 HEMORRHAGIC CONDITION, UNSPECIFIED: ICD-10-CM

## 2018-01-01 DIAGNOSIS — I63.9 CEREBRAL INFARCTION, UNSPECIFIED: ICD-10-CM

## 2018-01-01 DIAGNOSIS — Z98.890 OTHER SPECIFIED POSTPROCEDURAL STATES: ICD-10-CM

## 2018-01-01 DIAGNOSIS — G93.6 CEREBRAL EDEMA: ICD-10-CM

## 2018-01-01 LAB
ADD ON TEST-SPECIMEN IN LAB: SIGNIFICANT CHANGE UP
ADD ON TEST-SPECIMEN IN LAB: SIGNIFICANT CHANGE UP
ALBUMIN SERPL ELPH-MCNC: 2.4 G/DL — LOW (ref 3.3–5)
ALBUMIN SERPL ELPH-MCNC: 2.6 G/DL — LOW (ref 3.3–5)
ALBUMIN SERPL ELPH-MCNC: 2.9 G/DL — LOW (ref 3.3–5)
ALBUMIN SERPL ELPH-MCNC: 3 G/DL — LOW (ref 3.3–5)
ALBUMIN SERPL ELPH-MCNC: 3.1 G/DL — LOW (ref 3.3–5)
ALBUMIN SERPL ELPH-MCNC: 3.1 G/DL — LOW (ref 3.3–5)
ALBUMIN SERPL ELPH-MCNC: 4.1 G/DL
ALP BLD-CCNC: 117 U/L
ALP SERPL-CCNC: 100 U/L — SIGNIFICANT CHANGE UP (ref 40–120)
ALP SERPL-CCNC: 110 U/L — SIGNIFICANT CHANGE UP (ref 40–120)
ALP SERPL-CCNC: 86 U/L — SIGNIFICANT CHANGE UP (ref 40–120)
ALP SERPL-CCNC: 94 U/L — SIGNIFICANT CHANGE UP (ref 40–120)
ALP SERPL-CCNC: 95 U/L — SIGNIFICANT CHANGE UP (ref 40–120)
ALT FLD-CCNC: 18 U/L — SIGNIFICANT CHANGE UP (ref 12–78)
ALT FLD-CCNC: 23 U/L — SIGNIFICANT CHANGE UP (ref 12–78)
ALT FLD-CCNC: 24 U/L — SIGNIFICANT CHANGE UP (ref 12–78)
ALT FLD-CCNC: 27 U/L — SIGNIFICANT CHANGE UP (ref 12–78)
ALT FLD-CCNC: 34 U/L — SIGNIFICANT CHANGE UP (ref 12–78)
ALT SERPL-CCNC: 20 U/L
ANION GAP SERPL CALC-SCNC: 10 MMOL/L — SIGNIFICANT CHANGE UP (ref 5–17)
ANION GAP SERPL CALC-SCNC: 10 MMOL/L — SIGNIFICANT CHANGE UP (ref 5–17)
ANION GAP SERPL CALC-SCNC: 12 MMOL/L — SIGNIFICANT CHANGE UP (ref 5–17)
ANION GAP SERPL CALC-SCNC: 17 MMOL/L
ANION GAP SERPL CALC-SCNC: 6 MMOL/L — SIGNIFICANT CHANGE UP (ref 5–17)
ANION GAP SERPL CALC-SCNC: 6 MMOL/L — SIGNIFICANT CHANGE UP (ref 5–17)
ANION GAP SERPL CALC-SCNC: 7 MMOL/L — SIGNIFICANT CHANGE UP (ref 5–17)
ANION GAP SERPL CALC-SCNC: 7 MMOL/L — SIGNIFICANT CHANGE UP (ref 5–17)
ANION GAP SERPL CALC-SCNC: 8 MMOL/L — SIGNIFICANT CHANGE UP (ref 5–17)
ANION GAP SERPL CALC-SCNC: 9 MMOL/L — SIGNIFICANT CHANGE UP (ref 5–17)
ANISOCYTOSIS BLD QL: SLIGHT — SIGNIFICANT CHANGE UP
ANISOCYTOSIS BLD QL: SLIGHT — SIGNIFICANT CHANGE UP
APPEARANCE UR: CLEAR — SIGNIFICANT CHANGE UP
APPEARANCE UR: CLEAR — SIGNIFICANT CHANGE UP
APTT BLD: 22.5 SEC — LOW (ref 27.5–37.4)
APTT BLD: 28 SEC — SIGNIFICANT CHANGE UP (ref 27.5–37.4)
AST SERPL-CCNC: 12 U/L — LOW (ref 15–37)
AST SERPL-CCNC: 12 U/L — LOW (ref 15–37)
AST SERPL-CCNC: 18 U/L
AST SERPL-CCNC: 23 U/L — SIGNIFICANT CHANGE UP (ref 15–37)
AST SERPL-CCNC: 25 U/L — SIGNIFICANT CHANGE UP (ref 15–37)
AST SERPL-CCNC: 26 U/L — SIGNIFICANT CHANGE UP (ref 15–37)
BACTERIA # UR AUTO: ABNORMAL
BACTERIA # UR AUTO: ABNORMAL
BASOPHILS # BLD AUTO: 0 K/UL — SIGNIFICANT CHANGE UP (ref 0–0.2)
BASOPHILS # BLD AUTO: 0.01 K/UL — SIGNIFICANT CHANGE UP (ref 0–0.2)
BASOPHILS # BLD AUTO: 0.02 K/UL — SIGNIFICANT CHANGE UP (ref 0–0.2)
BASOPHILS # BLD AUTO: 0.03 K/UL
BASOPHILS # BLD AUTO: 0.1 K/UL — SIGNIFICANT CHANGE UP (ref 0–0.2)
BASOPHILS # BLD AUTO: 0.1 K/UL — SIGNIFICANT CHANGE UP (ref 0–0.2)
BASOPHILS NFR BLD AUTO: 0.1 % — SIGNIFICANT CHANGE UP (ref 0–2)
BASOPHILS NFR BLD AUTO: 0.2 % — SIGNIFICANT CHANGE UP (ref 0–2)
BASOPHILS NFR BLD AUTO: 0.3 %
BASOPHILS NFR BLD AUTO: 0.6 % — SIGNIFICANT CHANGE UP (ref 0–2)
BASOPHILS NFR BLD AUTO: 0.6 % — SIGNIFICANT CHANGE UP (ref 0–2)
BILIRUB SERPL-MCNC: 0.3 MG/DL — SIGNIFICANT CHANGE UP (ref 0.2–1.2)
BILIRUB SERPL-MCNC: 0.3 MG/DL — SIGNIFICANT CHANGE UP (ref 0.2–1.2)
BILIRUB SERPL-MCNC: 0.4 MG/DL
BILIRUB SERPL-MCNC: 0.5 MG/DL — SIGNIFICANT CHANGE UP (ref 0.2–1.2)
BILIRUB UR-MCNC: NEGATIVE — SIGNIFICANT CHANGE UP
BILIRUB UR-MCNC: NEGATIVE — SIGNIFICANT CHANGE UP
BLD GP AB SCN SERPL QL: SIGNIFICANT CHANGE UP
BUN SERPL-MCNC: 14 MG/DL — SIGNIFICANT CHANGE UP (ref 7–23)
BUN SERPL-MCNC: 16 MG/DL — SIGNIFICANT CHANGE UP (ref 7–23)
BUN SERPL-MCNC: 17 MG/DL — SIGNIFICANT CHANGE UP (ref 7–23)
BUN SERPL-MCNC: 18 MG/DL
BUN SERPL-MCNC: 18 MG/DL — SIGNIFICANT CHANGE UP (ref 7–23)
BUN SERPL-MCNC: 26 MG/DL — HIGH (ref 7–23)
BUN SERPL-MCNC: 31 MG/DL — HIGH (ref 7–23)
BUN SERPL-MCNC: 32 MG/DL — HIGH (ref 7–23)
CALCIUM SERPL-MCNC: 10 MG/DL
CALCIUM SERPL-MCNC: 10.2 MG/DL — HIGH (ref 8.5–10.1)
CALCIUM SERPL-MCNC: 8 MG/DL — LOW (ref 8.5–10.1)
CALCIUM SERPL-MCNC: 8.2 MG/DL — LOW (ref 8.5–10.1)
CALCIUM SERPL-MCNC: 8.6 MG/DL — SIGNIFICANT CHANGE UP (ref 8.5–10.1)
CALCIUM SERPL-MCNC: 9.1 MG/DL — SIGNIFICANT CHANGE UP (ref 8.5–10.1)
CALCIUM SERPL-MCNC: 9.1 MG/DL — SIGNIFICANT CHANGE UP (ref 8.5–10.1)
CALCIUM SERPL-MCNC: 9.2 MG/DL — SIGNIFICANT CHANGE UP (ref 8.5–10.1)
CALCIUM SERPL-MCNC: 9.2 MG/DL — SIGNIFICANT CHANGE UP (ref 8.5–10.1)
CALCIUM SERPL-MCNC: 9.4 MG/DL — SIGNIFICANT CHANGE UP (ref 8.5–10.1)
CHLORIDE SERPL-SCNC: 102 MMOL/L — SIGNIFICANT CHANGE UP (ref 96–108)
CHLORIDE SERPL-SCNC: 103 MMOL/L — SIGNIFICANT CHANGE UP (ref 96–108)
CHLORIDE SERPL-SCNC: 104 MMOL/L — SIGNIFICANT CHANGE UP (ref 96–108)
CHLORIDE SERPL-SCNC: 104 MMOL/L — SIGNIFICANT CHANGE UP (ref 96–108)
CHLORIDE SERPL-SCNC: 105 MMOL/L — SIGNIFICANT CHANGE UP (ref 96–108)
CHLORIDE SERPL-SCNC: 110 MMOL/L — HIGH (ref 96–108)
CHLORIDE SERPL-SCNC: 110 MMOL/L — HIGH (ref 96–108)
CHLORIDE SERPL-SCNC: 111 MMOL/L — HIGH (ref 96–108)
CHLORIDE SERPL-SCNC: 111 MMOL/L — HIGH (ref 96–108)
CHLORIDE SERPL-SCNC: 99 MMOL/L
CHOLEST SERPL-MCNC: 111 MG/DL — SIGNIFICANT CHANGE UP (ref 10–199)
CHOLEST SERPL-MCNC: 163 MG/DL — SIGNIFICANT CHANGE UP (ref 10–199)
CO2 SERPL-SCNC: 23 MMOL/L — SIGNIFICANT CHANGE UP (ref 22–31)
CO2 SERPL-SCNC: 24 MMOL/L
CO2 SERPL-SCNC: 24 MMOL/L — SIGNIFICANT CHANGE UP (ref 22–31)
CO2 SERPL-SCNC: 25 MMOL/L — SIGNIFICANT CHANGE UP (ref 22–31)
CO2 SERPL-SCNC: 25 MMOL/L — SIGNIFICANT CHANGE UP (ref 22–31)
CO2 SERPL-SCNC: 26 MMOL/L — SIGNIFICANT CHANGE UP (ref 22–31)
CO2 SERPL-SCNC: 26 MMOL/L — SIGNIFICANT CHANGE UP (ref 22–31)
CO2 SERPL-SCNC: 27 MMOL/L — SIGNIFICANT CHANGE UP (ref 22–31)
CO2 SERPL-SCNC: 27 MMOL/L — SIGNIFICANT CHANGE UP (ref 22–31)
CO2 SERPL-SCNC: 28 MMOL/L — SIGNIFICANT CHANGE UP (ref 22–31)
COLOR SPEC: YELLOW — SIGNIFICANT CHANGE UP
COLOR SPEC: YELLOW — SIGNIFICANT CHANGE UP
CREAT SERPL-MCNC: 0.66 MG/DL — SIGNIFICANT CHANGE UP (ref 0.5–1.3)
CREAT SERPL-MCNC: 0.8 MG/DL — SIGNIFICANT CHANGE UP (ref 0.5–1.3)
CREAT SERPL-MCNC: 0.81 MG/DL — SIGNIFICANT CHANGE UP (ref 0.5–1.3)
CREAT SERPL-MCNC: 0.82 MG/DL — SIGNIFICANT CHANGE UP (ref 0.5–1.3)
CREAT SERPL-MCNC: 0.83 MG/DL — SIGNIFICANT CHANGE UP (ref 0.5–1.3)
CREAT SERPL-MCNC: 0.85 MG/DL — SIGNIFICANT CHANGE UP (ref 0.5–1.3)
CREAT SERPL-MCNC: 0.85 MG/DL — SIGNIFICANT CHANGE UP (ref 0.5–1.3)
CREAT SERPL-MCNC: 0.88 MG/DL
CREAT SERPL-MCNC: 0.91 MG/DL — SIGNIFICANT CHANGE UP (ref 0.5–1.3)
CREAT SERPL-MCNC: 1.1 MG/DL — SIGNIFICANT CHANGE UP (ref 0.5–1.3)
CULTURE RESULTS: SIGNIFICANT CHANGE UP
CULTURE RESULTS: SIGNIFICANT CHANGE UP
DIFF PNL FLD: ABNORMAL
DIFF PNL FLD: ABNORMAL
ELLIPTOCYTES BLD QL SMEAR: SLIGHT — SIGNIFICANT CHANGE UP
EOSINOPHIL # BLD AUTO: 0 K/UL — SIGNIFICANT CHANGE UP (ref 0–0.5)
EOSINOPHIL # BLD AUTO: 0.01 K/UL — SIGNIFICANT CHANGE UP (ref 0–0.5)
EOSINOPHIL # BLD AUTO: 0.03 K/UL — SIGNIFICANT CHANGE UP (ref 0–0.5)
EOSINOPHIL # BLD AUTO: 0.07 K/UL
EOSINOPHIL # BLD AUTO: 0.07 K/UL — SIGNIFICANT CHANGE UP (ref 0–0.5)
EOSINOPHIL # BLD AUTO: 0.1 K/UL — SIGNIFICANT CHANGE UP (ref 0–0.5)
EOSINOPHIL # BLD AUTO: 0.1 K/UL — SIGNIFICANT CHANGE UP (ref 0–0.5)
EOSINOPHIL # BLD AUTO: 0.2 K/UL — SIGNIFICANT CHANGE UP (ref 0–0.5)
EOSINOPHIL # BLD AUTO: 0.3 K/UL — SIGNIFICANT CHANGE UP (ref 0–0.5)
EOSINOPHIL # BLD AUTO: 0.3 K/UL — SIGNIFICANT CHANGE UP (ref 0–0.5)
EOSINOPHIL NFR BLD AUTO: 0 % — SIGNIFICANT CHANGE UP (ref 0–6)
EOSINOPHIL NFR BLD AUTO: 0.1 % — SIGNIFICANT CHANGE UP (ref 0–6)
EOSINOPHIL NFR BLD AUTO: 0.3 % — SIGNIFICANT CHANGE UP (ref 0–6)
EOSINOPHIL NFR BLD AUTO: 0.7 % — SIGNIFICANT CHANGE UP (ref 0–6)
EOSINOPHIL NFR BLD AUTO: 0.8 %
EOSINOPHIL NFR BLD AUTO: 1 % — SIGNIFICANT CHANGE UP (ref 0–6)
EOSINOPHIL NFR BLD AUTO: 1.3 % — SIGNIFICANT CHANGE UP (ref 0–6)
EOSINOPHIL NFR BLD AUTO: 1.6 % — SIGNIFICANT CHANGE UP (ref 0–6)
EOSINOPHIL NFR BLD AUTO: 2 % — SIGNIFICANT CHANGE UP (ref 0–6)
EOSINOPHIL NFR BLD AUTO: 2.4 % — SIGNIFICANT CHANGE UP (ref 0–6)
EOSINOPHIL NFR BLD AUTO: 3.7 % — SIGNIFICANT CHANGE UP (ref 0–6)
EPI CELLS # UR: ABNORMAL
EPI CELLS # UR: SIGNIFICANT CHANGE UP
FERRITIN SERPL-MCNC: 73 NG/ML — SIGNIFICANT CHANGE UP (ref 15–150)
FERRITIN SERPL-MCNC: 86 NG/ML — SIGNIFICANT CHANGE UP (ref 15–150)
FOLATE SERPL-MCNC: 17.7 NG/ML — SIGNIFICANT CHANGE UP
FOLATE SERPL-MCNC: >20 NG/ML — SIGNIFICANT CHANGE UP
GLUCOSE BLDC GLUCOMTR-MCNC: 100 MG/DL — HIGH (ref 70–99)
GLUCOSE BLDC GLUCOMTR-MCNC: 103 MG/DL — HIGH (ref 70–99)
GLUCOSE BLDC GLUCOMTR-MCNC: 104 MG/DL — HIGH (ref 70–99)
GLUCOSE BLDC GLUCOMTR-MCNC: 105 MG/DL — HIGH (ref 70–99)
GLUCOSE BLDC GLUCOMTR-MCNC: 106 MG/DL — HIGH (ref 70–99)
GLUCOSE BLDC GLUCOMTR-MCNC: 107 MG/DL — HIGH (ref 70–99)
GLUCOSE BLDC GLUCOMTR-MCNC: 107 MG/DL — HIGH (ref 70–99)
GLUCOSE BLDC GLUCOMTR-MCNC: 108 MG/DL — HIGH (ref 70–99)
GLUCOSE BLDC GLUCOMTR-MCNC: 109 MG/DL — HIGH (ref 70–99)
GLUCOSE BLDC GLUCOMTR-MCNC: 112 MG/DL — HIGH (ref 70–99)
GLUCOSE BLDC GLUCOMTR-MCNC: 115 MG/DL — HIGH (ref 70–99)
GLUCOSE BLDC GLUCOMTR-MCNC: 117 MG/DL — HIGH (ref 70–99)
GLUCOSE BLDC GLUCOMTR-MCNC: 119 MG/DL — HIGH (ref 70–99)
GLUCOSE BLDC GLUCOMTR-MCNC: 121 MG/DL — HIGH (ref 70–99)
GLUCOSE BLDC GLUCOMTR-MCNC: 123 MG/DL — HIGH (ref 70–99)
GLUCOSE BLDC GLUCOMTR-MCNC: 125 MG/DL — HIGH (ref 70–99)
GLUCOSE BLDC GLUCOMTR-MCNC: 125 MG/DL — HIGH (ref 70–99)
GLUCOSE BLDC GLUCOMTR-MCNC: 126 MG/DL — HIGH (ref 70–99)
GLUCOSE BLDC GLUCOMTR-MCNC: 130 MG/DL — HIGH (ref 70–99)
GLUCOSE BLDC GLUCOMTR-MCNC: 134 MG/DL — HIGH (ref 70–99)
GLUCOSE BLDC GLUCOMTR-MCNC: 134 MG/DL — HIGH (ref 70–99)
GLUCOSE BLDC GLUCOMTR-MCNC: 135 MG/DL — HIGH (ref 70–99)
GLUCOSE BLDC GLUCOMTR-MCNC: 138 MG/DL — HIGH (ref 70–99)
GLUCOSE BLDC GLUCOMTR-MCNC: 139 MG/DL — HIGH (ref 70–99)
GLUCOSE BLDC GLUCOMTR-MCNC: 143 MG/DL — HIGH (ref 70–99)
GLUCOSE BLDC GLUCOMTR-MCNC: 147 MG/DL — HIGH (ref 70–99)
GLUCOSE BLDC GLUCOMTR-MCNC: 159 MG/DL — HIGH (ref 70–99)
GLUCOSE BLDC GLUCOMTR-MCNC: 172 MG/DL — HIGH (ref 70–99)
GLUCOSE BLDC GLUCOMTR-MCNC: 80 MG/DL — SIGNIFICANT CHANGE UP (ref 70–99)
GLUCOSE BLDC GLUCOMTR-MCNC: 88 MG/DL — SIGNIFICANT CHANGE UP (ref 70–99)
GLUCOSE BLDC GLUCOMTR-MCNC: 93 MG/DL — SIGNIFICANT CHANGE UP (ref 70–99)
GLUCOSE BLDC GLUCOMTR-MCNC: 98 MG/DL — SIGNIFICANT CHANGE UP (ref 70–99)
GLUCOSE BLDC GLUCOMTR-MCNC: 99 MG/DL — SIGNIFICANT CHANGE UP (ref 70–99)
GLUCOSE BLDC GLUCOMTR-MCNC: 99 MG/DL — SIGNIFICANT CHANGE UP (ref 70–99)
GLUCOSE SERPL-MCNC: 110 MG/DL — HIGH (ref 70–99)
GLUCOSE SERPL-MCNC: 119 MG/DL — HIGH (ref 70–99)
GLUCOSE SERPL-MCNC: 121 MG/DL — HIGH (ref 70–99)
GLUCOSE SERPL-MCNC: 123 MG/DL — HIGH (ref 70–99)
GLUCOSE SERPL-MCNC: 123 MG/DL — HIGH (ref 70–99)
GLUCOSE SERPL-MCNC: 128 MG/DL — HIGH (ref 70–99)
GLUCOSE SERPL-MCNC: 139 MG/DL — HIGH (ref 70–99)
GLUCOSE SERPL-MCNC: 86 MG/DL
GLUCOSE SERPL-MCNC: 89 MG/DL — SIGNIFICANT CHANGE UP (ref 70–99)
GLUCOSE SERPL-MCNC: 92 MG/DL — SIGNIFICANT CHANGE UP (ref 70–99)
GLUCOSE UR QL: NEGATIVE MG/DL — SIGNIFICANT CHANGE UP
GLUCOSE UR QL: NEGATIVE MG/DL — SIGNIFICANT CHANGE UP
HAV IGM SER QL: NONREACTIVE
HBA1C BLD-MCNC: 5.5 % — SIGNIFICANT CHANGE UP (ref 4–5.6)
HBA1C BLD-MCNC: 5.5 % — SIGNIFICANT CHANGE UP (ref 4–5.6)
HBV CORE IGM SER QL: NONREACTIVE
HBV SURFACE AG SER QL: NONREACTIVE
HCT VFR BLD CALC: 30.2 % — LOW (ref 34.5–45)
HCT VFR BLD CALC: 31.1 % — LOW (ref 34.5–45)
HCT VFR BLD CALC: 31.4 % — LOW (ref 34.5–45)
HCT VFR BLD CALC: 31.6 % — LOW (ref 34.5–45)
HCT VFR BLD CALC: 31.8 % — LOW (ref 34.5–45)
HCT VFR BLD CALC: 32.4 % — LOW (ref 34.5–45)
HCT VFR BLD CALC: 32.6 % — LOW (ref 34.5–45)
HCT VFR BLD CALC: 33.2 % — LOW (ref 34.5–45)
HCT VFR BLD CALC: 33.2 % — LOW (ref 34.5–45)
HCT VFR BLD CALC: 33.5 % — LOW (ref 34.5–45)
HCT VFR BLD CALC: 33.8 % — LOW (ref 34.5–45)
HCT VFR BLD CALC: 34.1 % — LOW (ref 34.5–45)
HCT VFR BLD CALC: 34.8 %
HCT VFR BLD CALC: 34.9 % — SIGNIFICANT CHANGE UP (ref 34.5–45)
HCT VFR BLD CALC: 35 % — SIGNIFICANT CHANGE UP (ref 34.5–45)
HCT VFR BLD CALC: 35 % — SIGNIFICANT CHANGE UP (ref 34.5–45)
HCT VFR BLD CALC: 35.2 % — SIGNIFICANT CHANGE UP (ref 34.5–45)
HCT VFR BLD CALC: 35.6 % — SIGNIFICANT CHANGE UP (ref 34.5–45)
HCT VFR BLD CALC: 35.6 % — SIGNIFICANT CHANGE UP (ref 34.5–45)
HCT VFR BLD CALC: 36 % — SIGNIFICANT CHANGE UP (ref 34.5–45)
HCT VFR BLD CALC: 36.2 % — SIGNIFICANT CHANGE UP (ref 34.5–45)
HCT VFR BLD CALC: 36.4 % — SIGNIFICANT CHANGE UP (ref 34.5–45)
HCT VFR BLD CALC: 37.9 % — SIGNIFICANT CHANGE UP (ref 34.5–45)
HCT VFR BLD CALC: 38.6 % — SIGNIFICANT CHANGE UP (ref 34.5–45)
HCT VFR BLD CALC: 38.7 % — SIGNIFICANT CHANGE UP (ref 34.5–45)
HCT VFR BLD CALC: 38.7 % — SIGNIFICANT CHANGE UP (ref 34.5–45)
HCT VFR BLD CALC: 38.9 % — SIGNIFICANT CHANGE UP (ref 34.5–45)
HCT VFR BLD CALC: 39.5 % — SIGNIFICANT CHANGE UP (ref 34.5–45)
HCT VFR BLD CALC: 41.4 % — SIGNIFICANT CHANGE UP (ref 34.5–45)
HCT VFR BLD CALC: 41.4 % — SIGNIFICANT CHANGE UP (ref 34.5–45)
HCV AB SER QL: NONREACTIVE
HCV S/CO RATIO: 0.21 S/CO
HDLC SERPL-MCNC: 35 MG/DL — LOW
HDLC SERPL-MCNC: 35 MG/DL — LOW (ref 40–125)
HGB BLD-MCNC: 10 G/DL — LOW (ref 11.5–15.5)
HGB BLD-MCNC: 10.4 G/DL — LOW (ref 11.5–15.5)
HGB BLD-MCNC: 10.4 G/DL — LOW (ref 11.5–15.5)
HGB BLD-MCNC: 10.5 G/DL — LOW (ref 11.5–15.5)
HGB BLD-MCNC: 10.6 G/DL — LOW (ref 11.5–15.5)
HGB BLD-MCNC: 10.6 G/DL — LOW (ref 11.5–15.5)
HGB BLD-MCNC: 10.9 G/DL — LOW (ref 11.5–15.5)
HGB BLD-MCNC: 10.9 G/DL — LOW (ref 11.5–15.5)
HGB BLD-MCNC: 11.1 G/DL — LOW (ref 11.5–15.5)
HGB BLD-MCNC: 11.3 G/DL — LOW (ref 11.5–15.5)
HGB BLD-MCNC: 11.3 G/DL — LOW (ref 11.5–15.5)
HGB BLD-MCNC: 11.4 G/DL
HGB BLD-MCNC: 11.5 G/DL — SIGNIFICANT CHANGE UP (ref 11.5–15.5)
HGB BLD-MCNC: 11.6 G/DL — SIGNIFICANT CHANGE UP (ref 11.5–15.5)
HGB BLD-MCNC: 11.6 G/DL — SIGNIFICANT CHANGE UP (ref 11.5–15.5)
HGB BLD-MCNC: 11.7 G/DL — SIGNIFICANT CHANGE UP (ref 11.5–15.5)
HGB BLD-MCNC: 11.9 G/DL — SIGNIFICANT CHANGE UP (ref 11.5–15.5)
HGB BLD-MCNC: 12.1 G/DL — SIGNIFICANT CHANGE UP (ref 11.5–15.5)
HGB BLD-MCNC: 12.1 G/DL — SIGNIFICANT CHANGE UP (ref 11.5–15.5)
HGB BLD-MCNC: 12.2 G/DL — SIGNIFICANT CHANGE UP (ref 11.5–15.5)
HGB BLD-MCNC: 12.3 G/DL — SIGNIFICANT CHANGE UP (ref 11.5–15.5)
HGB BLD-MCNC: 12.4 G/DL — SIGNIFICANT CHANGE UP (ref 11.5–15.5)
HGB BLD-MCNC: 12.7 G/DL — SIGNIFICANT CHANGE UP (ref 11.5–15.5)
HGB BLD-MCNC: 12.9 G/DL — SIGNIFICANT CHANGE UP (ref 11.5–15.5)
HGB BLD-MCNC: 13 G/DL — SIGNIFICANT CHANGE UP (ref 11.5–15.5)
HGB BLD-MCNC: 13.2 G/DL — SIGNIFICANT CHANGE UP (ref 11.5–15.5)
HGB BLD-MCNC: 13.3 G/DL — SIGNIFICANT CHANGE UP (ref 11.5–15.5)
HGB BLD-MCNC: 13.4 G/DL — SIGNIFICANT CHANGE UP (ref 11.5–15.5)
HGB BLD-MCNC: 13.9 G/DL — SIGNIFICANT CHANGE UP (ref 11.5–15.5)
HGB BLD-MCNC: 13.9 G/DL — SIGNIFICANT CHANGE UP (ref 11.5–15.5)
IMM GRANULOCYTES NFR BLD AUTO: 0.2 % — SIGNIFICANT CHANGE UP (ref 0–1.5)
IMM GRANULOCYTES NFR BLD AUTO: 0.3 % — SIGNIFICANT CHANGE UP (ref 0–1.5)
IMM GRANULOCYTES NFR BLD AUTO: 0.4 % — SIGNIFICANT CHANGE UP (ref 0–1.5)
IMM GRANULOCYTES NFR BLD AUTO: 0.5 %
IMM GRANULOCYTES NFR BLD AUTO: 0.5 % — SIGNIFICANT CHANGE UP (ref 0–1.5)
INR BLD: 1.02 RATIO — SIGNIFICANT CHANGE UP (ref 0.88–1.16)
INR BLD: 1.15 RATIO — SIGNIFICANT CHANGE UP (ref 0.88–1.16)
IRON SATN MFR SERPL: 12 % — LOW (ref 14–50)
IRON SATN MFR SERPL: 28 UG/DL — LOW (ref 30–160)
IRON SATN MFR SERPL: 34 UG/DL — SIGNIFICANT CHANGE UP (ref 30–160)
IRON SATN MFR SERPL: 9 % — LOW (ref 14–50)
KETONES UR-MCNC: NEGATIVE — SIGNIFICANT CHANGE UP
KETONES UR-MCNC: NEGATIVE — SIGNIFICANT CHANGE UP
LACTATE SERPL-SCNC: 1.1 MMOL/L — SIGNIFICANT CHANGE UP (ref 0.7–2)
LEUKOCYTE ESTERASE UR-ACNC: ABNORMAL
LEUKOCYTE ESTERASE UR-ACNC: NEGATIVE — SIGNIFICANT CHANGE UP
LEVETIRACETAM SERPL-MCNC: 25.8 MCG/ML — SIGNIFICANT CHANGE UP (ref 12–46)
LG PLATELETS BLD QL AUTO: SLIGHT — SIGNIFICANT CHANGE UP
LG PLATELETS BLD QL AUTO: SLIGHT — SIGNIFICANT CHANGE UP
LIPID PNL WITH DIRECT LDL SERPL: 101 MG/DL — SIGNIFICANT CHANGE UP
LIPID PNL WITH DIRECT LDL SERPL: 43 MG/DL — SIGNIFICANT CHANGE UP
LYMPHOCYTES # BLD AUTO: 0.44 K/UL — LOW (ref 1–3.3)
LYMPHOCYTES # BLD AUTO: 0.44 K/UL — LOW (ref 1–3.3)
LYMPHOCYTES # BLD AUTO: 0.77 K/UL — LOW (ref 1–3.3)
LYMPHOCYTES # BLD AUTO: 0.91 K/UL — LOW (ref 1–3.3)
LYMPHOCYTES # BLD AUTO: 1.1 K/UL — SIGNIFICANT CHANGE UP (ref 1–3.3)
LYMPHOCYTES # BLD AUTO: 1.2 K/UL — SIGNIFICANT CHANGE UP (ref 1–3.3)
LYMPHOCYTES # BLD AUTO: 1.24 K/UL
LYMPHOCYTES # BLD AUTO: 1.3 K/UL — SIGNIFICANT CHANGE UP (ref 1–3.3)
LYMPHOCYTES # BLD AUTO: 1.4 K/UL — SIGNIFICANT CHANGE UP (ref 1–3.3)
LYMPHOCYTES # BLD AUTO: 1.4 K/UL — SIGNIFICANT CHANGE UP (ref 1–3.3)
LYMPHOCYTES # BLD AUTO: 1.7 K/UL — SIGNIFICANT CHANGE UP (ref 1–3.3)
LYMPHOCYTES # BLD AUTO: 1.8 K/UL — SIGNIFICANT CHANGE UP (ref 1–3.3)
LYMPHOCYTES # BLD AUTO: 10.7 % — LOW (ref 13–44)
LYMPHOCYTES # BLD AUTO: 11 % — LOW (ref 13–44)
LYMPHOCYTES # BLD AUTO: 11 % — LOW (ref 13–44)
LYMPHOCYTES # BLD AUTO: 11.7 % — LOW (ref 13–44)
LYMPHOCYTES # BLD AUTO: 12.3 % — LOW (ref 13–44)
LYMPHOCYTES # BLD AUTO: 13 % — SIGNIFICANT CHANGE UP (ref 13–44)
LYMPHOCYTES # BLD AUTO: 13.3 % — SIGNIFICANT CHANGE UP (ref 13–44)
LYMPHOCYTES # BLD AUTO: 14.2 % — SIGNIFICANT CHANGE UP (ref 13–44)
LYMPHOCYTES # BLD AUTO: 14.6 % — SIGNIFICANT CHANGE UP (ref 13–44)
LYMPHOCYTES # BLD AUTO: 16.3 % — SIGNIFICANT CHANGE UP (ref 13–44)
LYMPHOCYTES # BLD AUTO: 17 % — SIGNIFICANT CHANGE UP (ref 13–44)
LYMPHOCYTES # BLD AUTO: 33 % — SIGNIFICANT CHANGE UP (ref 13–44)
LYMPHOCYTES # BLD AUTO: 4.6 % — LOW (ref 13–44)
LYMPHOCYTES # BLD AUTO: 6.4 % — LOW (ref 13–44)
LYMPHOCYTES # BLD AUTO: 6.8 % — LOW (ref 13–44)
LYMPHOCYTES # BLD AUTO: 9.6 % — LOW (ref 13–44)
LYMPHOCYTES # SPEC AUTO: 1 % — HIGH (ref 0–0)
LYMPHOCYTES NFR BLD AUTO: 13.6 %
MAGNESIUM SERPL-MCNC: 2.2 MG/DL — SIGNIFICANT CHANGE UP (ref 1.6–2.6)
MAGNESIUM SERPL-MCNC: 2.2 MG/DL — SIGNIFICANT CHANGE UP (ref 1.6–2.6)
MAN DIFF?: NORMAL
MANUAL SMEAR VERIFICATION: SIGNIFICANT CHANGE UP
MANUAL SMEAR VERIFICATION: SIGNIFICANT CHANGE UP
MCHC RBC-ENTMCNC: 28.8 PG — SIGNIFICANT CHANGE UP (ref 27–34)
MCHC RBC-ENTMCNC: 29 PG — SIGNIFICANT CHANGE UP (ref 27–34)
MCHC RBC-ENTMCNC: 29.1 PG — SIGNIFICANT CHANGE UP (ref 27–34)
MCHC RBC-ENTMCNC: 29.2 PG — SIGNIFICANT CHANGE UP (ref 27–34)
MCHC RBC-ENTMCNC: 29.2 PG — SIGNIFICANT CHANGE UP (ref 27–34)
MCHC RBC-ENTMCNC: 29.6 PG — SIGNIFICANT CHANGE UP (ref 27–34)
MCHC RBC-ENTMCNC: 29.7 PG
MCHC RBC-ENTMCNC: 29.7 PG — SIGNIFICANT CHANGE UP (ref 27–34)
MCHC RBC-ENTMCNC: 29.8 PG — SIGNIFICANT CHANGE UP (ref 27–34)
MCHC RBC-ENTMCNC: 29.9 PG — SIGNIFICANT CHANGE UP (ref 27–34)
MCHC RBC-ENTMCNC: 30 PG — SIGNIFICANT CHANGE UP (ref 27–34)
MCHC RBC-ENTMCNC: 30 PG — SIGNIFICANT CHANGE UP (ref 27–34)
MCHC RBC-ENTMCNC: 30.5 PG — SIGNIFICANT CHANGE UP (ref 27–34)
MCHC RBC-ENTMCNC: 30.5 PG — SIGNIFICANT CHANGE UP (ref 27–34)
MCHC RBC-ENTMCNC: 30.7 PG — SIGNIFICANT CHANGE UP (ref 27–34)
MCHC RBC-ENTMCNC: 30.8 PG — SIGNIFICANT CHANGE UP (ref 27–34)
MCHC RBC-ENTMCNC: 30.9 PG — SIGNIFICANT CHANGE UP (ref 27–34)
MCHC RBC-ENTMCNC: 31 PG — SIGNIFICANT CHANGE UP (ref 27–34)
MCHC RBC-ENTMCNC: 31.1 PG — SIGNIFICANT CHANGE UP (ref 27–34)
MCHC RBC-ENTMCNC: 31.2 PG — SIGNIFICANT CHANGE UP (ref 27–34)
MCHC RBC-ENTMCNC: 31.4 PG — SIGNIFICANT CHANGE UP (ref 27–34)
MCHC RBC-ENTMCNC: 31.4 PG — SIGNIFICANT CHANGE UP (ref 27–34)
MCHC RBC-ENTMCNC: 32.2 GM/DL — SIGNIFICANT CHANGE UP (ref 32–36)
MCHC RBC-ENTMCNC: 32.6 GM/DL — SIGNIFICANT CHANGE UP (ref 32–36)
MCHC RBC-ENTMCNC: 32.8 GM/DL
MCHC RBC-ENTMCNC: 33.1 GM/DL — SIGNIFICANT CHANGE UP (ref 32–36)
MCHC RBC-ENTMCNC: 33.2 G/DL — SIGNIFICANT CHANGE UP (ref 32–36)
MCHC RBC-ENTMCNC: 33.2 GM/DL — SIGNIFICANT CHANGE UP (ref 32–36)
MCHC RBC-ENTMCNC: 33.3 GM/DL — SIGNIFICANT CHANGE UP (ref 32–36)
MCHC RBC-ENTMCNC: 33.4 G/DL — SIGNIFICANT CHANGE UP (ref 32–36)
MCHC RBC-ENTMCNC: 33.4 GM/DL — SIGNIFICANT CHANGE UP (ref 32–36)
MCHC RBC-ENTMCNC: 33.5 G/DL — SIGNIFICANT CHANGE UP (ref 32–36)
MCHC RBC-ENTMCNC: 33.5 GM/DL — SIGNIFICANT CHANGE UP (ref 32–36)
MCHC RBC-ENTMCNC: 33.6 GM/DL — SIGNIFICANT CHANGE UP (ref 32–36)
MCHC RBC-ENTMCNC: 33.7 G/DL — SIGNIFICANT CHANGE UP (ref 32–36)
MCHC RBC-ENTMCNC: 33.7 GM/DL — SIGNIFICANT CHANGE UP (ref 32–36)
MCHC RBC-ENTMCNC: 33.8 G/DL — SIGNIFICANT CHANGE UP (ref 32–36)
MCHC RBC-ENTMCNC: 34 G/DL — SIGNIFICANT CHANGE UP (ref 32–36)
MCHC RBC-ENTMCNC: 34.1 G/DL — SIGNIFICANT CHANGE UP (ref 32–36)
MCHC RBC-ENTMCNC: 34.2 G/DL — SIGNIFICANT CHANGE UP (ref 32–36)
MCHC RBC-ENTMCNC: 34.4 G/DL — SIGNIFICANT CHANGE UP (ref 32–36)
MCHC RBC-ENTMCNC: 35 G/DL — SIGNIFICANT CHANGE UP (ref 32–36)
MCV RBC AUTO: 84.7 FL — SIGNIFICANT CHANGE UP (ref 80–100)
MCV RBC AUTO: 85.5 FL — SIGNIFICANT CHANGE UP (ref 80–100)
MCV RBC AUTO: 85.6 FL — SIGNIFICANT CHANGE UP (ref 80–100)
MCV RBC AUTO: 85.8 FL — SIGNIFICANT CHANGE UP (ref 80–100)
MCV RBC AUTO: 86.3 FL — SIGNIFICANT CHANGE UP (ref 80–100)
MCV RBC AUTO: 87.1 FL — SIGNIFICANT CHANGE UP (ref 80–100)
MCV RBC AUTO: 87.9 FL — SIGNIFICANT CHANGE UP (ref 80–100)
MCV RBC AUTO: 88.2 FL — SIGNIFICANT CHANGE UP (ref 80–100)
MCV RBC AUTO: 88.3 FL — SIGNIFICANT CHANGE UP (ref 80–100)
MCV RBC AUTO: 88.7 FL — SIGNIFICANT CHANGE UP (ref 80–100)
MCV RBC AUTO: 89.5 FL — SIGNIFICANT CHANGE UP (ref 80–100)
MCV RBC AUTO: 89.7 FL — SIGNIFICANT CHANGE UP (ref 80–100)
MCV RBC AUTO: 89.8 FL — SIGNIFICANT CHANGE UP (ref 80–100)
MCV RBC AUTO: 89.9 FL — SIGNIFICANT CHANGE UP (ref 80–100)
MCV RBC AUTO: 90.2 FL — SIGNIFICANT CHANGE UP (ref 80–100)
MCV RBC AUTO: 90.5 FL — SIGNIFICANT CHANGE UP (ref 80–100)
MCV RBC AUTO: 90.6 FL
MCV RBC AUTO: 92 FL — SIGNIFICANT CHANGE UP (ref 80–100)
MCV RBC AUTO: 92.8 FL — SIGNIFICANT CHANGE UP (ref 80–100)
MCV RBC AUTO: 93.1 FL — SIGNIFICANT CHANGE UP (ref 80–100)
MCV RBC AUTO: 93.4 FL — SIGNIFICANT CHANGE UP (ref 80–100)
MCV RBC AUTO: 93.7 FL — SIGNIFICANT CHANGE UP (ref 80–100)
MCV RBC AUTO: 94.1 FL — SIGNIFICANT CHANGE UP (ref 80–100)
MCV RBC AUTO: 95.6 FL — SIGNIFICANT CHANGE UP (ref 80–100)
MONOCYTES # BLD AUTO: 0.09 K/UL — SIGNIFICANT CHANGE UP (ref 0–0.9)
MONOCYTES # BLD AUTO: 0.34 K/UL — SIGNIFICANT CHANGE UP (ref 0–0.9)
MONOCYTES # BLD AUTO: 0.4 K/UL
MONOCYTES # BLD AUTO: 0.5 K/UL — SIGNIFICANT CHANGE UP (ref 0–0.9)
MONOCYTES # BLD AUTO: 0.6 K/UL — SIGNIFICANT CHANGE UP (ref 0–0.9)
MONOCYTES # BLD AUTO: 0.6 K/UL — SIGNIFICANT CHANGE UP (ref 0–0.9)
MONOCYTES # BLD AUTO: 0.7 K/UL — SIGNIFICANT CHANGE UP (ref 0–0.9)
MONOCYTES # BLD AUTO: 0.7 K/UL — SIGNIFICANT CHANGE UP (ref 0–0.9)
MONOCYTES # BLD AUTO: 0.78 K/UL — SIGNIFICANT CHANGE UP (ref 0–0.9)
MONOCYTES # BLD AUTO: 0.9 K/UL — SIGNIFICANT CHANGE UP (ref 0–0.9)
MONOCYTES # BLD AUTO: 1 K/UL — HIGH (ref 0–0.9)
MONOCYTES # BLD AUTO: 1 K/UL — HIGH (ref 0–0.9)
MONOCYTES # BLD AUTO: 1.1 K/UL — HIGH (ref 0–0.9)
MONOCYTES # BLD AUTO: 1.1 K/UL — HIGH (ref 0–0.9)
MONOCYTES NFR BLD AUTO: 1.3 % — LOW (ref 2–14)
MONOCYTES NFR BLD AUTO: 10 % — SIGNIFICANT CHANGE UP (ref 2–14)
MONOCYTES NFR BLD AUTO: 10.6 % — SIGNIFICANT CHANGE UP (ref 2–14)
MONOCYTES NFR BLD AUTO: 12 % — SIGNIFICANT CHANGE UP (ref 2–14)
MONOCYTES NFR BLD AUTO: 3 % — SIGNIFICANT CHANGE UP (ref 2–14)
MONOCYTES NFR BLD AUTO: 4 % — SIGNIFICANT CHANGE UP (ref 2–14)
MONOCYTES NFR BLD AUTO: 4.4 %
MONOCYTES NFR BLD AUTO: 5.2 % — SIGNIFICANT CHANGE UP (ref 2–14)
MONOCYTES NFR BLD AUTO: 6 % — SIGNIFICANT CHANGE UP (ref 2–14)
MONOCYTES NFR BLD AUTO: 6.3 % — SIGNIFICANT CHANGE UP (ref 2–14)
MONOCYTES NFR BLD AUTO: 7.2 % — SIGNIFICANT CHANGE UP (ref 2–14)
MONOCYTES NFR BLD AUTO: 8.2 % — SIGNIFICANT CHANGE UP (ref 2–14)
MONOCYTES NFR BLD AUTO: 8.6 % — SIGNIFICANT CHANGE UP (ref 2–14)
MONOCYTES NFR BLD AUTO: 8.8 % — SIGNIFICANT CHANGE UP (ref 2–14)
MONOCYTES NFR BLD AUTO: 8.9 % — SIGNIFICANT CHANGE UP (ref 2–14)
MONOCYTES NFR BLD AUTO: 9.6 % — SIGNIFICANT CHANGE UP (ref 2–14)
MONOCYTES NFR BLD AUTO: 9.6 % — SIGNIFICANT CHANGE UP (ref 2–14)
NEUTROPHILS # BLD AUTO: 10.17 K/UL — HIGH (ref 1.8–7.4)
NEUTROPHILS # BLD AUTO: 10.5 K/UL — HIGH (ref 1.8–7.4)
NEUTROPHILS # BLD AUTO: 17.6 K/UL — HIGH (ref 1.8–7.4)
NEUTROPHILS # BLD AUTO: 4.1 K/UL — SIGNIFICANT CHANGE UP (ref 1.8–7.4)
NEUTROPHILS # BLD AUTO: 5 K/UL — SIGNIFICANT CHANGE UP (ref 1.8–7.4)
NEUTROPHILS # BLD AUTO: 6.2 K/UL — SIGNIFICANT CHANGE UP (ref 1.8–7.4)
NEUTROPHILS # BLD AUTO: 6.27 K/UL — SIGNIFICANT CHANGE UP (ref 1.8–7.4)
NEUTROPHILS # BLD AUTO: 7.31 K/UL
NEUTROPHILS # BLD AUTO: 7.71 K/UL — HIGH (ref 1.8–7.4)
NEUTROPHILS # BLD AUTO: 7.8 K/UL — HIGH (ref 1.8–7.4)
NEUTROPHILS # BLD AUTO: 7.8 K/UL — HIGH (ref 1.8–7.4)
NEUTROPHILS # BLD AUTO: 8 K/UL — HIGH (ref 1.8–7.4)
NEUTROPHILS # BLD AUTO: 8 K/UL — HIGH (ref 1.8–7.4)
NEUTROPHILS # BLD AUTO: 8.3 K/UL — HIGH (ref 1.8–7.4)
NEUTROPHILS # BLD AUTO: 8.3 K/UL — HIGH (ref 1.8–7.4)
NEUTROPHILS # BLD AUTO: 8.37 K/UL — HIGH (ref 1.8–7.4)
NEUTROPHILS # BLD AUTO: 9.4 K/UL — HIGH (ref 1.8–7.4)
NEUTROPHILS NFR BLD AUTO: 54 % — SIGNIFICANT CHANGE UP (ref 43–77)
NEUTROPHILS NFR BLD AUTO: 69.8 % — SIGNIFICANT CHANGE UP (ref 43–77)
NEUTROPHILS NFR BLD AUTO: 73 % — SIGNIFICANT CHANGE UP (ref 43–77)
NEUTROPHILS NFR BLD AUTO: 74 % — SIGNIFICANT CHANGE UP (ref 43–77)
NEUTROPHILS NFR BLD AUTO: 74.2 % — SIGNIFICANT CHANGE UP (ref 43–77)
NEUTROPHILS NFR BLD AUTO: 74.9 % — SIGNIFICANT CHANGE UP (ref 43–77)
NEUTROPHILS NFR BLD AUTO: 76.9 % — SIGNIFICANT CHANGE UP (ref 43–77)
NEUTROPHILS NFR BLD AUTO: 77 % — SIGNIFICANT CHANGE UP (ref 43–77)
NEUTROPHILS NFR BLD AUTO: 79.1 % — HIGH (ref 43–77)
NEUTROPHILS NFR BLD AUTO: 80.4 %
NEUTROPHILS NFR BLD AUTO: 80.4 % — HIGH (ref 43–77)
NEUTROPHILS NFR BLD AUTO: 81.3 % — HIGH (ref 43–77)
NEUTROPHILS NFR BLD AUTO: 82 % — HIGH (ref 43–77)
NEUTROPHILS NFR BLD AUTO: 83 % — HIGH (ref 43–77)
NEUTROPHILS NFR BLD AUTO: 88 % — HIGH (ref 43–77)
NEUTROPHILS NFR BLD AUTO: 89.7 % — HIGH (ref 43–77)
NEUTROPHILS NFR BLD AUTO: 91.8 % — HIGH (ref 43–77)
NEUTS BAND # BLD: 2 % — SIGNIFICANT CHANGE UP (ref 0–8)
NEUTS BAND # BLD: 4 % — SIGNIFICANT CHANGE UP (ref 0–8)
NITRITE UR-MCNC: NEGATIVE — SIGNIFICANT CHANGE UP
NITRITE UR-MCNC: NEGATIVE — SIGNIFICANT CHANGE UP
NRBC # BLD: 0 /100 WBCS — SIGNIFICANT CHANGE UP (ref 0–0)
NT-PROBNP SERPL-SCNC: 351 PG/ML — HIGH (ref 0–125)
OB PNL STL: NEGATIVE — SIGNIFICANT CHANGE UP
OVALOCYTES BLD QL SMEAR: SLIGHT — SIGNIFICANT CHANGE UP
PH UR: 5 — SIGNIFICANT CHANGE UP (ref 5–8)
PH UR: 6 — SIGNIFICANT CHANGE UP (ref 5–8)
PHOSPHATE SERPL-MCNC: 2.1 MG/DL — LOW (ref 2.5–4.5)
PHOSPHATE SERPL-MCNC: 2.3 MG/DL — LOW (ref 2.5–4.5)
PLAT MORPH BLD: ABNORMAL
PLAT MORPH BLD: ABNORMAL
PLAT MORPH BLD: NORMAL — SIGNIFICANT CHANGE UP
PLATELET # BLD AUTO: 123 K/UL — LOW (ref 150–400)
PLATELET # BLD AUTO: 148 K/UL — LOW (ref 150–400)
PLATELET # BLD AUTO: 199 K/UL — SIGNIFICANT CHANGE UP (ref 150–400)
PLATELET # BLD AUTO: 252 K/UL — SIGNIFICANT CHANGE UP (ref 150–400)
PLATELET # BLD AUTO: 263 K/UL — SIGNIFICANT CHANGE UP (ref 150–400)
PLATELET # BLD AUTO: 265 K/UL — SIGNIFICANT CHANGE UP (ref 150–400)
PLATELET # BLD AUTO: 280 K/UL — SIGNIFICANT CHANGE UP (ref 150–400)
PLATELET # BLD AUTO: 283 K/UL — SIGNIFICANT CHANGE UP (ref 150–400)
PLATELET # BLD AUTO: 283 K/UL — SIGNIFICANT CHANGE UP (ref 150–400)
PLATELET # BLD AUTO: 295 K/UL — SIGNIFICANT CHANGE UP (ref 150–400)
PLATELET # BLD AUTO: 304 K/UL — SIGNIFICANT CHANGE UP (ref 150–400)
PLATELET # BLD AUTO: 308 K/UL — SIGNIFICANT CHANGE UP (ref 150–400)
PLATELET # BLD AUTO: 319 K/UL — SIGNIFICANT CHANGE UP (ref 150–400)
PLATELET # BLD AUTO: 325 K/UL — SIGNIFICANT CHANGE UP (ref 150–400)
PLATELET # BLD AUTO: 343 K/UL — SIGNIFICANT CHANGE UP (ref 150–400)
PLATELET # BLD AUTO: 378 K/UL — SIGNIFICANT CHANGE UP (ref 150–400)
PLATELET # BLD AUTO: 61 K/UL — LOW (ref 150–400)
PLATELET # BLD AUTO: 68 K/UL — LOW (ref 150–400)
PLATELET # BLD AUTO: 69 K/UL — LOW (ref 150–400)
PLATELET # BLD AUTO: 78 K/UL — LOW (ref 150–400)
PLATELET # BLD AUTO: 79 K/UL — LOW (ref 150–400)
PLATELET # BLD AUTO: 88 K/UL — LOW (ref 150–400)
PLATELET # BLD AUTO: 88 K/UL — LOW (ref 150–400)
PLATELET # BLD AUTO: NORMAL K/UL
PLATELET COUNT - ESTIMATE: NORMAL — SIGNIFICANT CHANGE UP
POIKILOCYTOSIS BLD QL AUTO: SLIGHT — SIGNIFICANT CHANGE UP
POIKILOCYTOSIS BLD QL AUTO: SLIGHT — SIGNIFICANT CHANGE UP
POLYCHROMASIA BLD QL SMEAR: SLIGHT — SIGNIFICANT CHANGE UP
POLYCHROMASIA BLD QL SMEAR: SLIGHT — SIGNIFICANT CHANGE UP
POTASSIUM SERPL-MCNC: 2.8 MMOL/L — CRITICAL LOW (ref 3.5–5.3)
POTASSIUM SERPL-MCNC: 3.5 MMOL/L — SIGNIFICANT CHANGE UP (ref 3.5–5.3)
POTASSIUM SERPL-MCNC: 3.5 MMOL/L — SIGNIFICANT CHANGE UP (ref 3.5–5.3)
POTASSIUM SERPL-MCNC: 3.7 MMOL/L — SIGNIFICANT CHANGE UP (ref 3.5–5.3)
POTASSIUM SERPL-MCNC: 3.7 MMOL/L — SIGNIFICANT CHANGE UP (ref 3.5–5.3)
POTASSIUM SERPL-MCNC: 4 MMOL/L — SIGNIFICANT CHANGE UP (ref 3.5–5.3)
POTASSIUM SERPL-MCNC: 4 MMOL/L — SIGNIFICANT CHANGE UP (ref 3.5–5.3)
POTASSIUM SERPL-MCNC: 4.4 MMOL/L — SIGNIFICANT CHANGE UP (ref 3.5–5.3)
POTASSIUM SERPL-MCNC: 4.7 MMOL/L — SIGNIFICANT CHANGE UP (ref 3.5–5.3)
POTASSIUM SERPL-SCNC: 2.8 MMOL/L — CRITICAL LOW (ref 3.5–5.3)
POTASSIUM SERPL-SCNC: 3.5 MMOL/L — SIGNIFICANT CHANGE UP (ref 3.5–5.3)
POTASSIUM SERPL-SCNC: 3.5 MMOL/L — SIGNIFICANT CHANGE UP (ref 3.5–5.3)
POTASSIUM SERPL-SCNC: 3.7 MMOL/L — SIGNIFICANT CHANGE UP (ref 3.5–5.3)
POTASSIUM SERPL-SCNC: 3.7 MMOL/L — SIGNIFICANT CHANGE UP (ref 3.5–5.3)
POTASSIUM SERPL-SCNC: 3.9 MMOL/L
POTASSIUM SERPL-SCNC: 4 MMOL/L — SIGNIFICANT CHANGE UP (ref 3.5–5.3)
POTASSIUM SERPL-SCNC: 4 MMOL/L — SIGNIFICANT CHANGE UP (ref 3.5–5.3)
POTASSIUM SERPL-SCNC: 4.4 MMOL/L — SIGNIFICANT CHANGE UP (ref 3.5–5.3)
POTASSIUM SERPL-SCNC: 4.7 MMOL/L — SIGNIFICANT CHANGE UP (ref 3.5–5.3)
PROT SERPL-MCNC: 6.2 GM/DL — SIGNIFICANT CHANGE UP (ref 6–8.3)
PROT SERPL-MCNC: 6.6 GM/DL — SIGNIFICANT CHANGE UP (ref 6–8.3)
PROT SERPL-MCNC: 6.7 GM/DL — SIGNIFICANT CHANGE UP (ref 6–8.3)
PROT SERPL-MCNC: 7 GM/DL — SIGNIFICANT CHANGE UP (ref 6–8.3)
PROT SERPL-MCNC: 7.1 G/DL
PROT SERPL-MCNC: 7.5 GM/DL — SIGNIFICANT CHANGE UP (ref 6–8.3)
PROT UR-MCNC: 15 MG/DL
PROT UR-MCNC: NEGATIVE MG/DL — SIGNIFICANT CHANGE UP
PROTHROM AB SERPL-ACNC: 11 SEC — SIGNIFICANT CHANGE UP (ref 9.8–12.7)
PROTHROM AB SERPL-ACNC: 12.5 SEC — SIGNIFICANT CHANGE UP (ref 9.8–12.7)
RBC # BLD: 3.33 M/UL — LOW (ref 3.8–5.2)
RBC # BLD: 3.36 M/UL — LOW (ref 3.8–5.2)
RBC # BLD: 3.38 M/UL — LOW (ref 3.8–5.2)
RBC # BLD: 3.41 M/UL — LOW (ref 3.8–5.2)
RBC # BLD: 3.52 M/UL — LOW (ref 3.8–5.2)
RBC # BLD: 3.58 M/UL — LOW (ref 3.8–5.2)
RBC # BLD: 3.64 M/UL — LOW (ref 3.8–5.2)
RBC # BLD: 3.76 M/UL — LOW (ref 3.8–5.2)
RBC # BLD: 3.8 M/UL — SIGNIFICANT CHANGE UP (ref 3.8–5.2)
RBC # BLD: 3.84 M/UL
RBC # BLD: 3.88 M/UL — SIGNIFICANT CHANGE UP (ref 3.8–5.2)
RBC # BLD: 3.89 M/UL — SIGNIFICANT CHANGE UP (ref 3.8–5.2)
RBC # BLD: 3.9 M/UL — SIGNIFICANT CHANGE UP (ref 3.8–5.2)
RBC # BLD: 3.97 M/UL — SIGNIFICANT CHANGE UP (ref 3.8–5.2)
RBC # BLD: 3.97 M/UL — SIGNIFICANT CHANGE UP (ref 3.8–5.2)
RBC # BLD: 4.15 M/UL — SIGNIFICANT CHANGE UP (ref 3.8–5.2)
RBC # BLD: 4.24 M/UL — SIGNIFICANT CHANGE UP (ref 3.8–5.2)
RBC # BLD: 4.28 M/UL — SIGNIFICANT CHANGE UP (ref 3.8–5.2)
RBC # BLD: 4.32 M/UL — SIGNIFICANT CHANGE UP (ref 3.8–5.2)
RBC # BLD: 4.49 M/UL — SIGNIFICANT CHANGE UP (ref 3.8–5.2)
RBC # BLD: 4.56 M/UL — SIGNIFICANT CHANGE UP (ref 3.8–5.2)
RBC # BLD: 4.6 M/UL — SIGNIFICANT CHANGE UP (ref 3.8–5.2)
RBC # BLD: 4.67 M/UL — SIGNIFICANT CHANGE UP (ref 3.8–5.2)
RBC # BLD: 4.84 M/UL — SIGNIFICANT CHANGE UP (ref 3.8–5.2)
RBC # FLD: 13.1 % — SIGNIFICANT CHANGE UP (ref 10.3–14.5)
RBC # FLD: 13.2 % — SIGNIFICANT CHANGE UP (ref 10.3–14.5)
RBC # FLD: 13.4 % — SIGNIFICANT CHANGE UP (ref 10.3–14.5)
RBC # FLD: 13.5 % — SIGNIFICANT CHANGE UP (ref 10.3–14.5)
RBC # FLD: 13.7 % — SIGNIFICANT CHANGE UP (ref 10.3–14.5)
RBC # FLD: 14.2 %
RBC # FLD: 14.2 % — SIGNIFICANT CHANGE UP (ref 10.3–14.5)
RBC # FLD: 14.3 % — SIGNIFICANT CHANGE UP (ref 10.3–14.5)
RBC # FLD: 14.9 % — HIGH (ref 10.3–14.5)
RBC # FLD: 15 % — HIGH (ref 10.3–14.5)
RBC # FLD: 16.5 % — HIGH (ref 10.3–14.5)
RBC # FLD: 16.8 % — HIGH (ref 10.3–14.5)
RBC # FLD: 18.1 % — HIGH (ref 10.3–14.5)
RBC # FLD: 18.9 % — HIGH (ref 10.3–14.5)
RBC # FLD: 19 % — HIGH (ref 10.3–14.5)
RBC # FLD: 19.2 % — HIGH (ref 10.3–14.5)
RBC # FLD: 19.3 % — HIGH (ref 10.3–14.5)
RBC # FLD: 19.3 % — HIGH (ref 10.3–14.5)
RBC # FLD: 19.5 % — HIGH (ref 10.3–14.5)
RBC # FLD: 19.7 % — HIGH (ref 10.3–14.5)
RBC # FLD: 19.7 % — HIGH (ref 10.3–14.5)
RBC # FLD: 19.8 % — HIGH (ref 10.3–14.5)
RBC BLD AUTO: ABNORMAL
RBC BLD AUTO: ABNORMAL
RBC BLD AUTO: NORMAL — SIGNIFICANT CHANGE UP
RBC BLD AUTO: SIGNIFICANT CHANGE UP
RBC BLD AUTO: SIGNIFICANT CHANGE UP
RBC CASTS # UR COMP ASSIST: ABNORMAL /HPF (ref 0–4)
RBC CASTS # UR COMP ASSIST: SIGNIFICANT CHANGE UP /HPF (ref 0–4)
SODIUM SERPL-SCNC: 139 MMOL/L — SIGNIFICANT CHANGE UP (ref 135–145)
SODIUM SERPL-SCNC: 140 MMOL/L
SODIUM SERPL-SCNC: 140 MMOL/L — SIGNIFICANT CHANGE UP (ref 135–145)
SODIUM SERPL-SCNC: 140 MMOL/L — SIGNIFICANT CHANGE UP (ref 135–145)
SODIUM SERPL-SCNC: 142 MMOL/L — SIGNIFICANT CHANGE UP (ref 135–145)
SODIUM SERPL-SCNC: 143 MMOL/L — SIGNIFICANT CHANGE UP (ref 135–145)
SP GR SPEC: 1 — LOW (ref 1.01–1.02)
SP GR SPEC: 1.01 — SIGNIFICANT CHANGE UP (ref 1.01–1.02)
SPECIMEN SOURCE: SIGNIFICANT CHANGE UP
SPECIMEN SOURCE: SIGNIFICANT CHANGE UP
SURGICAL PATHOLOGY FINAL REPORT - CH: SIGNIFICANT CHANGE UP
T4 AB SER-ACNC: 8.3 UG/DL — SIGNIFICANT CHANGE UP (ref 4.6–12)
TIBC SERPL-MCNC: 294 UG/DL — SIGNIFICANT CHANGE UP (ref 220–430)
TIBC SERPL-MCNC: 319 UG/DL — SIGNIFICANT CHANGE UP (ref 220–430)
TOTAL CHOLESTEROL/HDL RATIO MEASUREMENT: 3.2 RATIO — LOW (ref 3.3–7.1)
TOTAL CHOLESTEROL/HDL RATIO MEASUREMENT: 4.7 RATIO — SIGNIFICANT CHANGE UP (ref 3.3–7.1)
TRIGL SERPL-MCNC: 134 MG/DL — SIGNIFICANT CHANGE UP (ref 10–149)
TRIGL SERPL-MCNC: 164 MG/DL — HIGH (ref 10–149)
TROPONIN I SERPL-MCNC: <0.015 NG/ML — SIGNIFICANT CHANGE UP (ref 0.01–0.04)
TSH SERPL-MCNC: 1.26 UU/ML — SIGNIFICANT CHANGE UP (ref 0.34–4.82)
TYPE + AB SCN PNL BLD: SIGNIFICANT CHANGE UP
UIBC SERPL-MCNC: 260 UG/DL — SIGNIFICANT CHANGE UP (ref 110–370)
UIBC SERPL-MCNC: 291 UG/DL — SIGNIFICANT CHANGE UP (ref 110–370)
UROBILINOGEN FLD QL: NEGATIVE MG/DL — SIGNIFICANT CHANGE UP
UROBILINOGEN FLD QL: NEGATIVE MG/DL — SIGNIFICANT CHANGE UP
VIT B12 SERPL-MCNC: 754 PG/ML — SIGNIFICANT CHANGE UP (ref 232–1245)
VIT B12 SERPL-MCNC: 873 PG/ML — SIGNIFICANT CHANGE UP (ref 232–1245)
WBC # BLD: 10.1 K/UL — SIGNIFICANT CHANGE UP (ref 3.8–10.5)
WBC # BLD: 10.2 K/UL — SIGNIFICANT CHANGE UP (ref 3.8–10.5)
WBC # BLD: 10.35 K/UL — SIGNIFICANT CHANGE UP (ref 3.8–10.5)
WBC # BLD: 10.4 K/UL — SIGNIFICANT CHANGE UP (ref 3.8–10.5)
WBC # BLD: 10.5 K/UL — SIGNIFICANT CHANGE UP (ref 3.8–10.5)
WBC # BLD: 10.8 K/UL — HIGH (ref 3.8–10.5)
WBC # BLD: 10.95 K/UL — HIGH (ref 3.8–10.5)
WBC # BLD: 11.34 K/UL — HIGH (ref 3.8–10.5)
WBC # BLD: 11.87 K/UL — HIGH (ref 3.8–10.5)
WBC # BLD: 12.2 K/UL — HIGH (ref 3.8–10.5)
WBC # BLD: 13 K/UL — HIGH (ref 3.8–10.5)
WBC # BLD: 13.94 K/UL — HIGH (ref 3.8–10.5)
WBC # BLD: 15.28 K/UL — HIGH (ref 3.8–10.5)
WBC # BLD: 16.72 K/UL — HIGH (ref 3.8–10.5)
WBC # BLD: 20 K/UL — HIGH (ref 3.8–10.5)
WBC # BLD: 5.8 K/UL — SIGNIFICANT CHANGE UP (ref 3.8–10.5)
WBC # BLD: 6.84 K/UL — SIGNIFICANT CHANGE UP (ref 3.8–10.5)
WBC # BLD: 7.1 K/UL — SIGNIFICANT CHANGE UP (ref 3.8–10.5)
WBC # BLD: 8.11 K/UL — SIGNIFICANT CHANGE UP (ref 3.8–10.5)
WBC # BLD: 8.4 K/UL — SIGNIFICANT CHANGE UP (ref 3.8–10.5)
WBC # BLD: 9.49 K/UL — SIGNIFICANT CHANGE UP (ref 3.8–10.5)
WBC # BLD: 9.52 K/UL — SIGNIFICANT CHANGE UP (ref 3.8–10.5)
WBC # BLD: 9.7 K/UL — SIGNIFICANT CHANGE UP (ref 3.8–10.5)
WBC # FLD AUTO: 10.1 K/UL — SIGNIFICANT CHANGE UP (ref 3.8–10.5)
WBC # FLD AUTO: 10.2 K/UL — SIGNIFICANT CHANGE UP (ref 3.8–10.5)
WBC # FLD AUTO: 10.35 K/UL — SIGNIFICANT CHANGE UP (ref 3.8–10.5)
WBC # FLD AUTO: 10.4 K/UL — SIGNIFICANT CHANGE UP (ref 3.8–10.5)
WBC # FLD AUTO: 10.5 K/UL — SIGNIFICANT CHANGE UP (ref 3.8–10.5)
WBC # FLD AUTO: 10.8 K/UL — HIGH (ref 3.8–10.5)
WBC # FLD AUTO: 10.95 K/UL — HIGH (ref 3.8–10.5)
WBC # FLD AUTO: 11.34 K/UL — HIGH (ref 3.8–10.5)
WBC # FLD AUTO: 11.87 K/UL — HIGH (ref 3.8–10.5)
WBC # FLD AUTO: 12.2 K/UL — HIGH (ref 3.8–10.5)
WBC # FLD AUTO: 13 K/UL — HIGH (ref 3.8–10.5)
WBC # FLD AUTO: 13.94 K/UL — HIGH (ref 3.8–10.5)
WBC # FLD AUTO: 15.28 K/UL — HIGH (ref 3.8–10.5)
WBC # FLD AUTO: 16.72 K/UL — HIGH (ref 3.8–10.5)
WBC # FLD AUTO: 20 K/UL — HIGH (ref 3.8–10.5)
WBC # FLD AUTO: 5.8 K/UL — SIGNIFICANT CHANGE UP (ref 3.8–10.5)
WBC # FLD AUTO: 6.84 K/UL — SIGNIFICANT CHANGE UP (ref 3.8–10.5)
WBC # FLD AUTO: 7.1 K/UL — SIGNIFICANT CHANGE UP (ref 3.8–10.5)
WBC # FLD AUTO: 8.11 K/UL — SIGNIFICANT CHANGE UP (ref 3.8–10.5)
WBC # FLD AUTO: 8.4 K/UL — SIGNIFICANT CHANGE UP (ref 3.8–10.5)
WBC # FLD AUTO: 9.1 K/UL
WBC # FLD AUTO: 9.49 K/UL — SIGNIFICANT CHANGE UP (ref 3.8–10.5)
WBC # FLD AUTO: 9.52 K/UL — SIGNIFICANT CHANGE UP (ref 3.8–10.5)
WBC # FLD AUTO: 9.7 K/UL — SIGNIFICANT CHANGE UP (ref 3.8–10.5)
WBC UR QL: SIGNIFICANT CHANGE UP
WBC UR QL: SIGNIFICANT CHANGE UP

## 2018-01-01 PROCEDURE — 70553 MRI BRAIN STEM W/O & W/DYE: CPT | Mod: 26

## 2018-01-01 PROCEDURE — 73060 X-RAY EXAM OF HUMERUS: CPT | Mod: 26,RT

## 2018-01-01 PROCEDURE — 99213 OFFICE O/P EST LOW 20 MIN: CPT

## 2018-01-01 PROCEDURE — 71045 X-RAY EXAM CHEST 1 VIEW: CPT | Mod: 26

## 2018-01-01 PROCEDURE — 70553 MRI BRAIN STEM W/O & W/DYE: CPT

## 2018-01-01 PROCEDURE — 82565 ASSAY OF CREATININE: CPT

## 2018-01-01 PROCEDURE — 99215 OFFICE O/P EST HI 40 MIN: CPT

## 2018-01-01 PROCEDURE — 73080 X-RAY EXAM OF ELBOW: CPT | Mod: 26,LT

## 2018-01-01 PROCEDURE — A9585: CPT

## 2018-01-01 PROCEDURE — 99245 OFF/OP CONSLTJ NEW/EST HI 55: CPT

## 2018-01-01 PROCEDURE — 70450 CT HEAD/BRAIN W/O DYE: CPT | Mod: 26

## 2018-01-01 PROCEDURE — 23650 CLTX SHO DSLC W/MNPJ WO ANES: CPT | Mod: RT

## 2018-01-01 PROCEDURE — 73610 X-RAY EXAM OF ANKLE: CPT | Mod: 26,LT

## 2018-01-01 PROCEDURE — 99214 OFFICE O/P EST MOD 30 MIN: CPT

## 2018-01-01 PROCEDURE — 93010 ELECTROCARDIOGRAM REPORT: CPT

## 2018-01-01 PROCEDURE — 76937 US GUIDE VASCULAR ACCESS: CPT | Mod: 26

## 2018-01-01 PROCEDURE — 99285 EMERGENCY DEPT VISIT HI MDM: CPT

## 2018-01-01 PROCEDURE — 88360 TUMOR IMMUNOHISTOCHEM/MANUAL: CPT | Mod: 26

## 2018-01-01 PROCEDURE — 36561 INSERT TUNNELED CV CATH: CPT

## 2018-01-01 PROCEDURE — C1788: CPT

## 2018-01-01 PROCEDURE — 76937 US GUIDE VASCULAR ACCESS: CPT

## 2018-01-01 PROCEDURE — 77001 FLUOROGUIDE FOR VEIN DEVICE: CPT | Mod: 26

## 2018-01-01 PROCEDURE — 99283 EMERGENCY DEPT VISIT LOW MDM: CPT

## 2018-01-01 PROCEDURE — C1769: CPT

## 2018-01-01 PROCEDURE — 71046 X-RAY EXAM CHEST 2 VIEWS: CPT | Mod: 26

## 2018-01-01 PROCEDURE — 88331 PATH CONSLTJ SURG 1 BLK 1SPC: CPT | Mod: 26

## 2018-01-01 PROCEDURE — 99232 SBSQ HOSP IP/OBS MODERATE 35: CPT

## 2018-01-01 PROCEDURE — 73100 X-RAY EXAM OF WRIST: CPT | Mod: 26

## 2018-01-01 PROCEDURE — 73030 X-RAY EXAM OF SHOULDER: CPT | Mod: 26,RT

## 2018-01-01 PROCEDURE — 77001 FLUOROGUIDE FOR VEIN DEVICE: CPT

## 2018-01-01 PROCEDURE — 61781 SCAN PROC CRANIAL INTRA: CPT

## 2018-01-01 PROCEDURE — 88307 TISSUE EXAM BY PATHOLOGIST: CPT | Mod: 26

## 2018-01-01 PROCEDURE — 61510 CRNEC TREPH EXC BRN TUM STTL: CPT

## 2018-01-01 PROCEDURE — 99284 EMERGENCY DEPT VISIT MOD MDM: CPT | Mod: 25

## 2018-01-01 PROCEDURE — 99024 POSTOP FOLLOW-UP VISIT: CPT

## 2018-01-01 PROCEDURE — C1894: CPT

## 2018-01-01 PROCEDURE — 70547 MR ANGIOGRAPHY NECK W/O DYE: CPT | Mod: 26

## 2018-01-01 PROCEDURE — 99253 IP/OBS CNSLTJ NEW/EST LOW 45: CPT | Mod: 24

## 2018-01-01 PROCEDURE — 95819 EEG AWAKE AND ASLEEP: CPT | Mod: 26

## 2018-01-01 RX ORDER — ALPRAZOLAM 0.25 MG
1 TABLET ORAL
Qty: 0 | Refills: 0 | COMMUNITY
Start: 2018-01-01

## 2018-01-01 RX ORDER — DEXAMETHASONE 0.5 MG/5ML
2 ELIXIR ORAL EVERY 8 HOURS
Qty: 0 | Refills: 0 | Status: DISCONTINUED | OUTPATIENT
Start: 2018-01-01 | End: 2018-01-01

## 2018-01-01 RX ORDER — ASPIRIN/CALCIUM CARB/MAGNESIUM 324 MG
325 TABLET ORAL DAILY
Qty: 0 | Refills: 0 | Status: DISCONTINUED | OUTPATIENT
Start: 2018-01-01 | End: 2018-01-01

## 2018-01-01 RX ORDER — QUETIAPINE FUMARATE 200 MG/1
12.5 TABLET, FILM COATED ORAL DAILY
Qty: 0 | Refills: 0 | Status: DISCONTINUED | OUTPATIENT
Start: 2018-01-01 | End: 2018-01-01

## 2018-01-01 RX ORDER — METOPROLOL TARTRATE 50 MG
50 TABLET ORAL DAILY
Qty: 0 | Refills: 0 | Status: DISCONTINUED | OUTPATIENT
Start: 2018-01-01 | End: 2018-01-01

## 2018-01-01 RX ORDER — CLOPIDOGREL BISULFATE 75 MG/1
1 TABLET, FILM COATED ORAL
Qty: 0 | Refills: 0 | COMMUNITY

## 2018-01-01 RX ORDER — DEXAMETHASONE 0.5 MG/5ML
4 ELIXIR ORAL EVERY 6 HOURS
Qty: 0 | Refills: 0 | Status: DISCONTINUED | OUTPATIENT
Start: 2018-01-01 | End: 2018-01-01

## 2018-01-01 RX ORDER — QUETIAPINE FUMARATE 200 MG/1
12.5 TABLET, FILM COATED ORAL
Qty: 0 | Refills: 0 | COMMUNITY
Start: 2018-01-01

## 2018-01-01 RX ORDER — LOSARTAN POTASSIUM 100 MG/1
1 TABLET, FILM COATED ORAL
Qty: 0 | Refills: 0 | COMMUNITY

## 2018-01-01 RX ORDER — DEXAMETHASONE 0.5 MG/5ML
2 ELIXIR ORAL EVERY 12 HOURS
Qty: 0 | Refills: 0 | Status: DISCONTINUED | OUTPATIENT
Start: 2018-01-01 | End: 2018-01-01

## 2018-01-01 RX ORDER — POTASSIUM CHLORIDE 20 MEQ
10 PACKET (EA) ORAL
Qty: 0 | Refills: 0 | Status: COMPLETED | OUTPATIENT
Start: 2018-01-01 | End: 2018-01-01

## 2018-01-01 RX ORDER — INSULIN LISPRO 100/ML
VIAL (ML) SUBCUTANEOUS AT BEDTIME
Qty: 0 | Refills: 0 | Status: DISCONTINUED | OUTPATIENT
Start: 2018-01-01 | End: 2018-01-01

## 2018-01-01 RX ORDER — LOSARTAN POTASSIUM 100 MG/1
50 TABLET, FILM COATED ORAL DAILY
Qty: 0 | Refills: 0 | Status: DISCONTINUED | OUTPATIENT
Start: 2018-01-01 | End: 2018-01-01

## 2018-01-01 RX ORDER — LEVETIRACETAM 250 MG/1
1000 TABLET, FILM COATED ORAL ONCE
Qty: 0 | Refills: 0 | Status: DISCONTINUED | OUTPATIENT
Start: 2018-01-01 | End: 2018-01-01

## 2018-01-01 RX ORDER — INSULIN LISPRO 100/ML
1 VIAL (ML) SUBCUTANEOUS
Qty: 0 | Refills: 0 | COMMUNITY
Start: 2018-01-01

## 2018-01-01 RX ORDER — QUETIAPINE FUMARATE 200 MG/1
12.5 TABLET, FILM COATED ORAL AT BEDTIME
Qty: 0 | Refills: 0 | Status: DISCONTINUED | OUTPATIENT
Start: 2018-01-01 | End: 2018-01-01

## 2018-01-01 RX ORDER — DEXTROSE 50 % IN WATER 50 %
12.5 SYRINGE (ML) INTRAVENOUS ONCE
Qty: 0 | Refills: 0 | Status: DISCONTINUED | OUTPATIENT
Start: 2018-01-01 | End: 2018-01-01

## 2018-01-01 RX ORDER — LEVETIRACETAM 500 MG/1
500 TABLET, FILM COATED ORAL
Qty: 30 | Refills: 2 | Status: ACTIVE | COMMUNITY
Start: 2017-12-11 | End: 1900-01-01

## 2018-01-01 RX ORDER — PANTOPRAZOLE SODIUM 20 MG/1
40 TABLET, DELAYED RELEASE ORAL
Qty: 0 | Refills: 0 | Status: DISCONTINUED | OUTPATIENT
Start: 2018-01-01 | End: 2018-01-01

## 2018-01-01 RX ORDER — SULFAMETHOXAZOLE AND TRIMETHOPRIM 800; 160 MG/1; MG/1
800-160 TABLET ORAL TWICE DAILY
Qty: 14 | Refills: 0 | Status: ACTIVE | COMMUNITY
Start: 2018-01-01 | End: 1900-01-01

## 2018-01-01 RX ORDER — AMLODIPINE BESYLATE 2.5 MG/1
5 TABLET ORAL DAILY
Qty: 0 | Refills: 0 | Status: DISCONTINUED | OUTPATIENT
Start: 2018-01-01 | End: 2018-01-01

## 2018-01-01 RX ORDER — ACETAMINOPHEN 500 MG
1000 TABLET ORAL ONCE
Qty: 0 | Refills: 0 | Status: COMPLETED | OUTPATIENT
Start: 2018-01-01 | End: 2018-01-01

## 2018-01-01 RX ORDER — DEXTROSE 50 % IN WATER 50 %
15 SYRINGE (ML) INTRAVENOUS ONCE
Qty: 0 | Refills: 0 | Status: DISCONTINUED | OUTPATIENT
Start: 2018-01-01 | End: 2018-01-01

## 2018-01-01 RX ORDER — PANTOPRAZOLE SODIUM 20 MG/1
1 TABLET, DELAYED RELEASE ORAL
Qty: 0 | Refills: 0 | COMMUNITY
Start: 2018-01-01

## 2018-01-01 RX ORDER — LEVETIRACETAM 250 MG/1
1000 TABLET, FILM COATED ORAL AT BEDTIME
Qty: 0 | Refills: 0 | Status: DISCONTINUED | OUTPATIENT
Start: 2018-01-01 | End: 2018-01-01

## 2018-01-01 RX ORDER — PANTOPRAZOLE SODIUM 20 MG/1
40 TABLET, DELAYED RELEASE ORAL EVERY 12 HOURS
Qty: 0 | Refills: 0 | Status: DISCONTINUED | OUTPATIENT
Start: 2018-01-01 | End: 2018-01-01

## 2018-01-01 RX ORDER — FENTANYL CITRATE 50 UG/ML
50 INJECTION INTRAVENOUS
Qty: 0 | Refills: 0 | Status: DISCONTINUED | OUTPATIENT
Start: 2018-01-01 | End: 2018-01-01

## 2018-01-01 RX ORDER — ALPRAZOLAM 0.25 MG
0.25 TABLET ORAL EVERY 6 HOURS
Qty: 0 | Refills: 0 | Status: DISCONTINUED | OUTPATIENT
Start: 2018-01-01 | End: 2018-01-01

## 2018-01-01 RX ORDER — ACETAMINOPHEN 500 MG
650 TABLET ORAL EVERY 6 HOURS
Qty: 0 | Refills: 0 | Status: DISCONTINUED | OUTPATIENT
Start: 2018-01-01 | End: 2018-01-01

## 2018-01-01 RX ORDER — ATORVASTATIN CALCIUM 80 MG/1
40 TABLET, FILM COATED ORAL AT BEDTIME
Qty: 0 | Refills: 0 | Status: DISCONTINUED | OUTPATIENT
Start: 2018-01-01 | End: 2018-01-01

## 2018-01-01 RX ORDER — DEXAMETHASONE 0.5 MG/5ML
8 ELIXIR ORAL ONCE
Qty: 0 | Refills: 0 | Status: DISCONTINUED | OUTPATIENT
Start: 2018-01-01 | End: 2018-01-01

## 2018-01-01 RX ORDER — LEVETIRACETAM 250 MG/1
500 TABLET, FILM COATED ORAL
Qty: 0 | Refills: 0 | Status: DISCONTINUED | OUTPATIENT
Start: 2018-01-01 | End: 2018-01-01

## 2018-01-01 RX ORDER — ACETAMINOPHEN 500 MG
1000 TABLET ORAL ONCE
Qty: 0 | Refills: 0 | Status: COMPLETED | OUTPATIENT
Start: 2018-01-01 | End: 2019-05-06

## 2018-01-01 RX ORDER — POTASSIUM CHLORIDE 20 MEQ
40 PACKET (EA) ORAL EVERY 4 HOURS
Qty: 0 | Refills: 0 | Status: COMPLETED | OUTPATIENT
Start: 2018-01-01 | End: 2018-01-01

## 2018-01-01 RX ORDER — FAMOTIDINE 10 MG/ML
20 INJECTION INTRAVENOUS EVERY 12 HOURS
Qty: 0 | Refills: 0 | Status: DISCONTINUED | OUTPATIENT
Start: 2018-01-01 | End: 2018-01-01

## 2018-01-01 RX ORDER — DEXAMETHASONE 0.5 MG/5ML
1 ELIXIR ORAL
Qty: 0 | Refills: 0 | COMMUNITY
Start: 2018-01-01

## 2018-01-01 RX ORDER — ACETAMINOPHEN 500 MG
1000 TABLET ORAL ONCE
Qty: 0 | Refills: 0 | Status: DISCONTINUED | OUTPATIENT
Start: 2018-01-01 | End: 2018-01-01

## 2018-01-01 RX ORDER — ALPRAZOLAM 0.25 MG
0.25 TABLET ORAL ONCE
Qty: 0 | Refills: 0 | Status: DISCONTINUED | OUTPATIENT
Start: 2018-01-01 | End: 2018-01-01

## 2018-01-01 RX ORDER — TEMOZOLOMIDE 140 MG/1
140 CAPSULE ORAL
Qty: 5 | Refills: 5 | Status: DISCONTINUED | COMMUNITY
Start: 2017-11-21 | End: 2018-01-01

## 2018-01-01 RX ORDER — HEPARIN SODIUM 5000 [USP'U]/ML
5000 INJECTION INTRAVENOUS; SUBCUTANEOUS EVERY 8 HOURS
Qty: 0 | Refills: 0 | Status: DISCONTINUED | OUTPATIENT
Start: 2018-01-01 | End: 2018-01-01

## 2018-01-01 RX ORDER — INFLUENZA VIRUS VACCINE 15; 15; 15; 15 UG/.5ML; UG/.5ML; UG/.5ML; UG/.5ML
0.5 SUSPENSION INTRAMUSCULAR ONCE
Qty: 0 | Refills: 0 | Status: DISCONTINUED | OUTPATIENT
Start: 2018-01-01 | End: 2018-01-01

## 2018-01-01 RX ORDER — ONDANSETRON 8 MG/1
8 TABLET, ORALLY DISINTEGRATING ORAL
Qty: 30 | Refills: 0 | Status: DISCONTINUED | COMMUNITY
Start: 2018-01-02 | End: 2018-01-01

## 2018-01-01 RX ORDER — FENTANYL CITRATE 50 UG/ML
25 INJECTION INTRAVENOUS
Qty: 0 | Refills: 0 | Status: DISCONTINUED | OUTPATIENT
Start: 2018-01-01 | End: 2018-01-01

## 2018-01-01 RX ORDER — ONDANSETRON 8 MG/1
4 TABLET, FILM COATED ORAL EVERY 6 HOURS
Qty: 0 | Refills: 0 | Status: DISCONTINUED | OUTPATIENT
Start: 2018-01-01 | End: 2018-01-01

## 2018-01-01 RX ORDER — SENNA PLUS 8.6 MG/1
2 TABLET ORAL AT BEDTIME
Qty: 0 | Refills: 0 | Status: DISCONTINUED | OUTPATIENT
Start: 2018-01-01 | End: 2018-01-01

## 2018-01-01 RX ORDER — ONDANSETRON 8 MG/1
4 TABLET, FILM COATED ORAL ONCE
Qty: 0 | Refills: 0 | Status: DISCONTINUED | OUTPATIENT
Start: 2018-01-01 | End: 2018-01-01

## 2018-01-01 RX ORDER — METOPROLOL TARTRATE 50 MG
50 TABLET ORAL
Qty: 0 | Refills: 0 | Status: DISCONTINUED | OUTPATIENT
Start: 2018-01-01 | End: 2018-01-01

## 2018-01-01 RX ORDER — NITROFURANTOIN (MONOHYDRATE/MACROCRYSTALS) 25; 75 MG/1; MG/1
100 CAPSULE ORAL TWICE DAILY
Qty: 14 | Refills: 0 | Status: DISCONTINUED | COMMUNITY
Start: 2018-01-01 | End: 2018-01-01

## 2018-01-01 RX ORDER — DEXAMETHASONE 0.5 MG/5ML
6 ELIXIR ORAL ONCE
Qty: 0 | Refills: 0 | Status: COMPLETED | OUTPATIENT
Start: 2018-01-01 | End: 2018-01-01

## 2018-01-01 RX ORDER — CEFAZOLIN SODIUM 1 G
2000 VIAL (EA) INJECTION EVERY 8 HOURS
Qty: 0 | Refills: 0 | Status: COMPLETED | OUTPATIENT
Start: 2018-01-01 | End: 2018-01-01

## 2018-01-01 RX ORDER — METOPROLOL TARTRATE 50 MG
1 TABLET ORAL
Qty: 0 | Refills: 0 | COMMUNITY

## 2018-01-01 RX ORDER — HYDROCHLOROTHIAZIDE 25 MG
25 TABLET ORAL DAILY
Qty: 0 | Refills: 0 | Status: DISCONTINUED | OUTPATIENT
Start: 2018-01-01 | End: 2018-01-01

## 2018-01-01 RX ORDER — INSULIN LISPRO 100/ML
VIAL (ML) SUBCUTANEOUS
Qty: 0 | Refills: 0 | Status: DISCONTINUED | OUTPATIENT
Start: 2018-01-01 | End: 2018-01-01

## 2018-01-01 RX ORDER — SODIUM CHLORIDE 9 MG/ML
1000 INJECTION, SOLUTION INTRAVENOUS
Qty: 0 | Refills: 0 | Status: DISCONTINUED | OUTPATIENT
Start: 2018-01-01 | End: 2018-01-01

## 2018-01-01 RX ORDER — DOCUSATE SODIUM 100 MG
100 CAPSULE ORAL THREE TIMES A DAY
Qty: 0 | Refills: 0 | Status: DISCONTINUED | OUTPATIENT
Start: 2018-01-01 | End: 2018-01-01

## 2018-01-01 RX ORDER — GLUCAGON INJECTION, SOLUTION 0.5 MG/.1ML
1 INJECTION, SOLUTION SUBCUTANEOUS ONCE
Qty: 0 | Refills: 0 | Status: DISCONTINUED | OUTPATIENT
Start: 2018-01-01 | End: 2018-01-01

## 2018-01-01 RX ORDER — SODIUM CHLORIDE 9 MG/ML
1000 INJECTION INTRAMUSCULAR; INTRAVENOUS; SUBCUTANEOUS
Qty: 0 | Refills: 0 | Status: DISCONTINUED | OUTPATIENT
Start: 2018-01-01 | End: 2018-01-01

## 2018-01-01 RX ORDER — DEXTROSE 50 % IN WATER 50 %
25 SYRINGE (ML) INTRAVENOUS ONCE
Qty: 0 | Refills: 0 | Status: DISCONTINUED | OUTPATIENT
Start: 2018-01-01 | End: 2018-01-01

## 2018-01-01 RX ORDER — METOCLOPRAMIDE HCL 10 MG
10 TABLET ORAL ONCE
Qty: 0 | Refills: 0 | Status: DISCONTINUED | OUTPATIENT
Start: 2018-01-01 | End: 2018-01-01

## 2018-01-01 RX ORDER — LEVETIRACETAM 250 MG/1
1 TABLET, FILM COATED ORAL
Qty: 0 | Refills: 0 | COMMUNITY
Start: 2018-01-01

## 2018-01-01 RX ORDER — CIPROFLOXACIN HYDROCHLORIDE 500 MG/1
500 TABLET, FILM COATED ORAL
Qty: 14 | Refills: 0 | Status: DISCONTINUED | COMMUNITY
Start: 2018-01-01 | End: 2018-01-01

## 2018-01-01 RX ORDER — GLUCOSAMINE/CHONDROITIN/C/MANG 500-400 MG
2 CAPSULE ORAL
Qty: 0 | Refills: 0 | COMMUNITY

## 2018-01-01 RX ORDER — ASPIRIN/CALCIUM CARB/MAGNESIUM 324 MG
1 TABLET ORAL
Qty: 0 | Refills: 0 | COMMUNITY
Start: 2018-01-01

## 2018-01-01 RX ORDER — ASPIRIN/CALCIUM CARB/MAGNESIUM 324 MG
81 TABLET ORAL DAILY
Qty: 0 | Refills: 0 | Status: DISCONTINUED | OUTPATIENT
Start: 2018-01-01 | End: 2018-01-01

## 2018-01-01 RX ORDER — ALPRAZOLAM 0.25 MG
0.25 TABLET ORAL DAILY
Qty: 0 | Refills: 0 | Status: DISCONTINUED | OUTPATIENT
Start: 2018-01-01 | End: 2018-01-01

## 2018-01-01 RX ORDER — PANTOPRAZOLE SODIUM 20 MG/1
40 TABLET, DELAYED RELEASE ORAL DAILY
Qty: 0 | Refills: 0 | Status: DISCONTINUED | OUTPATIENT
Start: 2018-01-01 | End: 2018-01-01

## 2018-01-01 RX ORDER — ACETAMINOPHEN 500 MG
2 TABLET ORAL
Qty: 0 | Refills: 0 | COMMUNITY
Start: 2018-01-01

## 2018-01-01 RX ORDER — LEVETIRACETAM 250 MG/1
500 TABLET, FILM COATED ORAL DAILY
Qty: 0 | Refills: 0 | Status: DISCONTINUED | OUTPATIENT
Start: 2018-01-01 | End: 2018-01-01

## 2018-01-01 RX ORDER — LEVETIRACETAM 1000 MG/1
1000 TABLET, FILM COATED ORAL
Qty: 30 | Refills: 2 | Status: ACTIVE | COMMUNITY
Start: 2017-10-03 | End: 1900-01-01

## 2018-01-01 RX ORDER — AMLODIPINE BESYLATE 5 MG/1
5 TABLET ORAL
Refills: 0 | Status: ACTIVE | COMMUNITY
Start: 2018-01-01

## 2018-01-01 RX ORDER — SODIUM CHLORIDE 9 MG/ML
2200 INJECTION INTRAMUSCULAR; INTRAVENOUS; SUBCUTANEOUS ONCE
Qty: 0 | Refills: 0 | Status: COMPLETED | OUTPATIENT
Start: 2018-01-01 | End: 2018-01-01

## 2018-01-01 RX ORDER — LIDOCAINE AND PRILOCAINE 25; 25 MG/G; MG/G
2.5-2.5 CREAM TOPICAL
Qty: 1 | Refills: 2 | Status: ACTIVE | COMMUNITY
Start: 2018-01-01 | End: 1900-01-01

## 2018-01-01 RX ORDER — ASPIRIN/CALCIUM CARB/MAGNESIUM 324 MG
1 TABLET ORAL
Qty: 0 | Refills: 0 | COMMUNITY

## 2018-01-01 RX ORDER — DEXAMETHASONE 0.5 MG/5ML
8 ELIXIR ORAL ONCE
Qty: 0 | Refills: 0 | Status: COMPLETED | OUTPATIENT
Start: 2018-01-01 | End: 2018-01-01

## 2018-01-01 RX ORDER — LOMUSTINE 100 MG/1
100 CAPSULE, GELATIN COATED ORAL
Qty: 2 | Refills: 0 | Status: ACTIVE | COMMUNITY
Start: 2018-01-01 | End: 1900-01-01

## 2018-01-01 RX ORDER — POLYETHYLENE GLYCOL 3350 17 G/17G
17 POWDER, FOR SOLUTION ORAL DAILY
Qty: 0 | Refills: 0 | Status: DISCONTINUED | OUTPATIENT
Start: 2018-01-01 | End: 2018-01-01

## 2018-01-01 RX ORDER — GLUCOSAMINE/CHONDROITIN/C/MANG 500-400 MG
1 CAPSULE ORAL
Qty: 0 | Refills: 0 | COMMUNITY

## 2018-01-01 RX ORDER — DEXAMETHASONE 0.5 MG/5ML
2 ELIXIR ORAL
Qty: 0 | Refills: 0 | COMMUNITY
Start: 2018-01-01 | End: 2018-01-01

## 2018-01-01 RX ORDER — ALPRAZOLAM 0.25 MG/1
0.25 TABLET ORAL
Qty: 60 | Refills: 0 | Status: ACTIVE | COMMUNITY
Start: 2018-01-01 | End: 1900-01-01

## 2018-01-01 RX ORDER — AMLODIPINE BESYLATE 2.5 MG/1
1 TABLET ORAL
Qty: 0 | Refills: 0 | COMMUNITY

## 2018-01-01 RX ORDER — ACETAMINOPHEN 500 MG
650 TABLET ORAL ONCE
Qty: 0 | Refills: 0 | Status: COMPLETED | OUTPATIENT
Start: 2018-01-01 | End: 2018-01-01

## 2018-01-01 RX ORDER — MINERAL OIL
133 OIL (ML) MISCELLANEOUS ONCE
Qty: 0 | Refills: 0 | Status: DISCONTINUED | OUTPATIENT
Start: 2018-01-01 | End: 2018-01-01

## 2018-01-01 RX ORDER — ALPRAZOLAM 0.25 MG
1 TABLET ORAL
Qty: 0 | Refills: 0 | COMMUNITY

## 2018-01-01 RX ORDER — CLOPIDOGREL BISULFATE 75 MG/1
75 TABLET, FILM COATED ORAL
Qty: 30 | Refills: 5 | Status: DISCONTINUED | COMMUNITY
Start: 2017-02-06 | End: 2018-01-01

## 2018-01-01 RX ORDER — TEMOZOLOMIDE 100 MG/1
100 CAPSULE ORAL
Qty: 15 | Refills: 5 | Status: DISCONTINUED | COMMUNITY
Start: 2017-11-21 | End: 2018-01-01

## 2018-01-01 RX ORDER — LEVETIRACETAM 250 MG/1
1000 TABLET, FILM COATED ORAL ONCE
Qty: 0 | Refills: 0 | Status: COMPLETED | OUTPATIENT
Start: 2018-01-01 | End: 2018-01-01

## 2018-01-01 RX ORDER — QUETIAPINE FUMARATE 200 MG/1
12 TABLET, FILM COATED ORAL DAILY
Qty: 0 | Refills: 0 | Status: DISCONTINUED | OUTPATIENT
Start: 2018-01-01 | End: 2018-01-01

## 2018-01-01 RX ORDER — LOSARTAN POTASSIUM 100 MG/1
50 TABLET, FILM COATED ORAL
Qty: 0 | Refills: 0 | Status: DISCONTINUED | OUTPATIENT
Start: 2018-01-01 | End: 2018-01-01

## 2018-01-01 RX ADMIN — Medication 2 MILLIGRAM(S): at 05:27

## 2018-01-01 RX ADMIN — LEVETIRACETAM 500 MILLIGRAM(S): 250 TABLET, FILM COATED ORAL at 08:32

## 2018-01-01 RX ADMIN — HEPARIN SODIUM 5000 UNIT(S): 5000 INJECTION INTRAVENOUS; SUBCUTANEOUS at 21:22

## 2018-01-01 RX ADMIN — Medication 100 MILLIGRAM(S): at 14:49

## 2018-01-01 RX ADMIN — Medication 50 MILLIGRAM(S): at 05:20

## 2018-01-01 RX ADMIN — Medication 4 MILLIGRAM(S): at 23:03

## 2018-01-01 RX ADMIN — Medication 25 MILLIGRAM(S): at 06:04

## 2018-01-01 RX ADMIN — LEVETIRACETAM 500 MILLIGRAM(S): 250 TABLET, FILM COATED ORAL at 08:56

## 2018-01-01 RX ADMIN — Medication 100 MILLIGRAM(S): at 06:29

## 2018-01-01 RX ADMIN — Medication 2 MILLIGRAM(S): at 17:12

## 2018-01-01 RX ADMIN — POLYETHYLENE GLYCOL 3350 17 GRAM(S): 17 POWDER, FOR SOLUTION ORAL at 11:12

## 2018-01-01 RX ADMIN — Medication 2 MILLIGRAM(S): at 17:00

## 2018-01-01 RX ADMIN — Medication 0.25 MILLIGRAM(S): at 17:00

## 2018-01-01 RX ADMIN — FAMOTIDINE 20 MILLIGRAM(S): 10 INJECTION INTRAVENOUS at 06:04

## 2018-01-01 RX ADMIN — Medication 50 MILLIGRAM(S): at 06:16

## 2018-01-01 RX ADMIN — LEVETIRACETAM 500 MILLIGRAM(S): 250 TABLET, FILM COATED ORAL at 11:12

## 2018-01-01 RX ADMIN — Medication 50 MILLIGRAM(S): at 06:04

## 2018-01-01 RX ADMIN — Medication 0.5 MILLIGRAM(S): at 15:17

## 2018-01-01 RX ADMIN — Medication 0.25 MILLIGRAM(S): at 05:36

## 2018-01-01 RX ADMIN — Medication 2 MILLIGRAM(S): at 05:31

## 2018-01-01 RX ADMIN — Medication 2 MILLIGRAM(S): at 17:49

## 2018-01-01 RX ADMIN — Medication 2 MILLIGRAM(S): at 21:22

## 2018-01-01 RX ADMIN — HEPARIN SODIUM 5000 UNIT(S): 5000 INJECTION INTRAVENOUS; SUBCUTANEOUS at 05:20

## 2018-01-01 RX ADMIN — LOSARTAN POTASSIUM 50 MILLIGRAM(S): 100 TABLET, FILM COATED ORAL at 07:59

## 2018-01-01 RX ADMIN — LEVETIRACETAM 1000 MILLIGRAM(S): 250 TABLET, FILM COATED ORAL at 22:06

## 2018-01-01 RX ADMIN — Medication 4 MILLIGRAM(S): at 18:01

## 2018-01-01 RX ADMIN — Medication 2 MILLIGRAM(S): at 05:37

## 2018-01-01 RX ADMIN — PANTOPRAZOLE SODIUM 40 MILLIGRAM(S): 20 TABLET, DELAYED RELEASE ORAL at 16:35

## 2018-01-01 RX ADMIN — Medication 4 MILLIGRAM(S): at 05:30

## 2018-01-01 RX ADMIN — AMLODIPINE BESYLATE 5 MILLIGRAM(S): 2.5 TABLET ORAL at 05:31

## 2018-01-01 RX ADMIN — Medication 4 MILLIGRAM(S): at 12:48

## 2018-01-01 RX ADMIN — LEVETIRACETAM 500 MILLIGRAM(S): 250 TABLET, FILM COATED ORAL at 12:28

## 2018-01-01 RX ADMIN — Medication 325 MILLIGRAM(S): at 14:48

## 2018-01-01 RX ADMIN — Medication 1 APPLICATORFUL: at 21:18

## 2018-01-01 RX ADMIN — SODIUM CHLORIDE 75 MILLILITER(S): 9 INJECTION INTRAMUSCULAR; INTRAVENOUS; SUBCUTANEOUS at 22:01

## 2018-01-01 RX ADMIN — Medication 1 TABLET(S): at 12:43

## 2018-01-01 RX ADMIN — LOSARTAN POTASSIUM 50 MILLIGRAM(S): 100 TABLET, FILM COATED ORAL at 17:47

## 2018-01-01 RX ADMIN — HEPARIN SODIUM 5000 UNIT(S): 5000 INJECTION INTRAVENOUS; SUBCUTANEOUS at 13:53

## 2018-01-01 RX ADMIN — Medication 400 MILLIGRAM(S): at 19:00

## 2018-01-01 RX ADMIN — Medication 100 MILLIEQUIVALENT(S): at 20:55

## 2018-01-01 RX ADMIN — Medication 81 MILLIGRAM(S): at 11:12

## 2018-01-01 RX ADMIN — QUETIAPINE FUMARATE 12.5 MILLIGRAM(S): 200 TABLET, FILM COATED ORAL at 22:06

## 2018-01-01 RX ADMIN — PANTOPRAZOLE SODIUM 40 MILLIGRAM(S): 20 TABLET, DELAYED RELEASE ORAL at 05:38

## 2018-01-01 RX ADMIN — Medication 50 MILLIGRAM(S): at 18:01

## 2018-01-01 RX ADMIN — Medication 2 MILLIGRAM(S): at 20:59

## 2018-01-01 RX ADMIN — SENNA PLUS 2 TABLET(S): 8.6 TABLET ORAL at 22:05

## 2018-01-01 RX ADMIN — Medication 0.25 MILLIGRAM(S): at 11:12

## 2018-01-01 RX ADMIN — LEVETIRACETAM 1000 MILLIGRAM(S): 250 TABLET, FILM COATED ORAL at 22:02

## 2018-01-01 RX ADMIN — AMLODIPINE BESYLATE 5 MILLIGRAM(S): 2.5 TABLET ORAL at 05:36

## 2018-01-01 RX ADMIN — Medication 1 TABLET(S): at 11:49

## 2018-01-01 RX ADMIN — LEVETIRACETAM 500 MILLIGRAM(S): 250 TABLET, FILM COATED ORAL at 12:01

## 2018-01-01 RX ADMIN — HEPARIN SODIUM 5000 UNIT(S): 5000 INJECTION INTRAVENOUS; SUBCUTANEOUS at 14:00

## 2018-01-01 RX ADMIN — Medication 50 MILLIGRAM(S): at 05:30

## 2018-01-01 RX ADMIN — Medication 81 MILLIGRAM(S): at 12:28

## 2018-01-01 RX ADMIN — PANTOPRAZOLE SODIUM 40 MILLIGRAM(S): 20 TABLET, DELAYED RELEASE ORAL at 05:37

## 2018-01-01 RX ADMIN — Medication 50 MILLIGRAM(S): at 05:44

## 2018-01-01 RX ADMIN — Medication 81 MILLIGRAM(S): at 12:04

## 2018-01-01 RX ADMIN — Medication 100 MILLIEQUIVALENT(S): at 19:10

## 2018-01-01 RX ADMIN — ATORVASTATIN CALCIUM 40 MILLIGRAM(S): 80 TABLET, FILM COATED ORAL at 21:21

## 2018-01-01 RX ADMIN — Medication 1 TABLET(S): at 14:49

## 2018-01-01 RX ADMIN — LEVETIRACETAM 1000 MILLIGRAM(S): 250 TABLET, FILM COATED ORAL at 22:07

## 2018-01-01 RX ADMIN — Medication 1 TABLET(S): at 11:17

## 2018-01-01 RX ADMIN — LEVETIRACETAM 1000 MILLIGRAM(S): 250 TABLET, FILM COATED ORAL at 21:22

## 2018-01-01 RX ADMIN — SODIUM CHLORIDE 2200 MILLILITER(S): 9 INJECTION INTRAMUSCULAR; INTRAVENOUS; SUBCUTANEOUS at 16:48

## 2018-01-01 RX ADMIN — Medication 1 TABLET(S): at 12:51

## 2018-01-01 RX ADMIN — Medication 4 MILLIGRAM(S): at 12:51

## 2018-01-01 RX ADMIN — Medication 50 MILLIGRAM(S): at 17:00

## 2018-01-01 RX ADMIN — AMLODIPINE BESYLATE 5 MILLIGRAM(S): 2.5 TABLET ORAL at 09:10

## 2018-01-01 RX ADMIN — POLYETHYLENE GLYCOL 3350 17 GRAM(S): 17 POWDER, FOR SOLUTION ORAL at 22:07

## 2018-01-01 RX ADMIN — Medication 6 MILLIGRAM(S): at 21:46

## 2018-01-01 RX ADMIN — Medication 2: at 13:00

## 2018-01-01 RX ADMIN — LEVETIRACETAM 500 MILLIGRAM(S): 250 TABLET, FILM COATED ORAL at 10:51

## 2018-01-01 RX ADMIN — QUETIAPINE FUMARATE 12.5 MILLIGRAM(S): 200 TABLET, FILM COATED ORAL at 21:07

## 2018-01-01 RX ADMIN — Medication 40 MILLIEQUIVALENT(S): at 21:45

## 2018-01-01 RX ADMIN — PANTOPRAZOLE SODIUM 40 MILLIGRAM(S): 20 TABLET, DELAYED RELEASE ORAL at 05:35

## 2018-01-01 RX ADMIN — ATORVASTATIN CALCIUM 40 MILLIGRAM(S): 80 TABLET, FILM COATED ORAL at 21:07

## 2018-01-01 RX ADMIN — Medication 2 MILLIGRAM(S): at 05:36

## 2018-01-01 RX ADMIN — Medication 50 MILLIGRAM(S): at 05:36

## 2018-01-01 RX ADMIN — Medication 50 MILLIGRAM(S): at 17:49

## 2018-01-01 RX ADMIN — Medication 2 MILLIGRAM(S): at 14:26

## 2018-01-01 RX ADMIN — LEVETIRACETAM 1000 MILLIGRAM(S): 250 TABLET, FILM COATED ORAL at 21:58

## 2018-01-01 RX ADMIN — LEVETIRACETAM 500 MILLIGRAM(S): 250 TABLET, FILM COATED ORAL at 12:50

## 2018-01-01 RX ADMIN — Medication 2 MILLIGRAM(S): at 13:51

## 2018-01-01 RX ADMIN — Medication 4 MILLIGRAM(S): at 21:22

## 2018-01-01 RX ADMIN — PANTOPRAZOLE SODIUM 40 MILLIGRAM(S): 20 TABLET, DELAYED RELEASE ORAL at 06:20

## 2018-01-01 RX ADMIN — LEVETIRACETAM 500 MILLIGRAM(S): 250 TABLET, FILM COATED ORAL at 10:20

## 2018-01-01 RX ADMIN — Medication 4 MILLIGRAM(S): at 11:53

## 2018-01-01 RX ADMIN — AMLODIPINE BESYLATE 5 MILLIGRAM(S): 2.5 TABLET ORAL at 05:30

## 2018-01-01 RX ADMIN — LEVETIRACETAM 500 MILLIGRAM(S): 250 TABLET, FILM COATED ORAL at 12:59

## 2018-01-01 RX ADMIN — AMLODIPINE BESYLATE 5 MILLIGRAM(S): 2.5 TABLET ORAL at 21:05

## 2018-01-01 RX ADMIN — Medication 50 MILLIGRAM(S): at 06:26

## 2018-01-01 RX ADMIN — Medication 1000 MILLIGRAM(S): at 20:21

## 2018-01-01 RX ADMIN — Medication 100 MILLIEQUIVALENT(S): at 19:54

## 2018-01-01 RX ADMIN — QUETIAPINE FUMARATE 12.5 MILLIGRAM(S): 200 TABLET, FILM COATED ORAL at 22:08

## 2018-01-01 RX ADMIN — LEVETIRACETAM 1000 MILLIGRAM(S): 250 TABLET, FILM COATED ORAL at 22:05

## 2018-01-01 RX ADMIN — FENTANYL CITRATE 25 MICROGRAM(S): 50 INJECTION INTRAVENOUS at 20:10

## 2018-01-01 RX ADMIN — PANTOPRAZOLE SODIUM 40 MILLIGRAM(S): 20 TABLET, DELAYED RELEASE ORAL at 11:17

## 2018-01-01 RX ADMIN — Medication 50 MILLIGRAM(S): at 16:34

## 2018-01-01 RX ADMIN — LEVETIRACETAM 1000 MILLIGRAM(S): 250 TABLET, FILM COATED ORAL at 23:03

## 2018-01-01 RX ADMIN — AMLODIPINE BESYLATE 5 MILLIGRAM(S): 2.5 TABLET ORAL at 05:28

## 2018-01-01 RX ADMIN — LEVETIRACETAM 500 MILLIGRAM(S): 250 TABLET, FILM COATED ORAL at 21:06

## 2018-01-01 RX ADMIN — ATORVASTATIN CALCIUM 40 MILLIGRAM(S): 80 TABLET, FILM COATED ORAL at 23:02

## 2018-01-01 RX ADMIN — Medication 1 TABLET(S): at 11:59

## 2018-01-01 RX ADMIN — ATORVASTATIN CALCIUM 40 MILLIGRAM(S): 80 TABLET, FILM COATED ORAL at 21:18

## 2018-01-01 RX ADMIN — LEVETIRACETAM 1000 MILLIGRAM(S): 250 TABLET, FILM COATED ORAL at 20:59

## 2018-01-01 RX ADMIN — LEVETIRACETAM 1000 MILLIGRAM(S): 250 TABLET, FILM COATED ORAL at 21:44

## 2018-01-01 RX ADMIN — Medication 1 TABLET(S): at 12:04

## 2018-01-01 RX ADMIN — FAMOTIDINE 20 MILLIGRAM(S): 10 INJECTION INTRAVENOUS at 06:28

## 2018-01-01 RX ADMIN — Medication 100 MILLIGRAM(S): at 06:04

## 2018-01-01 RX ADMIN — Medication 0.25 MILLIGRAM(S): at 21:02

## 2018-01-01 RX ADMIN — Medication 2 MILLIGRAM(S): at 17:28

## 2018-01-01 RX ADMIN — ATORVASTATIN CALCIUM 40 MILLIGRAM(S): 80 TABLET, FILM COATED ORAL at 21:05

## 2018-01-01 RX ADMIN — Medication 0.25 MILLIGRAM(S): at 21:07

## 2018-01-01 RX ADMIN — LOSARTAN POTASSIUM 50 MILLIGRAM(S): 100 TABLET, FILM COATED ORAL at 05:34

## 2018-01-01 RX ADMIN — LEVETIRACETAM 500 MILLIGRAM(S): 250 TABLET, FILM COATED ORAL at 09:10

## 2018-01-01 RX ADMIN — LOSARTAN POTASSIUM 50 MILLIGRAM(S): 100 TABLET, FILM COATED ORAL at 05:38

## 2018-01-01 RX ADMIN — LOSARTAN POTASSIUM 50 MILLIGRAM(S): 100 TABLET, FILM COATED ORAL at 05:37

## 2018-01-01 RX ADMIN — Medication 1 APPLICATORFUL: at 21:21

## 2018-01-01 RX ADMIN — HEPARIN SODIUM 5000 UNIT(S): 5000 INJECTION INTRAVENOUS; SUBCUTANEOUS at 17:17

## 2018-01-01 RX ADMIN — Medication 2 MILLIGRAM(S): at 05:35

## 2018-01-01 RX ADMIN — Medication 50 MILLIGRAM(S): at 05:37

## 2018-01-01 RX ADMIN — Medication 81 MILLIGRAM(S): at 12:01

## 2018-01-01 RX ADMIN — LEVETIRACETAM 1000 MILLIGRAM(S): 250 TABLET, FILM COATED ORAL at 21:19

## 2018-01-01 RX ADMIN — PANTOPRAZOLE SODIUM 40 MILLIGRAM(S): 20 TABLET, DELAYED RELEASE ORAL at 12:55

## 2018-01-01 RX ADMIN — Medication 2 MILLIGRAM(S): at 06:19

## 2018-01-01 RX ADMIN — LEVETIRACETAM 1000 MILLIGRAM(S): 250 TABLET, FILM COATED ORAL at 21:02

## 2018-01-01 RX ADMIN — Medication 2 MILLIGRAM(S): at 23:16

## 2018-01-01 RX ADMIN — Medication 50 MILLIGRAM(S): at 05:28

## 2018-01-01 RX ADMIN — Medication 0.25 MILLIGRAM(S): at 00:01

## 2018-01-01 RX ADMIN — Medication 100 MILLIGRAM(S): at 22:05

## 2018-01-01 RX ADMIN — ATORVASTATIN CALCIUM 40 MILLIGRAM(S): 80 TABLET, FILM COATED ORAL at 21:04

## 2018-01-01 RX ADMIN — QUETIAPINE FUMARATE 12.5 MILLIGRAM(S): 200 TABLET, FILM COATED ORAL at 21:58

## 2018-01-01 RX ADMIN — Medication 4 MILLIGRAM(S): at 05:19

## 2018-01-01 RX ADMIN — HEPARIN SODIUM 5000 UNIT(S): 5000 INJECTION INTRAVENOUS; SUBCUTANEOUS at 05:30

## 2018-01-01 RX ADMIN — AMLODIPINE BESYLATE 5 MILLIGRAM(S): 2.5 TABLET ORAL at 06:20

## 2018-01-01 RX ADMIN — HEPARIN SODIUM 5000 UNIT(S): 5000 INJECTION INTRAVENOUS; SUBCUTANEOUS at 13:01

## 2018-01-01 RX ADMIN — HEPARIN SODIUM 5000 UNIT(S): 5000 INJECTION INTRAVENOUS; SUBCUTANEOUS at 06:26

## 2018-01-01 RX ADMIN — Medication 2 MILLIGRAM(S): at 16:04

## 2018-01-01 RX ADMIN — ATORVASTATIN CALCIUM 40 MILLIGRAM(S): 80 TABLET, FILM COATED ORAL at 22:06

## 2018-01-01 RX ADMIN — Medication 325 MILLIGRAM(S): at 12:53

## 2018-01-01 RX ADMIN — Medication 2 MILLIGRAM(S): at 06:16

## 2018-01-01 RX ADMIN — PANTOPRAZOLE SODIUM 40 MILLIGRAM(S): 20 TABLET, DELAYED RELEASE ORAL at 05:31

## 2018-01-01 RX ADMIN — LEVETIRACETAM 500 MILLIGRAM(S): 250 TABLET, FILM COATED ORAL at 12:05

## 2018-01-01 RX ADMIN — FENTANYL CITRATE 25 MICROGRAM(S): 50 INJECTION INTRAVENOUS at 19:19

## 2018-01-01 RX ADMIN — PANTOPRAZOLE SODIUM 40 MILLIGRAM(S): 20 TABLET, DELAYED RELEASE ORAL at 17:23

## 2018-01-01 RX ADMIN — HEPARIN SODIUM 5000 UNIT(S): 5000 INJECTION INTRAVENOUS; SUBCUTANEOUS at 21:04

## 2018-01-01 RX ADMIN — LOSARTAN POTASSIUM 50 MILLIGRAM(S): 100 TABLET, FILM COATED ORAL at 06:19

## 2018-01-01 RX ADMIN — HEPARIN SODIUM 5000 UNIT(S): 5000 INJECTION INTRAVENOUS; SUBCUTANEOUS at 06:16

## 2018-01-01 RX ADMIN — Medication 2 MILLIGRAM(S): at 22:07

## 2018-01-01 RX ADMIN — QUETIAPINE FUMARATE 12.5 MILLIGRAM(S): 200 TABLET, FILM COATED ORAL at 21:18

## 2018-01-01 RX ADMIN — PANTOPRAZOLE SODIUM 40 MILLIGRAM(S): 20 TABLET, DELAYED RELEASE ORAL at 05:27

## 2018-01-01 RX ADMIN — PANTOPRAZOLE SODIUM 40 MILLIGRAM(S): 20 TABLET, DELAYED RELEASE ORAL at 06:27

## 2018-01-01 RX ADMIN — Medication 1 MILLIGRAM(S): at 21:46

## 2018-01-01 RX ADMIN — Medication 325 MILLIGRAM(S): at 11:52

## 2018-01-01 RX ADMIN — Medication 0.25 MILLIGRAM(S): at 10:18

## 2018-01-01 RX ADMIN — Medication 0.25 MILLIGRAM(S): at 04:45

## 2018-01-01 RX ADMIN — Medication 1 APPLICATORFUL: at 22:07

## 2018-01-01 RX ADMIN — Medication 101.6 MILLIGRAM(S): at 00:54

## 2018-01-01 RX ADMIN — AMLODIPINE BESYLATE 5 MILLIGRAM(S): 2.5 TABLET ORAL at 06:26

## 2018-01-01 RX ADMIN — SODIUM CHLORIDE 2200 MILLILITER(S): 9 INJECTION INTRAMUSCULAR; INTRAVENOUS; SUBCUTANEOUS at 17:49

## 2018-01-01 RX ADMIN — LEVETIRACETAM 500 MILLIGRAM(S): 250 TABLET, FILM COATED ORAL at 11:50

## 2018-01-01 RX ADMIN — Medication 81 MILLIGRAM(S): at 11:49

## 2018-01-01 RX ADMIN — LEVETIRACETAM 500 MILLIGRAM(S): 250 TABLET, FILM COATED ORAL at 12:08

## 2018-01-01 RX ADMIN — Medication 0.25 MILLIGRAM(S): at 02:26

## 2018-01-01 RX ADMIN — FAMOTIDINE 20 MILLIGRAM(S): 10 INJECTION INTRAVENOUS at 17:47

## 2018-01-01 RX ADMIN — Medication 1 TABLET(S): at 11:50

## 2018-01-01 RX ADMIN — HEPARIN SODIUM 5000 UNIT(S): 5000 INJECTION INTRAVENOUS; SUBCUTANEOUS at 05:44

## 2018-01-01 RX ADMIN — ATORVASTATIN CALCIUM 40 MILLIGRAM(S): 80 TABLET, FILM COATED ORAL at 20:59

## 2018-01-01 RX ADMIN — Medication 0.25 MILLIGRAM(S): at 12:08

## 2018-01-01 RX ADMIN — LEVETIRACETAM 1000 MILLIGRAM(S): 250 TABLET, FILM COATED ORAL at 21:21

## 2018-01-01 RX ADMIN — Medication 0.5 MILLIGRAM(S): at 21:21

## 2018-01-01 RX ADMIN — Medication 2 MILLIGRAM(S): at 21:18

## 2018-01-01 RX ADMIN — AMLODIPINE BESYLATE 5 MILLIGRAM(S): 2.5 TABLET ORAL at 06:16

## 2018-01-01 RX ADMIN — LOSARTAN POTASSIUM 50 MILLIGRAM(S): 100 TABLET, FILM COATED ORAL at 05:28

## 2018-01-01 RX ADMIN — LOSARTAN POTASSIUM 50 MILLIGRAM(S): 100 TABLET, FILM COATED ORAL at 06:28

## 2018-01-01 RX ADMIN — Medication 81 MILLIGRAM(S): at 12:08

## 2018-01-01 RX ADMIN — PANTOPRAZOLE SODIUM 40 MILLIGRAM(S): 20 TABLET, DELAYED RELEASE ORAL at 18:01

## 2018-01-01 RX ADMIN — HEPARIN SODIUM 5000 UNIT(S): 5000 INJECTION INTRAVENOUS; SUBCUTANEOUS at 21:02

## 2018-01-01 RX ADMIN — Medication 50 MILLIGRAM(S): at 05:31

## 2018-01-01 RX ADMIN — ATORVASTATIN CALCIUM 40 MILLIGRAM(S): 80 TABLET, FILM COATED ORAL at 21:58

## 2018-01-01 RX ADMIN — Medication 50 MILLIGRAM(S): at 06:28

## 2018-01-01 RX ADMIN — Medication 50 MILLIGRAM(S): at 06:20

## 2018-01-01 RX ADMIN — Medication 40 MILLIEQUIVALENT(S): at 04:26

## 2018-01-01 RX ADMIN — HEPARIN SODIUM 5000 UNIT(S): 5000 INJECTION INTRAVENOUS; SUBCUTANEOUS at 23:03

## 2018-01-01 RX ADMIN — Medication 1 TABLET(S): at 12:08

## 2018-01-01 RX ADMIN — Medication 1 TABLET(S): at 12:48

## 2018-01-01 RX ADMIN — QUETIAPINE FUMARATE 12.5 MILLIGRAM(S): 200 TABLET, FILM COATED ORAL at 12:59

## 2018-01-01 RX ADMIN — PANTOPRAZOLE SODIUM 40 MILLIGRAM(S): 20 TABLET, DELAYED RELEASE ORAL at 12:48

## 2018-01-01 RX ADMIN — AMLODIPINE BESYLATE 5 MILLIGRAM(S): 2.5 TABLET ORAL at 05:20

## 2018-01-01 RX ADMIN — ATORVASTATIN CALCIUM 40 MILLIGRAM(S): 80 TABLET, FILM COATED ORAL at 22:07

## 2018-01-01 RX ADMIN — Medication 2 MILLIGRAM(S): at 13:53

## 2018-01-01 RX ADMIN — LEVETIRACETAM 1000 MILLIGRAM(S): 250 TABLET, FILM COATED ORAL at 21:06

## 2018-01-01 RX ADMIN — Medication 4 MILLIGRAM(S): at 18:17

## 2018-01-01 RX ADMIN — Medication 325 MILLIGRAM(S): at 11:17

## 2018-01-01 RX ADMIN — Medication 4 MILLIGRAM(S): at 05:44

## 2018-01-01 RX ADMIN — AMLODIPINE BESYLATE 5 MILLIGRAM(S): 2.5 TABLET ORAL at 05:38

## 2018-01-01 RX ADMIN — Medication 1 TABLET(S): at 12:28

## 2018-01-01 RX ADMIN — Medication 50 MILLIGRAM(S): at 17:18

## 2018-01-01 RX ADMIN — SODIUM CHLORIDE 75 MILLILITER(S): 9 INJECTION INTRAMUSCULAR; INTRAVENOUS; SUBCUTANEOUS at 19:07

## 2018-01-01 RX ADMIN — LEVETIRACETAM 1000 MILLIGRAM(S): 250 TABLET, FILM COATED ORAL at 21:07

## 2018-01-01 RX ADMIN — HEPARIN SODIUM 5000 UNIT(S): 5000 INJECTION INTRAVENOUS; SUBCUTANEOUS at 20:59

## 2018-01-01 RX ADMIN — Medication 50 MILLIGRAM(S): at 17:47

## 2018-01-01 RX ADMIN — Medication 50 MILLIGRAM(S): at 21:07

## 2018-01-01 RX ADMIN — Medication 81 MILLIGRAM(S): at 12:59

## 2018-01-01 RX ADMIN — PANTOPRAZOLE SODIUM 40 MILLIGRAM(S): 20 TABLET, DELAYED RELEASE ORAL at 21:03

## 2018-01-01 RX ADMIN — AMLODIPINE BESYLATE 5 MILLIGRAM(S): 2.5 TABLET ORAL at 05:34

## 2018-01-01 RX ADMIN — Medication 50 MILLIGRAM(S): at 05:35

## 2018-01-01 RX ADMIN — PANTOPRAZOLE SODIUM 40 MILLIGRAM(S): 20 TABLET, DELAYED RELEASE ORAL at 17:18

## 2018-01-01 RX ADMIN — Medication 100 MILLIGRAM(S): at 22:02

## 2018-01-01 RX ADMIN — ATORVASTATIN CALCIUM 40 MILLIGRAM(S): 80 TABLET, FILM COATED ORAL at 21:22

## 2018-01-01 RX ADMIN — Medication 25 MILLIGRAM(S): at 06:28

## 2018-01-01 RX ADMIN — LEVETIRACETAM 500 MILLIGRAM(S): 250 TABLET, FILM COATED ORAL at 11:49

## 2018-01-01 RX ADMIN — Medication 1 TABLET(S): at 11:53

## 2018-01-01 RX ADMIN — Medication 0.5 MILLIGRAM(S): at 00:41

## 2018-01-01 RX ADMIN — QUETIAPINE FUMARATE 12.5 MILLIGRAM(S): 200 TABLET, FILM COATED ORAL at 09:15

## 2018-01-01 RX ADMIN — AMLODIPINE BESYLATE 5 MILLIGRAM(S): 2.5 TABLET ORAL at 05:44

## 2018-01-01 RX ADMIN — AMLODIPINE BESYLATE 5 MILLIGRAM(S): 2.5 TABLET ORAL at 05:37

## 2018-01-01 RX ADMIN — Medication 2 MILLIGRAM(S): at 05:34

## 2018-01-01 RX ADMIN — PANTOPRAZOLE SODIUM 40 MILLIGRAM(S): 20 TABLET, DELAYED RELEASE ORAL at 11:53

## 2018-01-01 RX ADMIN — Medication 50 MILLIGRAM(S): at 17:21

## 2018-01-01 RX ADMIN — Medication 40 MILLIEQUIVALENT(S): at 17:46

## 2018-01-01 RX ADMIN — Medication 50 MILLIGRAM(S): at 17:53

## 2018-01-01 RX ADMIN — Medication 1 TABLET(S): at 12:59

## 2018-01-01 RX ADMIN — LOSARTAN POTASSIUM 50 MILLIGRAM(S): 100 TABLET, FILM COATED ORAL at 05:31

## 2018-01-01 RX ADMIN — Medication 4 MILLIGRAM(S): at 18:09

## 2018-01-01 RX ADMIN — Medication 325 MILLIGRAM(S): at 12:48

## 2018-01-01 RX ADMIN — Medication 1 TABLET(S): at 11:12

## 2018-01-01 RX ADMIN — Medication 650 MILLIGRAM(S): at 05:30

## 2018-01-01 RX ADMIN — LOSARTAN POTASSIUM 50 MILLIGRAM(S): 100 TABLET, FILM COATED ORAL at 06:04

## 2018-01-01 RX ADMIN — Medication 81 MILLIGRAM(S): at 11:50

## 2018-01-01 RX ADMIN — PANTOPRAZOLE SODIUM 40 MILLIGRAM(S): 20 TABLET, DELAYED RELEASE ORAL at 17:12

## 2018-01-01 RX ADMIN — ATORVASTATIN CALCIUM 40 MILLIGRAM(S): 80 TABLET, FILM COATED ORAL at 22:02

## 2018-01-08 ENCOUNTER — RX RENEWAL (OUTPATIENT)
Age: 63
End: 2018-01-08

## 2018-01-16 ENCOUNTER — MEDICATION RENEWAL (OUTPATIENT)
Age: 63
End: 2018-01-16

## 2018-01-19 ENCOUNTER — LABORATORY RESULT (OUTPATIENT)
Age: 63
End: 2018-01-19

## 2018-01-19 ENCOUNTER — APPOINTMENT (OUTPATIENT)
Dept: HEMATOLOGY ONCOLOGY | Facility: CLINIC | Age: 63
End: 2018-01-19
Payer: COMMERCIAL

## 2018-01-19 VITALS
DIASTOLIC BLOOD PRESSURE: 86 MMHG | BODY MASS INDEX: 47.12 KG/M2 | TEMPERATURE: 98.5 F | HEART RATE: 80 BPM | HEIGHT: 64 IN | WEIGHT: 276 LBS | SYSTOLIC BLOOD PRESSURE: 160 MMHG

## 2018-01-19 LAB
HCT VFR BLD CALC: 39.8 %
HGB BLD-MCNC: 13 G/DL
MCHC RBC-ENTMCNC: 27.5 PG
MCHC RBC-ENTMCNC: 32.6 GM/DL
MCV RBC AUTO: 84.4 FL
PLATELET # BLD AUTO: 321 K/UL
RBC # BLD: 4.71 M/UL
RBC # FLD: 15.8 %
WBC # FLD AUTO: 8.9 K/UL

## 2018-01-19 PROCEDURE — 99215 OFFICE O/P EST HI 40 MIN: CPT | Mod: 25

## 2018-01-19 PROCEDURE — 85025 COMPLETE CBC W/AUTO DIFF WBC: CPT

## 2018-01-22 LAB
ALBUMIN SERPL ELPH-MCNC: 4.5 G/DL
ALP BLD-CCNC: 114 U/L
ALT SERPL-CCNC: 22 U/L
ANION GAP SERPL CALC-SCNC: 17 MMOL/L
AST SERPL-CCNC: 17 U/L
BILIRUB SERPL-MCNC: 0.4 MG/DL
BUN SERPL-MCNC: 20 MG/DL
CALCIUM SERPL-MCNC: 9.8 MG/DL
CHLORIDE SERPL-SCNC: 98 MMOL/L
CO2 SERPL-SCNC: 26 MMOL/L
CREAT SERPL-MCNC: 0.86 MG/DL
GLUCOSE SERPL-MCNC: 83 MG/DL
POTASSIUM SERPL-SCNC: 4.4 MMOL/L
PROT SERPL-MCNC: 7.5 G/DL
SODIUM SERPL-SCNC: 141 MMOL/L

## 2018-01-24 ENCOUNTER — RX RENEWAL (OUTPATIENT)
Age: 63
End: 2018-01-24

## 2018-01-30 ENCOUNTER — RECORD ABSTRACTING (OUTPATIENT)
Age: 63
End: 2018-01-30

## 2018-02-06 ENCOUNTER — APPOINTMENT (OUTPATIENT)
Dept: GYNECOLOGIC ONCOLOGY | Facility: CLINIC | Age: 63
End: 2018-02-06
Payer: COMMERCIAL

## 2018-02-06 VITALS
BODY MASS INDEX: 47.12 KG/M2 | HEIGHT: 64 IN | SYSTOLIC BLOOD PRESSURE: 146 MMHG | DIASTOLIC BLOOD PRESSURE: 81 MMHG | OXYGEN SATURATION: 100 % | HEART RATE: 82 BPM | WEIGHT: 276 LBS

## 2018-02-06 DIAGNOSIS — D25.9 LEIOMYOMA OF UTERUS, UNSPECIFIED: ICD-10-CM

## 2018-02-06 DIAGNOSIS — N85.2 HYPERTROPHY OF UTERUS: ICD-10-CM

## 2018-02-06 PROCEDURE — 99205 OFFICE O/P NEW HI 60 MIN: CPT

## 2018-02-12 ENCOUNTER — APPOINTMENT (OUTPATIENT)
Dept: NEUROLOGY | Facility: CLINIC | Age: 63
End: 2018-02-12
Payer: COMMERCIAL

## 2018-02-12 ENCOUNTER — OUTPATIENT (OUTPATIENT)
Dept: OUTPATIENT SERVICES | Facility: HOSPITAL | Age: 63
LOS: 1 days | End: 2018-02-12
Payer: COMMERCIAL

## 2018-02-12 ENCOUNTER — APPOINTMENT (OUTPATIENT)
Dept: MRI IMAGING | Facility: CLINIC | Age: 63
End: 2018-02-12
Payer: COMMERCIAL

## 2018-02-12 VITALS
BODY MASS INDEX: 48.32 KG/M2 | HEART RATE: 78 BPM | DIASTOLIC BLOOD PRESSURE: 72 MMHG | HEIGHT: 64 IN | SYSTOLIC BLOOD PRESSURE: 142 MMHG | WEIGHT: 283 LBS

## 2018-02-12 DIAGNOSIS — Z00.8 ENCOUNTER FOR OTHER GENERAL EXAMINATION: ICD-10-CM

## 2018-02-12 DIAGNOSIS — I10 ESSENTIAL (PRIMARY) HYPERTENSION: ICD-10-CM

## 2018-02-12 DIAGNOSIS — I63.9 CEREBRAL INFARCTION, UNSPECIFIED: ICD-10-CM

## 2018-02-12 DIAGNOSIS — Z87.898 PERSONAL HISTORY OF OTHER SPECIFIED CONDITIONS: ICD-10-CM

## 2018-02-12 PROCEDURE — A9585: CPT

## 2018-02-12 PROCEDURE — 99214 OFFICE O/P EST MOD 30 MIN: CPT

## 2018-02-12 PROCEDURE — 70553 MRI BRAIN STEM W/O & W/DYE: CPT | Mod: 26

## 2018-02-12 PROCEDURE — 70553 MRI BRAIN STEM W/O & W/DYE: CPT

## 2018-02-12 PROCEDURE — 82565 ASSAY OF CREATININE: CPT

## 2018-02-20 ENCOUNTER — APPOINTMENT (OUTPATIENT)
Dept: HEMATOLOGY ONCOLOGY | Facility: CLINIC | Age: 63
End: 2018-02-20
Payer: COMMERCIAL

## 2018-02-20 ENCOUNTER — LABORATORY RESULT (OUTPATIENT)
Age: 63
End: 2018-02-20

## 2018-02-20 VITALS
HEIGHT: 64 IN | WEIGHT: 276 LBS | SYSTOLIC BLOOD PRESSURE: 176 MMHG | DIASTOLIC BLOOD PRESSURE: 106 MMHG | BODY MASS INDEX: 47.12 KG/M2 | TEMPERATURE: 98.9 F | HEART RATE: 94 BPM

## 2018-02-20 LAB
HCT VFR BLD CALC: 38 %
HGB BLD-MCNC: 12.2 G/DL
MCHC RBC-ENTMCNC: 28.1 PG
MCHC RBC-ENTMCNC: 32.2 GM/DL
MCV RBC AUTO: 87.3 FL
PLATELET # BLD AUTO: 222 K/UL
RBC # BLD: 4.35 M/UL
RBC # FLD: 15.8 %
WBC # FLD AUTO: 8 K/UL

## 2018-02-20 PROCEDURE — 85025 COMPLETE CBC W/AUTO DIFF WBC: CPT

## 2018-02-20 PROCEDURE — 99215 OFFICE O/P EST HI 40 MIN: CPT | Mod: 25

## 2018-02-22 LAB
ALBUMIN SERPL ELPH-MCNC: 4.5 G/DL
ALP BLD-CCNC: 114 U/L
ALT SERPL-CCNC: 21 U/L
ANION GAP SERPL CALC-SCNC: 17 MMOL/L
AST SERPL-CCNC: 19 U/L
BILIRUB SERPL-MCNC: 0.4 MG/DL
BUN SERPL-MCNC: 26 MG/DL
CALCIUM SERPL-MCNC: 10.4 MG/DL
CHLORIDE SERPL-SCNC: 102 MMOL/L
CO2 SERPL-SCNC: 23 MMOL/L
CREAT SERPL-MCNC: 0.87 MG/DL
GLUCOSE SERPL-MCNC: 91 MG/DL
POTASSIUM SERPL-SCNC: 4.4 MMOL/L
PROT SERPL-MCNC: 7.7 G/DL
SODIUM SERPL-SCNC: 142 MMOL/L

## 2018-02-24 ENCOUNTER — OTHER (OUTPATIENT)
Age: 63
End: 2018-02-24

## 2018-02-26 ENCOUNTER — OUTPATIENT (OUTPATIENT)
Dept: OUTPATIENT SERVICES | Facility: HOSPITAL | Age: 63
LOS: 1 days | End: 2018-02-26
Payer: COMMERCIAL

## 2018-02-26 ENCOUNTER — APPOINTMENT (OUTPATIENT)
Dept: CT IMAGING | Facility: CLINIC | Age: 63
End: 2018-02-26
Payer: COMMERCIAL

## 2018-02-26 DIAGNOSIS — Z00.8 ENCOUNTER FOR OTHER GENERAL EXAMINATION: ICD-10-CM

## 2018-02-26 PROCEDURE — 74177 CT ABD & PELVIS W/CONTRAST: CPT

## 2018-02-26 PROCEDURE — 74177 CT ABD & PELVIS W/CONTRAST: CPT | Mod: 26

## 2018-04-03 ENCOUNTER — LABORATORY RESULT (OUTPATIENT)
Age: 63
End: 2018-04-03

## 2018-04-03 ENCOUNTER — APPOINTMENT (OUTPATIENT)
Dept: HEMATOLOGY ONCOLOGY | Facility: CLINIC | Age: 63
End: 2018-04-03
Payer: COMMERCIAL

## 2018-04-03 VITALS
TEMPERATURE: 98.8 F | WEIGHT: 277 LBS | HEART RATE: 82 BPM | BODY MASS INDEX: 47.29 KG/M2 | DIASTOLIC BLOOD PRESSURE: 85 MMHG | HEIGHT: 64 IN | SYSTOLIC BLOOD PRESSURE: 151 MMHG

## 2018-04-03 DIAGNOSIS — Z79.899 OTHER LONG TERM (CURRENT) DRUG THERAPY: ICD-10-CM

## 2018-04-03 DIAGNOSIS — R19.00 INTRA-ABDOMINAL AND PELVIC SWELLING, MASS AND LUMP, UNSPECIFIED SITE: ICD-10-CM

## 2018-04-03 LAB
ALBUMIN SERPL ELPH-MCNC: 4 G/DL
ALP BLD-CCNC: 118 U/L
ALT SERPL-CCNC: 14 U/L
ANION GAP SERPL CALC-SCNC: 15 MMOL/L
AST SERPL-CCNC: 21 U/L
BILIRUB SERPL-MCNC: 0.3 MG/DL
BUN SERPL-MCNC: 19 MG/DL
CALCIUM SERPL-MCNC: 9.9 MG/DL
CHLORIDE SERPL-SCNC: 101 MMOL/L
CO2 SERPL-SCNC: 25 MMOL/L
CREAT SERPL-MCNC: 1.04 MG/DL
GLUCOSE SERPL-MCNC: 98 MG/DL
HCT VFR BLD CALC: 34.9 %
HGB BLD-MCNC: 11.6 G/DL
MCHC RBC-ENTMCNC: 29.1 PG
MCHC RBC-ENTMCNC: 33.3 GM/DL
MCV RBC AUTO: 87.3 FL
PLATELET # BLD AUTO: 282 K/UL
POTASSIUM SERPL-SCNC: 4.8 MMOL/L
PROT SERPL-MCNC: 7.1 G/DL
RBC # BLD: 3.99 M/UL
RBC # FLD: 15 %
SODIUM SERPL-SCNC: 141 MMOL/L
WBC # FLD AUTO: 7.8 K/UL

## 2018-04-03 PROCEDURE — 99215 OFFICE O/P EST HI 40 MIN: CPT | Mod: 25

## 2018-04-03 PROCEDURE — 36415 COLL VENOUS BLD VENIPUNCTURE: CPT

## 2018-04-03 PROCEDURE — 85025 COMPLETE CBC W/AUTO DIFF WBC: CPT

## 2018-04-16 ENCOUNTER — MEDICATION RENEWAL (OUTPATIENT)
Age: 63
End: 2018-04-16

## 2018-04-17 ENCOUNTER — MEDICATION RENEWAL (OUTPATIENT)
Age: 63
End: 2018-04-17

## 2018-06-05 NOTE — H&P PST ADULT - TEACHING/LEARNING LEARNING PREFERENCES
computer/internet/video/group instruction/individual instruction/written material/pictorial/skill demonstration/audio/verbal instruction

## 2018-06-05 NOTE — H&P PST ADULT - HISTORY OF PRESENT ILLNESS
63 years old female with glioblastoma multiforme. She had surgery in August , 2017.  Cranial abnormality noted. Planned craniotomy. 63 years old female with Glioblastoma Multiforme. She had surgery in August , 2017.  Cranial abnormality noted. Planned craniotomy.

## 2018-06-05 NOTE — H&P PST ADULT - PMH
Essential hypertension    Glioblastoma multiforme    High cholesterol    HSV (herpes simplex virus) infection  to lip and chin  Obesity    Osteoarthritis of both knees, unspecified osteoarthritis type    Transient cerebral ischemia, unspecified type  2015

## 2018-06-06 PROBLEM — G45.9 TRANSIENT CEREBRAL ISCHEMIC ATTACK, UNSPECIFIED: Chronic | Status: ACTIVE | Noted: 2017-07-21

## 2018-06-07 NOTE — H&P ADULT - HISTORY OF PRESENT ILLNESS
Patient is 64yo female with PMhx of HTN, obesity, anxiety, GBM, s/p Craniotomy for removal of neoplasm  right parietal area. Currently afebrile, HD stable, comfortable in NAD, doing well, post op,  no major complaints.

## 2018-06-07 NOTE — PATIENT PROFILE ADULT. - TEACHING/LEARNING LEARNING PREFERENCES
group instruction/video/skill demonstration/verbal instruction/computer/internet/pictorial/written material/audio/individual instruction

## 2018-06-07 NOTE — BRIEF OPERATIVE NOTE - PROCEDURE
<<-----Click on this checkbox to enter Procedure Craniotomy for removal of neoplasm  06/07/2018  right parietal with Brain Path and [facility] neuronavigation  Active  [id number]

## 2018-06-07 NOTE — H&P ADULT - NSHPREVIEWOFSYSTEMS_GEN_ALL_CORE
(-)Fever, chills, cough, chest pain, sob, headache, dizziness, palpitations, abd pain, n/v/d, leg swelling  (+)NONE

## 2018-06-07 NOTE — H&P ADULT - NSHPPHYSICALEXAM_GEN_ALL_CORE
T(C): 36.8 (06-07-18 @ 20:00), Max: 36.8 (06-07-18 @ 08:44)  HR: 99 (06-07-18 @ 22:00) (84 - 100)  BP: 108/61 (06-07-18 @ 22:00) (105/72 - 127/67)  RR: 23 (06-07-18 @ 22:00) (14 - 23)  SpO2: 99% (06-07-18 @ 21:00) (92% - 100%)  Wt(kg): --    Gen: AAOx3, NAD  HEENT: NCAT, EOMI  Neck: Supple  CV: nml S1S2, RRR  Lungs: CTABL  Abd: Soft, NT, ND, BS+  Ext: No edema  Neuro: Non focal

## 2018-06-07 NOTE — PROGRESS NOTE ADULT - PROBLEM SELECTOR PLAN 1
- advance diet as tolerated  - seq teds on for dvt proh   - oob w/ pt in the am   - keppra for seizure pph   - head CT in AM   - will d/c Dr David

## 2018-06-07 NOTE — H&P ADULT - ASSESSMENT
64yo male with PMhx of GBM s/p craniotomy removal of neoplasm    GBM s/p craniotomy removal of neoplasm  HTN  Obesity    Recommend:  supp o2 as needed  Duonebs PRN  q1hr Neuro checks  follow up neurosurgery  NO ID issues  Obtain AM labs  NPO  IVF  BP control, resume home meds  GI ppx  DVT ppx, SCDs  Monitor in ICU

## 2018-06-07 NOTE — PROGRESS NOTE ADULT - ASSESSMENT
64 yo female s/p Right Parietal Craniotomy tumor resection  - advance diet as tolerated  - seq teds on for dvt proh   - oob w/ pt in the am   - keppra for seizure pph   - head CT in AM   - will d/c Dr David

## 2018-06-07 NOTE — PROGRESS NOTE ADULT - SUBJECTIVE AND OBJECTIVE BOX
HPI:  pt seen and examined with in the ICU. Pt is s/p craniotomy tumor resection, Pt has a history of crani/tumor resection of GBM on 8/23/17.   Pt doing well post-operatively. Pt denies any headache, n/v, numbness, tingling.         PAST MEDICAL & SURGICAL HISTORY:  HSV (herpes simplex virus) infection: to lip and chin  Obesity  Osteoarthritis of both knees, unspecified osteoarthritis type  Glioblastoma multiforme  High cholesterol  Transient cerebral ischemia, unspecified type: 2015  Essential hypertension  H/O craniotomy: 8/2017      FAMILY HISTORY:  Family history of prostate cancer in father (Father)      Social Hx:  Nonsmoker, no drug or alcohol use    MEDICATIONS  (STANDING):  amLODIPine   Tablet 5 milliGRAM(s) Oral daily  atorvastatin 40 milliGRAM(s) Oral at bedtime  docusate sodium 100 milliGRAM(s) Oral three times a day  famotidine    Tablet 20 milliGRAM(s) Oral every 12 hours  hydrochlorothiazide 25 milliGRAM(s) Oral daily  levETIRAcetam 1000 milliGRAM(s) Oral at bedtime  levETIRAcetam 500 milliGRAM(s) Oral <User Schedule>  losartan 50 milliGRAM(s) Oral two times a day  metoprolol succinate ER 50 milliGRAM(s) Oral two times a day  multivitamin 1 Tablet(s) Oral daily  senna 2 Tablet(s) Oral at bedtime  sodium chloride 0.9%. 1000 milliLiter(s) (75 mL/Hr) IV Continuous <Continuous>       Allergies    MSG (Rash)  No Known Drug Allergies    Intolerances        ROS: Pertinent positives in HPI, all other ROS were reviewed and are negative.      Vital Signs Last 24 Hrs  T(C): 36.9 (07 Jun 2018 21:00), Max: 36.9 (07 Jun 2018 21:00)  T(F): 98.4 (07 Jun 2018 21:00), Max: 98.4 (07 Jun 2018 21:00)  HR: 90 (08 Jun 2018 04:00) (84 - 100)  BP: 113/65 (08 Jun 2018 04:00) (102/58 - 127/67)  BP(mean): 59 (08 Jun 2018 04:00) (48 - 83)  RR: 20 (08 Jun 2018 04:00) (14 - 23)  SpO2: 99% (08 Jun 2018 04:00) (78% - 100%)        Constitutional: awake and alert.  HEENT: PERRLA, EOMI, dressing intact c/d  Neck: Supple.  Respiratory: Breath sounds are clear bilaterally  Cardiovascular: S1 and S2, regular  rhythm  Gastrointestinal: soft, nontender  Extremities:  no edema  Musculoskeletal: no joint swelling/tenderness, no abnormal movements  Skin: No rashes    Neurological exam:  HF: A x O x 3. Appropriately interactive, normal affect. Speech fluent, No Aphasia or paraphasic errors. Naming /repetition intact   CN: ANGELITA, EOMI, VFF, facial sensation normal,   Motor: No pronator drift, Strength 5/5 in all 4 ext, normal bulk and tone, no tremor, rigidity or bradykinesia.    Sens: Intact to light touch   Gait/Balance: Cannot test              Labs:   06-08    143  |  110<H>  |  14  ----------------------------<  110<H>  3.5   |  27  |  0.66    Ca    8.0<L>      08 Jun 2018 05:29    TPro  6.7  /  Alb  3.0<L>  /  TBili  0.3  /  DBili  x   /  AST  12<L>  /  ALT  18  /  AlkPhos  94  06-08                              10.0   11.34 )-----------( 252      ( 08 Jun 2018 05:29 )             30.2

## 2018-06-08 NOTE — PHYSICAL THERAPY INITIAL EVALUATION ADULT - PERTINENT HX OF CURRENT PROBLEM, REHAB EVAL
Pt w/ R parietal GBM (Pt has a history of crani/tumor resection of GBM on 8/23/17.) CT this a.m-see results.

## 2018-06-08 NOTE — PROGRESS NOTE ADULT - SUBJECTIVE AND OBJECTIVE BOX
Patient is a 63y old  Female who presents with a chief complaint of s/p craniotomy (07 Jun 2018 23:15)      HPI:  Patient is 64yo female with PMhx of HTN, obesity, anxiety, GBM, s/p Craniotomy for removal of neoplasm  right parietal area.     6/8 - Pt seen and examined earlier today, in NAD. Appears comfortable, denies new complaints, no overnight events.      acetaminophen  IVPB. 1000 milliGRAM(s) IV Intermittent once PRN  acetaminophen  IVPB. 1000 milliGRAM(s) IV Intermittent once PRN  amLODIPine   Tablet 5 milliGRAM(s) Oral daily  atorvastatin 40 milliGRAM(s) Oral at bedtime  docusate sodium 100 milliGRAM(s) Oral three times a day  famotidine    Tablet 20 milliGRAM(s) Oral every 12 hours  fentaNYL    Injectable 25 MICROGram(s) IV Push every 30 minutes PRN  hydrochlorothiazide 25 milliGRAM(s) Oral daily  levETIRAcetam 1000 milliGRAM(s) Oral at bedtime  levETIRAcetam 500 milliGRAM(s) Oral <User Schedule>  losartan 50 milliGRAM(s) Oral two times a day  metoclopramide Injectable 10 milliGRAM(s) IV Push once PRN  metoprolol succinate ER 50 milliGRAM(s) Oral two times a day  multivitamin 1 Tablet(s) Oral daily  ondansetron Injectable 4 milliGRAM(s) IV Push every 6 hours PRN  senna 2 Tablet(s) Oral at bedtime  sodium chloride 0.9%. 1000 milliLiter(s) IV Continuous <Continuous>      Vital Signs Last 24 Hrs  T(C): 37 (08 Jun 2018 14:00), Max: 37 (08 Jun 2018 14:00)  T(F): 98.6 (08 Jun 2018 14:00), Max: 98.6 (08 Jun 2018 14:00)  HR: 80 (08 Jun 2018 16:00) (80 - 99)  BP: 136/57 (08 Jun 2018 16:00) (102/58 - 147/65)  BP(mean): 78 (08 Jun 2018 16:00) (48 - 83)  RR: 22 (08 Jun 2018 16:00) (14 - 23)  SpO2: 100% (08 Jun 2018 16:00) (78% - 100%)                          10.0   11.34 )-----------( 252      ( 08 Jun 2018 05:29 )             30.2       06-08    143  |  110<H>  |  14  ----------------------------<  110<H>  3.5   |  27  |  0.66    Ca    8.0<L>      08 Jun 2018 05:29    TPro  6.7  /  Alb  3.0<L>  /  TBili  0.3  /  DBili  x   /  AST  12<L>  /  ALT  18  /  AlkPhos  94  06-08        Radiology:  CT Head No Cont (06.08.18 @ 10:36) >  Status post right frontal parietal craniotomies for resection of an   enhancing lesion in the right parietal lobe. Small amount of postsurgical   hemorrhagic blood products and air is identified within the surgical   cavity. There is surrounding vasogenic edema and/or nonenhancing tumor   resulting in mild mass effect upon the ventricular system. There is   minimal right to left midline shift of approximately 1 to 2 mm.       Constitutional: awake and alert.  HEENT: PERRLA, EOMI, dressing intact c/d/I  Neck: Supple.  Respiratory: Breath sounds are clear bilaterally  Cardiovascular: S1 and S2, regular  rhythm  Gastrointestinal: soft, nontender  Extremities:  no edema  Musculoskeletal: no joint swelling/tenderness, no abnormal movements  Skin: No rashes    Neurological exam:  HF: A x O x 3. Appropriately interactive, normal affect. Speech fluent, No Aphasia or paraphasic errors. Naming /repetition intact   CN: ANGELITA, EOMI, VFF, facial sensation normal,   Motor: No pronator drift, Strength 5/5 in all 4 ext, normal bulk and tone, no tremor, rigidity or bradykinesia.    Sens: Intact to light touch   Gait/Balance: Cannot test

## 2018-06-08 NOTE — PROGRESS NOTE ADULT - ASSESSMENT
62 yo female s/p Right Parietal Craniotomy tumor resection    -D/c escobedo  -D/c IVF  -Neuros / vitals q 4 hours  -Regular diet  -Cont keppra for seizure ppx  -Needs MRI as outpt  -Likely d/c home tomorrow  -Venodynes for DVT ppx    Discussed with Dr David

## 2018-06-09 NOTE — DISCHARGE NOTE ADULT - PATIENT PORTAL LINK FT
You can access the [facility] Patient Portal, offered by [facility], by registering with the following website: [email or web address]

## 2018-06-09 NOTE — PROGRESS NOTE ADULT - ASSESSMENT
64 yo female s/p Right Parietal Craniotomy tumor resection    -Neuros / vitals q 4 hours  -Regular diet  -Cont keppra for seizure ppx  -Needs MRI as outpt  -Venodynes for DVT ppx  - ? d/c home today     Discussed with Dr David

## 2018-06-09 NOTE — DISCHARGE NOTE ADULT - NS AS ACTIVITY OBS
Walking-Outdoors allowed/Showering allowed/No Heavy lifting/straining/Walking-Indoors allowed/Do not make important decisions/Do not drive or operate machinery/Stairs allowed

## 2018-06-09 NOTE — DISCHARGE NOTE ADULT - CARE PLAN
Principal Discharge DX:	Glioblastoma multiforme  Goal:	d/c home  Assessment and plan of treatment:	s/p crani tumor resection, cont keppra  Secondary Diagnosis:	Essential hypertension  Secondary Diagnosis:	High cholesterol  Secondary Diagnosis:	Obesity  Secondary Diagnosis:	Transient cerebral ischemia, unspecified type

## 2018-06-09 NOTE — DISCHARGE NOTE ADULT - MEDICATION SUMMARY - MEDICATIONS TO TAKE
I will START or STAY ON the medications listed below when I get home from the hospital:    aspirin 81 mg oral tablet, chewable  -- 1 tab(s) by mouth once a day. Stopped for surgery  May restart on Monday 6/11/18  -- Indication: For Essential hypertension    losartan 50 mg oral tablet  -- 1 tab(s) by mouth 2 times a day  -- Indication: For Essential hypertension    Keppra 500 mg oral tablet  -- 1 tab(s) by mouth once a day in am  -- Check with your doctor before becoming pregnant.  It is very important that you take or use this exactly as directed.  Do not skip doses or discontinue unless directed by your doctor.  May cause drowsiness or dizziness.  Obtain medical advice before taking any non-prescription drugs as some may affect the action of this medication.  Swallow whole.  Do not crush.  This drug may impair the ability to drive or operate machinery.  Use care until you become familiar with its effects.    -- Indication: For Glioblastoma multiforme    Keppra 1000 mg oral tablet  -- 1 tab(s) by mouth once a day (at bedtime)   -- Check with your doctor before becoming pregnant.  It is very important that you take or use this exactly as directed.  Do not skip doses or discontinue unless directed by your doctor.  May cause drowsiness or dizziness.  Obtain medical advice before taking any non-prescription drugs as some may affect the action of this medication.  Swallow whole.  Do not crush.  This drug may impair the ability to drive or operate machinery.  Use care until you become familiar with its effects.    -- Indication: For Glioblastoma multiforme    atorvastatin 40 mg oral tablet  -- 1 tab(s) by mouth once a day (at bedtime)  -- Indication: For High cholesterol    metoprolol succinate 50 mg oral tablet, extended release  -- 1 tab(s) by mouth 2 times a day  -- Indication: For Essential hypertension    amLODIPine 5 mg oral tablet  -- 1 tab(s) by mouth once a day (in the morning)  -- Indication: For Essential hypertension    hydroCHLOROthiazide 25 mg oral tablet  -- 1 tab(s) by mouth once a day (in the morning)  -- Indication: For Essential hypertension

## 2018-06-09 NOTE — DISCHARGE NOTE ADULT - HOSPITAL COURSE
pt has history of craniotomy tumor resection for GBM, p/w regrowth, pt taken to the OR for Right parietal craniotomy tumor resection Post-operatively pt doing well. tolerating PO well, voiding and ambulating. Pain well controlled. D/C home

## 2018-06-09 NOTE — DISCHARGE NOTE ADULT - MEDICATION SUMMARY - MEDICATIONS TO STOP TAKING
I will STOP taking the medications listed below when I get home from the hospital:    clopidogrel 75 mg oral tablet  -- 1 tab(s) by mouth once a day. Stopped for surgery.

## 2018-06-09 NOTE — DISCHARGE NOTE ADULT - CARE PROVIDER_API CALL
Chaim David; PhD), Neurosurgery  [address] [facility] [address] [place] [place]  [address] [place] [address]  Phone: [phone]  Fax: [phone]

## 2018-06-22 PROBLEM — C71.9 GLIOBLASTOMA MULTIFORME: Status: ACTIVE | Noted: 2017-09-21

## 2018-06-22 PROBLEM — Z98.890 S/P CRANIOTOMY: Status: ACTIVE | Noted: 2017-09-20

## 2018-07-07 NOTE — ED PROVIDER NOTE - CONSTITUTIONAL, MLM
normal... Well appearing, well nourished, awake, alert, oriented to person, place, time/situation and in mild distress due to pain

## 2018-07-07 NOTE — ED ADULT TRIAGE NOTE - CHIEF COMPLAINT QUOTE
Pt. to the ED BIBA from Home C/O Right shoulder pain S/P Mechanical fall from standing height- Pt. states her knees gave out and fell by landing on right shoulder- Denies hitting head, LOC and hurting chest - On Aspirin- Pt. does not meet TA at this time- Dr. Lazar to the bedside

## 2018-07-07 NOTE — ED PROVIDER NOTE - RESPIRATORY NEGATIVE STATEMENT, MLM
no chest pain, no cough, and no shortness of breath. Eyes with no visual disturbances.  Ears clean and dry and no hearing difficulties. Nose with pink mucosa and no drainage.  Mouth mucous membranes moist and pink.  No tenderness or swelling to throat or neck.

## 2018-07-07 NOTE — ED PROVIDER NOTE - OBJECTIVE STATEMENT
64 yo pt presents for right shoulder pain.  pt with recent craniotomy with Dr. David for brain mass.  today pt spilled some water on floor, slipped on water and landed on right shoulder.  No loc, no headache, no chest pain. No travel, no sick  contact

## 2018-07-07 NOTE — ED PROCEDURE NOTE - PROCEDURE ADDITIONAL DETAILS
right shoulder dislocation.  10 cc lidocaine placed right shoulder.  Adduction and external rotation and then hyper adduction and internal rotation.  + reduction.  pain improved, distal n/v/m intact after reduction.

## 2018-07-23 PROBLEM — Z86.73 HISTORY OF STROKE: Status: RESOLVED | Noted: 2017-09-25 | Resolved: 2018-01-01

## 2018-08-01 PROBLEM — C71.9 MALIGNANT NEOPLASM OF BRAIN, UNSPECIFIED: Chronic | Status: ACTIVE | Noted: 2018-01-01

## 2018-08-01 PROBLEM — E78.00 PURE HYPERCHOLESTEROLEMIA, UNSPECIFIED: Chronic | Status: ACTIVE | Noted: 2017-08-08

## 2018-08-01 PROBLEM — B00.9 HERPESVIRAL INFECTION, UNSPECIFIED: Chronic | Status: ACTIVE | Noted: 2018-01-01

## 2018-08-01 PROBLEM — M17.0 BILATERAL PRIMARY OSTEOARTHRITIS OF KNEE: Chronic | Status: ACTIVE | Noted: 2018-01-01

## 2018-08-01 PROBLEM — E66.9 OBESITY, UNSPECIFIED: Chronic | Status: ACTIVE | Noted: 2018-01-01

## 2018-08-03 NOTE — ED ADULT TRIAGE NOTE - CHIEF COMPLAINT QUOTE
Pt. to the ED BIBA and Niece C/O Left side facial drop - Niece states that she found patient 45 mins ago and noticed the drop - Niece also states that patient fell in the middle of the night OOB and was assisted by EMS back to bed- Hx. of Brain tumors and Cyber knife- Pt. Evaluated by Dr. Lazar , Code Stroke not indicated at this time - Hx. of Multiple Co- Morbidities

## 2018-08-03 NOTE — CHART NOTE - NSCHARTNOTEFT_GEN_A_CORE
Code stroke. Pt presented w/ stroke w/ left side facial drooping and left sided weakness. Currently at baseline. Pt alert and oriented. Moving all extremities.     Vitals  T99 128/57 Hr 97 95%    PE  Neuro: Alert and oriented x3, left sided facial drooping  Eye: Pupil equal and reactive  cardio: s1 s2 rrr  lungs: ctab  abd: soft nontender nondistended +BS  vasc: 2+ Pedal pulse  MSK: decreased strength on left side upper and lower    A/P  AMS  -pt AMS 2/2 to stroke is at baseline  -pt recently had a surgery and is not a candidate for tPA at this time  -Ct scan in the ED did not show bleed  -If pt gets worse will consider imaging

## 2018-08-03 NOTE — ED ADULT NURSE NOTE - NSIMPLEMENTINTERV_GEN_ALL_ED
Implemented All Fall Risk Interventions:  Ward to call system. Call bell, personal items and telephone within reach. Instruct patient to call for assistance. Room bathroom lighting operational. Non-slip footwear when patient is off stretcher. Physically safe environment: no spills, clutter or unnecessary equipment. Stretcher in lowest position, wheels locked, appropriate side rails in place. Provide visual cue, wrist band, yellow gown, etc. Monitor gait and stability. Monitor for mental status changes and reorient to person, place, and time. Review medications for side effects contributing to fall risk. Reinforce activity limits and safety measures with patient and family.

## 2018-08-03 NOTE — CONSULT NOTE ADULT - ASSESSMENT
62yo female with PMhx of HTN, obesity, anxiety, GBM, s/p Craniotomy 6/07/2018; followed by Cyber-knife radio-surgery 2 weeks ago, has been off steroids since last week and off plavix since surgery; presented to ED with complaint of new left hand / left leg weakness and more pronounced left facial droop since this morning.    CT head reveals extensive confluent hypoattenuation within the right frontoparietal and temporal lobes and basal ganglia, as seen on prior exam, likely reflecting a combination of tumor and/or edema.    # Probable recurrent / residual tumor with edema - compressive sx    # Possibility of right acute infarct cannot be excluded - given hypercoaguble state and being off Plavix    # Rule out seizure - nocturnal event      - MRI brain with and without contrast.  - Start Plavix or ASA -81 mg   - Decadron as per NS / N-oncology   - If MRI positive for stroke then full stroke protocol. 64yo female with PMhx of HTN, obesity, anxiety, GBM, s/p Craniotomy 6/07/2018; followed by Cyber-knife radio-surgery 2 weeks ago, has been off steroids since last week and off plavix since surgery; presented to ED with complaint of new left hand / left leg weakness and more pronounced left facial droop since this morning.    CT head reveals extensive confluent hypoattenuation within the right frontoparietal and temporal lobes and basal ganglia, as seen on prior exam, likely reflecting a combination of tumor and/or edema.    # Probable recurrent / residual tumor with edema - compressive sx    # Possibility of right MCA acute infarct cannot be excluded - given hypercoaguble state and being off Plavix    # Rule out seizure - nocturnal event; less likley      - MRI brain with and without contrast.  - Start Plavix or ASA -81 mg   - Decadron as per NS / N-oncology   - If MRI positive for stroke then full stroke protocol.  - F/U with Neurosurgery    D/W, her family and Dr. Mcintyre

## 2018-08-03 NOTE — ED PROVIDER NOTE - PROGRESS NOTE DETAILS
Neal DIETRICH for ED attending, Dr. Lazar: called to eval for code stroke. Pt last seen normal last night, pt with facial droop, Sx for greater than 3 hours. Initial NIH of 1. Not a TPA candidate. Neal DIETRICH for ED attending, Dr. Lazar: Aster stafford for Dr. David's service. Neal DIETRICH for ED attending, Dr. Lazar: neurosurgery Dr. Frankie stafford for consult. Neal DIETRICH for ED attending, Dr. Lazar: neuro, Dr. Beckford paged. Attending Lazar, neurosurg eval, suggest MRI with a dn w/o, ok with aspirn. Attending sandra Lazar/cyndi Hendrix for admision Attending sandra Lazar/cyndi Beckford

## 2018-08-03 NOTE — H&P ADULT - ASSESSMENT
left facial droop and left upper extremity weakness, not candidate for TPA, can be related to edema vs cva  -admit to tele   -mri head/neck   -neuro consult   -ns consult   -will give decadon x1 now   -start protonix  -oncology consult, patient and family do not want Dr. Serrano, outpt oncologist in [facility] facility  -neurochecks q4h  -check BGM and sliding scale  -pt consult     htn, controlled   -cont home meds    dvt prophylaxis   -heparin sc     disposition   - consult for discharge planning   -pt consult left facial droop and left upper extremity weakness, not candidate for TPA, can be related to edema vs cva  -admit to tele   -mri head/neck   -neuro consult   -ns consult   -will give decadon x1 now   -start protonix  -oncology consult, patient and family do not want Dr. Serrano, outpt oncologist in [facility] facility  -neurochecks q4h  -check BGM and sliding scale  -pt consult     anemia, no signs of gross bleeding   -check guiac   -check anemia panel   -h/h q6h    left wrist, elbow and ankle pain with abrasion s/p fall   -get xray  -wound dressing daily     htn, controlled   -cont home meds    dvt prophylaxis   -heparin sc     disposition   - consult for discharge planning   -pt consult left facial droop and left upper extremity weakness, not candidate for TPA, can be related to edema vs cva  -admit to tele   -mri head/neck   -neuro consult   -ns consult   -will give decadon x1 now   -start protonix  -oncology consult, patient and family do not want Dr. Serrano, outpt oncologist in [facility] facility  -neurochecks q4h  -check BGM and sliding scale  -pt consult   -hold htcz and arb to allow for cerebral autoregulation, treat prn to keep SBP<180  -cont BB, asa, hold plavix in setting of anemia    anemia, no signs of gross bleeding   -check guiac   -check anemia panel   -h/h q6h    left wrist, elbow and ankle pain with abrasion s/p fall   -get xray  -wound dressing daily     htn, controlled   -cont home meds    dvt prophylaxis   -heparin sc     disposition   - consult for discharge planning   -pt consult left facial droop and left upper extremity weakness, not candidate for TPA, can be related to cerebral edema 2/2 to GBM, s/p craniotomy and resection for removal of neoplasm right parietal area 6/2018 s/p cyberknife 2 weeks ago  vs acute cva, cth- no acute change or midline shift  -admit to tele   -mri head/neck   -neuro consult   -ns consult   -will give decadon x1 now   -start protonix  -oncology consult, patient and family do not want Dr. Serrano, outpt oncologist in [facility] facility  -neurochecks q4h  -check BGM and sliding scale  -pt consult   -hold htcz and arb to allow for cerebral autoregulation, treat prn to keep SBP<180  -cont BB, asa, hold plavix in setting of anemia    anemia, no signs of gross bleeding   -check guiac   -check anemia panel   -h/h q6h    left wrist, elbow and ankle pain with abrasion s/p fall   -get xray  -wound dressing daily     htn, controlled   -cont home meds    dvt prophylaxis   -heparin sc     disposition   -sw consult for discharge planning   -pt consult

## 2018-08-03 NOTE — ED PROVIDER NOTE - LOCATION OF NEW WEAKNESS
5/5 strength left hand. 4/5 strength left leg. Rapid alternating movements decreased on left hand./FACIAL

## 2018-08-03 NOTE — ED PROVIDER NOTE - OBJECTIVE STATEMENT
62 y/o female with a PMHx of HTN, HLD, TIA (3-4 years ago), Glioblastoma multiforme s/p craniotomy (6/2018), s/p Cyber knife (2 weeks ago) presents to the ED BIBA c/o left sided facial droop. Pt's niece states that the pt was found 45 minutes ago with a left sided facial droop and left arm weakness, had a fall in the middle of the night. Pt last seen last night. No fever, vomiting, diarrhea. No recent travel. Neuro: Dr. Iverson. Neurosurgeon: Dr. David.

## 2018-08-03 NOTE — H&P ADULT - HISTORY OF PRESENT ILLNESS
62 y/o female  with h/o HTN, obesity, anxiety, GBM, s/p craniotomy and resection for removal of neoplasm  right parietal area 6/2018 presents c/o new left sided weakness to hand and left leg. More pronounced left facial droop since this morning. Reportedly patient slipped out of bed at 4:30am EMS was called as patient was unable to get herself back in bed and they assisted her with that. Later on patient had worsening of left sided symptoms prompting her visit to the ER today. Per family patient has been off steroids since the last week. Has been off plavix since surgery and completed cyberknife about 2 weeks ago. patient lives with boyfriend.  walks with cane    In ED, cth-no acute changes.  s/p asa 325mg      PAST MEDICAL HISTORY:  as below    PAST SURGICAL HISTORY: as below    FAMILY HISTORY:   non-contributory to the patient's current presentation

## 2018-08-03 NOTE — CONSULT NOTE ADULT - SUBJECTIVE AND OBJECTIVE BOX
CC: 63 y old  Female who presents with a chief complaint of worsening left sided weakness and left facial droop    HPI: 62yo female with PMhx of HTN, obesity, anxiety, GBM, s/p Craniotomy right parietal 6/07/2018; followed by Cyber-knife radio-surgery 2 weeks ago, has been off steroids since last week and off plavix since surgery.    Pt presented to ED with complaint of new left hand and left leg weakness and more pronounced left facial droop since this morning; patient reportedly slipped out of bed at 4:30 am, EMS was called as patient was unable to get herself back in bed, they assisted her with that; in the morning, patient noted worsening of left sided symptoms prompting her visit to the ER.     Pt denies diplopia, slurred speech or aphasia, HA, N, V, vertigo, dizziness, LOC or seizurelike activity.    PAST MEDICAL & SURGICAL HISTORY:  HSV (herpes simplex virus) infection: to lip and chin  Obesity  Osteoarthritis of both knees, unspecified osteoarthritis type  Glioblastoma multiforme  High cholesterol  Transient cerebral ischemia, unspecified type: 2015  Essential hypertension  H/O craniotomy: 8/2017    FAMILY HISTORY:  Family history of prostate cancer in father (Father)    Social Hx:  Nonsmoker, no drug or alcohol use    MEDICATIONS  (STANDING):  aspirin 325 milliGRAM(s) Oral daily     Allergies  MSG (Rash)  No Known Drug Allergies    Intolerances    ROS: Pertinent positives in HPI, all other ROS were reviewed and are negative.      Vital Signs Last 24 Hrs  T(C): 37.2 (03 Aug 2018 12:28), Max: 37.2 (03 Aug 2018 12:28)  T(F): 99 (03 Aug 2018 12:28), Max: 99 (03 Aug 2018 12:28)  HR: 115 (03 Aug 2018 12:28) (115 - 115)  BP: 157/106 (03 Aug 2018 12:28) (157/106 - 157/106)  BP(mean): --  RR: 18 (03 Aug 2018 12:28) (18 - 18)  SpO2: 95% (03 Aug 2018 12:28) (95% - 95%)    GE:  Constitutional: awake and alert.  HEENT: PERRLA, EOMI,   Neck: Supple.  Respiratory: Breath sounds are clear bilaterally  Cardiovascular: S1 and S2, regular  Extremities:  no edema  Vascular: No Carotid Bruit   Musculoskeletal: bruising to left hand/wrist, no joint swelling/tenderness, no abnormal movements  Skin: No rashes    Neurological exam:  HF: A x O x 3. Appropriately interactive, normal affect. Speech fluent, No Aphasia or paraphasic errors. Naming /repetition intact   CN: ANGELITA, EOMI, VFF, Left NL flattening, tongue midline, Palate moves equally, SCM equal bilaterally  Motor: mild left pronator drift, decreased bulk and tone Left quad, left biceps. Left UE 4+/5 bicep. Left LE 3/5.  RUE / RLE 5/5      Sens: Intact to light touch / PP  Reflexes: BJ 2+, BR 2+, KJ 2+, AJ 2+, downgoing toes b/l  Coord:  No FNFA, dysmetria, difficulty with IFRAH on left.     Gait/Balance: Not tested for fear of falling    NIHSS: 5     Labs:   08-03    140  |  105  |  17  ----------------------------<  89  3.7   |  28  |  0.80    Ca    9.2      03 Aug 2018 14:13    TPro  6.6  /  Alb  2.9<L>  /  TBili  0.5  /  DBili  x   /  AST  25  /  ALT  24  /  AlkPhos  95  08-03                        10.6   9.49  )-----------( 263      ( 03 Aug 2018 14:13 )             31.6     Radiology:  - CT Head:< from: CT Head No Cont (08.03.18 @ 12:59) >  No significant interval change since prior exam. Reidentified are the   right frontotemporal craniotomies for resection of tumors. Extensive   confluent hypoattenuation is in within the right frontoparietal and   anterior temporal lobes as well as the right basal ganglia is   reidentified, as seen on prior exam, likely reflecting a combination of   tumor and/or edema. No significant mass effect or midline shift. CC: 63 y old  Female who presents with a chief complaint of worsening left sided weakness and left facial droop    HPI: 62 yo female with PMhx of HTN, obesity, anxiety, GBM, s/p Craniotomy (right parietal 6/07/2018), followed by Cyber-knife radio-surgery 2 weeks ago, has been off steroids since last week and off plavix since surgery.    Pt presented to ED at 12.28 hrs with complaint of new left hand / left leg weakness and more pronounced left facial droop since this morning; no associated diplopia, slurred speech / aphasia, HA, N, V, vertigo, dizziness.    Patient also reports that she slipped out of bed at 4:30 am while she was trying to get up, EMS was called as patient was unable to get herself back in bed, they assisted her, she denies LOC or seizure-like activity, or urinary incontinence. In the morning, patient noted worsening of left sided symptoms that prompted her visit to the ER.     CT head reveals confluent hypoattenuation in right frontoparietal and anterior temporal lobes as well as the right basal ganglia.     PAST MEDICAL & SURGICAL HISTORY:  HSV (herpes simplex virus) infection: to lip and chin  Obesity  Osteoarthritis of both knees, unspecified osteoarthritis type  Glioblastoma multiforme  High cholesterol  Transient cerebral ischemia, unspecified type: 2015  Essential hypertension  H/O craniotomy: 8/2017    FAMILY HISTORY:  Family history of prostate cancer in father (Father)    Social Hx:  Nonsmoker, no drug or alcohol use    MEDICATIONS  (STANDING):  aspirin 325 milliGRAM(s) Oral daily     Allergies  MSG (Rash)  No Known Drug Allergies    Intolerances    ROS: Pertinent positives in HPI, all other ROS were reviewed and are negative.      Vital Signs Last 24 Hrs  T(C): 37.2 (03 Aug 2018 12:28), Max: 37.2 (03 Aug 2018 12:28)  T(F): 99 (03 Aug 2018 12:28), Max: 99 (03 Aug 2018 12:28)  HR: 115 (03 Aug 2018 12:28) (115 - 115)  BP: 157/106 (03 Aug 2018 12:28) (157/106 - 157/106)  BP(mean): --  RR: 18 (03 Aug 2018 12:28) (18 - 18)  SpO2: 95% (03 Aug 2018 12:28) (95% - 95%)    GE:  Constitutional: awake and alert, no distress  HEENT: PERRLA, EOMI,   Neck: Supple.  Respiratory: Breath sounds are clear bilaterally  Cardiovascular: S1 and S2, regular  Extremities:  no edema  Vascular: No Carotid Bruit   Musculoskeletal: bruising to left hand/wrist, no joint swelling/tenderness, no abnormal movements  Skin: No rashes    Neurological exam:  HF: A x O x 3.  Speech fluent, No Aphasia or paraphasic errors. Naming /repetition intact   CN: ANGELITA, EOMI, VFF, Left NL flattening, tongue midline, Palate moves equally, SCM equal bilaterally  Motor: Left pronator drift, Left UE 4/5, Left LE 3+/5. RUE / RLE 5/5      Sens: Left sensory neglect  Reflexes: BJ 2+, BR 2+, KJ 2+, AJ 2+, downgoing toes b/l  Coord:  No FNFA - R,  difficulty with IFRAH on left.     Gait/Balance: Not tested     NIHSS: 5     Labs:   08-03    140  |  105  |  17  ----------------------------<  89  3.7   |  28  |  0.80    Ca    9.2      03 Aug 2018 14:13    TPro  6.6  /  Alb  2.9<L>  /  TBili  0.5  /  DBili  x   /  AST  25  /  ALT  24  /  AlkPhos  95  08-03                        10.6   9.49  )-----------( 263      ( 03 Aug 2018 14:13 )             31.6     Radiology:  - CT Head:< from: CT Head No Cont (08.03.18 @ 12:59) >  No significant interval change since prior exam. Reidentified are the   right frontotemporal craniotomies for resection of tumors. Extensive   confluent hypoattenuation is in within the right frontoparietal and   anterior temporal lobes as well as the right basal ganglia is   reidentified, as seen on prior exam, likely reflecting a combination of   tumor and/or edema. No significant mass effect or midline shift.

## 2018-08-03 NOTE — CONSULT NOTE ADULT - SUBJECTIVE AND OBJECTIVE BOX
Patient is a 63y old  Female who presents with a chief complaint of new left sided weakness to hand and left leg. More pronounced left facial droop since this morning. Reportedly patient slipped out of bed at 4:30 am EMS was called as patient was unable to get herself back in bed and they assisted her with that. Later on patient had worsening of left sided symptoms prompting her visit to the ER today. Per family patient has been off steroids since the last week. Has been off plavix since surgery and completed cyberknife about 2 weeks ago.     HPI: Patient is 64yo female with PMhx of HTN, obesity, anxiety, GBM, s/p Craniotomy for removal of neoplasm  right parietal area 6/07/2018.       PAST MEDICAL & SURGICAL HISTORY:  HSV (herpes simplex virus) infection: to lip and chin  Obesity  Osteoarthritis of both knees, unspecified osteoarthritis type  Glioblastoma multiforme  High cholesterol  Transient cerebral ischemia, unspecified type: 2015  Essential hypertension  H/O craniotomy: 8/2017      FAMILY HISTORY:  Family history of prostate cancer in father (Father)        Social Hx:  Nonsmoker, no drug or alcohol use    MEDICATIONS  (STANDING):  aspirin 325 milliGRAM(s) Oral daily     Allergies    MSG (Rash)  No Known Drug Allergies    ROS: Pertinent positives in HPI- left facial droop and left arm/leg weakness, all other ROS were reviewed and are negative.      Vital Signs Last 24 Hrs  T(C): 37.2 (03 Aug 2018 12:28), Max: 37.2 (03 Aug 2018 12:28)  T(F): 99 (03 Aug 2018 12:28), Max: 99 (03 Aug 2018 12:28)  HR: 115 (03 Aug 2018 12:28) (115 - 115)  BP: 157/106 (03 Aug 2018 12:28) (157/106 - 157/106)  BP(mean): --  RR: 18 (03 Aug 2018 12:28) (18 - 18)  SpO2: 95% (03 Aug 2018 12:28) (95% - 95%)        Constitutional: awake and alert.  HEENT: PERRLA, EOMI,   Neck: Supple.  Respiratory: Breath sounds are clear bilaterally  Cardiovascular: S1 and S2, regular / irregular rhythm  Gastrointestinal: soft, nontender  Extremities:  no edema  Vascular: Caritid Bruit - no  Musculoskeletal: bruising to left hand/wrist, weakness to left hand, LLE weakness.   Skin: No rashes    Neurological exam:  HF: A x O x 3. Appropriately interactive, normal affect. Speech fluent, No Aphasia or paraphasic errors. Naming /repetition intact   CN: ANGELITA, EOMI, LEFT NL flattening, facial sensation normal, tongue midline, Palate moves equally, SCM equal bilaterally  Motor: mild left pronator drift, decreased bulk and tone to Left quad, decreased tone to left biceps. Left  4/5 LUE 4+/5 biceps, triceps. Left Quad 2/5 distal LLE 2/5 RLE 5/5 Right DF/PF 5/5 Left DF/PF 3/5   Sens: Intact to light touch / PP/ VS/ JS    Reflexes: Symmetric and normal . BJ 2+, BR 2+, KJ 2+, AJ 2+, downgoing toes b/l  Coord:  No FNFA, dysmetria, difficulty with IFRAH on left.    Gait/Balance: Cannot test patient with difficulty walking and fear of falling.     NIHSS: 5      Labs:                        10.6   9.49  )-----------( 263      ( 03 Aug 2018 14:13 )             31.6               PT/INR - ( 03 Aug 2018 14:13 )   PT: 12.5 sec;   INR: 1.15 ratio         PTT - ( 03 Aug 2018 14:13 )  PTT:22.5 sec    RADIOLOGY:  < from: CT Head No Cont (08.03.18 @ 12:59) >  IMPRESSION:       No significant interval change since prior exam. Reidentified are the   right frontotemporal craniotomies for resection of tumors. Extensive   confluent hypoattenuation is in within the right frontoparietal and   anterior temporal lobes as well as the right basal ganglia is   reidentified, as seen on prior exam, likely reflecting a combination of   tumor and/or edema. No significant mass effect or midline shift.     < end of copied text >

## 2018-08-03 NOTE — H&P ADULT - NSHPPHYSICALEXAM_GEN_ALL_CORE
PHYSICAL EXAM:    GENERAL: NAD, well-groomed, well-developed.  obese  HEAD:  NC/AT  EYES: EOMI, PERRLA, no scleral icterus  HEENT: Moist mucous membranes  NECK: Supple, No JVD  CNS:  Alert & Oriented X3, left facial droop, Motor Strength 5/5 right  upper and lower extremities; Motor Strength 3/5 left upper and lower extremities; DTRs 2+ intact   LUNG: Clear to auscultation bilaterally; No rales, rhonchi, wheezing, or rubs  HEART: RRR; No murmurs, rubs, or gallops  ABDOMEN: +BS, ST/ND/NT  GENITOURINARY- Voiding, Bladder not distended  EXTREMITIES:  2+ Peripheral Pulses, No clubbing, cyanosis.  +2 LE bilaterally pitting edema  MUSCULOSKELTAL- left wrist- FROM, abrasion over ulnar aspect, left ankle-FROM, abrasion noted.

## 2018-08-03 NOTE — CONSULT NOTE ADULT - ASSESSMENT
Patient is a 63y old  Female who presents with a chief complaint of new left sided weakness to hand and left leg. More pronounced left facial droop since this morning. Reportedly patient slipped out of bed at 4:30 am EMS was called as patient was unable to get herself back in bed and they assisted her with that. Later on patient had worsening of left sided symptoms prompting her visit to the ER today. Per family patient has been off steroids since the last week. Has been off plavix since surgery and completed cyberknife about 2 weeks ago.     Discussed and reviewed with Dr. Lazar- known to Dr. David for GBM s/p resection 8/23/17, 6/2018  recommend MRI brain with and without contrast r/o radiation necrosis vs recurrence of tumor.   If able recommend oncology consult and resuming steroids as per oncology  If oncologist unable to evaluate today would give decadron 8mg x1 and then continue decadron 4mg q6h with RISS and gastric protection   no acute neurosurgerical intervention at this time.   due to bruising and pain to right wrist would recommend xray of right wrist to r/o fracture.

## 2018-08-04 NOTE — PHYSICAL THERAPY INITIAL EVALUATION ADULT - ADDITIONAL COMMENTS
Patient reported using a cane due to severe B knee OA. H/O recent fall (2 weeks) with c/o "subluxed" R shoulder.

## 2018-08-04 NOTE — DIETITIAN INITIAL EVALUATION ADULT. - ENERGY NEEDS
Ht. 64     "        Wt.   261.4 #              BMI  44.9                   #               Pt is at  197  %  IBW  #

## 2018-08-04 NOTE — PHYSICAL THERAPY INITIAL EVALUATION ADULT - GENERAL OBSERVATIONS, REHAB EVAL
**s/p code stoke over night and new onset of AMS. Will hold physical therapy eval until post neuro clearance. MRI ordered. Patient received in bed, NPO, +HM.  Patient denied pain, wanted food.  S/S eval not yet performed. Spoke with Dr Justin Arechiga, if RN can clear, she will lift NPO orders and bedrest orders.

## 2018-08-04 NOTE — PHYSICAL THERAPY INITIAL EVALUATION ADULT - PATIENT PROFILE REVIEW, REHAB EVAL
CT head reveals confluent hypoattenuation in right frontoparietal and anterior temporal lobes as well as the right basal ganglia./yes

## 2018-08-04 NOTE — PHYSICAL THERAPY INITIAL EVALUATION ADULT - MODALITIES TREATMENT COMMENTS
Patient returned to supine, call bell in reach. Bed alarm active. RN informed of session.  MD to lift bedrest for next session.

## 2018-08-04 NOTE — PHYSICAL THERAPY INITIAL EVALUATION ADULT - PERTINENT HX OF CURRENT PROBLEM, REHAB EVAL
64 yo F admitted c/o new L sided weakness to hand and L leg. More pronounced L facial droop since this morning. Reportedly patient slipped out of bed at 4:30am EMS was called as patient was unable to get herself back in bed. Later on patient had worsening of L sided symptoms prompting her visit to the ER. recent cessation of steroids, no AC therapy since sx.- s/p craniotomy and resection for removal of neoplasm right parietal area 6/7/2018.  Patient completed CyberKnife about 2 weeks ago.

## 2018-08-04 NOTE — CONSULT NOTE ADULT - SUBJECTIVE AND OBJECTIVE BOX
HPI:  62 y/o female  diagnosed with  right GBM 2017 S/P surgery followed by RT and Temodar; Unfortunately was recently diagnosed with local-regional recurrence and was treated with  surgery /cyber knife RT and under the direction of Dr Ruibo ([facility]) possible Avastin and a second medication ( ? investigational drug )   Now presents c/o new left sided weakness to hand and left leg. More pronounced left facial droop since this morning. Reportedly patient slipped out of bed at 4:30am EMS was called as patient was unable to get herself back in bed and they assisted her with that. Later on patient had worsening of left sided symptoms prompting her visit to the ER today. Per family patient has been off steroids since the last week. Has been off plavix since surgery and completed cyberknife about 2 weeks ago. Had brain MRI ( see below) revealing less enhancement, + masses/   She was recently started ( resumed ) Dexamethasone and is feeling better; she is stable has no new complaints and would like to eat/ there is apparently some difficulty with swallowing reflex and consult with speech swallow awaited prior to taking her off NPO status    EXAM:  MR ANGIO BRAIN                            Findings:    Redemonstrated are 2 noncontiguous enhancing masses in the right frontal   lobe, and in the right parietotemporal lobes, respectively. The degree of   enhancement of the 2 lesions is markedly decreased since the prior exam.   Previous foci of enhancement within the body of the corpus callosum and   extending into the right anterior superior frontal lobe have essentially   resolved. The surrounding confluent T2/FLAIR hyperintensity within the   right frontal parietal and temporal lobes, likely reflecting a   combination of nonenhancing tumor and/or vasogenic edema, is mildly   increased and expanded since prior exam. No acute infarct. No new   hemorrhage. No significant mass effect or midline shift. Postsurgical   changes from prior right sided craniotomies again identified. Small old   infarct in the left posterior temporal lobe is again identified.    MRA of the [place] demonstrates scattered mild to moderate   narrowings of the anterior and posterior circulation, suggestive of   atherosclerotic changes. No high-grade stenosis or occlusion. No   thrombus. No evidence of medium or large sized aneurysm.    MRA of the neck demonstrates normal antegrade flow in the bilateral   common carotid, cervical internal carotid and vertebral arteries. No flow   gap is seen to suggest high grade stenosis.        IMPRESSION:    MRI brain:   2 noncontiguous enhancing masses in the right frontal lobe   and in the right parietotemporal lobes, respectively, demonstrate   markedly decreased enhancement since the prior exam. Previous foci of   enhancement within the body of the corpus callosum and extending into the   right anterior superiorfrontal lobe have essentially resolved. However,   the surrounding confluent T2/FLAIR hyperintensity within the right   frontal parietal and temporal lobes, likely reflecting a combination of   nonenhancing tumor and/or vasogenic edema, is mildly increased and   expanded since prior exam. These findings may reflect pseudoregression of   disease versus improvement of disease, and clinical correlation with type   of chemotherapeutic agent is needed.  No acute infarct or other acute   abnormality.    MRA brain: Scattered mild to moderate narrowings of the anterior and   posterior circulation, suggestive of atherosclerotic changes. No   high-grade stenosis or occlusion. No thrombus.     MRA neck: No hemodynamically significant stenosis.          PAST MEDICAL HISTORY:  as below    PAST SURGICAL HISTORY: as below    FAMILY HISTORY:   non-contributory to the patient's current presentation (03 Aug 2018 20:21)      PAST MEDICAL & SURGICAL HISTORY:  HSV (herpes simplex virus) infection: to lip and chin  Obesity  Osteoarthritis of both knees, unspecified osteoarthritis type  Glioblastoma multiforme  High cholesterol  Transient cerebral ischemia, unspecified type: 2015  Essential hypertension  H/O craniotomy: 8/2017      MEDICATIONS  (STANDING):  amLODIPine   Tablet 5 milliGRAM(s) Oral daily  aspirin 325 milliGRAM(s) Oral daily  atorvastatin 40 milliGRAM(s) Oral at bedtime  dexamethasone  Injectable 4 milliGRAM(s) IV Push every 6 hours  dextrose 5%. 1000 milliLiter(s) (50 mL/Hr) IV Continuous <Continuous>  dextrose 50% Injectable 12.5 Gram(s) IV Push once  dextrose 50% Injectable 25 Gram(s) IV Push once  dextrose 50% Injectable 25 Gram(s) IV Push once  heparin  Injectable 5000 Unit(s) SubCutaneous every 8 hours  insulin lispro (HumaLOG) corrective regimen sliding scale   SubCutaneous three times a day before meals  levETIRAcetam 1000 milliGRAM(s) Oral at bedtime  levETIRAcetam 500 milliGRAM(s) Oral <User Schedule>  metoprolol succinate ER 50 milliGRAM(s) Oral daily  multivitamin 1 Tablet(s) Oral daily  pantoprazole  Injectable 40 milliGRAM(s) IV Push daily    MEDICATIONS  (PRN):  ALPRAZolam 0.25 milliGRAM(s) Oral daily PRN for anxiety  dextrose 40% Gel 15 Gram(s) Oral once PRN Blood Glucose LESS THAN 70 milliGRAM(s)/deciliter  glucagon  Injectable 1 milliGRAM(s) IntraMuscular once PRN Glucose LESS THAN 70 milligrams/deciliter      Allergies    MSG (Rash)  No Known Drug Allergies    Intolerances        SOCIAL HISTORY:    FAMILY HISTORY:  Family history of prostate cancer in father (Father)      Vital Signs Last 24 Hrs  T(C): 36.4 (04 Aug 2018 10:04), Max: 38.1 (04 Aug 2018 05:29)  T(F): 97.6 (04 Aug 2018 10:04), Max: 100.6 (04 Aug 2018 05:29)  HR: 80 (04 Aug 2018 10:04) (80 - 105)  BP: 142/74 (04 Aug 2018 10:04) (128/57 - 177/88)  BP(mean): --  RR: 20 (04 Aug 2018 10:04) (16 - 20)  SpO2: 92% (04 Aug 2018 10:04) (92% - 97%)    Obese   NAD  VSS  HEENT neg  Lungs clear   Cor neg  Abd obese  Ext neg phlebitis  L weakness    LABS:                        10.9   x     )-----------( x        ( 04 Aug 2018 05:54 )             33.2     08-04    139  |  102  |  17  ----------------------------<  119<H>  3.7   |  25  |  0.82    Ca    9.2      04 Aug 2018 05:54  Phos  2.3     08-04  Mg     2.2     08-04    TPro  7.0  /  Alb  3.1<L>  /  TBili  0.5  /  DBili  x   /  AST  12<L>  /  ALT  23  /  AlkPhos  100  08-04    PT/INR - ( 03 Aug 2018 14:13 )   PT: 12.5 sec;   INR: 1.15 ratio         PTT - ( 03 Aug 2018 14:13 )  PTT:22.5 sec

## 2018-08-04 NOTE — PHYSICAL THERAPY INITIAL EVALUATION ADULT - ACTIVE RANGE OF MOTION EXAMINATION, REHAB EVAL
bilateral upper extremity Active ROM was WFL (within functional limits)/bilateral  lower extremity Active ROM was WFL (within functional limits)/No pain with AROM R shoulder

## 2018-08-04 NOTE — PROVIDER CONTACT NOTE (CHANGE IN STATUS NOTIFICATION) - SITUATION
Pt unable to follow commands for neuro exam. As per family at bedside, this differs from pt's baseline.

## 2018-08-04 NOTE — CONSULT NOTE ADULT - ASSESSMENT
Impression    Recurrent GBM/ Imaging suggests treatment effect 9 decrease enhancement, which is reassuring  Symptoms are better with Steroids    A/P    Continue steroids  Antacids  DVT prophylaxis ( high risk for DVT)  Speech swallow evaluation  FU with Dr Caballero / Neurooncology [facility] for ongoing Avastin Rx

## 2018-08-05 NOTE — SWALLOW BEDSIDE ASSESSMENT ADULT - COMMENTS
pt, white female, age 63, presents with a chief complaint of new left sided weakness to hand and left leg. More pronounced left facial droop since this morning. Reportedly patient slipped out of bed at 4:30 am EMS was called as patient was unable to get herself back in bed and they assisted her with that. Later on patient had worsening of left sided symptoms prompting her visit to the ER today. Per family patient has been off steroids since the last week. Has been off Plavix since surgery and completed cyberknife about 2 weeks ago.     HPI: Patient is 64yo female with PMhx of HTN, obesity, anxiety, GBM, s/p Craniotomy for removal of neoplasm  right parietal area 6/07/2018.

## 2018-08-05 NOTE — SWALLOW BEDSIDE ASSESSMENT ADULT - NS SPL SWALLOW CLINIC TRIAL FT
pt presents with oral-pharyngeal swallow skills within normal limits for regular consistency diet   Rx Upgrade diet consistency to regular.    meds as tolerated.    Pt's swallow function is ar base,ine and Service will not follow.  Please re-consult prn.

## 2018-08-05 NOTE — SWALLOW BEDSIDE ASSESSMENT ADULT - SLP GENERAL OBSERVATIONS
On encounter, pt lying in bed, able to sit upright unaided: states that the weakness is decreasing on left side.  Able to converse in full sentences without overt dysarthria: occasional hesitations as if in lexical search.

## 2018-08-06 NOTE — PROGRESS NOTE ADULT - ASSESSMENT
· Assessment		  * Worsening of the left facial droop and left upper extremity weakness,  not candidate for TPA, can be related to cerebral edema due to GBM, s/p craniotomy and resection for removal of neoplasm right parietal area 6/2018 s/p cyber knife 2 weeks ago , ruled out CVA by MRI    cth- no acute change or midline shift  - tele  - sinus  -mri head/neck  - no strokes, post-surgical changes, ? worsening of edema   -neuro consult  - o/p f/u with neuro oncology  -ns consult  - no acute neurosurgical interventions   -c/w decadron IV 2 q8h --> PO for 1 week , reevaluate as per neurosurgery   -start protonix IV --> PO   -oncology consult, patient and family do not want Dr. Serrano, outpt oncologist in [facility] facility  -neurochecks q4h  -check BGM and sliding scale  - 2 d echo - pending   -TSH, B12 - wnl  -hold htcz and arb to allow for cerebral autoregulation, treat prn to keep SBP<180  -cont BB, asa, hold plavix in setting of anemia  - c/w keppra , check level      * Anemia, no signs of gross bleeding , resolved  -check guiac   - neg   -check anemia panel  - normal  -h/h q6h - stable     * Left wrist, elbow and ankle pain with abrasion s/p fall   -get xray - neg for fx  -wound dressing daily     * HTN  controlled   -cont home meds BB and CCB     dvt prophylaxis   -heparin sc     disposition  - 2 d echo - pending   -sw consult for discharge planning  - may need JESSICA placement, likely in am  -pt consult
63 year old woman hx of GBM, s/p craniotomy, tumor resection and RT presented with worsening left weakness, improving. MRI of brain shows areas of edema, no evidence of CVA,  ? due to RT. Restarted on decadron, feeling stronger.
Patient is a 63y old  Female who presents with a chief complaint of new left sided weakness to hand and left leg. More pronounced left facial droop since this morning. Reportedly patient slipped out of bed at 4:30 am EMS was called as patient was unable to get herself back in bed and they assisted her with that. Later on patient had worsening of left sided symptoms prompting her visit to the ER today. MRI shows decreased enhancement, increased edema    -No acute neurosurgical intervention  -Can decrease steroids to 2mg q 8 hrs  -Oncology / RT f/u  -Will sign off for now, reconsult PRN    Discussed with Dr David
· Assessment		  * Worsening of the left facial droop and left upper extremity weakness,  not candidate for TPA, can be related to cerebral edema due to GBM, s/p craniotomy and resection for removal of neoplasm right parietal area 6/2018 s/p cyber knife 2 weeks ago , ruled out CVA by MRI    cth- no acute change or midline shift  - tele   -mri head/neck  - no strokes, post-surgical changes, ? worsening of edema   -neuro consult  - o/p f/u with neuro oncology  -ns consult  - no acute neurosurgical interventions   -c/w decadron IV   -start protonix IV   -oncology consult, patient and family do not want Dr. Serrano, outpt oncologist in [facility] facility  -neurochecks q4h  -check BGM and sliding scale  -pt   -hold htcz and arb to allow for cerebral autoregulation, treat prn to keep SBP<180  -cont BB, asa, hold plavix in setting of anemia    * Anemia, no signs of gross bleeding   -check guiac   -check anemia panel   -h/h q6h - stable     * Left wrist, elbow and ankle pain with abrasion s/p fall   -get xray - neg for fx  -wound dressing daily     * HTN  controlled   -cont home meds BB and CCB     dvt prophylaxis   -heparin sc     disposition   -sw consult for discharge planning   -pt consult
· Assessment		  * Worsening of the left facial droop and left upper extremity weakness,  not candidate for TPA, can be related to cerebral edema due to GBM, s/p craniotomy and resection for removal of neoplasm right parietal area 6/2018 s/p cyber knife 2 weeks ago , ruled out CVA by MRI    cth- no acute change or midline shift  - tele  - sinus  -mri head/neck  - no strokes, post-surgical changes, ? worsening of edema   -neuro consult  - o/p f/u with neuro oncology  -ns consult  - no acute neurosurgical interventions   -c/w decadron IV   -start protonix IV   -oncology consult, patient and family do not want Dr. Serrano, outpt oncologist in [facility] facility  -neurochecks q4h  -check BGM and sliding scale  - 2 d echo  -TSH, B12 - wnl  -hold htcz and arb to allow for cerebral autoregulation, treat prn to keep SBP<180  -cont BB, asa, hold plavix in setting of anemia  - c/w keppra , check level      * Anemia, no signs of gross bleeding , resolved  -check guiac   - neg   -check anemia panel  - normal  -h/h q6h - stable     * Left wrist, elbow and ankle pain with abrasion s/p fall   -get xray - neg for fx  -wound dressing daily     * HTN  controlled   -cont home meds BB and CCB     dvt prophylaxis   -heparin sc     disposition  - 2 d echo - pending   -sw consult for discharge planning  - may need JESSICA placement, likely in am  -pt consult

## 2018-08-06 NOTE — PROGRESS NOTE ADULT - SUBJECTIVE AND OBJECTIVE BOX
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 More pronounced left facial droop since this morning. Reportedly patient slipped out of bed at 4:30am EMS was called as patient was unable to get herself back in bed and they assisted her with that. Later on patient had worsening of left sided symptoms prompting her visit to the ER today. Per family patient has been off steroids since the last week. Has been off plavix since surgery and completed cyberknife about 2 weeks ago. She lives with boyfriend.  Patient walks with cane  In ED, cth-no acute changes.  s/p asa 325mg  8/4/18 Patient seen and examined at bedside earlier today , reports improvement of the left sided weakness, reports left foot pain, denies CP, palpitations, no new sx   8/5/18 - no events, feels better, denies HA, CP, palpitations, abd pain   8/6/18 - no events, tolerates IV steroids, feels anxious , denies new weakness or numbness  Review of system- Rest of the review of system are negative except mentioned in HPI    T(C): 36.1 (08-06-18 @ 09:49), Max: 37 (08-05-18 @ 17:41)  T(F): 97 (08-06-18 @ 09:49), Max: 98.6 (08-05-18 @ 17:41)  HR: 110 (08-06-18 @ 09:49) (74 - 110)  BP: 151/84 (08-06-18 @ 09:49) (133/60 - 154/79)  RR: 18 (08-06-18 @ 09:49) (18 - 18)  SpO2: 96% (08-06-18 @ 09:49) (96% - 97%)  Wt(kg): --      PHYSICAL EXAM:  GENERAL: NAD  NERVOUS SYSTEM:  Alert & Oriented X3, left pronator drift , Left UE 4+/5, Left LE 4+/5. RUE / RLE 5/5     HEAD:  Atraumatic, Normocephalic, left fascial drop  EYES: EOMI, PERRLA, conjunctiva and sclera clear  HEENT: Moist mucous membranes  NECK: Supple, No JVD  CHEST/LUNG: Clear to auscultation bilaterally; No rales, no rhonchi, no wheezing, or rubs  HEART: Regular rate and rhythm; No murmurs, rubs, or gallops  ABDOMEN: Soft, Nontender, Nondistended; Bowel sounds present  GENITOURINARY- Voiding, no suprapubic tenderness  EXTREMITIES:  2+ Peripheral Pulses, No clubbing, cyanosis, or edema  MUSCULOSKELETAL:- No muscle tenderness, Muscle tone normal,+ left ankle joint tenderness, no Joint swelling, Joint range of motion-normal  SKIN-no rash, no lesion    LABS:  08-06    139  |  104  |  32<H>  ----------------------------<  123<H>  4.0   |  25  |  0.85    Ca    9.1      06 Aug 2018 07:00                         11.6   13.94 )-----------( 304      ( 06 Aug 2018 07:00 )             34.9   CARDIAC MARKERS ( 06 Aug 2018 07:00 )  <0.015 ng/mL / x     / x     / x     / x        08-05    139  |  104  |  26<H>  ----------------------------<  139<H>  3.5   |  26  |  0.85    Ca    9.4      05 Aug 2018 06:04  Phos  2.3     08-04  Mg     2.2     08-04    TPro  7.0  /  Alb  3.1<L>  /  TBili  0.5  /  DBili  x   /  AST  12<L>  /  ALT  23  /  AlkPhos  100  08-04                       12.4   x     )-----------( x        ( 05 Aug 2018 16:11 )             36.4   CARDIAC MARKERS ( 03 Aug 2018 23:52 )  <0.015 ng/mL / x     / x     / x     / x        LIVER FUNCTIONS - ( 04 Aug 2018 05:54 )  Alb: 3.1 g/dL / Pro: 7.0 gm/dL / ALK PHOS: 100 U/L / ALT: 23 U/L / AST: 12 U/L / GGT: x                               11.5   x     )-----------( x        ( 04 Aug 2018 16:16 )             33.8     08-04    139  |  102  |  17  ----------------------------<  119<H>  3.7   |  25  |  0.82    Ca    9.2      04 Aug 2018 05:54  Phos  2.3     08-04  Mg     2.2     08-04    TPro  7.0  /  Alb  3.1<L>  /  TBili  0.5  /  DBili  x   /  AST  12<L>  /  ALT  23  /  AlkPhos  100  08-04    PT/INR - ( 03 Aug 2018 14:13 )   PT: 12.5 sec;   INR: 1.15 ratio         PTT - ( 03 Aug 2018 14:13 )  PTT:22.5 sec  CARDIAC MARKERS ( 03 Aug 2018 23:52 )  <0.015 ng/mL / x     / x     / x     / x      CARDIAC MARKERS ( 03 Aug 2018 17:35 )  <0.015 ng/mL / x     / x     / x     / x      CARDIAC MARKERS ( 03 Aug 2018 14:13 )  <0.015 ng/mL / x     / x     / x     / x        CAPILLARY BLOOD GLUCOSE      POCT Blood Glucose.: 125 mg/dL (04 Aug 2018 12:24)  POCT Blood Glucose.: 138 mg/dL (04 Aug 2018 07:45)  POCT Blood Glucose.: 93 mg/dL (03 Aug 2018 23:27)  RECENT CULTURES:    RADIOLOGY & ADDITIONAL TESTS:     < from: MR Head w/wo IV Cont (08.04.18 @ 12:27) >  IMPRESSION:    MRI brain:   2 noncontiguous enhancing masses in the right frontal lobe   and in the right parietotemporal lobes, respectively, demonstrate   markedly decreased enhancement since the prior exam. Previous foci of   enhancement within the body of the corpus callosum and extending into the   right anterior superiorfrontal lobe have essentially resolved. However,   the surrounding confluent T2/FLAIR hyperintensity within the right   frontal parietal and temporal lobes, likely reflecting a combination of   nonenhancing tumor and/or vasogenic edema, is mildly increased and   expanded since prior exam. These findings may reflect pseudoregression of   disease versus improvement of disease, and clinical correlation with type   of chemotherapeutic agent is needed.  No acute infarct or other acute   abnormality.    MRA brain: Scattered mild to moderate narrowings of the anterior and   posterior circulation, suggestive of atherosclerotic changes. No   high-grade stenosis or occlusion. No thrombus.     MRA neck: No hemodynamically significant stenosis.    < end of copied text >    < from: Xray Elbow AP + Lateral, Left (08.03.18 @ 20:57) >  No fracture    < end of copied text >  < from: Xray Ankle Complete 3 Views, Left (08.03.18 @ 20:56) >  Ankle mortise is intact. Moderate medial and lateral malleoli are soft   tissue swelling. No definite fracture.    Impression:    No fracture      < end of copied text >  < from: Xray Wrist 2 Views, Left (08.03.18 @ 20:55) >  No fracture    < end of copied text >  < from: Xray Chest 1 View AP/PA. (08.03.18 @ 12:52) >  AP radiograph of the chest demonstrates no evidence of infiltrate,   pleural effusion or vascular congestion. No atelectasis is seen.   Implantable cardiac device is noted. The cardiac silhouette is normal in   size. Osseous structures are intact.    Impression: No active pulmonary disease.    < end of copied text >    Current medications:  ALPRAZolam 0.25 milliGRAM(s) Oral daily PRN  amLODIPine   Tablet 5 milliGRAM(s) Oral daily  aspirin 325 milliGRAM(s) Oral daily  atorvastatin 40 milliGRAM(s) Oral at bedtime  dexamethasone  Injectable 4 milliGRAM(s) IV Push every 6 hours  dextrose 40% Gel 15 Gram(s) Oral once PRN  dextrose 5%. 1000 milliLiter(s) IV Continuous <Continuous>  dextrose 50% Injectable 12.5 Gram(s) IV Push once  dextrose 50% Injectable 25 Gram(s) IV Push once  dextrose 50% Injectable 25 Gram(s) IV Push once  glucagon  Injectable 1 milliGRAM(s) IntraMuscular once PRN  heparin  Injectable 5000 Unit(s) SubCutaneous every 8 hours  insulin lispro (HumaLOG) corrective regimen sliding scale   SubCutaneous three times a day before meals  levETIRAcetam 1000 milliGRAM(s) Oral at bedtime  levETIRAcetam 500 milliGRAM(s) Oral <User Schedule>  metoprolol succinate ER 50 milliGRAM(s) Oral daily  multivitamin 1 Tablet(s) Oral daily  pantoprazole  Injectable 40 milliGRAM(s) IV Push daily
64yo female with PMhx of HTN, obesity, anxiety, GBM, s/p Craniotomy 6/07/2018; followed by Cyber-knife radio-surgery 2 weeks ago, has been off steroids since last week and off plavix since surgery; presented to ED with complaint of new left hand / left leg weakness and more pronounced left facial droop. initial CT head reveals extensive confluent hypoattenuation within the right frontoparietal and temporal lobes and basal ganglia, as seen on prior exam, likely reflecting a combination of tumor and/or edema.    MEDICATIONS  (STANDING):  amLODIPine   Tablet 5 milliGRAM(s) Oral daily  aspirin 325 milliGRAM(s) Oral daily  atorvastatin 40 milliGRAM(s) Oral at bedtime  heparin  Injectable 5000 Unit(s) SubCutaneous every 8 hours  insulin lispro (HumaLOG) corrective regimen sliding scale   SubCutaneous three times a day before meals  levETIRAcetam 1000 milliGRAM(s) Oral at bedtime  levETIRAcetam 500 milliGRAM(s) Oral <User Schedule>  metoprolol succinate ER 50 milliGRAM(s) Oral daily  multivitamin 1 Tablet(s) Oral daily  pantoprazole  Injectable 40 milliGRAM(s) IV Push daily    MEDICATIONS  (PRN):  ALPRAZolam 0.25 milliGRAM(s) Oral daily PRN for anxiety  dextrose 40% Gel 15 Gram(s) Oral once PRN Blood Glucose LESS THAN 70 milliGRAM(s)/deciliter  glucagon  Injectable 1 milliGRAM(s) IntraMuscular once PRN Glucose LESS THAN 70 milligrams/deciliter      Neurological Exam:    CN: ANGELITA, EOMI, VFF, Left NL flattening, tongue midline, Palate moves equally, SCM equal bilaterally  Motor: Left pronator drift, Left UE 4+/5, Left LE 4+/5. RUE / RLE 5/5      Sens: Left sensory neglect  Reflexes: 1-2 + bilat, downgoing toes b/l  Coord:  difficulty with IFRAH on left.     Gait/Balance: Not tested     < from: MR Head w/wo IV Cont (08.04.18 @ 12:27) >    MRI brain:   2 noncontiguous enhancing masses in the right frontal lobe   and in the right parietotemporal lobes, respectively, demonstrate   markedly decreased enhancement since the prior exam. Previous foci of   enhancement within the body of the corpus callosum and extending into the   right anterior superior frontal lobe have essentially resolved. However,   the surrounding confluent T2/FLAIR hyperintensity within the right   frontal parietal and temporal lobes, likely reflecting a combination of   nonenhancing tumor and/or vasogenic edema, is mildly increased and   expanded since prior exam. These findings may reflect pseudo regression of   disease versus improvement of disease, and clinical correlation with type   of chemotherapeutic agent is needed.  No acute infarct or other acute   abnormality.    MRA brain: Scattered mild to moderate narrowings of the anterior and   posterior circulation, suggestive of atherosclerotic changes. No   high-grade stenosis or occlusion. No thrombus.     MRA neck: No hemodynamically significant stenosis.    < end of copied text >
Patient is a 63y old  Female who presents with a chief complaint of new left facial droop and extremity weakness (03 Aug 2018 20:21)      HPI:  Patient is a 63y old  Female who presents with a chief complaint of new left sided weakness to hand and left leg. More pronounced left facial droop since this morning. Reportedly patient slipped out of bed at 4:30 am EMS was called as patient was unable to get herself back in bed and they assisted her with that. Later on patient had worsening of left sided symptoms prompting her visit to the ER today. Per family patient has been off steroids since the last week. Has been off plavix since surgery and completed cyberknife about 2 weeks ago.     8/6 - Pt seen by Dr David yesterday who discussed MRI results in detail. Seen by myself earlier today, not examined, in NAD. Appears comfortable, denies new complaints, no overnight events         ALPRAZolam 0.25 milliGRAM(s) Oral daily PRN  amLODIPine   Tablet 5 milliGRAM(s) Oral daily  aspirin 325 milliGRAM(s) Oral daily  atorvastatin 40 milliGRAM(s) Oral at bedtime  dexamethasone  Injectable 4 milliGRAM(s) IV Push every 6 hours  heparin  Injectable 5000 Unit(s) SubCutaneous every 8 hours  levETIRAcetam 1000 milliGRAM(s) Oral at bedtime  levETIRAcetam 500 milliGRAM(s) Oral <User Schedule>  metoprolol succinate ER 50 milliGRAM(s) Oral daily  multivitamin 1 Tablet(s) Oral daily  pantoprazole  Injectable 40 milliGRAM(s) IV Push daily      Vital Signs Last 24 Hrs  T(C): 36.1 (06 Aug 2018 09:49), Max: 37 (05 Aug 2018 17:41)  T(F): 97 (06 Aug 2018 09:49), Max: 98.6 (05 Aug 2018 17:41)  HR: 110 (06 Aug 2018 09:49) (74 - 110)  BP: 151/84 (06 Aug 2018 09:49) (133/60 - 154/79)  RR: 18 (06 Aug 2018 09:49) (18 - 18)  SpO2: 96% (06 Aug 2018 09:49) (96% - 97%)                          11.6   13.94 )-----------( 304      ( 06 Aug 2018 07:00 )             34.9       08-06    139  |  104  |  32<H>  ----------------------------<  123<H>  4.0   |  25  |  0.85    Ca    9.1      06 Aug 2018 07:00        Radiology:  MR Head w/wo IV Cont (08.04.18 @ 12:27) >  MRI brain:   2 noncontiguous enhancing masses in the right frontal lobe   and in the right parietotemporal lobes, respectively, demonstrate   markedly decreased enhancement since the prior exam. Previous foci of   enhancement within the body of the corpus callosum and extending into the   right anterior superiorfrontal lobe have essentially resolved. However,   the surrounding confluent T2/FLAIR hyperintensity within the right   frontal parietal and temporal lobes, likely reflecting a combination of   nonenhancing tumor and/or vasogenic edema, is mildly increased and   expanded since prior exam. These findings may reflect pseudoregression of   disease versus improvement of disease, and clinical correlation with type   of chemotherapeutic agent is needed.  No acute infarct or other acute   abnormality.    MRA brain: Scattered mild to moderate narrowings of the anterior and   posterior circulation, suggestive of atherosclerotic changes. No   high-grade stenosis or occlusion. No thrombus.     MRA neck: No hemodynamically significant stenosis.
Subjective:  Patient is a 63y old  Female who presents with a chief complaint of new left facial droop and extremity weakness     HPI:      62 y/o female  with h/o HTN, obesity, anxiety, GBM, s/p craniotomy and resection for removal of neoplasm  right parietal area 2018 presents on 8/3/18  c/o new left sided weakness to hand and left leg. More pronounced left facial droop since this morning. Reportedly patient slipped out of bed at 4:30am EMS was called as patient was unable to get herself back in bed and they assisted her with that. Later on patient had worsening of left sided symptoms prompting her visit to the ER today. Per family patient has been off steroids since the last week. Has been off plavix since surgery and completed cyberknife about 2 weeks ago. She lives with boyfriend.  Patient walks with cane  In ED, cth-no acute changes.  s/p asa 325mg  18 Patient seen and examined at bedside earlier today , reports improvement of the left sided weakness, reports left foot pain, denies CP, palpitations, no new sx   18 - no events, feels better, denies HA, CP, palpitations, abd pain   Review of system- Rest of the review of system are negative except mentioned in HPI    T(C): 37 (18 @ 17:41), Max: 37 (18 @ 17:41)  T(F): 98.6 (18 @ 17:41), Max: 98.6 (18 @ 17:41)  HR: 74 (18 @ 17:41) (74 - 98)  BP: 133/60 (18 @ 17:41) (105/76 - 141/89)  RR: 18 (18 @ 17:41) (18 - 20)  SpO2: 96% (18 @ 17:41) (93% - 98%)  Wt(kg): --  Daily Weight in k.8 (04 Aug 2018 14:48)    PHYSICAL EXAM:  GENERAL: NAD  NERVOUS SYSTEM:  Alert & Oriented X3, left pronator drift , Left UE 4+/5, Left LE 4+/5. RUE / RLE 5/5     HEAD:  Atraumatic, Normocephalic, left fascial drop  EYES: EOMI, PERRLA, conjunctiva and sclera clear  HEENT: Moist mucous membranes  NECK: Supple, No JVD  CHEST/LUNG: Clear to auscultation bilaterally; No rales, no rhonchi, no wheezing, or rubs  HEART: Regular rate and rhythm; No murmurs, rubs, or gallops  ABDOMEN: Soft, Nontender, Nondistended; Bowel sounds present  GENITOURINARY- Voiding, no suprapubic tenderness  EXTREMITIES:  2+ Peripheral Pulses, No clubbing, cyanosis, or edema  MUSCULOSKELETAL:- No muscle tenderness, Muscle tone normal,+ left ankle joint tenderness, no Joint swelling, Joint range of motion-normal  SKIN-no rash, no lesion    LABS:      139  |  104  |  26<H>  ----------------------------<  139<H>  3.5   |  26  |  0.85    Ca    9.4      05 Aug 2018 06:04  Phos  2.3     08-04  Mg     2.2     08-04    TPro  7.0  /  Alb  3.1<L>  /  TBili  0.5  /  DBili  x   /  AST  12<L>  /  ALT  23  /  AlkPhos  100  08-04                       12.4   x     )-----------( x        ( 05 Aug 2018 16:11 )             36.4   CARDIAC MARKERS ( 03 Aug 2018 23:52 )  <0.015 ng/mL / x     / x     / x     / x        LIVER FUNCTIONS - ( 04 Aug 2018 05:54 )  Alb: 3.1 g/dL / Pro: 7.0 gm/dL / ALK PHOS: 100 U/L / ALT: 23 U/L / AST: 12 U/L / GGT: x                               11.5   x     )-----------( x        ( 04 Aug 2018 16:16 )             33.8     08-04    139  |  102  |  17  ----------------------------<  119<H>  3.7   |  25  |  0.82    Ca    9.2      04 Aug 2018 05:54  Phos  2.3     08-04  Mg     2.2     08-04    TPro  7.0  /  Alb  3.1<L>  /  TBili  0.5  /  DBili  x   /  AST  12<L>  /  ALT  23  /  AlkPhos  100  08-04    PT/INR - ( 03 Aug 2018 14:13 )   PT: 12.5 sec;   INR: 1.15 ratio         PTT - ( 03 Aug 2018 14:13 )  PTT:22.5 sec  CARDIAC MARKERS ( 03 Aug 2018 23:52 )  <0.015 ng/mL / x     / x     / x     / x      CARDIAC MARKERS ( 03 Aug 2018 17:35 )  <0.015 ng/mL / x     / x     / x     / x      CARDIAC MARKERS ( 03 Aug 2018 14:13 )  <0.015 ng/mL / x     / x     / x     / x        CAPILLARY BLOOD GLUCOSE      POCT Blood Glucose.: 125 mg/dL (04 Aug 2018 12:24)  POCT Blood Glucose.: 138 mg/dL (04 Aug 2018 07:45)  POCT Blood Glucose.: 93 mg/dL (03 Aug 2018 23:27)  RECENT CULTURES:    RADIOLOGY & ADDITIONAL TESTS:     < from: MR Head w/wo IV Cont (18 @ 12:27) >  IMPRESSION:    MRI brain:   2 noncontiguous enhancing masses in the right frontal lobe   and in the right parietotemporal lobes, respectively, demonstrate   markedly decreased enhancement since the prior exam. Previous foci of   enhancement within the body of the corpus callosum and extending into the   right anterior superiorfrontal lobe have essentially resolved. However,   the surrounding confluent T2/FLAIR hyperintensity within the right   frontal parietal and temporal lobes, likely reflecting a combination of   nonenhancing tumor and/or vasogenic edema, is mildly increased and   expanded since prior exam. These findings may reflect pseudoregression of   disease versus improvement of disease, and clinical correlation with type   of chemotherapeutic agent is needed.  No acute infarct or other acute   abnormality.    MRA brain: Scattered mild to moderate narrowings of the anterior and   posterior circulation, suggestive of atherosclerotic changes. No   high-grade stenosis or occlusion. No thrombus.     MRA neck: No hemodynamically significant stenosis.    < end of copied text >    < from: Xray Elbow AP + Lateral, Left (18 @ 20:57) >  No fracture    < end of copied text >  < from: Xray Ankle Complete 3 Views, Left (18 @ 20:56) >  Ankle mortise is intact. Moderate medial and lateral malleoli are soft   tissue swelling. No definite fracture.    Impression:    No fracture      < end of copied text >  < from: Xray Wrist 2 Views, Left (18 @ 20:55) >  No fracture    < end of copied text >  < from: Xray Chest 1 View AP/PA. (18 @ 12:52) >  AP radiograph of the chest demonstrates no evidence of infiltrate,   pleural effusion or vascular congestion. No atelectasis is seen.   Implantable cardiac device is noted. The cardiac silhouette is normal in   size. Osseous structures are intact.    Impression: No active pulmonary disease.    < end of copied text >    Current medications:  ALPRAZolam 0.25 milliGRAM(s) Oral daily PRN  amLODIPine   Tablet 5 milliGRAM(s) Oral daily  aspirin 325 milliGRAM(s) Oral daily  atorvastatin 40 milliGRAM(s) Oral at bedtime  dexamethasone  Injectable 4 milliGRAM(s) IV Push every 6 hours  dextrose 40% Gel 15 Gram(s) Oral once PRN  dextrose 5%. 1000 milliLiter(s) IV Continuous <Continuous>  dextrose 50% Injectable 12.5 Gram(s) IV Push once  dextrose 50% Injectable 25 Gram(s) IV Push once  dextrose 50% Injectable 25 Gram(s) IV Push once  glucagon  Injectable 1 milliGRAM(s) IntraMuscular once PRN  heparin  Injectable 5000 Unit(s) SubCutaneous every 8 hours  insulin lispro (HumaLOG) corrective regimen sliding scale   SubCutaneous three times a day before meals  levETIRAcetam 1000 milliGRAM(s) Oral at bedtime  levETIRAcetam 500 milliGRAM(s) Oral <User Schedule>  metoprolol succinate ER 50 milliGRAM(s) Oral daily  multivitamin 1 Tablet(s) Oral daily  pantoprazole  Injectable 40 milliGRAM(s) IV Push daily
Subjective:  Patient is a 63y old  Female who presents with a chief complaint of new left facial droop and extremity weakness     HPI:      64 y/o female  with h/o HTN, obesity, anxiety, GBM, s/p craniotomy and resection for removal of neoplasm  right parietal area 2018 presents on 8/3/18  c/o new left sided weakness to hand and left leg. More pronounced left facial droop since this morning. Reportedly patient slipped out of bed at 4:30am EMS was called as patient was unable to get herself back in bed and they assisted her with that. Later on patient had worsening of left sided symptoms prompting her visit to the ER today. Per family patient has been off steroids since the last week. Has been off plavix since surgery and completed cyberknife about 2 weeks ago. She lives with boyfriend.  Patient walks with cane  In ED, cth-no acute changes.  s/p asa 325mg  18 Patient seen and examined at bedside earlier today , reports improvement of the left sided weakness, reports left foot pain, denies CP, palpitations, no new sx     Review of system- Rest of the review of system are negative except mentioned in HPI    OBJECTIVE:   T(C): 36.8 (18 @ 16:43), Max: 38.1 (18 @ 05:29)  HR: 104 (18 @ 16:43) (80 - 105)  BP: 147/80 (18 @ 16:43) (128/57 - 177/88)  RR: 20 (18 @ 16:43) (16 - 20)  SpO2: 92% (18 @ 16:43) (92% - 97%)  Wt(kg): --  Daily     Daily Weight in k.8 (04 Aug 2018 14:48)    PHYSICAL EXAM:  GENERAL: NAD  NERVOUS SYSTEM:  Alert & Oriented X3, left pronator drift , Left UE 4+/5, Left LE 4+/5. RUE / RLE 5/5     HEAD:  Atraumatic, Normocephalic, left fascial drop  EYES: EOMI, PERRLA, conjunctiva and sclera clear  HEENT: Moist mucous membranes  NECK: Supple, No JVD  CHEST/LUNG: Clear to auscultation bilaterally; No rales, no rhonchi, no wheezing, or rubs  HEART: Regular rate and rhythm; No murmurs, rubs, or gallops  ABDOMEN: Soft, Nontender, Nondistended; Bowel sounds present  GENITOURINARY- Voiding, no suprapubic tenderness  EXTREMITIES:  2+ Peripheral Pulses, No clubbing, cyanosis, or edema  MUSCULOSKELETAL:- No muscle tenderness, Muscle tone normal,+ left ankle joint tenderness, no Joint swelling, Joint range of motion-normal  SKIN-no rash, no lesion    LABS:                        11.5   x     )-----------( x        ( 04 Aug 2018 16:16 )             33.8     08-04    139  |  102  |  17  ----------------------------<  119<H>  3.7   |  25  |  0.82    Ca    9.2      04 Aug 2018 05:54  Phos  2.3     08-04  Mg     2.2     08-04    TPro  7.0  /  Alb  3.1<L>  /  TBili  0.5  /  DBili  x   /  AST  12<L>  /  ALT  23  /  AlkPhos  100  08-04    PT/INR - ( 03 Aug 2018 14:13 )   PT: 12.5 sec;   INR: 1.15 ratio         PTT - ( 03 Aug 2018 14:13 )  PTT:22.5 sec  CARDIAC MARKERS ( 03 Aug 2018 23:52 )  <0.015 ng/mL / x     / x     / x     / x      CARDIAC MARKERS ( 03 Aug 2018 17:35 )  <0.015 ng/mL / x     / x     / x     / x      CARDIAC MARKERS ( 03 Aug 2018 14:13 )  <0.015 ng/mL / x     / x     / x     / x        CAPILLARY BLOOD GLUCOSE      POCT Blood Glucose.: 125 mg/dL (04 Aug 2018 12:24)  POCT Blood Glucose.: 138 mg/dL (04 Aug 2018 07:45)  POCT Blood Glucose.: 93 mg/dL (03 Aug 2018 23:27)  RECENT CULTURES:    RADIOLOGY & ADDITIONAL TESTS:  < from: MR Head w/wo IV Cont (18 @ 12:27) >  IMPRESSION:    MRI brain:   2 noncontiguous enhancing masses in the right frontal lobe   and in the right parietotemporal lobes, respectively, demonstrate   markedly decreased enhancement since the prior exam. Previous foci of   enhancement within the body of the corpus callosum and extending into the   right anterior superiorfrontal lobe have essentially resolved. However,   the surrounding confluent T2/FLAIR hyperintensity within the right   frontal parietal and temporal lobes, likely reflecting a combination of   nonenhancing tumor and/or vasogenic edema, is mildly increased and   expanded since prior exam. These findings may reflect pseudoregression of   disease versus improvement of disease, and clinical correlation with type   of chemotherapeutic agent is needed.  No acute infarct or other acute   abnormality.    MRA brain: Scattered mild to moderate narrowings of the anterior and   posterior circulation, suggestive of atherosclerotic changes. No   high-grade stenosis or occlusion. No thrombus.     MRA neck: No hemodynamically significant stenosis.    < end of copied text >    < from: Xray Elbow AP + Lateral, Left (08.03.18 @ 20:57) >  No fracture    < end of copied text >  < from: Xray Ankle Complete 3 Views, Left (08.03.18 @ 20:56) >  Ankle mortise is intact. Moderate medial and lateral malleoli are soft   tissue swelling. No definite fracture.    Impression:    No fracture      < end of copied text >  < from: Xray Wrist 2 Views, Left (08.03.18 @ 20:55) >  No fracture    < end of copied text >  < from: Xray Chest 1 View AP/PA. (08.03.18 @ 12:52) >  AP radiograph of the chest demonstrates no evidence of infiltrate,   pleural effusion or vascular congestion. No atelectasis is seen.   Implantable cardiac device is noted. The cardiac silhouette is normal in   size. Osseous structures are intact.    Impression: No active pulmonary disease.    < end of copied text >    Current medications:  ALPRAZolam 0.25 milliGRAM(s) Oral daily PRN  amLODIPine   Tablet 5 milliGRAM(s) Oral daily  aspirin 325 milliGRAM(s) Oral daily  atorvastatin 40 milliGRAM(s) Oral at bedtime  dexamethasone  Injectable 4 milliGRAM(s) IV Push every 6 hours  dextrose 40% Gel 15 Gram(s) Oral once PRN  dextrose 5%. 1000 milliLiter(s) IV Continuous <Continuous>  dextrose 50% Injectable 12.5 Gram(s) IV Push once  dextrose 50% Injectable 25 Gram(s) IV Push once  dextrose 50% Injectable 25 Gram(s) IV Push once  glucagon  Injectable 1 milliGRAM(s) IntraMuscular once PRN  heparin  Injectable 5000 Unit(s) SubCutaneous every 8 hours  insulin lispro (HumaLOG) corrective regimen sliding scale   SubCutaneous three times a day before meals  levETIRAcetam 1000 milliGRAM(s) Oral at bedtime  levETIRAcetam 500 milliGRAM(s) Oral <User Schedule>  metoprolol succinate ER 50 milliGRAM(s) Oral daily  multivitamin 1 Tablet(s) Oral daily  pantoprazole  Injectable 40 milliGRAM(s) IV Push daily

## 2018-08-07 NOTE — DISCHARGE NOTE ADULT - HOSPITAL COURSE
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 More pronounced left facial droop since this morning. Reportedly patient slipped out of bed at 4:30am EMS was called as patient was unable to get herself back in bed and they assisted her with that. Later on patient had worsening of left sided symptoms prompting her visit to the ER today. Per family patient has been off steroids since the last week. Has been off plavix since surgery and completed cyberknife about 2 weeks ago. She lives with boyfriend. Patient walks with cane.    8/7/18 - Pt. seen and examined with Dr. Ricardo in the chair. Feels well, offers no complaints. Agreeable to go to rehab.    REVIEW OF SYSTEMS: all negative except for comments above.    Vital Signs Last 24 Hrs  T(C): 36.2 (07 Aug 2018 09:40), Max: 36.4 (07 Aug 2018 05:11)  T(F): 97.2 (07 Aug 2018 09:40), Max: 97.5 (07 Aug 2018 05:11)  HR: 86 (07 Aug 2018 09:40) (74 - 86)  BP: 135/68 (07 Aug 2018 09:40) (135/68 - 150/73)  BP(mean): --  RR: 18 (07 Aug 2018 09:40) (18 - 18)  SpO2: 96% (07 Aug 2018 09:40) (96% - 100%)    PHYSICAL EXAM:    GEN: sitting in the chair, NAD  HEENT:   NC/AT  CV:  +S1, +S2, RRR  RESP:  CTA B/L  BREAST:  not examined  GI:  abdomen soft, non-tender, non-distended, normoactive BS  RECTAL:  not examined  MSK:   normal muscle tone  EXT:  no edema  NEURO:  AAOX3, no focal neurological deficits  SKIN:  no rashes    Labs & Radiology reviewed.    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 Juan Marinelli  - neuro surgery consult appreciated  - no acute neurosurgical interventions   - s/p IV steroids, spoke with Dr. Cardona (rad onc) and will do steroid taper ---> 2mg Q8 x 3 days, 2mg q12 x 3 days, 2mg daily x 3 days   - TSH, B12 - wnl  - hold htcz and arb to allow for cerebral autoregulation, treat prn to keep SBP<180  - cont BB, asa, plavix  - Keppra level  - echo results    * Anemia, no signs of gross bleeding, RESOLVED  - guaiac - neg   - anemia panel  - normal  - h/h stable     * Left wrist, elbow and ankle pain with abrasion s/p fall   - xray - neg for fx  - wound dressing daily     * HTN  controlled   - cont home meds BB and CCB     * Dispo  - discharge to rehab today  - spoke with Dr. Cardona and patient will not need chemotherapy or radiation while in rehab. Pt. to follow up with Dr. Cardona and Dr. Dr. Juan Marinelli after rehab. 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 More pronounced left facial droop since this morning. Reportedly patient slipped out of bed at 4:30am EMS was called as patient was unable to get herself back in bed and they assisted her with that. Later on patient had worsening of left sided symptoms prompting her visit to the ER today. Per family patient has been off steroids since the last week. Has been off plavix since surgery and completed cyberknife about 2 weeks ago. She lives with boyfriend. Patient walks with cane.    8/7/18 - Pt. seen and examined with Dr. Ricardo in the chair. Feels well, offers no complaints. Agreeable to go to rehab.    REVIEW OF SYSTEMS: all negative except for comments above.    Vital Signs Last 24 Hrs  T(C): 36.2 (07 Aug 2018 09:40), Max: 36.4 (07 Aug 2018 05:11)  T(F): 97.2 (07 Aug 2018 09:40), Max: 97.5 (07 Aug 2018 05:11)  HR: 86 (07 Aug 2018 09:40) (74 - 86)  BP: 135/68 (07 Aug 2018 09:40) (135/68 - 150/73)  BP(mean): --  RR: 18 (07 Aug 2018 09:40) (18 - 18)  SpO2: 96% (07 Aug 2018 09:40) (96% - 100%)    PHYSICAL EXAM:    GEN: sitting in the chair, NAD  HEENT:   NC/AT  CV:  +S1, +S2, RRR  RESP:  CTA B/L  BREAST:  not examined  GI:  abdomen soft, non-tender, non-distended, normoactive BS  RECTAL:  not examined  MSK:   normal muscle tone  EXT:  no edema  NEURO:  AAOX3, no focal neurological deficits  SKIN:  no rashes    Labs & Radiology reviewed.    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 Juan Marinelli  - neuro surgery consult appreciated  - no acute neurosurgical interventions   - s/p IV steroids, spoke with Dr. Cardona (rad onc) and will do steroid taper ---> 2mg Q8 x 3 days, 2mg q12 x 3 days, 2mg daily x 3 days   - TSH, B12 - wnl  - hold htcz and arb to allow for cerebral autoregulation, treat prn to keep SBP<180  - cont BB, asa, plavix  - Keppra level   - echo results    * Anemia, no signs of gross bleeding, RESOLVED  - guaiac - neg   - anemia panel  - normal  - h/h stable     * Left wrist, elbow and ankle pain with abrasion s/p fall   - xray - neg for fx  - wound dressing daily     * HTN  controlled   - cont home meds BB and CCB     * Dispo  - discharge to rehab today  - spoke with Dr. Cardona and patient will not need chemotherapy or radiation while in rehab. Pt. to follow up with Dr. Cardona and Dr. Dr. Juan Marinelli after rehab.   Disposition - medically optimized to be discharged  to [facility]  with close follow up with PCP, oncologist radiologist within 1 week  return to ED if fever, abdominal pain, nausea, vomiting, chest pain, dyspnea  Discharge plan discussed with patient, RN  Patient advised to follow up with PCP within 3-7 days  time spend 40 min  Discharge note faxed to PCP with my contact information to call me back     Patient seen and examined with NP Casie Fulton .  I personally had a face-to-face encounter with the patient, examined the patient myself and reviewed the plan of care with the patient and NP.   I agree with the assessment and plan of NP as stated and discussed. 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 More pronounced left facial droop since this morning. Reportedly patient slipped out of bed at 4:30am EMS was called as patient was unable to get herself back in bed and they assisted her with that. Later on patient had worsening of left sided symptoms prompting her visit to the ER today. Per family patient has been off steroids since the last week. Has been off plavix since surgery and completed cyberknife about 2 weeks ago. She lives with boyfriend. Patient walks with cane.    8/7/18 - Pt. seen and examined with Dr. Ricardo in the chair. Feels well, offers no complaints. Agreeable to go to rehab.    REVIEW OF SYSTEMS: all negative except for comments above.    Vital Signs Last 24 Hrs  T(C): 36.2 (07 Aug 2018 09:40), Max: 36.4 (07 Aug 2018 05:11)  T(F): 97.2 (07 Aug 2018 09:40), Max: 97.5 (07 Aug 2018 05:11)  HR: 86 (07 Aug 2018 09:40) (74 - 86)  BP: 135/68 (07 Aug 2018 09:40) (135/68 - 150/73)  BP(mean): --  RR: 18 (07 Aug 2018 09:40) (18 - 18)  SpO2: 96% (07 Aug 2018 09:40) (96% - 100%)    PHYSICAL EXAM:    GEN: sitting in the chair, NAD  HEENT:   NC/AT  CV:  +S1, +S2, RRR  RESP:  CTA B/L  BREAST:  not examined  GI:  abdomen soft, non-tender, non-distended, normoactive BS  RECTAL:  not examined  MSK:   normal muscle tone  EXT:  no edema  NEURO:  AAOX3, no focal neurological deficits  SKIN:  no rashes    Labs & Radiology reviewed.    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 Juan Marinelli  - neuro surgery consult appreciated  - no acute neurosurgical interventions   - s/p IV steroids, spoke with Dr. Cardona (rad onc) and will do steroid taper ---> 2mg Q8 x 3 days, 2mg q12 x 3 days, 2mg daily x 3 days   - TSH, B12 - wnl  - hold htcz and arb to allow for cerebral autoregulation, treat prn to keep SBP<180  - cont BB, asa, plavix  - echo ordered on 8/5 but not yet performed ---> do not want to delay discharge so patient can follow up with Dr. Burrows as outpt  - Keppra level pending    * Anemia, no signs of gross bleeding, RESOLVED  - guaiac - neg   - anemia panel  - normal  - h/h stable     * Left wrist, elbow and ankle pain with abrasion s/p fall   - xray - neg for fx  - wound dressing daily     * HTN  controlled   - cont home meds BB and CCB     * Dispo  - discharge to rehab today  - spoke with Dr. Cardona and patient will not need radiation while in rehab. Pt. to follow up with Dr. Cardona and - spoke with Dr. Dr. Juan Marinelli and patient has appointment on 8/14 for Avastatin that should be kept.      Disposition - medically optimized to be discharged  to [facility]  with close follow up with PCP, oncologist radiologist within 1 week  return to ED if fever, abdominal pain, nausea, vomiting, chest pain, dyspnea  Discharge plan discussed with patient, RN  Patient advised to follow up with PCP within 3-7 days  time spend 40 min  Discharge note faxed to PCP with my contact information to call me back     Patient seen and examined with NP Casie Fulton .  I personally had a face-to-face encounter with the patient, examined the patient myself and reviewed the plan of care with the patient and NP.   I agree with the assessment and plan of NP as stated and discussed. 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 More pronounced left facial droop since this morning. Reportedly patient slipped out of bed at 4:30am EMS was called as patient was unable to get herself back in bed and they assisted her with that. Later on patient had worsening of left sided symptoms prompting her visit to the ER today. Per family patient has been off steroids since the last week. Has been off plavix since surgery and completed cyberknife about 2 weeks ago. She lives with boyfriend. Patient walks with cane.    8/7/18 - Pt. seen and examined with Dr. Ricardo in the chair. Feels well, offers no complaints. Agreeable to go to rehab.    REVIEW OF SYSTEMS: all negative except for comments above.    Vital Signs Last 24 Hrs  T(C): 36.2 (07 Aug 2018 09:40), Max: 36.4 (07 Aug 2018 05:11)  T(F): 97.2 (07 Aug 2018 09:40), Max: 97.5 (07 Aug 2018 05:11)  HR: 86 (07 Aug 2018 09:40) (74 - 86)  BP: 135/68 (07 Aug 2018 09:40) (135/68 - 150/73)  BP(mean): --  RR: 18 (07 Aug 2018 09:40) (18 - 18)  SpO2: 96% (07 Aug 2018 09:40) (96% - 100%)    PHYSICAL EXAM:    GEN: sitting in the chair, NAD  HEENT:   NC/AT  CV:  +S1, +S2, RRR  RESP:  CTA B/L  BREAST:  not examined  GI:  abdomen soft, non-tender, non-distended, normoactive BS  RECTAL:  not examined  MSK:   normal muscle tone  EXT:  no edema  NEURO:  AAOX3, no focal neurological deficits  SKIN:  no rashes    Labs & Radiology reviewed.    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 Juan Marinelli  - neuro surgery consult appreciated  - no acute neurosurgical interventions   - s/p IV steroids, spoke with Dr. Cardona (rad onc) and will do steroid taper ---> 2mg Q8 x 3 days, 2mg q12 x 3 days, 2mg daily x 3 days   - TSH, B12 - wnl  - hold htcz and arb to allow for cerebral autoregulation, treat prn to keep SBP<180  - cont BB, asa, plavix  - echo ordered on 8/5 but not yet performed ---> do not want to delay discharge so patient can follow up with Dr. Armstrong as outpt  - Keppra level pending    * Anemia, no signs of gross bleeding, RESOLVED  - guaiac - neg   - anemia panel  - normal  - h/h stable     * Left wrist, elbow and ankle pain with abrasion s/p fall   - xray - neg for fx  - wound dressing daily     * HTN  controlled   - cont home meds BB and CCB     * Dispo  - discharge to rehab today  - spoke with Dr. Cardona and patient will not need radiation while in rehab. Pt. to follow up with Dr. Cardona and - spoke with Dr. Dr. Juan Marinelli and patient has appointment on 8/14 for Avastatin that should be kept.      Disposition - medically optimized to be discharged  to [facility]  with close follow up with PCP, oncologist radiologist within 1 week  return to ED if fever, abdominal pain, nausea, vomiting, chest pain, dyspnea  Discharge plan discussed with patient, RN  Patient advised to follow up with PCP within 3-7 days  time spend 40 min  Discharge note faxed to PCP with my contact information to call me back     Patient seen and examined with NP Casie Fulton .  I personally had a face-to-face encounter with the patient, examined the patient myself and reviewed the plan of care with the patient and NP.   I agree with the assessment and plan of NP as stated and discussed.

## 2018-08-07 NOTE — DISCHARGE NOTE ADULT - PROVIDER TOKENS
TOKEN:'[id number]',TOKEN:'[id number]' TOKEN:'[id number]:'[id number]',TOKEN:'[id number]'

## 2018-08-07 NOTE — DISCHARGE NOTE ADULT - OTHER SIGNIFICANT FINDINGS
Complete Blood Count in AM (08.06.18 @ 07:00)    Nucleated RBC: 0 /100 WBCs    WBC Count: 13.94 K/uL    RBC Count: 3.97 M/uL    Hemoglobin: 11.6 g/dL    Hematocrit: 34.9 %    Mean Cell Volume: 87.9 fl    Mean Cell Hemoglobin: 29.2 pg    Mean Cell Hemoglobin Conc: 33.2 gm/dL    Red Cell Distrib Width: 13.2 %    Platelet Count - Automated: 304 K/uL    Basic Metabolic Panel in AM (08.06.18 @ 07:00)    Sodium, Serum: 139 mmol/L    Potassium, Serum: 4.0 mmol/L    Chloride, Serum: 104 mmol/L    Carbon Dioxide, Serum: 25 mmol/L    Anion Gap, Serum: 10 mmol/L    Blood Urea Nitrogen, Serum: 32 mg/dL    Creatinine, Serum: 0.85 mg/dL    Glucose, Serum: 123 mg/dL    Calcium, Total Serum: 9.1 mg/dL    Troponin I, Serum (08.06.18 @ 07:00)    Troponin I, Serum: <0.015: High Sensitivity Troponin and new reference  range effective 7/6/2016 ng/mL    Serum Pro-Brain Natriuretic Peptide (08.06.18 @ 07:00)    Serum Pro-Brain Natriuretic Peptide: 351 pg/mL  < from: MR Head w/wo IV Cont (08.04.18 @ 12:27) >  MPRESSION:    MRI brain:   2 noncontiguous enhancing masses in the right frontal lobe   and in the right parietotemporal lobes, respectively, demonstrate   markedly decreased enhancement since the prior exam. Previous foci of   enhancement within the body of the corpus callosum and extending into the   right anterior superiorfrontal lobe have essentially resolved. However,   the surrounding confluent T2/FLAIR hyperintensity within the right   frontal parietal and temporal lobes, likely reflecting a combination of   nonenhancing tumor and/or vasogenic edema, is mildly increased and   expanded since prior exam. These findings may reflect pseudoregression of   disease versus improvement of disease, and clinical correlation with type   of chemotherapeutic agent is needed.  No acute infarct or other acute   abnormality.    MRA brain: Scattered mild to moderate narrowings of the anterior and   posterior circulation, suggestive of atherosclerotic changes. No   high-grade stenosis or occlusion. No thrombus.     MRA neck: No hemodynamically significant stenosis.    < end of copied text >  < from: Xray Elbow AP + Lateral, Left (08.03.18 @ 20:57) >  No bone, joint or soft tissue abnormality is evident.    Impression:    No fracture    < end of copied text >  < from: Xray Ankle Complete 3 Views, Left (08.03.18 @ 20:56) >  Ankle mortise is intact. Moderate medial and lateral malleoli are soft   tissue swelling. No definite fracture.    Impression:    No fracture      < end of copied text >

## 2018-08-07 NOTE — DISCHARGE NOTE ADULT - PATIENT PORTAL LINK FT
You can access the [facility], offered by [facility], by registering with the following website: [email or web address]

## 2018-08-07 NOTE — DISCHARGE NOTE ADULT - PLAN OF CARE
Follow up with Dr. Juan Marinelli and Dr. Adonis Cardona after rehab - Continue Keppra   - Steroid taper as below:  Decadron 2mg PO Q8 x 3 days (8/7 -8/9) ---> Decadron 2mg PO Q12 x 3 days (8/10-8/12) ---> Decadron 2mg PO Qdaily (8/13-8/15) Continue home medications Follow up with Dr. Juan Marinelli on 8/14 and Dr. Adonis Cardona after rehab

## 2018-08-07 NOTE — DISCHARGE NOTE ADULT - CARE PROVIDERS DIRECT ADDRESSES
,[email or web address] [email or web address],DirectAddress_Unknown

## 2018-08-07 NOTE — DISCHARGE NOTE ADULT - CARE PROVIDER_API CALL
Adonis Cardona), Radiation Oncology  [address] [place] [address]  Phone: [phone]  Fax: [phone]    Juan Marinelli), Neurology  [address] [place] [address]  Phone: [phone]  Fax: [phone] Adonis Cardona), Radiation Oncology  [address] [place] [address]  Phone: [phone]  Fax: [phone]    Juan Marinelli), Neurology  [address] [place] [address]  Phone: [phone]  Fax: [phone]    Robbin Armstrong), Cardiovascular Disease; Internal Medicine; Nuclear Cardiology  [address] [facility] [facility] [address]  [place] [address]  Phone: [phone]  Fax: [phone]

## 2018-08-07 NOTE — DISCHARGE NOTE ADULT - MEDICATION SUMMARY - MEDICATIONS TO TAKE
I will START or STAY ON the medications listed below when I get home from the hospital:    Decadron  -- 2 milligram(s) by mouth every 8 hours  -- Indication: For steroid taper    aspirin 81 mg oral tablet, chewable  -- 1 tab(s) by mouth once a day.   -- Indication: For for your heart    losartan 50 mg oral tablet  -- 1 tab(s) by mouth once a day  -- Indication: For for your blood pressure/HR    levETIRAcetam 1000 mg oral tablet  -- 1 tab(s) by mouth once a day (at bedtime)  -- Indication: For anti seizure    levETIRAcetam 500 mg oral tablet  -- 1 tab(s) by mouth   -- Indication: For anti seizure    atorvastatin 40 mg oral tablet  -- 1 tab(s) by mouth once a day (at bedtime)  -- Indication: For for your cholesterol    ALPRAZolam 0.25 mg oral tablet  -- 1 tab(s) by mouth once a day, As Needed - for anxiety      ****Before Radiation****  -- Indication: For for anxiety as needed    metoprolol succinate 50 mg oral tablet, extended release  -- 1 tab(s) by mouth 2 times a day  -- Indication: For for your blood pressure/HR    amLODIPine 5 mg oral tablet  -- 1 tab(s) by mouth once a day (in the morning)  -- Indication: For for your blood pressure/HR    hydroCHLOROthiazide 25 mg oral tablet  -- 1 tab(s) by mouth once a day (in the morning)  -- Indication: For for your blood pressure/HR    Glucosamine Chondroitin oral capsule  -- 2 cap(s) by mouth 2 times a day  -- Indication: For nutraceutical product    Multiple Vitamins oral tablet  -- 1 tab(s) by mouth once a day  -- Indication: For vitamin

## 2018-08-07 NOTE — DISCHARGE NOTE ADULT - CARE PLAN
Principal Discharge DX:	Glioblastoma multiforme  Goal:	Follow up with Dr. Juan Marinelli and Dr. Adonis Cardona after rehab  Assessment and plan of treatment:	- Continue Keppra   - Steroid taper as below:  Decadron 2mg PO Q8 x 3 days (8/7 -8/9) ---> Decadron 2mg PO Q12 x 3 days (8/10-8/12) ---> Decadron 2mg PO Qdaily (8/13-8/15)  Secondary Diagnosis:	Essential hypertension  Goal:	Continue home medications Principal Discharge DX:	Glioblastoma multiforme  Goal:	Follow up with Dr. Juan Mairnelli on 8/14 and Dr. Adonis Cardona after rehab  Assessment and plan of treatment:	- Continue Keppra   - Steroid taper as below:  Decadron 2mg PO Q8 x 3 days (8/7 -8/9) ---> Decadron 2mg PO Q12 x 3 days (8/10-8/12) ---> Decadron 2mg PO Qdaily (8/13-8/15)  Secondary Diagnosis:	Essential hypertension  Goal:	Continue home medications

## 2018-12-20 NOTE — CONSULT NOTE ADULT - ATTENDING COMMENTS
Patient is well known to me - past resection of GBM right fronto temporal region with progression - received temodar and radiation. now on Avastin therapy.     CT does not show significant mass effect or new lesion. MRI required to truly comment on possible disease progression.     palliative care consult appropriate and discussion of nursing supports at home

## 2018-12-20 NOTE — H&P ADULT - NSHPPHYSICALEXAM_GEN_ALL_CORE
Vital Signs Last 24 Hrs  T(C): 36.7 (20 Dec 2018 17:00), Max: 36.7 (20 Dec 2018 15:19)  T(F): 98 (20 Dec 2018 17:00), Max: 98 (20 Dec 2018 15:19)  HR: 88 (20 Dec 2018 17:00) (84 - 88)  BP: 128/67 (20 Dec 2018 17:00) (107/75 - 128/67)  RR: 22 (20 Dec 2018 17:00) (18 - 22)  SpO2: 99% (20 Dec 2018 17:00) (98% - 99%)

## 2018-12-20 NOTE — ED ADULT TRIAGE NOTE - CHIEF COMPLAINT QUOTE
complaining of knee weakness and inability to ambulate. Patient has history of brain tumor and surgery with Dr. David.

## 2018-12-20 NOTE — ED PROVIDER NOTE - NS_ ATTENDINGSCRIBEDETAILS _ED_A_ED_FT
The scribe's documentation has been prepared under my direction and personally reviewed by me in its entirety.  I confirm that the note above accurately reflects all my work, treatment, procedures, and decision making except where otherwise noted or amended by me.  Shawn Morrell M.D.

## 2018-12-20 NOTE — ED PROVIDER NOTE - OBJECTIVE STATEMENT
62 y/o female with PMHx of HLD, HTN, HSV, h/o craniotomy, chronic left sided weakness, s/p CyberKnife, Obesity, presents to the ED states that she could not walk around secondary to her left foot. States she "didn't want to fall," so she came into the ED. Patient also confirms left leg weakness. States that she was walking around fine in the morning, but then she couldn't walk around 7am. Patient is a poor historian. Denies any other symptoms, no headache, nausea, vomiting, chest pain. 62 y/o female with PMHx of HLD, HTN, HSV, h/o craniotomy, chronic left sided weakness, s/p CyberKnife, Obesity, presents to the ED states that she could not walk around secondary to her left foot. States she "didn't want to fall," so she came into the ED. Patient also confirms left leg weakness. States that she was walking at baseline in the morning, but then she couldn't walk around 7am.  Last normal 6 a.m.  PT notes that she has chronic left face, arm, and leg weakness form a brain mass and this weakness increased from baseline.  Patient is a poor historian. Denies any other symptoms, no headache, nausea, vomiting, chest pain.

## 2018-12-20 NOTE — CONSULT NOTE ADULT - ASSESSMENT
64 yo female with known GBM, s/p craniotomy tumor resection, RT/CT   - pt will need MRI of the brain +/- contrast to assess the progression of the disease and further help with prognosis   - continue decadron w/ GI pph  - cont pain control as needed    - would recommend palliative care consult   - Dr David will review MRI and d/w pt's family tomorrow

## 2018-12-20 NOTE — ED PROVIDER NOTE - CARE PLAN
Principal Discharge DX:	Brain cancer  Secondary Diagnosis:	Immobility syndrome  Secondary Diagnosis:	Upper motor neuron disease

## 2018-12-20 NOTE — H&P ADULT - NEGATIVE ENMT SYMPTOMS
no nasal congestion/no hearing difficulty/no vertigo/no sinus symptoms/no tinnitus/no nasal discharge

## 2018-12-20 NOTE — ED ADULT NURSE NOTE - CHIEF COMPLAINT QUOTE
complaining of knee weakness and inability to ambulate. Patient states she felt like she was going to fall.  Patient has history of brain tumor and surgery with Dr. David. Lives alone with boyfriend.

## 2018-12-20 NOTE — H&P ADULT - ATTENDING COMMENTS
I have seen and examined pt at bedside along w/ resident, Dr. Cabral.  I agree w/ above H&P except where I have made the pertinent changes.

## 2018-12-20 NOTE — H&P ADULT - MUSCULOSKELETAL
details… detailed exam Muscle strength of 2 in LUE and 4 in RUE. 4 in LLE and 5 in RLE/ROM intact/normal normal/ROM intact/Muscle strength of 2/5 in LUE and 4/5 in RUE. 4/5 in LLE and 5/5 in RLE

## 2018-12-20 NOTE — CONSULT NOTE ADULT - SUBJECTIVE AND OBJECTIVE BOX
Patient is a 63y old  Female who presents with a chief complaint of     HPI:  62 yo female seen and examined in the ER and d/w DR David. Pt is known to our service. Pt has a known GBM s/p tumor resection, RT, CT. Pt presented to the ER with c/o worsening weakness to her left side.       PAST MEDICAL & SURGICAL HISTORY:  HSV (herpes simplex virus) infection: to lip and chin  Obesity  Osteoarthritis of both knees, unspecified osteoarthritis type  Glioblastoma multiforme  High cholesterol  Transient cerebral ischemia, unspecified type: 2015  Essential hypertension  H/O craniotomy: 8/2017      FAMILY HISTORY:  Family history of prostate cancer in father (Father)      Social Hx:  Nonsmoker, no drug or alcohol use    MEDICATIONS  (STANDING):  potassium chloride    Tablet ER 40 milliEquivalent(s) Oral every 4 hours       Allergies    MSG (Rash)  No Known Drug Allergies    Intolerances        ROS: Pertinent positives in HPI, all other ROS were reviewed and are negative.      Vital Signs Last 24 Hrs  T(C): 36.7 (20 Dec 2018 17:00), Max: 36.7 (20 Dec 2018 15:19)  T(F): 98 (20 Dec 2018 17:00), Max: 98 (20 Dec 2018 15:19)  HR: 88 (20 Dec 2018 17:00) (84 - 88)  BP: 128/67 (20 Dec 2018 17:00) (107/75 - 128/67)  BP(mean): --  RR: 22 (20 Dec 2018 17:00) (18 - 22)  SpO2: 99% (20 Dec 2018 17:00) (98% - 99%)        Constitutional: sleepy but easily arousable to verbal stimuli, however falls asleep easily, oriented x2 confused   HEENT: PERRLA, left facial droop,   Respiratory: Breath sounds are clear bilaterally  Cardiovascular: S1 and S2, regular / irregular rhythm  Gastrointestinal: soft, nontender  Extremities:  +  edema in all 4 extremities   Musculoskeletal: no joint swelling/tenderness      Neurological exam:  HF: sleepy but easily arousable to verbal stimuli, however falls asleep easily, oriented x2 confused. mild dysarthric speech, left hemineglect but will attend to left side intermittently. follows simple commands, gets confused easily  CN: ANGELITA, Left facial droop tongue midline, Palate moves equally,  Motor; Right side strong. left hemiparesis left  1/5, bic/tric 2-3/5, delt 3/5. Left lower extremity 2-3/5   Sens: Intact to light touch   Coord:  unable to assess   Gait/Balance: Cannot test              Labs:   12-20    140  |  103  |  18  ----------------------------<  92  2.8<LL>   |  27  |  1.10    Ca    10.2<H>      20 Dec 2018 16:20    TPro  7.5  /  Alb  3.1<L>  /  TBili  0.5  /  DBili  x   /  AST  26  /  ALT  27  /  AlkPhos  110  12-20                              11.9   9.52  )-----------( 123      ( 20 Dec 2018 16:20 )             36.0       Radiology:  - CT Head:]  EXAM:  CT BRAIN                            PROCEDURE DATE:  12/20/2018          INTERPRETATION:  Exam Date: 12/20/2018 5:26 PM    CT head without IV contrast    CLINICAL INFORMATION: Leg weakness, bilateral and generalized, unable to   ambulate. History of GBM.        TECHNIQUE: Contiguous axial sections were obtained through the head.     This scan was performed using automatic exposure control (radiation dose   reduction software) to obtain a diagnostic image quality scan with   patient dose as low as reasonably achievable.      COMPARISON: Multiple prior CT and MRI imaging, the most recent MRI brain   of 11/2/2018    FINDINGS:       Reidentified is the right sided craniotomy for prior resection of tumor.   Several focal areas of hypoattenuation within the right frontal lobe,   right parietal lobe, and right medial temporal lobe are again identified,   compatible with known areas of disease. There is curvilinear   hyperattenuating material within these areas, seen on prior MRI brain of   11/2/18, likely reflecting posttreatment calcifications versus old   hemorrhagic blood products. No new acute hemorrhage is identified. No   midline shift or significant mass effect is present. No hydrocephalus.   Very small old infarct in the left posterior parietal lobe is again   identified.    IMPRESSION:       No definite new acute abnormality seen on CT.  Reidentified is the right   sided craniotomy for prior resection of tumor. Several focal areas of   hypoattenuation within the right frontal lobe, right parietal lobe, and   right medial temporal lobe are again identified, compatible with known   areas of disease. There is curvilinear hyperattenuating material within   these areas, seen on prior MRI brain of 11/2/18, likely reflecting   posttreatment calcifications versus old hemorrhagic blood products. No   new acute hemorrhage is identified. No midline shift or significant mass   effect is present.                 JEANNINE URBANO M.D., ATTENDING RADIOLOGIST  This documenthas been electronically signed. Dec 20 2018  5:29PM

## 2018-12-20 NOTE — ED ADULT NURSE NOTE - NSIMPLEMENTINTERV_GEN_ALL_ED
Implemented All Fall with Harm Risk Interventions:  [place] to call system. Call bell, personal items and telephone within reach. Instruct patient to call for assistance. Room bathroom lighting operational. Non-slip footwear when patient is off stretcher. Physically safe environment: no spills, clutter or unnecessary equipment. Stretcher in lowest position, wheels locked, appropriate side rails in place. Provide visual cue, wrist band, yellow gown, etc. Monitor gait and stability. Monitor for mental status changes and reorient to person, place, and time. Review medications for side effects contributing to fall risk. Reinforce activity limits and safety measures with patient and family. Provide visual clues: red socks.

## 2018-12-20 NOTE — H&P ADULT - GASTROINTESTINAL DETAILS
no distention/no guarding/soft/nontender/no rigidity/normal no distention/no rigidity/no guarding/soft/nontender/no organomegaly/normal

## 2018-12-20 NOTE — H&P ADULT - NEUROLOGICAL DETAILS
responds to verbal commands/alert and oriented x 3/Normal strength R-sided and weakness at the LLE abd LUE./cranial nerves intact/sensation intact

## 2018-12-20 NOTE — PHARMACOTHERAPY INTERVENTION NOTE - COMMENTS
med history complete, reviewed medications with patient and family, confirmed with doctor first med profile

## 2018-12-20 NOTE — ED PROVIDER NOTE - PROGRESS NOTE DETAILS
Justification for admission: worsening neoplastic disease with worsening acute on chronic neurological symptoms.  Given mass not a TPA candidate.  Decadron ordered.  Neurosurgery is at bedside evaluating the patient.  Given presentation and findings, patient requires hospitalization.  I discussed all findings from our evaluation today with the patient and the medical necessity for admission to the hospital.  I addressed expectations regarding the admission and I discussed the plan of care in detail.  I addressed all questions and concerns regarding the admission and the plan of care.  I used verbal teach back to confirm understanding.  The patient agreed with the admission and the plan of care.  Pt signed out to Dr. Lozano.  Patient's history, vital signs, lab results, imaging, pertinent findings, and clinical condition reviewed during sign out.  At sign out patient admitted in hemodynamically stable condition in no acute distress.

## 2018-12-20 NOTE — ED PROVIDER NOTE - MEDICAL DECISION MAKING DETAILS
New left arm and leg weakness above baseline, CT head to evaluate for intercranial abnormalities, metabolic evaluations, urine studies, symptomatic treatment, neurology consult, neurosurgery consult and reassess. 63 YOF PMH r/s brain mass p/w worsening acute on chronic left arm and leg weakness above baseline, CT head to evaluate for intercranial abnormalities, metabolic and hematologic evaluation, urine studies, symptomatic treatment, neurology consult, neurosurgery consult and reassess.  DDX CVA, worsening metastatic disease, mass lesion.  Admit given immobility.

## 2018-12-20 NOTE — H&P ADULT - ASSESSMENT
64 y/o F with PMH of HLD, HTN, HSV infection of the face, Chronic Left sided weakness 2/2 Brain cancer (Glioblastoma Multiforme) s/p CyberKnife and craniotomy, Transient cerebral ischemia and Osteoarthritis of b/l knee is admitted for Weakness of LE.     #ADMIT TO MED    #Weakness 2/2 Glioblastoma Multiforme  - CT head shows no new acute abnormality, hemorrhage, midline shift or mass.   - Neurosurgery consult appreciated.  - Continue Decadron and protonix   - Correctional ISS for decadron use  - F/u MRI brain   - F/u Palliative consult  - F/u PT eval and treat   - F/u MR brain +/- contrast  - F/u am labs.  - Continue Keppra 500mg am and 1000mg bedtime   - Continue multivitamins   - Continue xanax 0.25mg for agitation   - Continue to monitor    #Hypokalemia  - likely dietary   - S/p KCL 120meq po and 30meq IV  - F/u BMP am and supplement as needed.   - Continue to monitor    #HTN  - Currently controlled   - Continue Toprol ER 50mg BID  - Continue Amlodipine 5mg   - Continue Losartan 50mg   - Continue HCTZ 25mg   - Continue to monitor    #HLD  - F/u Lipid panel am   - Continue Lipitor 40mg  - Continue Asa 81mg    #Osteoarthritis  - Tylenol 650mg PRN q6h for pain    #Prophylactic Measure  - heparin subq and venodynes    IMPROVE VTE Individual Risk Assessment  RISK                                                                Points  [  ] Previous VTE                                                  3  [  ] Thrombophilia                                               2  [*  ] Lower limb paralysis                                      2        (unable to hold up >15 seconds)    [ * ] Current Cancer                                              2         (within 6 months)  [  ] Immobilization > 24 hrs                                1  [  ] ICU/CCU stay > 24 hours                              1  [ * ] Age > 60                                                      1  IMPROVE VTE Score ___5______ 62 y/o F with PMH of HLD, HTN, HSV infection of the face, Chronic Left sided weakness 2/2 Brain cancer (Glioblastoma Multiforme) s/p CyberKnife and craniotomy, Transient cerebral ischemia and Osteoarthritis of b/l knee is admitted for Weakness of LE.     #ADMIT TO MED    #Weakness 2/2 Glioblastoma Multiforme  - CT head shows no new acute abnormality, hemorrhage, midline shift or mass.   - Neurosurgery consult appreciated.  - Continue Decadron and protonix   - Correctional ISS for decadron use  - F/u MRI brain   - F/u Palliative consult  - F/u PT eval and treat   - F/u MR brain +/- contrast  - F/u am labs.  - Continue Keppra 500mg am and 1000mg bedtime   - Continue multivitamins   - Continue xanax 0.25mg for agitation   - Fall and Seizure precautions  - Neuro checks q4hrs  - Continue to monitor    #Hypokalemia  - likely dietary   - S/p KCL 120meq po and 30meq IV  - F/u BMP am and supplement as needed.   - Continue to monitor    #HTN  - Currently controlled   - Continue Toprol ER 50mg BID  - Continue Amlodipine 5mg   - Continue Losartan 50mg   - Continue HCTZ 25mg   - Continue to monitor    #HLD  - F/u Lipid panel am   - Continue Lipitor 40mg  - Continue Asa 81mg    #Osteoarthritis  - Tylenol 650mg PRN q6h for pain    #Prophylactic Measure  - heparin subq and venodynes    IMPROVE VTE Individual Risk Assessment  RISK                                                                Points  [  ] Previous VTE                                                  3  [  ] Thrombophilia                                               2  [*  ] Lower limb paralysis                                      2        (unable to hold up >15 seconds)    [ * ] Current Cancer                                              2         (within 6 months)  [  ] Immobilization > 24 hrs                                1  [  ] ICU/CCU stay > 24 hours                              1  [ * ] Age > 60                                                      1  IMPROVE VTE Score ___5______ 62 y/o F with PMH of HLD, HTN, HSV infection of the face, Chronic Left sided weakness 2/2 Brain cancer (Glioblastoma Multiforme) s/p CyberKnife and craniotomy, Transient cerebral ischemia and Osteoarthritis of b/l knee is admitted for Weakness of LE.     #ADMIT TO MED    #Weakness 2/2 possibly underlying Glioblastoma Multiforme, hypokalemia, failure to thrive (Pt is obese but report decrease appetite and poor po intake)  - CT head shows no new acute abnormality, hemorrhage, midline shift or mass.   - Neurosurgery consult appreciated.  - Will give Decadron and protonix   - Monitor fingerstick and give Correctional ISS while on decadron.   - Will consult Palliative care  - Will consult PT to eval and treat   - MR brain +/- contrast ordered  - F/u am labs.  - Continue Keppra 500mg am and 1000mg bedtime   - Continue multivitamins   - Continue xanax 0.25mg for agitation   - Follow fall and Seizure precautions  - Neuro checks q4hrs  - Continue to monitor for acute change in status    #Glioblastoma Multiforme  - Palliative care consult obtained  - Continue Keppra 500mg am and 1000mg bedtime   - Continue multivitamins   - Continue xanax 0.25mg for agitation   - Follow fall and Seizure precautions  - Neuro checks q4hrs    #Hypokalemia  - possibly dietary, failure to thrive d/t poor po intake, medication use (HCTZ)  - S/p KCL 120meq po and 30meq IV  - F/u BMP am and supplement as needed.   - Hold HCTZ  - Continue to monitor electrolytes     #HTN  - Currently controlled   - Continue Toprol ER 50mg BID  - Continue Amlodipine 5mg   - Continue Losartan 50mg   - Hold HCTZ    - Continue to monitor blood pressure     #HLD  - F/u Lipid panel am   - Continue Lipitor 40mg  - Continue Asa 81mg    #Osteoarthritis  - Tylenol 650mg PRN q6h for pain    #Prophylactic Measure  - heparin subq and venodynes    IMPROVE VTE Individual Risk Assessment  RISK                                                                Points  [  ] Previous VTE                                                  3  [  ] Thrombophilia                                               2  [*  ] Lower limb paralysis                                      2        (unable to hold up >15 seconds)    [ * ] Current Cancer                                              2         (within 6 months)  [  ] Immobilization > 24 hrs                                1  [  ] ICU/CCU stay > 24 hours                              1  [ * ] Age > 60                                                      1  IMPROVE VTE Score ___5______ 64 y/o F with PMH of HLD, HTN, HSV infection of the face, Chronic Left sided weakness 2/2 Brain cancer (Glioblastoma Multiforme) s/p CyberKnife and craniotomy, Transient cerebral ischemia and Osteoarthritis of b/l knee is admitted for Weakness of LE of one day duration.     #ADMIT TO MED    #Weakness 2/2 possibly underlying Glioblastoma Multiforme, hypokalemia, failure to thrive (Pt is obese but report decrease appetite and poor po intake)  - CT head shows no new acute abnormality, hemorrhage, midline shift or mass.   - Neurosurgery consult appreciated.  - Will give Decadron and protonix   - Monitor fingerstick and give Correctional ISS while on decadron.   - Will consult Palliative care  - Will consult PT to eval and treat   - MR brain +/- contrast ordered  - check am labs.  - Continue Keppra 500mg am and 1000mg bedtime   - Continue multivitamins   - Continue xanax 0.25mg for agitation   - Follow fall and Seizure precautions  - Neuro checks q4hrs  - Continue to monitor for acute change in status    #Glioblastoma Multiforme  - Palliative care consult obtained  - Continue Keppra 500mg am and 1000mg bedtime   - Continue multivitamins   - Continue xanax 0.25mg for agitation   - Follow fall and Seizure precautions  - Neuro checks q4hrs    #Hypokalemia  - possibly dietary, failure to thrive d/t poor po intake, medication use (HCTZ)  - S/p KCL   - check BMP am and supplement as needed.   - Hold HCTZ  - Continue to monitor electrolytes     #HTN  - Currently controlled   - Continue Toprol ER 50mg BID  - Continue Amlodipine 5mg   - Continue Losartan 50mg   - Hold HCTZ    - Continue to monitor blood pressure     #HLD  - check Lipid panel am   - Continue Lipitor 40mg  - Continue Asa 81mg    #Osteoarthritis  - Tylenol 650mg PRN q6h for pain    #Prophylactic Measure  - heparin subq and venodynes    IMPROVE VTE Individual Risk Assessment  RISK                                                                Points  [  ] Previous VTE                                                  3  [  ] Thrombophilia                                               2  [*  ] Lower limb paralysis                                      2        (unable to hold up >15 seconds)    [ * ] Current Cancer                                              2         (within 6 months)  [  ] Immobilization > 24 hrs                                1  [  ] ICU/CCU stay > 24 hours                              1  [ * ] Age > 60                                                      1  IMPROVE VTE Score ___5______

## 2018-12-20 NOTE — H&P ADULT - HISTORY OF PRESENT ILLNESS
62 y/o F with PMH of HLD, HTN, HSV infection of the face, Chronic Left sided weakness 2/2 Brain cancer (Glioblastoma Multiforme) s/p cyberknife and craniotomy, Transient cerebral ischemia and Osteoarthritis of b/l knee came to the ED with complaint of not being able to walk properly. As per pt, symptom began 12/20 around 7am with worsening left-sided weakness. She was able to ambulate at her baseline before then. Pt has chronic left sided weakness at baseline. She came to the ED to prevent any falls. Pt denies any pain, loss of sensation, numbness of her LE. Pt reported her UE was not affected. Denies headaches, N/V, CP, SOB, abdominal pain, dysuria.     In the ED, labs, lactate, UA, blood cx, CXR were ordered. Pt received 2200ml NS bolus, ativan 1mg and Keppra 1000mg. Metaboloc showed hypokalemia of 2.8. Pt received KCL 120meq po and 30meq IV. EKG, CT head were done. 62 y/o F with PMH of HLD, HTN, HSV infection of the face, Chronic Left sided weakness 2/2 Brain cancer (Glioblastoma Multiforme) s/p cyberknife and craniotomy, Transient cerebral ischemia and Osteoarthritis of b/l knee came to the ED with complaint of not being able to walk properly. As per pt, symptom began 12/20 around 7am with worsening left-sided weakness. She was able to ambulate at her baseline before then. Pt has chronic left sided weakness at baseline. She came to the ED to prevent any falls. Pt denies any pain, loss of sensation, numbness of her LE. Pt reported her UE was not affected. Denies headaches, N/V, CP, SOB, abdominal pain, dysuria.     In the ED, labs, lactate, UA, blood cx, CXR were ordered. Pt received 2200ml NS bolus, ativan 1mg and Keppra 1000mg. Metabolic showed hypokalemia of 2.8. Pt received KCL 120meq po and 30meq IV. EKG, CT head were done. 62 y/o F with PMH of HLD, HTN, HSV infection of the face, Chronic Left sided weakness 2/2 Brain cancer (Glioblastoma Multiforme) s/p cyberknife and craniotomy, Transient cerebral ischemia and Osteoarthritis of b/l knee came to the ED with complaint of not being able to walk properly. As per pt, symptom began 12/20 around 7am with worsening left-sided weakness. She was able to ambulate at her baseline before then. Pt has chronic left sided weakness at baseline. She came to the ED to prevent any falls. Pt denies any pain, loss of sensation, numbness of her LE. Pt reported her UE was not affected. Denies headaches, N/V, CP, SOB, abdominal pain, dysuria. Pt reports poor po intake d/t decrease appetite.     In the ED, labs, lactate, UA, blood cx, CXR were ordered. Pt received 2200ml NS bolus, ativan 1mg and Keppra 1000mg. Metabolic showed hypokalemia of 2.8. Pt received KCL 120meq po and 30meq IV. EKG, CT head were done. 62 y/o F with PMH of HLD, HTN, HSV infection of the face, GBM, Chronic Left sided weakness 2/2 Brain cancer (Glioblastoma Multiforme) s/p cyberknife and craniotomy, Transient cerebral ischemia and Osteoarthritis of b/l knee came to the ED with complaint of not being able to walk properly for the past day. As per pt, symptom began 12/20 around 7am with worsening left-sided weakness. She was able to ambulate at her baseline before then. Pt has chronic left sided weakness at baseline. She came to the ED to prevent any falls. Pt denies any pain, loss of sensation, numbness of her LE. Pt reported her UE was not affected. Denies headaches, N/V, CP, SOB, abdominal pain, dysuria. Pt reports poor po intake d/t decrease appetite.     In the ED, labs, lactate, UA, blood cx, CXR were ordered. Pt received 2200ml NS bolus, ativan 1mg and Keppra 1000mg. Metabolic showed hypokalemia of 2.8. Pt received KCL IV/PO,  EKG, CT head were done.

## 2018-12-20 NOTE — H&P ADULT - NECK DETAILS
no JVD/normal/supple supple/no JVD/normal/normal thyroid gland normal thyroid gland/no JVD/supple/normal/no LAD

## 2018-12-21 NOTE — PHYSICAL THERAPY INITIAL EVALUATION ADULT - PERTINENT HX OF CURRENT PROBLEM, REHAB EVAL
complaint of not being able to walk properly for the past day. As per pt, symptom began 12/20 around 7am with worsening left-sided weakness. She was able to ambulate at her baseline before then. Pt has chronic left sided weakness at baseline. She came to the ED to prevent any falls.

## 2018-12-21 NOTE — PHYSICAL THERAPY INITIAL EVALUATION ADULT - GENERAL OBSERVATIONS, REHAB EVAL
Pre session: supine in bed with bed alarm on. call bell and phone in reach. Nsg assistant present. Post session: seated in chair with chair alarm on. Nsg assistant present to assist with meal. No c/o pain. L UE weakness noted 2/5. Pt agitated and states fear of falling. Tolerated well and would benefit from further skilled PT for functional mobility training.

## 2018-12-21 NOTE — PHYSICAL THERAPY INITIAL EVALUATION ADULT - MANUAL MUSCLE TESTING RESULTS, REHAB EVAL
cognition; agitation during mobility. L UE weakness 2/5; unable to  walker safely; needs assist/grossly assessed due to

## 2018-12-21 NOTE — PHYSICAL THERAPY INITIAL EVALUATION ADULT - TRANSFER SAFETY CONCERNS NOTED: SIT/STAND, REHAB EVAL
AD too far from pt; neds assist to maintain safety/decreased balance during turns/decreased safety awareness

## 2018-12-21 NOTE — CONSULT NOTE ADULT - SUBJECTIVE AND OBJECTIVE BOX
HPI: Ms. Reno is a 63y old Female coming from home with hx of HLD, HTN, HSV infection of the face, GBM (dx 2017), Chronic Left sided weakness 2/2 this, s/p cyberknife and craniotomy, TIA, Osteoarthritis of b/l knee, 3rd admission this year, last  for CVA, now admitted  for complaint of not being able to walk x few days and poor po intake. She was able to ambulate at her baseline before then. Found here to have hypokalemia, CTH with no acute changes from previous imaging, unchanged focal areas of hypoattenuation seen (awaiting MRI for further workup), and normal CXR. Palliative Care consulted to assist with establishing GOC.    Met Ms. Reno this afternoon, sister-in-law, niece, and significant other at bedside. Pt denies pain, dyspnea, or any discomfort. Notes some anxiety at times, affirms using xanax at home, mostly before she sleeps. She would like dose prior to planned MRI. Otherwise only notes more weakness on Left side that keeps her from walking with her walker, which she at times calls her port by mistake. She has some confusion during encounter, though able to orient to person, place, month (not year), and say she is here because her "port" (walker) wasn't helping her walk anymore and she feared falling. Family added that she has two 12-hr aids at baseline, normally able to walk with walker, but over past 7 days has had increasing difficulty with this.     Pt offered that she knows she has brain cancer and she asked if conversation would be about when she was going to die. Asked her if she was worried about dying which she confirmed. She also affirmed being depressed at times, though denying suicidal or homicidal ideation. Asked her what brought her life leonela in the midst of the setbacks she mentioned, and she struggled with answering this question. However, eventually noted not being in the hospital, getting good news about her cancer, and spending time watching tv and with her family brought her leonela. She hopes to live longer and be able to return to being able to walk with her walker, saying that without this she is unable to live her life the way that she wants to, agreeing that it significantly impacts her QOL. Spent time hearing her out, helping her to articulate her concerns, and noting that while we were unsure of what her prognosis will be without further studies, we would be wishing with her for the best. Reassured her and family that it is normal to be afraid about what the future holds and that the neurosurgery team hoped to get more studies so they could give her and her family a more concrete answer about this. Reviewed our role in Ms. Reno's care with the pt and her family. They confirmed pt's HCP is her brother Scotty Reno. Offered that as per notes neurosurgery will contact them to give them updates on imaging results, progression of cancer, and likely prognosis after MRI is done. Suggested our team meet with them after this on  to then help them plan based on the information shared. They agreed with this, thus we scheduled meeting for 11am on . Noted intent to continue to follow along with the team in the interim to help with any sx that arise.     PAIN: ( )Yes   (x )No  DYSPNEA: ( ) Yes  (x ) No    PAST MEDICAL & SURGICAL HISTORY:  HSV (herpes simplex virus) infection: to lip and chin  Obesity  Osteoarthritis of both knees, unspecified osteoarthritis type  Glioblastoma multiforme  High cholesterol  Transient cerebral ischemia, unspecified type: 2015  Essential hypertension  H/O craniotomy: 2017      SOCIAL HX: Lives at home with significant other with aid support    Hx opiate tolerance ( )YES  ( x)NO    Baseline ADLs  (Prior to Admission)- needs assistance with all ADLs, has two 12-hr aides   ( ) Independent   (x )Dependent    FAMILY HISTORY:  Family history of prostate cancer in father (Father)      Review of Systems:    Anxiety- yes  Depression- yes, as above  Constipation- at times, miralax helpful for this, BM yest no issues  Diarrhea- denies  Nausea- denies  Vomiting- denies  Anorexia- yes  Weight Loss- unsure   Cough- no  Secretions- no  Fatigue- yes  Weakness- yes, especially on left  Delirium- yes    All other systems reviewed and negative      PHYSICAL EXAM:    Vital Signs Last 24 Hrs  T(C): 36.9 (21 Dec 2018 11:40), Max: 36.9 (21 Dec 2018 11:40)  T(F): 98.4 (21 Dec 2018 11:40), Max: 98.4 (21 Dec 2018 11:40)  HR: 86 (21 Dec 2018 11:40) (79 - 88)  BP: 99/58 (21 Dec 2018 11:40) (99/58 - 139/62)  BP(mean): --  RR: 18 (21 Dec 2018 11:40) (17 - 22)  SpO2: 98% (21 Dec 2018 11:40) (94% - 99%)  Daily     Daily Weight in k (21 Dec 2018 14:42)    PPSV2: 30  %  General: Older female sitting up in bed, eating sandwich on her own, pleasant, friendly, NAD  Mental Status: AOx3-4 (person, place, month, some shakiness on details of events but overall understands why she is here and can share plan on what is being done)  HEENT: mmm, perrl, eomi, L facial droop, some temporal wasting  Lungs: clear  Cardiac: +s1 s2 rrr  GI: soft nt nd +bs  : voids  Ext: moves all extremities, though less robustly on left, some pedal edema  Neuro: left hemiparesis, some word-finding difficulty and occasional L-sided neglect      LABS:                        10.6   6.84  )-----------( 88       ( 21 Dec 2018 06:59 )             31.8         142  |  110<H>  |  17  ----------------------------<  123<H>  4.7   |  26  |  0.91    Ca    9.1      21 Dec 2018 06:59    TPro  x   /  Alb  2.6<L>  /  TBili  x   /  DBili  x   /  AST  x   /  ALT  x   /  AlkPhos  x         Albumin: Albumin, Serum: 2.6 g/dL ( @ 06:59)      Allergies    MSG (Rash)  No Known Drug Allergies    Intolerances      MEDICATIONS  (STANDING):  amLODIPine   Tablet 5 milliGRAM(s) Oral daily  aspirin  chewable 81 milliGRAM(s) Oral daily  atorvastatin 40 milliGRAM(s) Oral at bedtime  dextrose 5%. 1000 milliLiter(s) (50 mL/Hr) IV Continuous <Continuous>  dextrose 50% Injectable 12.5 Gram(s) IV Push once  dextrose 50% Injectable 25 Gram(s) IV Push once  dextrose 50% Injectable 25 Gram(s) IV Push once  heparin  Injectable 5000 Unit(s) SubCutaneous every 8 hours  insulin lispro (HumaLOG) corrective regimen sliding scale   SubCutaneous three times a day before meals  insulin lispro (HumaLOG) corrective regimen sliding scale   SubCutaneous at bedtime  levETIRAcetam 1000 milliGRAM(s) Oral at bedtime  levETIRAcetam 500 milliGRAM(s) Oral daily  losartan 50 milliGRAM(s) Oral daily  metoprolol succinate ER 50 milliGRAM(s) Oral two times a day  multivitamin 1 Tablet(s) Oral daily  pantoprazole  Injectable 40 milliGRAM(s) IV Push every 12 hours    MEDICATIONS  (PRN):  acetaminophen   Tablet .. 650 milliGRAM(s) Oral every 6 hours PRN Mild Pain (1 - 3), Moderate Pain (4 - 6), Severe Pain (7 - 10)  dextrose 40% Gel 15 Gram(s) Oral once PRN Blood Glucose LESS THAN 70 milliGRAM(s)/deciliter  glucagon  Injectable 1 milliGRAM(s) IntraMuscular once PRN Glucose LESS THAN 70 milligrams/deciliter      RADIOLOGY/ADDITIONAL STUDIES:    EXAM:  CT BRAIN                        PROCEDURE DATE:  2018      IMPRESSION:       No definite new acute abnormality seen on CT.  Reidentified is the right   sided craniotomy for prior resection of tumor. Several focal areas of   hypoattenuation within the right frontal lobe, right parietal lobe, and   right medial temporal lobe are again identified, compatible with known   areas of disease. There is curvilinear hyperattenuating material within   these areas, seen on prior MRI brain of 18, likely reflecting   posttreatment calcifications versus old hemorrhagic blood products. No   new acute hemorrhage is identified. No midline shift or significant mass   effect is present.       JEANNINE URBANO M.D., ATTENDING RADIOLOGIST  This document has been electronically signed. Dec 20 2018  5:29PM      EXAM:  XR CHEST PORTABLE IMMED 1V                        PROCEDURE DATE:  2018      IMPRESSION:    No infiltrate.    Unchanged right-sided MediPort.    EUN VYAS M.D., ATTENDING RADIOLOGIST  This document has been electronically signed. Dec 20 2018  3:52PM

## 2018-12-21 NOTE — DIETITIAN INITIAL EVALUATION ADULT. - OTHER INFO
Pt seen for consult (assessment). Pt is 64yo F w/ PMH of obesity, high cholesterol, GBM, HTN, transient cerebral ischemia, and HLD. Pt admitted for Brain CA, immobility syndrome, and upper motor neuron disease. Pt presents w/? failure to thrive 2/2 reported decreased PO intake/appetite (however pt is poor historian). Upon visit pt appears well nourished. Observed pt did not have breakfast yet upon visit (needs feeding assist). Pt reported current weight as 104kg; admit wt of 117kg. EMR hx shows previous wt of 119Kg in 8/2018, no significant wt change per EMR. Unable to get additional historical information as pt is poor historian and did not wish to speak, family will not be visiting today to attempt to obtain additional info, will follow up. Skin: No edema. Javi: 13 w/ no PO PTA. I/O no BM recorded since admit, not on bowel regimen. Pt admitted with hypokalemia, now WNL. Pt is at high risk for malnutrition 2/2 brain CA & possible decreased PO intake, currently unable to meet criteria with given information. Will f/u to re-evaluate for malnutrition. Recommendations: 1) continue DASH diet 2) weekly weights Pt seen for consult (assessment). Pt is 62yo F w/ PMH of obesity, high cholesterol, GBM, HTN, transient cerebral ischemia, and HLD. Pt admitted for Brain CA, immobility syndrome, and upper motor neuron disease. Pt presents w/? failure to thrive 2/2 reported decreased PO intake/appetite (however pt is poor historian). Upon visit pt appears well nourished. Observed pt did not have breakfast yet upon visit (needs feeding assist). Pt reported current weight as 104kg; admit wt of 117kg. EMR hx shows previous wt of 119Kg in 8/2018, no significant wt change per EMR. Unable to get additional historical information as pt is poor historian and did not wish to speak, family will not be visiting today to attempt to obtain additional info, will follow up. Skin: No edema. Javi: 13 w/ no PO PTA. I/O no BM recorded since admit, not on bowel regimen. Pt admitted with hypokalemia, now WNL. Pt is at high risk for malnutrition 2/2 brain CA & possible decreased PO intake, currently unable to meet criteria with given information. Will f/u to re-evaluate for malnutrition. Recommendations: 1) continue DASH diet 2) add gelatein 1 daily 3)weekly weights

## 2018-12-21 NOTE — PROGRESS NOTE ADULT - SUBJECTIVE AND OBJECTIVE BOX
SUBJECTIVE:  Pt is an 64 yo F w/ PMHx of glioblastoma multiforme s/p cyberknife+RT+CT/+Craniotomy w/ residual left sided weakness, HTN, HLD, TIA, and osteoarthritis who presented with worsening of left leg weakness. Per pt's family, she was first diagnosed w/ GBM in Aug 2017. She had her first surgery and then in May of 2018, there was recurrence. And in June she had a second surgery. She had residual left-sided deficits, but a few weeks ago was ambulating well with a walker. Over the last week, she has had marked decline in all her extremities, but most pronounced in her left upper and lower extremities.     REVIEW OF SYSTEMS, pt is an unreliable historian:  CONSTITUTIONAL: no fevers or chills  EYES/ENT: No visual changes;  No vertigo or throat pain   NECK: No pain or stiffnessPt is an 64 yo F w/ PMHx of glioblastoma multiforme s/p cyberknife+RT+CT/+Craniotomy w/ residual left sided weakness, HTN, HLD, TIA, and osteoarthritis who presented with worsening of left leg weakness.   RESPIRATORY: No cough, wheezing, hemoptysis; No shortness of breath  CARDIOVASCULAR: No chest pain or palpitations  GASTROINTESTINAL: No abdominal or epigastric pain. No nausea, vomiting, or hematemesis; No diarrhea or constipation. No melena or hematochezia.  GENITOURINARY: No dysuria, frequency or hematuria  NEUROLOGICAL: see above, no numbness  SKIN: No itching, burning, rashes, or lesions   All other review of systems is negative unless indicated above    Vital Signs Last 24 Hrs  T(C): 36.9 (21 Dec 2018 11:40), Max: 36.9 (21 Dec 2018 11:40)  T(F): 98.4 (21 Dec 2018 11:40), Max: 98.4 (21 Dec 2018 11:40)  HR: 86 (21 Dec 2018 11:40) (79 - 88)  BP: 99/58 (21 Dec 2018 11:40) (99/58 - 139/62)  BP(mean): --  RR: 18 (21 Dec 2018 11:40) (17 - 22)  SpO2: 98% (21 Dec 2018 11:40) (94% - 99%)    I&O's Summary      CAPILLARY BLOOD GLUCOSE      POCT Blood Glucose.: 147 mg/dL (21 Dec 2018 12:13)  POCT Blood Glucose.: 134 mg/dL (21 Dec 2018 07:46)      PHYSICAL EXAM:  Constitutional: NAD, awake and alert, obese, laying in bed  HEENT: PERR, EOMI, Normal Hearing, MMM  Neck: Soft and supple, No LAD, No JVD  Respiratory: Breath sounds are clear bilaterally, No wheezing, rales or rhonchi  Cardiovascular: S1 and S2, regular rate and rhythm, no Murmurs, gallops or rubs  Gastrointestinal: Bowel Sounds present, soft, nontender, nondistended, no guarding, no rebound  Extremities: No peripheral edema  Vascular: 2+ peripheral pulses  Neurological: A/O x 3, face sensation equal bilaterally, nasolabial droop on left side when smiling, eyebrows equal when raised, no dysarthria, right arm 4/5 strength, left arm 4/5 strength but much less than right arm, left lower extremity able to lift a few centimeters off the bed momentarily, right lower extremity able to lift to 45% momentarily, sensation intact and equal bilaterally   Skin: No rashes    MEDICATIONS:  MEDICATIONS  (STANDING):  amLODIPine   Tablet 5 milliGRAM(s) Oral daily  aspirin  chewable 81 milliGRAM(s) Oral daily  atorvastatin 40 milliGRAM(s) Oral at bedtime  dextrose 5%. 1000 milliLiter(s) (50 mL/Hr) IV Continuous <Continuous>  dextrose 50% Injectable 12.5 Gram(s) IV Push once  dextrose 50% Injectable 25 Gram(s) IV Push once  dextrose 50% Injectable 25 Gram(s) IV Push once  heparin  Injectable 5000 Unit(s) SubCutaneous every 8 hours  insulin lispro (HumaLOG) corrective regimen sliding scale   SubCutaneous three times a day before meals  insulin lispro (HumaLOG) corrective regimen sliding scale   SubCutaneous at bedtime  levETIRAcetam 1000 milliGRAM(s) Oral at bedtime  levETIRAcetam 500 milliGRAM(s) Oral daily  losartan 50 milliGRAM(s) Oral daily  metoprolol succinate ER 50 milliGRAM(s) Oral two times a day  multivitamin 1 Tablet(s) Oral daily  pantoprazole  Injectable 40 milliGRAM(s) IV Push every 12 hours      LABS: All Labs Reviewed:                        10.6   6.84  )-----------( 88       ( 21 Dec 2018 06:59 )             31.8     12-21    142  |  110<H>  |  17  ----------------------------<  123<H>  4.7   |  26  |  0.91    Ca    9.1      21 Dec 2018 06:59    TPro  x   /  Alb  2.6<L>  /  TBili  x   /  DBili  x   /  AST  x   /  ALT  x   /  AlkPhos  x   12-21      < from: 12 Lead ECG (12.20.18 @ 16:21) >  Diagnosis Line Normal sinus rhythm  Moderate voltage criteria for LVH, may be normal variant  Junctional ST depression, probably normal  Borderline ECG  When compared with ECG of 03-AUG-2018 14:03,  No significant change was found  Confirmed by Palla MD, Derick ([id number]) on 12/21/2018 2:50:52 PM    < end of copied text >  < from: CT Head No Cont (12.20.18 @ 17:26) >  FINDINGS:       Reidentified is the right sided craniotomy for prior resection of tumor.   Several focal areas of hypoattenuation within the right frontal lobe,   right parietal lobe, and right medial temporal lobe are again identified,   compatible with known areas of disease. There is curvilinear   hyperattenuating material within these areas, seen on prior MRI brain of   11/2/18, likely reflecting posttreatment calcifications versus old   hemorrhagic blood products. No new acute hemorrhage is identified. No   midline shift or significant mass effect is present. No hydrocephalus.   Very small old infarct in the left posterior parietal lobe is again   identified.    IMPRESSION:       No definite new acute abnormality seen on CT.  Reidentified is the right   sided craniotomy for prior resection of tumor. Several focal areas of   hypoattenuation within the right frontal lobe, right parietal lobe, and   right medial temporal lobe are again identified, compatible with known   areas of disease. There is curvilinear hyperattenuating material within   these areas, seen on prior MRI brain of 11/2/18, likely reflecting   posttreatment calcifications versus old hemorrhagic blood products. No   new acute hemorrhage is identified. No midline shift or significant mass   effect is present. SUBJECTIVE:  Pt is an 64 yo F w/ PMHx of glioblastoma multiforme s/p cyberknife+RT+CT/+Craniotomy w/ residual left sided weakness, HTN, HLD, TIA, and osteoarthritis who presented with worsening of left leg weakness. Per pt's family, she was first diagnosed w/ GBM in Aug 2017. She had her first surgery and then in May of 2018, there was recurrence. And in July she underwent cyberknife. She had residual left-sided deficits, but a few weeks ago was ambulating well with a walker. Over the last week, she has had marked decline in all her extremities, but most pronounced in her left upper and lower extremities.     REVIEW OF SYSTEMS, pt is an unreliable historian:  CONSTITUTIONAL: no fevers or chills  EYES/ENT: No visual changes;  No vertigo or throat pain   NECK: No pain or stiffnessPt is an 64 yo F w/ PMHx of glioblastoma multiforme s/p cyberknife+RT+CT/+Craniotomy w/ residual left sided weakness, HTN, HLD, TIA, and osteoarthritis who presented with worsening of left leg weakness.   RESPIRATORY: No cough, wheezing, hemoptysis; No shortness of breath  CARDIOVASCULAR: No chest pain or palpitations  GASTROINTESTINAL: No abdominal or epigastric pain. No nausea, vomiting, or hematemesis; No diarrhea or constipation. No melena or hematochezia.  GENITOURINARY: No dysuria, frequency or hematuria  NEUROLOGICAL: see above, no numbness  SKIN: No itching, burning, rashes, or lesions   All other review of systems is negative unless indicated above    Vital Signs Last 24 Hrs  T(C): 36.9 (21 Dec 2018 11:40), Max: 36.9 (21 Dec 2018 11:40)  T(F): 98.4 (21 Dec 2018 11:40), Max: 98.4 (21 Dec 2018 11:40)  HR: 86 (21 Dec 2018 11:40) (79 - 88)  BP: 99/58 (21 Dec 2018 11:40) (99/58 - 139/62)  BP(mean): --  RR: 18 (21 Dec 2018 11:40) (17 - 22)  SpO2: 98% (21 Dec 2018 11:40) (94% - 99%)    I&O's Summary      CAPILLARY BLOOD GLUCOSE      POCT Blood Glucose.: 147 mg/dL (21 Dec 2018 12:13)  POCT Blood Glucose.: 134 mg/dL (21 Dec 2018 07:46)      PHYSICAL EXAM:  Constitutional: NAD, awake and alert, obese, laying in bed  HEENT: PERR, EOMI, Normal Hearing, MMM  Neck: Soft and supple, No LAD, No JVD  Respiratory: Breath sounds are clear bilaterally, No wheezing, rales or rhonchi  Cardiovascular: S1 and S2, regular rate and rhythm, no Murmurs, gallops or rubs  Gastrointestinal: Bowel Sounds present, soft, nontender, nondistended, no guarding, no rebound  Extremities: No peripheral edema  Vascular: 2+ peripheral pulses  Neurological: A/O x 3, face sensation equal bilaterally, nasolabial droop on left side when smiling, eyebrows equal when raised, no dysarthria, right arm 4/5 strength, left arm 4/5 strength but much less than right arm, left lower extremity able to lift a few centimeters off the bed momentarily, right lower extremity able to lift to 45% momentarily, sensation intact and equal bilaterally   Skin: No rashes    MEDICATIONS:  MEDICATIONS  (STANDING):  amLODIPine   Tablet 5 milliGRAM(s) Oral daily  aspirin  chewable 81 milliGRAM(s) Oral daily  atorvastatin 40 milliGRAM(s) Oral at bedtime  dextrose 5%. 1000 milliLiter(s) (50 mL/Hr) IV Continuous <Continuous>  dextrose 50% Injectable 12.5 Gram(s) IV Push once  dextrose 50% Injectable 25 Gram(s) IV Push once  dextrose 50% Injectable 25 Gram(s) IV Push once  heparin  Injectable 5000 Unit(s) SubCutaneous every 8 hours  insulin lispro (HumaLOG) corrective regimen sliding scale   SubCutaneous three times a day before meals  insulin lispro (HumaLOG) corrective regimen sliding scale   SubCutaneous at bedtime  levETIRAcetam 1000 milliGRAM(s) Oral at bedtime  levETIRAcetam 500 milliGRAM(s) Oral daily  losartan 50 milliGRAM(s) Oral daily  metoprolol succinate ER 50 milliGRAM(s) Oral two times a day  multivitamin 1 Tablet(s) Oral daily  pantoprazole  Injectable 40 milliGRAM(s) IV Push every 12 hours      LABS: All Labs Reviewed:                        10.6   6.84  )-----------( 88       ( 21 Dec 2018 06:59 )             31.8     12-21    142  |  110<H>  |  17  ----------------------------<  123<H>  4.7   |  26  |  0.91    Ca    9.1      21 Dec 2018 06:59    TPro  x   /  Alb  2.6<L>  /  TBili  x   /  DBili  x   /  AST  x   /  ALT  x   /  AlkPhos  x   12-21      < from: 12 Lead ECG (12.20.18 @ 16:21) >  Diagnosis Line Normal sinus rhythm  Moderate voltage criteria for LVH, may be normal variant  Junctional ST depression, probably normal  Borderline ECG  When compared with ECG of 03-AUG-2018 14:03,  No significant change was found  Confirmed by Palla MD, Derick ([id number]) on 12/21/2018 2:50:52 PM    < end of copied text >  < from: CT Head No Cont (12.20.18 @ 17:26) >  FINDINGS:       Reidentified is the right sided craniotomy for prior resection of tumor.   Several focal areas of hypoattenuation within the right frontal lobe,   right parietal lobe, and right medial temporal lobe are again identified,   compatible with known areas of disease. There is curvilinear   hyperattenuating material within these areas, seen on prior MRI brain of   11/2/18, likely reflecting posttreatment calcifications versus old   hemorrhagic blood products. No new acute hemorrhage is identified. No   midline shift or significant mass effect is present. No hydrocephalus.   Very small old infarct in the left posterior parietal lobe is again   identified.    IMPRESSION:       No definite new acute abnormality seen on CT.  Reidentified is the right   sided craniotomy for prior resection of tumor. Several focal areas of   hypoattenuation within the right frontal lobe, right parietal lobe, and   right medial temporal lobe are again identified, compatible with known   areas of disease. There is curvilinear hyperattenuating material within   these areas, seen on prior MRI brain of 11/2/18, likely reflecting   posttreatment calcifications versus old hemorrhagic blood products. No   new acute hemorrhage is identified. No midline shift or significant mass   effect is present.

## 2018-12-21 NOTE — CONSULT NOTE ADULT - ASSESSMENT
63y old Female coming from home with hx of HLD, HTN, HSV infection of the face, GBM (dx 2017), Chronic Left sided weakness 2/2 this, s/p cyberknife and craniotomy, TIA, Osteoarthritis of b/l knee, 3rd admission this year, last 8/18 for CVA, now admitted 12/20 for complaint of not being able to walk x few days and poor po intake. She was able to ambulate at her baseline before then. Found here to have hypokalemia, CTH with no acute changes from previous imaging, unchanged focal areas of hypoattenuation seen (awaiting MRI for further workup), and normal CXR. Palliative Care consulted to assist with establishing GOC.    1) AMS  - hx of GBM  - possible component of metabolic encephalopathy as well due to electrolyte abn  - seems to fluctuate  - frequent reorientation and fall precautions    2) Anxiety  - added back xanax 0.25 mg q6h prn  - can give a dose prior to MRI  - also received dose of IV ativan 1mg overnight, which can be used prior to MRI as well if needed  - however if delirious would avoid benzos as this can exacerbate delirium, prefer use of seroquel if able to take po or IV haldol low dose 0.5 mg if needed    3) Weakness/GBM  - neurosurgery notes appreciated  - imaging appreciated  - CTH showing known focal areas of hypoattenuation, no acute abnormalities  - awaiting MRI for further evaluation of progression and prognosis  - neurosurg to speak with family after these results are available  - fall precautions  - c/w keppra    4) Debility/Malnutrition  - PPS<40%  - need for assistance with all ADLs  - PT note appreciated- recs TBD, home with PT vs. JESSICA  - dietary notes appreciated- more information needed for full assessment noted (pt poor historian and family not present during this evaluation)    5) Prognosis  - guarded  - awaiting follow up scans and neurosurgery impression for more clarity on this. However, it is likely poor given GBM and debility, PPS<40%, need for 24h aid support at baseline, and poor intake with high risk for malnutrition noted    6) GOC/Advanced Directives  - pt's capacity waxes and wanes, though she seems to understand what is happening with her  - HCP on file naming her brother : Scotty Reno [phone]  - full code, will address in GOC meeting  - GOC meeting scheduled for 12/23 at 11am    Thank you for including us in Ms. Reno's care. Will continue to follow with you.    Yari Haas MD  Palliative Care Attending

## 2018-12-21 NOTE — PROGRESS NOTE ADULT - ASSESSMENT
Pt is an 62 yo F w/ PMHx of glioblastoma multiforme s/p cyberknife+RT+CT/+Craniotomy w/ residual left sided weakness, HTN, HLD, TIA, and osteoarthritis who presented with worsening of left-sided weakness for 1 week.     #Left-sided weakness  - likely glioblastoma multiforme given continued weakness despite resolution of potassium and calcium   - CT head shows no new acute abnormality, hemorrhage, midline shift or mass.   - Will give Decadron (dose per Dr. David) and protonix   - MR brain +/- contrast ordered  - continue to monitor CMP  - Continue Keppra 500mg am and 1000mg bedtime   - Continue multivitamins   - Continue xanax 0.25mg for agitation   - PT evaluation: needs assistance to ambulate with rolling walker  - Follow fall and Seizure precautions  - Neuro checks q4hrs  - Continue to monitor for acute change in status  - Neurosurgery recs appreciated    #Glioblastoma Multiforme  - Palliative care consult obtained  - Continue Keppra 500mg am and 1000mg bedtime   - Continue multivitamins   - Continue xanax 0.25mg for agitation   - Follow fall and Seizure precautions  - Neuro checks q4hrs    #Hypokalemia  - Potassium stable s/p KCl, now 4.7  - possibly dietary d/t poor po intake and medication use (HCTZ)  - Hold HCTZ  - Alb 2.6  - continue to monitor    #Hypercalcemia  - Calcium stable now, 9.1  - continue to monitor    #HTN  - Currently controlled   - Continue Toprol ER 50mg BID  - Continue Amlodipine 5mg   - Continue Losartan 50mg   - Hold HCTZ    - Continue to monitor blood pressure     #HLD  - Lipid panel: Total Cholesterol 163, , HDL 35,   - Continue Lipitor 40mg  - Continue Asa 81mg    #Osteoarthritis  - Tylenol 650mg PRN q6h for pain    #DVT PPx  - heparin 5000 units SQ and venodynes Pt is an 64 yo F w/ PMHx of glioblastoma multiforme s/p cyberknife+RT+CT/+Craniotomy w/ residual left sided weakness, HTN, HLD, TIA, and osteoarthritis who presented with worsening of left-sided weakness for 1 week.     #Left-sided weakness  - likely due to cerebral edema secondary to glioblastoma multiforme given continued weakness despite resolution of potassium and calcium   - CT head shows no new acute abnormality, hemorrhage, midline shift or mass.   - Will give Decadron (dose per Dr. David) and protonix   - MR brain +/- contrast ordered  - continue to monitor CMP  - Continue Keppra 500mg am and 1000mg bedtime   - Continue multivitamins   - Continue xanax 0.25mg for agitation   - PT evaluation: needs assistance to ambulate with rolling walker  - Follow fall and Seizure precautions  - Neuro checks q4hrs  - Continue to monitor for acute change in status  - Neurosurgery recs appreciated    #Glioblastoma Multiforme  - Palliative care consult obtained  - Continue Keppra 500mg am and 1000mg bedtime   - Continue multivitamins   - Continue xanax 0.25mg for agitation   - Follow fall and Seizure precautions  - Neuro checks q4hrs    #Hypokalemia  - Potassium stable s/p KCl, now 4.7  - possibly dietary d/t poor po intake and medication use (HCTZ)  - Hold HCTZ  - Alb 2.6  - continue to monitor    #Hypercalcemia  - Calcium stable now, 9.1  - continue to monitor    #HTN  - Currently controlled   - Continue Toprol ER 50mg BID  - Continue Amlodipine 5mg   - Continue Losartan 50mg   - Hold HCTZ    - Continue to monitor blood pressure     #HLD  - Lipid panel: Total Cholesterol 163, , HDL 35,   - Continue Lipitor 40mg  - Continue Asa 81mg    #Osteoarthritis  - Tylenol 650mg PRN q6h for pain    #DVT PPx  - heparin 5000 units SQ and venodynes Pt is an 64 yo F w/ PMHx of glioblastoma multiforme s/p cyberknife+RT+CT/+Craniotomy w/ residual left sided weakness, HTN, HLD, TIA, and osteoarthritis who presented with worsening of left-sided weakness for 1 week.     #Left-sided weakness  - likely due to cerebral edema secondary to glioblastoma multiforme given continued weakness despite resolution of potassium and calcium   - CT head shows no new acute abnormality, hemorrhage, midline shift or mass.   - Will give Decadron (dose per Dr. David) and protonix   - MR brain +/- contrast ordered  - continue to monitor CMP  - Continue Keppra 500mg am and 1000mg bedtime   - Continue multivitamins   - Continue xanax 0.25mg for agitation   - PT evaluation: needs assistance to ambulate with rolling walker  - Follow fall and Seizure precautions  - Neuro checks q4hrs  - Continue to monitor for acute change in status  - Neurosurgery recs appreciated    #Glioblastoma Multiforme  - Palliative care consult obtained  - Continue Keppra 500mg am and 1000mg bedtime   - On Bevacizumab q2 weeks, last dose 12/3  - Continue multivitamins   - Continue xanax 0.25mg for agitation   - Follow fall and Seizure precautions  - Neuro checks q4hrs    #Hypokalemia  - Potassium stable s/p KCl, now 4.7  - possibly dietary d/t poor po intake and medication use (HCTZ)  - Hold HCTZ  - Alb 2.6  - continue to monitor    #Hypercalcemia  - Calcium stable now, 9.1  - continue to monitor    #Thrombocytopenia  - likely secondary to chemotherapy, Bevacizumab  - last dose was 12/17, pt receives it q2 weeks    #HTN  - Currently controlled   - Continue Toprol ER 50mg BID  - Continue Amlodipine 5mg   - Continue Losartan 50mg   - Hold HCTZ    - Continue to monitor blood pressure     #HLD  - Lipid panel: Total Cholesterol 163, , HDL 35,   - Continue Lipitor 40mg  - Continue Asa 81mg    #Osteoarthritis  - Tylenol 650mg PRN q6h for pain    #DVT PPx  - heparin 5000 units SQ and venodynes Pt is an 64 yo F w/ PMHx of glioblastoma multiforme s/p cyberknife+RT+CT/+Craniotomy w/ residual left sided weakness, HTN, HLD, TIA, and osteoarthritis who presented with worsening of left-sided weakness for 1 week.     #Left-sided weakness  - likely due to cerebral edema secondary to glioblastoma multiforme given continued weakness despite resolution of potassium and calcium   - CT head shows no new acute abnormality, hemorrhage, midline shift or mass.   - Will give Decadron (dose per Dr. David) and protonix   - MR brain +/- contrast ordered  - continue to monitor CMP  - Continue Keppra 500mg am and 1000mg bedtime   - Continue multivitamins   - Continue xanax 0.25mg for agitation   - PT evaluation: needs assistance to ambulate with rolling walker  - Follow fall and Seizure precautions  - Neuro checks q4hrs  - Continue to monitor for acute change in status  - Neurosurgery recs appreciated    #Glioblastoma Multiforme  - Palliative family meeting on Sunday at 11AM  - Continue Keppra 500mg am and 1000mg bedtime   - On Bevacizumab q2 weeks, last dose 12/3  - Continue multivitamins   - Continue xanax 0.25mg for agitation   - Follow fall and Seizure precautions  - Neuro checks q4hrs    #Hypokalemia  - Potassium stable s/p KCl, now 4.7  - possibly dietary d/t poor po intake and medication use (HCTZ)  - Hold HCTZ  - Alb 2.6  - continue to monitor    #Hypercalcemia  - Calcium stable now, 9.1  - continue to monitor    #Thrombocytopenia  - likely secondary to chemotherapy, Bevacizumab  - last dose was 12/17, pt receives it q2 weeks    #HTN  - Currently controlled   - Continue Toprol ER 50mg BID  - Continue Amlodipine 5mg   - Continue Losartan 50mg   - Hold HCTZ    - Continue to monitor blood pressure     #HLD  - Lipid panel: Total Cholesterol 163, , HDL 35,   - Continue Lipitor 40mg  - Continue Asa 81mg    #Osteoarthritis  - Tylenol 650mg PRN q6h for pain    #DVT PPx  - heparin 5000 units SQ and venodynes

## 2018-12-21 NOTE — DIETITIAN INITIAL EVALUATION ADULT. - PERTINENT LABORATORY DATA
12-21 Na142 mmol/L Glu 123 mg/dL<H> K+ 4.7 mmol/L Cr  0.91 mg/dL BUN 17 mg/dL Phos n/a   Alb n/a   PAB n/a

## 2018-12-21 NOTE — PHYSICAL THERAPY INITIAL EVALUATION ADULT - DIAGNOSIS, PT EVAL
Weakness 2/2 possibly underlying Glioblastoma Multiforme, hypokalemia, failure to thrive (Pt is obese but report decrease appetite and poor po intake;Glioblastoma Multiforme

## 2018-12-21 NOTE — PHYSICAL THERAPY INITIAL EVALUATION ADULT - ADDITIONAL COMMENTS
CT head shows no new acute abnormality, hemorrhage, midline shift or mass. CT head shows no new acute abnormality, hemorrhage, midline shift or mass.  Lives in house with boyfriend. Has 2 JOHNNY with 1 HR. bedroom and bathroom on first floor. Uses walker at all times. Has aides come in 5 days /wk from morning until evening.

## 2018-12-22 NOTE — CHART NOTE - NSCHARTNOTEFT_GEN_A_CORE
Afua GRESHAM and Oliva Benites met with pt. at bedside to follow up. Pt. was very nervous and asking if she was dying. Pt. was able to state where she is and discussed she is waiting to speak with Dr. David. Pt. appeared confused at times during the conversation and was very fearful. Emotional support provided. Family meeting scheduled for tomorrow at 11AM.

## 2018-12-22 NOTE — PROGRESS NOTE ADULT - SUBJECTIVE AND OBJECTIVE BOX
63F w/ PMHx of glioblastoma multiforme s/p cyberknife+RT+CT/+Craniotomy w/ residual left sided weakness, HTN, HLD, TIA, and osteoarthritis who presented with worsening of left leg weakness. Per pt's family, she was first diagnosed w/ GBM in Aug 2017. She had her first surgery and then in May of 2018, there was recurrence. And in July she underwent cyberknife. She had residual left-sided deficits, but a few weeks ago was ambulating well with a walker. Over the last week, she has had marked decline in all her extremities, but most pronounced in her left upper and lower extremities.     : Pt seen and examined at bedside. She is able to move all 4 extremities without difficulty. However she is very concerned about the cancer and keeps asking if she is going to die. Pt's brother Scotty at bedside.    REVIEW OF SYSTEMS  CONSTITUTIONAL: No fevers or chills  EYES/ENT: No visual changes;  No vertigo or throat pain   NECK: No pain or stiffness  RESPIRATORY: No cough, wheezing, hemoptysis; No shortness of breath  CARDIOVASCULAR: No chest pain or palpitations  GASTROINTESTINAL: No abdominal or epigastric pain. No nausea, vomiting, or hematemesis; No diarrhea or constipation. No melena or hematochezia.  GENITOURINARY: No dysuria, frequency or hematuria  NEUROLOGICAL: see above, no numbness  SKIN: No itching, burning, rashes, or lesions   All other review of systems is negative unless indicated above    Vital Signs Last 24 Hrs  T(C): 36.7 (18 @ 11:14)  T(F): 98 (18 @ 11:14), Max: 98 (18 @ 11:14)  HR: 78 (18 @ 11:14) (78 - 94)  BP: 133/68 (18 @ 11:14)  BP(mean): --  RR: 18 (18 @ 11:14) (17 - 18)  SpO2: 98% (18 @ 11:14) (96% - 98%)  Wt(kg): --     @ 07:01  -   @ 07:00  --------------------------------------------------------  IN: 0 mL / OUT: 1100 mL / NET: -1100 mL    PHYSICAL EXAM:  Constitutional: NAD, awake and alert, obese, laying in bed  HEENT: PERR, EOMI, Normal Hearing, MMM  Neck: Soft and supple, No LAD, No JVD  Respiratory: Breath sounds are clear bilaterally, No wheezing, rales or rhonchi  Cardiovascular: S1 and S2, regular rate and rhythm, no Murmurs, gallops or rubs  Gastrointestinal: Bowel Sounds present, soft, nontender, nondistended, no guarding, no rebound  Extremities: No peripheral edema  Vascular: 2+ peripheral pulses  Neurological: A/O x 3, face sensation equal bilaterally, nasolabial droop on left side when smiling, eyebrows equal when raised, no dysarthria, R arm 4/5 strength, L arm 4/5 strength, can lift b/l LE off the bed, no difference in sensation b/l LE.  Skin: No rashes    Lab Results:                                            10.4                  Neurophils% (auto):   x      ( @ 07:53):    8.11 )-----------(88           Lymphocytes% (auto):  x                                             31.1                   Eosinphils% (auto):   x        Manual%: Neutrophils x    ; Lymphocytes x    ; Eosinophils x    ; Bands%: x    ; Blasts x         Differential:	[] Automated		[] Manual        142  |  111<H>  |  16  ----------------------------<  128<H>  4.4   |  23  |  0.83    Ca    8.6      22 Dec 2018 07:53  Phos  2.1       Mg     2.2         TPro  6.2  /  Alb  2.4<L>  /  TBili  0.3  /  DBili  x   /  AST  23  /  ALT  34  /  AlkPhos  86        Urinalysis Basic - ( 20 Dec 2018 20:19 )    Color: Yellow / Appearance: Clear / S.015 / pH: x  Gluc: x / Ketone: Negative  / Bili: Negative / Urobili: Negative mg/dL   Blood: x / Protein: 15 mg/dL / Nitrite: Negative   Leuk Esterase: Trace / RBC: 0-2 /HPF / WBC 0-2   Sq Epi: x / Non Sq Epi: Many / Bacteria: Few    IMAGING    < from: MR Head w/wo IV Cont (18 @ 22:37) >  IMPRESSION:   Persistent infiltrative disease in the right frontal and temporoparietal   lobes with extensive irregular thickened nodular enhancement and T2   signal abnormality. As compared to previous exam, postsurgical cavity   appears to be a bit more retracted and surrounding enhancement is   marginally less intense however, otherwise the extent of the disease   remains unchanged. No midline shift or hydrocephalus.    < end of copied text >

## 2018-12-22 NOTE — PROGRESS NOTE ADULT - ASSESSMENT
Assessment and Recommendation:   · Assessment		  64 yo female with known GBM, s/p craniotomy tumor resection, RT/CT   - MRI of the brain +/- contrast reviewed by Dr David, stable when compared with prior imaging   - continue decadron w/ GI pph  - cont pain control as needed    - would recommend palliative care consult, scheduled Sunday per familiy at bedside   - Dr David spoke with pt & family earlier this am  No surgical intervention at this time  Recommend continue decadron, continue 24 hour aide, PT eval, see if patient qualifies for visiting RN

## 2018-12-22 NOTE — PROGRESS NOTE ADULT - ASSESSMENT
63F w/ PMHx of glioblastoma multiforme s/p cyberknife+RT+CT/+Craniotomy w/ residual left sided weakness, HTN, HLD, TIA, and osteoarthritis who presented with worsening of left-sided weakness for 1 week.     #Left-sided weakness  - likely due to cerebral edema secondary to glioblastoma multiforme given continued weakness despite resolution of potassium and calcium   - CT head shows no new acute abnormality, hemorrhage, midline shift or mass. MR imaging also shows no significant change in disease, results as above.  - Neurosurg recs: cont decadron 4 q6 with PPI, cont PT, f/u palliative  - Cont Keppra  - Continue xanax 0.25mg for agitation   - PT eval: needs assistance to ambulate with rolling walker, dispo for home w/ PT vs rehab facility, will f/u recs  - Cont fall/seizure precautions  - Neuro checks q4hrs  - Continue to monitor for acute change in status    #Glioblastoma Multiforme  - f/u palliative family meeting on Sunday 12/23/18 at 11AM  - Cont Keppra   - On Bevacizumab q2 weeks, last dose 12/3  - Continue multivitamins   - Continue xanax 0.25mg for agitation   - Follow fall and Seizure precautions  - Neuro checks q4hrs    #Hypokalemia  - Resolved  - possibly dietary d/t poor po intake and medication use (HCTZ)  - Hold HCTZ  - Alb 2.6  - continue to monitor    #Hypercalcemia  - Calcium stable now, 9.1  - continue to monitor    #Thrombocytopenia  - likely secondary to chemotherapy, Bevacizumab  - last dose was 12/17, pt receives it q2 weeks  - held HSQ    #HTN  - Currently controlled   - Continue Toprol ER 50mg BID  - Continue Amlodipine 5mg   - Continue Losartan 50mg   - Hold HCTZ    - Continue to monitor blood pressure     #HLD  - Continue Lipitor 40mg  - Continue Asa 81mg    #Osteoarthritis  - Tylenol 650mg PRN q6h for pain    #DVT PPx  - heparin 5000 units SQ held for thrombocytopenia, cont SCDs

## 2018-12-22 NOTE — PROGRESS NOTE ADULT - SUBJECTIVE AND OBJECTIVE BOX
· Subjective and Objective: 	  Patient is a 63y old  Female who presented with a chief complaint of left sided weakness    HPI:  62 yo female seen and examined in the ER and d/w DR David. Pt is known to our service. Pt has a known GBM s/p tumor resection, RT, CT. Pt presented to the ER with c/o worsening weakness to her left side.  the son states that she has 24 hour aide assistance, but has had progressive weakness of left side x 1 week     PAST MEDICAL & SURGICAL HISTORY:  HSV (herpes simplex virus) infection: to lip and chin  Obesity  Osteoarthritis of both knees, unspecified osteoarthritis type  Glioblastoma multiforme  High cholesterol  Transient cerebral ischemia, unspecified type: 2015  Essential hypertension  H/O craniotomy: 8/2017    FAMILY HISTORY:  Family history of prostate cancer in father (Father)    Social Hx:  Nonsmoker, no drug or alcohol use    MEDICATIONS  (STANDING):  potassium chloride    Tablet ER 40 milliEquivalent(s) Oral every 4 hours       Allergies    MSG (Rash)  No Known Drug Allergies    Intolerances        ROS: Pertinent positives in HPI, all other ROS were reviewed and are negative.      Vital Signs Last 24 Hrs  T(C): 36.7 (22 Dec 2018 11:14), Max: 36.7 (22 Dec 2018 11:14)  T(F): 98 (22 Dec 2018 11:14), Max: 98 (22 Dec 2018 11:14)  HR: 78 (22 Dec 2018 11:14) (78 - 94)  BP: 133/68 (22 Dec 2018 11:14) (115/67 - 136/54)  BP(mean): --  RR: 18 (22 Dec 2018 11:14) (17 - 18)  SpO2: 98% (22 Dec 2018 11:14) (96% - 98%)    Constitutional: awake very conversive, oriented x 2-3  forgetful   HEENT: PERRLA, left facial droop,   Respiratory: Breath sounds are clear bilaterally  Cardiovascular: S1 and S2, regular / irregular rhythm  Gastrointestinal: soft, nontender  Extremities:  +  edema in all 4 extremities   Musculoskeletal: no joint swelling/tenderness    Neurological exam:  HF: awake alert, oriented x 2-3 speech clear , left hemineglect but will attend to left side intermittently. follows simple commands, asks repetitive questions  CN: ANGELITA, Left facial droop tongue midline, Palate moves equally,  Motor; Right side strong. left hemiparesis left  2/5, bic/tric 2-3/5, delt 3/5. Left lower extremity 2-3/5   Sens: Intact to light touch   Coord:  unable to assess   Gait/Balance: Cannot test    Labs:                         10.4   8.11  )-----------( 88       ( 22 Dec 2018 07:53 )             31.1   12-22    142  |  111<H>  |  16  ----------------------------<  128<H>  4.4   |  23  |  0.83    Ca    8.6      22 Dec 2018 07:53  Phos  2.1     12-22  Mg     2.2     12-22    TPro  6.2  /  Alb  2.4<L>  /  TBili  0.3  /  DBili  x   /  AST  23  /  ALT  34  /  AlkPhos  86  12-22        Radiology:  - CT Head:]  EXAM:  CT BRAIN                            PROCEDURE DATE:  12/20/2018          INTERPRETATION:  Exam Date: 12/20/2018 5:26 PM    CT head without IV contrast    CLINICAL INFORMATION: Leg weakness, bilateral and generalized, unable to   ambulate. History of GBM.        TECHNIQUE: Contiguous axial sections were obtained through the head.     This scan was performed using automatic exposure control (radiation dose   reduction software) to obtain a diagnostic image quality scan with   patient dose as low as reasonably achievable.      COMPARISON: Multiple prior CT and MRI imaging, the most recent MRI brain   of 11/2/2018    FINDINGS:       Reidentified is the right sided craniotomy for prior resection of tumor.   Several focal areas of hypoattenuation within the right frontal lobe,   right parietal lobe, and right medial temporal lobe are again identified,   compatible with known areas of disease. There is curvilinear   hyperattenuating material within these areas, seen on prior MRI brain of   11/2/18, likely reflecting posttreatment calcifications versus old   hemorrhagic blood products. No new acute hemorrhage is identified. No   midline shift or significant mass effect is present. No hydrocephalus.   Very small old infarct in the left posterior parietal lobe is again   identified.    IMPRESSION:       No definite new acute abnormality seen on CT.  Reidentified is the right   sided craniotomy for prior resection of tumor. Several focal areas of   hypoattenuation within the right frontal lobe, right parietal lobe, and   right medial temporal lobe are again identified, compatible with known   areas of disease. There is curvilinear hyperattenuating material within   these areas, seen on prior MRI brain of 11/2/18, likely reflecting   posttreatment calcifications versus old hemorrhagic blood products. No   new acute hemorrhage is identified. No midline shift or significant mass   effect is present.                 JEANNINE URBANO M.D., ATTENDING RADIOLOGIST  This documenthas been electronically signed. Dec 20 2018  5:29PM

## 2018-12-22 NOTE — PROGRESS NOTE ADULT - ASSESSMENT
63y old Female coming from home with hx of HLD, HTN, HSV infection of the face, GBM (dx 2017), Chronic Left sided weakness 2/2 this, s/p cyberknife and craniotomy, TIA, Osteoarthritis of b/l knee, 3rd admission this year, last 8/18 for CVA, now admitted 12/20 for complaint of not being able to walk x few days and poor po intake. She was able to ambulate at her baseline before then. Found here to have hypokalemia, CTH with no acute changes from previous imaging, unchanged focal areas of hypoattenuation seen (awaiting MRI for further workup), and normal CXR. Palliative Care consulted to assist with establishing GOC.    1) AMS  - hx of GBM  - possible component of metabolic encephalopathy as well due to electrolyte abn  - seems to fluctuate  - frequent reorientation and fall precautions    2) Anxiety  - added back xanax 0.25 mg q6h prn  received 2 doses over the past 24 hrs  - also received dose of IV ativan 1mg overnight, which can be used prior to MRI as well if needed  - however if delirious would avoid benzos as this can exacerbate delirium, prefer use of seroquel if able to take po or IV haldol low dose 0.5 mg if needed    3) Weakness/GBM  - neurosurgery notes appreciated  - imaging appreciated  - CTH showing known focal areas of hypoattenuation, no acute abnormalities  - MRI noted  - neurosurg to speak with family after these results are available  - fall precautions  - c/w keppra    4) Debility/Malnutrition  - PPS<40%  - need for assistance with all ADLs  - PT note appreciated- recs TBD, home with PT vs. [facility]  - dietary notes appreciated- more information needed for full assessment noted (pt poor historian and family not present during this evaluation)    5) Prognosis  - guarded  - awaiting follow up scans and neurosurgery impression for more clarity on this. However, it is likely poor given GBM and debility, PPS<40%, need for 24h aid support at baseline, and poor intake with high risk for malnutrition noted    6) GOC/Advanced Directives  - pt's capacity waxes and wanes  - HCP on file naming her brother : Scotty Reno [phone]  - full code, will address in GOC meeting  - GOC meeting scheduled for 12/23 at 11am

## 2018-12-22 NOTE — PROGRESS NOTE ADULT - SUBJECTIVE AND OBJECTIVE BOX
HPI: Ms. Reno is a 63y old Female coming from home with hx of HLD, HTN, HSV infection of the face, GBM (dx 2017), Chronic Left sided weakness 2/2 this, s/p cyberknife and craniotomy, TIA, Osteoarthritis of b/l knee, 3rd admission this year, last 8/18 for CVA, now admitted 12/20 for complaint of not being able to walk x few days and poor po intake. She was able to ambulate at her baseline before then. Found here to have hypokalemia, CTH with no acute changes from previous imaging, unchanged focal areas of hypoattenuation seen (awaiting MRI for further workup), and normal CXR. Palliative Care consulted to assist with establishing GOC.  12/22/18 Seen and examined at bedside for follow up symptom management. Pt denies pain or dyspnea. Appears extremely anxious and disoriented this morning.     PAIN: ( )Yes   (x )No  DYSPNEA: ( ) Yes  (x ) No    PAST MEDICAL & SURGICAL HISTORY:  HSV (herpes simplex virus) infection: to lip and chin  Obesity  Osteoarthritis of both knees, unspecified osteoarthritis type  Glioblastoma multiforme  High cholesterol  Transient cerebral ischemia, unspecified type: 2015  Essential hypertension  H/O craniotomy: 8/2017      SOCIAL HX: Lives at home with significant other with aid support    Hx opiate tolerance ( )YES  ( x)NO    Baseline ADLs  (Prior to Admission)- needs assistance with all ADLs, has two 12-hr aides   ( ) Independent   (x )Dependent    FAMILY HISTORY:  Family history of prostate cancer in father (Father)      Review of Systems:    Anxiety- yes  Depression- yes, as above  Constipation- at times, miralax helpful for this, BM yest no issues  Diarrhea- denies  Nausea- denies  Vomiting- denies  Anorexia- yes  Weight Loss- unsure   Cough- no  Secretions- no  Fatigue- yes  Weakness- yes, especially on left  Delirium- yes    All other systems reviewed and negative      PHYSICAL EXAM:  ICU Vital Signs Last 24 Hrs  T(C): 36.4 (22 Dec 2018 04:22), Max: 36.9 (21 Dec 2018 11:40)  T(F): 97.6 (22 Dec 2018 04:22), Max: 98.4 (21 Dec 2018 11:40)  HR: 83 (22 Dec 2018 04:22) (83 - 94)  BP: 115/67 (22 Dec 2018 04:22) (99/58 - 136/54)  BP(mean): --  ABP: --  ABP(mean): --  RR: 17 (22 Dec 2018 04:22) (17 - 18)  SpO2: 96% (22 Dec 2018 04:22) (96% - 98%)      PPSV2: 30  %  General: Older female sitting up in bed, anxious and disoriented  Mental Status: AOx2 disoriented as to why she is in the hospital.  HEENT: mmm, perrl, eomi, L facial droop, some temporal wasting  Lungs: clear  Cardiac: +s1 s2 rrr  GI: soft nt nd +bs  : voids  Ext: moves all extremities, though less robustly on left, some pedal edema  Neuro: left hemiparesis, some word-finding difficulty and occasional L-sided neglect      LABS:                                 10.4   8.11  )-----------( 88       ( 22 Dec 2018 07:53 )             31.1       12-22    142  |  111<H>  |  16  ----------------------------<  128<H>  4.4   |  23  |  0.83    Ca    8.6      22 Dec 2018 07:53  Phos  2.1     12-22  Mg     2.2     12-22    TPro  6.2  /  Alb  2.4<L>  /  TBili  0.3  /  DBili  x   /  AST  23  /  ALT  34  /  AlkPhos  86  12-22    Allergies    MSG (Rash)  No Known Drug Allergies    Intolerances

## 2018-12-23 NOTE — DISCHARGE NOTE ADULT - CARE PROVIDER_API CALL
Robbin Armstrong (MD), Cardiovascular Disease; Internal Medicine; Nuclear Cardiology  [address] [facility] [address]  [place] [address]  Phone: [phone]  Fax: [phone]    Chaim David; PhD), Neurosurgery  [address] [facility]  [address] [place] [address]  Phone: [phone]  Fax: [phone] Robbin Armstrong), Cardiovascular Disease; Internal Medicine; Nuclear Cardiology  [address] [facility] [address]  [place] [address]  Phone: [phone]  Fax: [phone]    Chaim David (MD; PhD), Neurosurgery  [address] [facility]  [address]nd [place] [address]  Phone: [phone]  Fax: [phone]    Juan Marinelli), Neurology  [address] [place] [address]  Phone: [phone]  Fax: [phone]

## 2018-12-23 NOTE — PROGRESS NOTE ADULT - ASSESSMENT
Pt is an 62 yo F w/ PMHx of glioblastoma multiforme s/p cyberknife+RT+CT/+Craniotomy w/ residual left sided weakness, HTN, HLD, TIA, and osteoarthritis who presented with worsening of left-sided weakness for 1 week.     #Left-sided weakness  - likely due to cerebral edema secondary to glioblastoma multiforme given continued weakness despite resolution of potassium and calcium   - improving now s/p steroids   - CT head shows no new acute abnormality, hemorrhage, midline shift or mass.   - MRI brain: persistent infiltrative disease in right frontal and temporoparietal lobes, post surgical cvity a bit more retracted, extent of disease remains unchanged, no midline shift or hydrocephalus  - neurosurgery: continue steroids and protonix   - continue to monitor CMP  - Continue Keppra 500mg am and 1000mg bedtime   - Continue multivitamins   - Continue xanax 0.25mg for agitation   - PT evaluation: needs assistance to ambulate with rolling walker  - Follow fall and Seizure precautions  - Neuro checks q4hrs  - Continue to monitor for acute change in status  - Neurosurgery recs appreciated    #Glioblastoma Multiforme  - Palliative family meeting earlier today  - Continue Keppra 500mg am and 1000mg bedtime   - On Bevacizumab q2 weeks, last dose 12/17  - Continue multivitamins   - Continue xanax 0.25mg for agitation   - Follow fall and Seizure precautions  - Neuro checks q4hrs    #Palliative   - Family meeting with Aleyda Benites earlier today  - Proxy: Scotty, brother  - DNR/DNI  - limited medical interventions, antibiotics case dependent, continue to come to hospital for acute issues    #Hypokalemia  - Potassium stable s/p KCl, now 4.4  - possibly dietary d/t poor po intake and medication use (HCTZ)  - Hold HCTZ  - continue to monitor    #Hypercalcemia  - Calcium stable now, 8.6  - Alb 2.6  - continue to monitor    #Thrombocytopenia  - likely secondary to chemotherapy, Bevacizumab  - last dose was 12/17, pt receives it q2 weeks    #HTN  - Currently controlled   - Continue Toprol ER 50mg BID  - Continue Amlodipine 5mg   - Continue Losartan 50mg   - Hold HCTZ    - Continue to monitor blood pressure     #HLD  - Lipid panel: Total Cholesterol 163, , HDL 35,   - Continue Lipitor 40mg  - Continue Asa 81mg    #Osteoarthritis  - Tylenol 650mg PRN q6h for pain    #DVT PPx  - heparin 5000 units SQ held due to thrombocytopenia  - venodynes

## 2018-12-23 NOTE — DISCHARGE NOTE ADULT - PROVIDER TOKENS
TOKEN:'[id number]',TOKEN:'[id number]' TOKEN:'[id number]:'[id number]:'[id number]'

## 2018-12-23 NOTE — DISCHARGE NOTE ADULT - MEDICATION SUMMARY - MEDICATIONS TO TAKE
I will START or STAY ON the medications listed below when I get home from the hospital:    dexamethasone 2 mg oral tablet  -- 1 tab(s) by mouth every 12 hours  -- Indication: For Glioblastoma multiforme    acetaminophen 325 mg oral tablet  -- 2 tab(s) by mouth every 6 hours, As needed, Mild Pain (1 - 3), Moderate Pain (4 - 6), Severe Pain (7 - 10)  -- Indication: For Pain    aspirin 81 mg oral tablet, chewable  -- 1 tab(s) by mouth once a day.   -- Indication: For Cardiovascular disease prevention    losartan 50 mg oral tablet  -- 1 tab(s) by mouth once a day  -- Indication: For HTN    levETIRAcetam 1000 mg oral tablet  -- 1 tab(s) by mouth once a day (at bedtime)  -- Indication: For Glioblastoma multiforme    levETIRAcetam 500 mg oral tablet  -- 1 tab(s) by mouth once a day (in the morning)  -- Indication: For Glioblastoma multiforme    insulin lispro (concentrated) 200 units/mL subcutaneous solution  -- 0 Unit(s) if Glucose 0 - 250  1 Unit(s) if Glucose 251 - 300  2 Unit(s) if Glucose 301 - 350  3 Unit(s) if Glucose 351 - 400  4 Unit(s) if Glucose Greater Than 400  -- Indication: For Steroid use for glioblastoma multiforme    insulin lispro (concentrated) 200 units/mL subcutaneous solution  -- 1 Unit(s) if Glucose 151 - 200  2 Unit(s) if Glucose 201 - 250  3 Unit(s) if Glucose 251 - 300  4 Unit(s) if Glucose 301 - 350  5 Unit(s) if Glucose 351 - 400  6 Unit(s) if Glucose Greater Than 400  -- Indication: For Steroid use for glioblastoma multiforme    atorvastatin 40 mg oral tablet  -- 1 tab(s) by mouth once a day (at bedtime)  -- Indication: For HLD    QUEtiapine  -- 12.5 milligram(s) by mouth once a day (at bedtime)  -- Indication: For Anxiety    QUEtiapine  -- 12.5 milligram(s) by mouth once a day, As Needed  -- Indication: For Anxiety    ALPRAZolam 0.25 mg oral tablet  -- 1 tab(s) by mouth every 6 hours, As needed, anxiety or agitation  -- Indication: For Anxiety    metoprolol succinate 50 mg oral tablet, extended release  -- 1 tab(s) by mouth 2 times a day  -- Indication: For HTN    amLODIPine 5 mg oral tablet  -- 1 tab(s) by mouth once a day (in the morning)  -- Indication: For HTN    pantoprazole 40 mg oral delayed release tablet  -- 1 tab(s) by mouth once a day (before a meal)  -- Indication: For steroid use for glioblastoma multiforme    Multiple Vitamins oral tablet  -- 1 tab(s) by mouth once a day  -- Indication: For Supplement

## 2018-12-23 NOTE — PROGRESS NOTE ADULT - SUBJECTIVE AND OBJECTIVE BOX
Pt is an 62 yo F w/ PMHx of glioblastoma multiforme s/p cyberknife+RT+CT/+Craniotomy w/ residual left sided weakness, HTN, HLD, TIA, and osteoarthritis who presented with worsening of left leg weakness admitted for weakness.     SUBJECTIVE:   Pt consistently asking if she's dying. She wants to find her "port" (stress ball). Weakness is the same as always. She denies any problems.    REVIEW OF SYSTEMS, unreliable historian:   CONSTITUTIONAL: no fevers or chills  EYES/ENT: No visual changes;  No vertigo or throat pain   NECK: No pain or stiffness  RESPIRATORY: No cough, wheezing, hemoptysis; No shortness of breath  CARDIOVASCULAR: No chest pain or palpitations  GASTROINTESTINAL: No abdominal or epigastric pain. No nausea, vomiting, or hematemesis; No diarrhea or constipation. No melena or hematochezia.  GENITOURINARY: No dysuria, frequency or hematuria  NEUROLOGICAL: see above, no numbness  SKIN: No itching, burning, rashes, or lesions   All other review of systems is negative unless indicated above    Vital Signs Last 24 Hrs  T(C): 36.6 (23 Dec 2018 11:17), Max: 36.8 (22 Dec 2018 17:44)  T(F): 97.9 (23 Dec 2018 11:17), Max: 98.3 (22 Dec 2018 17:44)  HR: 83 (23 Dec 2018 11:17) (71 - 86)  BP: 139/83 (23 Dec 2018 11:17) (129/70 - 152/77)  BP(mean): --  RR: 18 (23 Dec 2018 11:17) (17 - 18)  SpO2: 99% (23 Dec 2018 11:17) (96% - 99%)    I&O's Summary    22 Dec 2018 07:01  -  23 Dec 2018 07:00  --------------------------------------------------------  IN: 0 mL / OUT: 1500 mL / NET: -1500 mL        CAPILLARY BLOOD GLUCOSE      POCT Blood Glucose.: 115 mg/dL (23 Dec 2018 11:44)  POCT Blood Glucose.: 108 mg/dL (23 Dec 2018 07:54)  POCT Blood Glucose.: 125 mg/dL (22 Dec 2018 21:25)  POCT Blood Glucose.: 123 mg/dL (22 Dec 2018 17:02)    PHYSICAL EXAM:  Constitutional: NAD, awake and alert, obese, laying in bed  HEENT: PERR, EOMI, Normal Hearing, MMM  Neck: Soft and supple, No LAD, No JVD  Respiratory: Breath sounds are clear bilaterally, No wheezing, rales or rhonchi  Cardiovascular: S1 and S2, regular rate and rhythm, no Murmurs, gallops or rubs  Gastrointestinal: Bowel Sounds present, soft, nontender, nondistended, no guarding, no rebound  Extremities: No peripheral edema  Vascular: 2+ peripheral pulses  Neurological: A/O x 3, face sensation equal bilaterally, nasolabial droop on left side when smiling, eyebrows equal when raised, no dysarthria, right arm 4/5 strength, left arm 4/5 strength but much less than right arm, left lower extremity able to lift high off bed much stronger than 2 days ago, right lower extremity able to lift to 45% momentarily, sensation intact and equal bilaterally   Skin: No rashes    MEDICATIONS:  MEDICATIONS  (STANDING):  amLODIPine   Tablet 5 milliGRAM(s) Oral daily  aspirin  chewable 81 milliGRAM(s) Oral daily  atorvastatin 40 milliGRAM(s) Oral at bedtime  clotrimazole 2% Vaginal Cream 1 Applicatorful Vaginal at bedtime  dexamethasone  Injectable 2 milliGRAM(s) IV Push every 8 hours  dextrose 5%. 1000 milliLiter(s) (50 mL/Hr) IV Continuous <Continuous>  dextrose 50% Injectable 12.5 Gram(s) IV Push once  dextrose 50% Injectable 25 Gram(s) IV Push once  dextrose 50% Injectable 25 Gram(s) IV Push once  influenza   Vaccine 0.5 milliLiter(s) IntraMuscular once  insulin lispro (HumaLOG) corrective regimen sliding scale   SubCutaneous three times a day before meals  insulin lispro (HumaLOG) corrective regimen sliding scale   SubCutaneous at bedtime  levETIRAcetam 1000 milliGRAM(s) Oral at bedtime  levETIRAcetam 500 milliGRAM(s) Oral daily  losartan 50 milliGRAM(s) Oral daily  metoprolol succinate ER 50 milliGRAM(s) Oral two times a day  mineral oil enema 133 milliLiter(s) Rectal once  multivitamin 1 Tablet(s) Oral daily  pantoprazole  Injectable 40 milliGRAM(s) IV Push every 12 hours      LABS: All Labs Reviewed:                        10.4   8.11  )-----------( 88       ( 22 Dec 2018 07:53 )             31.1     12-22    142  |  111<H>  |  16  ----------------------------<  128<H>  4.4   |  23  |  0.83    Ca    8.6      22 Dec 2018 07:53  Phos  2.1     12-22  Mg     2.2     12-22    TPro  6.2  /  Alb  2.4<L>  /  TBili  0.3  /  DBili  x   /  AST  23  /  ALT  34  /  AlkPhos  86  12-22          Blood Culture: 12-20 @ 20:19  Organism --  Gram Stain Blood -- Gram Stain --  Specimen Source .Urine None  Culture-Blood --    12-20 @ 16:20  Organism --  Gram Stain Blood -- Gram Stain --  Specimen Source .Blood None  Culture-Blood --    < from: MR Head w/wo IV Cont (12.21.18 @ 22:37) >  FINDINGS:   Limited by motion. There are persistent postoperative changes right   pterional craniotomy with residual blood products. There is heterogeneous   infiltrative disease and with irregular thickened enhancement in the   right frontal and temporoparietal lobes which overall is similar to the   previous exam, the intensity of the enhancement is marginally diminished.   There is persistent enhancement at the genu of the corpus callosum. There   is associated nonenhancing T2 signal abnormality and vasogenic edema   which is also overall stable. The size and appearance of the ventricles   and cortical sulci are stable. There is no midline shift or acute   hydrocephalus.Small focus of gliosis in the subcortical white left   posterior  parietal lobe, without enhancement unchanged as compared to previous exam.  There is no evidence of acute infarct, or hemorrhage.  Major intra-arterial flow voids are unremarkable.      IMPRESSION:   Persistent infiltrative disease in the right frontal and temporoparietal   lobes with extensive irregular thickened nodular enhancement and T2   signal abnormality. As compared to previous exam, postsurgical cavity   appears to be a bit more retracted and surrounding enhancement is   marginally less intense however, otherwise the extent of the disease   remains unchanged. No midline shift or hydrocephalus.

## 2018-12-23 NOTE — GOALS OF CARE CONVERSATION - PERSONAL ADVANCE DIRECTIVE - CONVERSATION DETAILS
The role of Palliative medicine was reviewed. The pt's brother discussed the pt's decline and personality changes over the past several months. She cont to receive chemo at [facility] and he is considering the benefit and burden of this, He is hopeful that his sister would be able to make this decision however her capacity is fluctuating as she is extremely anxious and disoriented at times. We reviewed options at disch including [facility] if PT recommends vs home vs inpatient hospice. She has currently been receiving IV Ativan however will take oral meds in which she would not qualify for inpatient. She does have 24 hr aides at home and hopefully with the addition of hospice support she would be safe and manageable.  MOLST complete on chart DNR/DNI STH/LMT/NFT/Determine Use abx. Add Seroquel today and follow for clearer determination of plan. 50 minutesd spent on advanced care planning discussion

## 2018-12-23 NOTE — DISCHARGE NOTE ADULT - HOSPITAL COURSE
Pt is a 62 yo F w/ PMHx of glioblastoma multiform currently on chemotherapy s/p cyberknife and craniotomy w/ residual deficits, TIA, HLD, HTN, and osteoarthritis who presented with acute worsening of right sided weakness. Pt was found to be hypokalemic (2.8) and hypercalcemic. Pt's home HCTZ was held and pt recommended to discontinue HCTZ after discharge, and potassium was repleted as necessary. CT head revealed no new acute abnormality or hemorrhage. MRI brain revealed persistent infiltrative disease in right frontal and temporoparietal lobes, post surgical cavity a bit more retracted, extent of disease remains unchanged, no midline shift or hydrocephalus. Pt was evaluated by neurosurgery and started on IV steroids. Pt will be discharged home on PO steroids. Pt was also seen by palliative team while inpatient. ____________________________________.   Pt stable for discharge and instructed to follow up with _____________________________. Pt is a 64 yo F w/ PMHx of glioblastoma multiform currently on chemotherapy s/p cyberknife and craniotomy w/ residual deficits, TIA, HLD, HTN, and osteoarthritis who presented with acute worsening of right sided weakness. Pt was found to be hypokalemic (2.8) and hypercalcemic. Pt's home HCTZ was held and pt recommended to discontinue HCTZ after discharge, and potassium was repleted as necessary. CT head revealed no new acute abnormality or hemorrhage. MRI brain revealed persistent infiltrative disease in right frontal and temporoparietal lobes, post surgical cavity a bit more retracted, extent of disease remains unchanged, no midline shift or hydrocephalus. Pt was evaluated by neurosurgery and started on IV steroids. Pt will be discharged home on PO steroids. Pt was also seen by palliative team while inpatient. Pt's health care proxy is her brother, Scotty. Pt does not have capacity. Pt is DNR/DNI with limited medical intervention. She will return to the hospital PRN for acute issues and abx will be decided on a case-by-case basis. MOLST form was filled out.   Pt stable for discharge to rehab and instructed to follow up with her PCP, oncologist, and neurosurgery. Pt is a 62 yo F w/ PMHx of glioblastoma multiform currently on chemotherapy s/p cyberknife and craniotomy w/ residual deficits, TIA, HLD, HTN, and osteoarthritis who presented with acute worsening of right sided weakness. Pt was found to be hypokalemic (2.8) and hypercalcemic. Pt's home HCTZ was held and pt recommended to discontinue HCTZ after discharge, and potassium was repleted as necessary. CT head revealed no new acute abnormality or hemorrhage. MRI brain revealed persistent infiltrative disease in right frontal and temporoparietal lobes, post surgical cavity a bit more retracted, extent of disease remains unchanged, no midline shift or hydrocephalus. Pt was evaluated by neurosurgery and started on IV steroids. Pt will be discharged home on PO steroids. Pt was also seen by palliative team while inpatient. Pt's health care proxy is her brother, Scotty. Pt does not have capacity. Pt is DNR/DNI with limited medical intervention. She will return to the hospital PRN for acute issues and abx will be decided on a case-by-case basis. MOLST form was filled out. Pt was seen and examined on day of discharge and is stable for discharge to rehab and instructed to follow up with her PCP, oncologist, and neurosurgery. Pt's PCP's, Dr. Armstrong, office was called and a message was left with his nurse, Nguyen, concerning pt's admission. Dr. Armstrong's office requested that pt come for follow up 7-10 days after discharge from rehab.

## 2018-12-23 NOTE — DISCHARGE NOTE ADULT - MEDICATION SUMMARY - MEDICATIONS TO STOP TAKING
I will STOP taking the medications listed below when I get home from the hospital:    hydroCHLOROthiazide 25 mg oral tablet  -- 1 tab(s) by mouth once a day (in the morning)

## 2018-12-23 NOTE — DISCHARGE NOTE ADULT - PLAN OF CARE
- Continue keppra   - Continue decadron   - Continue protonix Maintain normal potassium levels - Please eat a balanced diet to maintain normal potassium levels  - Please do not use hydrochlorothiazide anymore Maintain normal calcium levels - Please do not use hydrochlorothiazide anymore Keep blood pressure less than 140/90 - Continue your home meds except hydrochlorothiazide Maximise quality of life - Continue home keppra   - Continue dexamethasone   - Continue protonix  - Continue insulin sliding scale due to steroid use  - Please follow up with oncologist  - Please follow up with neurosurgery - Please do not use hydrochlorothiazide anymore  - Please follow up with PCP - Continue your home meds except hydrochlorothiazide  - Please follow up with PCP - Continue home keppra   - Continue dexamethasone   - Continue protonix  - Continue insulin sliding scale due to steroid use  - Pt will not have chemotherapy while in rehab  - Please follow up with oncologist  - Please follow up with neurosurgery

## 2018-12-23 NOTE — DISCHARGE NOTE ADULT - ADDITIONAL INSTRUCTIONS
Please follow up with your primary care doctor 7-10 days after you are discharged from rehab.   Please follow up with your oncologist.   Please follow up with neurosurgery as needed.

## 2018-12-23 NOTE — DISCHARGE NOTE ADULT - OTHER SIGNIFICANT FINDINGS
< from: CT Head No Cont (12.20.18 @ 17:26) >  FINDINGS:       Reidentified is the right sided craniotomy for prior resection of tumor.   Several focal areas of hypoattenuation within the right frontal lobe,   right parietal lobe, and right medial temporal lobe are again identified,   compatible with known areas of disease. There is curvilinear   hyperattenuating material within these areas, seen on prior MRI brain of   11/2/18, likely reflecting posttreatment calcifications versus old   hemorrhagic blood products. No new acute hemorrhage is identified. No   midline shift or significant mass effect is present. No hydrocephalus.   Very small old infarct in the left posterior parietal lobe is again   identified.    IMPRESSION:       No definite new acute abnormality seen on CT.  Reidentified is the right   sided craniotomy for prior resection of tumor. Several focal areas of   hypoattenuation within the right frontal lobe, right parietal lobe, and   right medial temporal lobe are again identified, compatible with known   areas of disease. There is curvilinear hyperattenuating material within   these areas, seen on prior MRI brain of 11/2/18, likely reflecting   posttreatment calcifications versus old hemorrhagic blood products. No   new acute hemorrhage is identified. No midline shift or significant mass   effect is present.     < from: MR Head w/wo IV Cont (12.21.18 @ 22:37) >  FINDINGS:   Limited by motion. There are persistent postoperative changes right   pterional craniotomy with residual blood products. There is heterogeneous   infiltrative disease and with irregular thickened enhancement in the   right frontal and temporoparietal lobes which overall is similar to the   previous exam, the intensity of the enhancement is marginally diminished.   There is persistent enhancement at the genu of the corpus callosum. There   is associated nonenhancing T2 signal abnormality and vasogenic edema   which is also overall stable. The size and appearance of the ventricles   and cortical sulci are stable. There is no midline shift or acute   hydrocephalus.Small focus of gliosis in the subcortical white left   posterior  parietal lobe, without enhancement unchanged as compared to previous exam.  There is no evidence of acute infarct, or hemorrhage.  Major intra-arterial flow voids are unremarkable.      IMPRESSION:   Persistent infiltrative disease in the right frontal and temporoparietal   lobes with extensive irregular thickened nodular enhancement and T2   signal abnormality. As compared to previous exam, postsurgical cavity   appears to be a bit more retracted and surrounding enhancement is   marginally less intense however, otherwise the extent of the disease   remains unchanged. No midline shift or hydrocephalus.    Comprehensive Metabolic Panel (12.20.18 @ 16:20)    Sodium, Serum: 140 mmol/L    Potassium, Serum: 2.8 mmol/L    Chloride, Serum: 103 mmol/L    Carbon Dioxide, Serum: 27 mmol/L    Anion Gap, Serum: 10 mmol/L    Blood Urea Nitrogen, Serum: 18 mg/dL    Creatinine, Serum: 1.10 mg/dL    Glucose, Serum: 92 mg/dL    Calcium, Total Serum: 10.2 mg/dL    Protein Total, Serum: 7.5 gm/dL    Albumin, Serum: 3.1 g/dL    Bilirubin Total, Serum: 0.5 mg/dL    Alkaline Phosphatase, Serum: 110 U/L    Aspartate Aminotransferase (AST/SGOT): 26 U/L    Alanine Aminotransferase (ALT/SGPT): 27 U/L    eGFR if Non : 53    eGFR if : 61 mL/min/1.73M2 < from: CT Head No Cont (12.20.18 @ 17:26) >  FINDINGS:       Reidentified is the right sided craniotomy for prior resection of tumor.   Several focal areas of hypoattenuation within the right frontal lobe,   right parietal lobe, and right medial temporal lobe are again identified,   compatible with known areas of disease. There is curvilinear   hyperattenuating material within these areas, seen on prior MRI brain of   11/2/18, likely reflecting posttreatment calcifications versus old   hemorrhagic blood products. No new acute hemorrhage is identified. No   midline shift or significant mass effect is present. No hydrocephalus.   Very small old infarct in the left posterior parietal lobe is again   identified.    IMPRESSION:       No definite new acute abnormality seen on CT.  Reidentified is the right   sided craniotomy for prior resection of tumor. Several focal areas of   hypoattenuation within the right frontal lobe, right parietal lobe, and   right medial temporal lobe are again identified, compatible with known   areas of disease. There is curvilinear hyperattenuating material within   these areas, seen on prior MRI brain of 11/2/18, likely reflecting   posttreatment calcifications versus old hemorrhagic blood products. No   new acute hemorrhage is identified. No midline shift or significant mass   effect is present.     < from: MR Head w/wo IV Cont (12.21.18 @ 22:37) >  FINDINGS:   Limited by motion. There are persistent postoperative changes right   pterional craniotomy with residual blood products. There is heterogeneous   infiltrative disease and with irregular thickened enhancement in the   right frontal and temporoparietal lobes which overall is similar to the   previous exam, the intensity of the enhancement is marginally diminished.   There is persistent enhancement at the genu of the corpus callosum. There   is associated nonenhancing T2 signal abnormality and vasogenic edema   which is also overall stable. The size and appearance of the ventricles   and cortical sulci are stable. There is no midline shift or acute   hydrocephalus.Small focus of gliosis in the subcortical white left   posterior  parietal lobe, without enhancement unchanged as compared to previous exam.  There is no evidence of acute infarct, or hemorrhage.  Major intra-arterial flow voids are unremarkable.      IMPRESSION:   Persistent infiltrative disease in the right frontal and temporoparietal   lobes with extensive irregular thickened nodular enhancement and T2   signal abnormality. As compared to previous exam, postsurgical cavity   appears to be a bit more retracted and surrounding enhancement is   marginally less intense however, otherwise the extent of the disease   remains unchanged. No midline shift or hydrocephalus.    Comprehensive Metabolic Panel (12.20.18 @ 16:20)    Sodium, Serum: 140 mmol/L    Potassium, Serum: 2.8 mmol/L    Chloride, Serum: 103 mmol/L    Carbon Dioxide, Serum: 27 mmol/L    Anion Gap, Serum: 10 mmol/L    Blood Urea Nitrogen, Serum: 18 mg/dL    Creatinine, Serum: 1.10 mg/dL    Glucose, Serum: 92 mg/dL    Calcium, Total Serum: 10.2 mg/dL    Protein Total, Serum: 7.5 gm/dL    Albumin, Serum: 3.1 g/dL    Bilirubin Total, Serum: 0.5 mg/dL    Alkaline Phosphatase, Serum: 110 U/L    Aspartate Aminotransferase (AST/SGOT): 26 U/L    Alanine Aminotransferase (ALT/SGPT): 27 U/L    eGFR if Non : 53    eGFR if : 61 mL/min/1.73M2    Basic Metabolic Panel in AM (12.25.18 @ 05:31)    Sodium, Serum: 142 mmol/L    Potassium, Serum: 4.0 mmol/L    Chloride, Serum: 111 mmol/L    Carbon Dioxide, Serum: 24 mmol/L    Anion Gap, Serum: 7 mmol/L    Blood Urea Nitrogen, Serum: 31 mg/dL    Creatinine, Serum: 0.81 mg/dL    Glucose, Serum: 121 mg/dL    Calcium, Total Serum: 8.2 mg/dL    eGFR if Non : 76    eGFR if : 88 mL/min/1.73M2    Complete Blood Count in AM (12.28.18 @ 06:11)    Nucleated RBC: 0 /100 WBCs    WBC Count: 10.35 K/uL    RBC Count: 3.64 M/uL    Hemoglobin: 11.1 g/dL    Hematocrit: 34.1 %    Mean Cell Volume: 93.7 fl    Mean Cell Hemoglobin: 30.5 pg    Mean Cell Hemoglobin Conc: 32.6 gm/dL    Red Cell Distrib Width: 19.5 %    Platelet Count - Automated: 61 K/uL

## 2018-12-23 NOTE — PROGRESS NOTE ADULT - ASSESSMENT
63y old Female coming from home with hx of HLD, HTN, HSV infection of the face, GBM (dx 2017), Chronic Left sided weakness 2/2 this, s/p cyberknife and craniotomy, TIA, Osteoarthritis of b/l knee, 3rd admission this year, last 8/18 for CVA, now admitted 12/20 for complaint of not being able to walk x few days and poor po intake. She was able to ambulate at her baseline before then. Found here to have hypokalemia, CTH with no acute changes from previous imaging, unchanged focal areas of hypoattenuation seen (awaiting MRI for further workup), and normal CXR. Palliative Care consulted to assist with establishing GOC.    1) AMS  - hx of GBM  - possible component of metabolic encephalopathy as well due to electrolyte abn  - seems to fluctuate  - frequent reorientation and fall precautions      2) Anxiety  - cont xanax 0.25 mg q6h prn  received 2 doses of 0.25 mg over the past 24 hrs  - also received dose of IV ativan 0.5 mg 3x overnight  - however if delirious would avoid benzos as this can exacerbate delirium, prefer use of seroquel if able to take po or IV haldol low dose 0.5 mg if needed  -start Seroquel 12 mg PO HS and 12 mg PO PRN in am    3) Weakness/GBM  - neurosurgery notes appreciated  - imaging appreciated  - CTH showing known focal areas of hypoattenuation, no acute abnormalities  - neurosurg  spoke with family re: MRI results  -no disease progression  - fall precautions  - c/w keppra    4) Debility/Malnutrition  - PPS<40%  - need for assistance with all ADLs  - PT note appreciated- recs TBD, home with PT vs. [facility]  - dietary notes appreciated- more information needed for full assessment noted (pt poor historian and family not present during this evaluation)  -brother Scotty HCP requests [facility] if PT recommends    5) Prognosis  - guarded  - awaiting follow up scans and neurosurgery impression for more clarity on this. However, it is likely poor given GBM and debility, PPS<40%, need for 24h aid support at baseline, and poor intake with high risk for malnutrition noted    6) GOC/Advanced Directives  - pt's capacity waxes and wanes  - HCP on file naming her brother : Scotty Reno [phone]  - DNR/DNI on [facility] completed  - GOC meeting 12/23 at 11am  -Discussed [facility] vs home vs inpatient hospice  -See GOC discussion

## 2018-12-23 NOTE — DISCHARGE NOTE ADULT - CARE PROVIDERS DIRECT ADDRESSES
,DirectAddress_Unknown,[email or web address] ,DirectAddress_Unknown,[email or web address]

## 2018-12-23 NOTE — DISCHARGE NOTE ADULT - CARE PLAN
Principal Discharge DX:	Glioblastoma multiforme  Assessment and plan of treatment:	- Continue keppra   - Continue decadron   - Continue protonix  Secondary Diagnosis:	Hypokalemia  Goal:	Maintain normal potassium levels  Assessment and plan of treatment:	- Please eat a balanced diet to maintain normal potassium levels  - Please do not use hydrochlorothiazide anymore  Secondary Diagnosis:	Hypercalcemia  Goal:	Maintain normal calcium levels  Assessment and plan of treatment:	- Please do not use hydrochlorothiazide anymore  Secondary Diagnosis:	Essential hypertension  Goal:	Keep blood pressure less than 140/90  Assessment and plan of treatment:	- Continue your home meds except hydrochlorothiazide Principal Discharge DX:	Glioblastoma multiforme  Goal:	Maximise quality of life  Assessment and plan of treatment:	- Continue home keppra   - Continue dexamethasone   - Continue protonix  - Continue insulin sliding scale due to steroid use  - Please follow up with oncologist  - Please follow up with neurosurgery  Secondary Diagnosis:	Hypokalemia  Goal:	Maintain normal potassium levels  Assessment and plan of treatment:	- Please eat a balanced diet to maintain normal potassium levels  - Please do not use hydrochlorothiazide anymore  Secondary Diagnosis:	Hypercalcemia  Goal:	Maintain normal calcium levels  Assessment and plan of treatment:	- Please do not use hydrochlorothiazide anymore  - Please follow up with PCP  Secondary Diagnosis:	Essential hypertension  Goal:	Keep blood pressure less than 140/90  Assessment and plan of treatment:	- Continue your home meds except hydrochlorothiazide  - Please follow up with PCP Principal Discharge DX:	Glioblastoma multiforme  Goal:	Maximise quality of life  Assessment and plan of treatment:	- Continue home keppra   - Continue dexamethasone   - Continue protonix  - Continue insulin sliding scale due to steroid use  - Pt will not have chemotherapy while in rehab  - Please follow up with oncologist  - Please follow up with neurosurgery  Secondary Diagnosis:	Hypokalemia  Goal:	Maintain normal potassium levels  Assessment and plan of treatment:	- Please eat a balanced diet to maintain normal potassium levels  - Please do not use hydrochlorothiazide anymore  Secondary Diagnosis:	Hypercalcemia  Goal:	Maintain normal calcium levels  Assessment and plan of treatment:	- Please do not use hydrochlorothiazide anymore  - Please follow up with PCP  Secondary Diagnosis:	Essential hypertension  Goal:	Keep blood pressure less than 140/90  Assessment and plan of treatment:	- Continue your home meds except hydrochlorothiazide  - Please follow up with PCP

## 2018-12-23 NOTE — PROGRESS NOTE ADULT - SUBJECTIVE AND OBJECTIVE BOX
HPI: Ms. Reno is a 63y old Female coming from home with hx of HLD, HTN, HSV infection of the face, GBM (dx 2017), Chronic Left sided weakness 2/2 this, s/p cyberknife and craniotomy, TIA, Osteoarthritis of b/l knee, 3rd admission this year, last 8/18 for CVA, now admitted 12/20 for complaint of not being able to walk x few days and poor po intake. She was able to ambulate at her baseline before then. Found here to have hypokalemia, CTH with no acute changes from previous imaging, unchanged focal areas of hypoattenuation seen (awaiting MRI for further workup), and normal CXR. Palliative Care consulted to assist with establishing GOC.  12/22/18 Seen and examined at bedside for follow up symptom management. Pt denies pain or dyspnea. Appears extremely anxious and disoriented this morning.   12/23/18 Seen and examined at bedside with brother Scotty present. Pt is anxious and disoriented this morning. Denies pain or dyspnea  PAIN: ( )Yes   (x )No  DYSPNEA: ( ) Yes  (x ) No    PAST MEDICAL & SURGICAL HISTORY:  HSV (herpes simplex virus) infection: to lip and chin  Obesity  Osteoarthritis of both knees, unspecified osteoarthritis type  Glioblastoma multiforme  High cholesterol  Transient cerebral ischemia, unspecified type: 2015  Essential hypertension  H/O craniotomy: 8/2017      SOCIAL HX: Lives at home with significant other with aide support    Hx opiate tolerance ( )YES  ( x)NO    Baseline ADLs  (Prior to Admission)- needs assistance with all ADLs, has two 12-hr aides   ( ) Independent   (x )Dependent    FAMILY HISTORY:  Family history of prostate cancer in father (Father)  Brother Prostate Ca    Review of Systems:    Anxiety- yes  Depression- yes, as above  Constipation- at times, miralax helpful for this, BM yest no issues  Diarrhea- denies  Nausea- denies  Vomiting- denies  Anorexia- yes  Weight Loss- unsure   Cough- no  Secretions- no  Fatigue- yes  Weakness- yes, especially on left  Delirium- yes    All other systems reviewed and negative      PHYSICAL EXAM:    ICU Vital Signs Last 24 Hrs  T(C): 36.6 (23 Dec 2018 11:17), Max: 36.8 (22 Dec 2018 17:44)  T(F): 97.9 (23 Dec 2018 11:17), Max: 98.3 (22 Dec 2018 17:44)  HR: 83 (23 Dec 2018 11:17) (71 - 86)  BP: 139/83 (23 Dec 2018 11:17) (129/70 - 152/77)  BP(mean): --  ABP: --  ABP(mean): --  RR: 18 (23 Dec 2018 11:17) (17 - 18)  SpO2: 99% (23 Dec 2018 11:17) (96% - 99%)    PPSV2: 30  %  General: Older female sitting up in bed, anxious and disoriented  Mental Status: AOx2 disoriented as to why she is in the hospital.  HEENT: mmm, perrl, eomi, L facial droop, some temporal wasting  Lungs: clear  Cardiac: +s1 s2 rrr  GI: soft nt nd +bs  : voids  Ext: moves all extremities, though less robustly on left, some pedal edema  Neuro: left hemiparesis, some word-finding difficulty and occasional L-sided neglect      LABS:                                10.4   8.11  )-----------( 88       ( 22 Dec 2018 07:53 )             31.1                      12-22    142  |  111<H>  |  16  ----------------------------<  128<H>  4.4   |  23  |  0.83    Ca    8.6      22 Dec 2018 07:53  Phos  2.1     12-22  Mg     2.2     12-22    Mg     2.2     12-22        Allergies    MSG (Rash)  No Known Drug Allergies    Intolerances

## 2018-12-23 NOTE — DISCHARGE NOTE ADULT - PATIENT PORTAL LINK FT
You can access the [facility] Portal, offered by [facility], by registering with the following website: [email or web address]

## 2018-12-24 NOTE — PROGRESS NOTE ADULT - ASSESSMENT
Pt is an 62 yo F w/ PMHx of glioblastoma multiforme s/p cyberknife+RT+CT/+Craniotomy w/ residual left sided weakness, HTN, HLD, TIA, and osteoarthritis who presented with worsening of left-sided weakness for 1 week.     # Left-sided weakness likely 2/2 GBM  - likely due to cerebral edema secondary to glioblastoma multiforme given continued weakness despite resolution of potassium and calcium   - improving now s/p steroids   - CT head shows no new acute abnormality, hemorrhage, midline shift or mass.   - MRI brain as noted above  - Neurosurgery recommendations appreciated, no acute interventions  - Switch to PO Steroids 2mg Q12H  - Continue to monitor CMP  - Continue PO Keppra 500mg am and 1000mg bedtime   - Continue multivitamins   - Continue xanax 0.25mg for agitation   - PT evaluation: needs assistance to ambulate with rolling walker  - Follow fall and Seizure precautions  - Neuro checks q4hrs  - Continue to monitor for acute change in status    # Glioblastoma Multiforme  - Palliative family meeting yesterday  - Continue Keppra 500mg am and 1000mg bedtime   - On Bevacizumab q2 weeks, last dose 12/17  - Continue multivitamins   - Continue xanax 0.25mg for agitation   - Follow fall and Seizure precautions  - Neuro checks q4hrs   - Family meeting with Aleyda Benites yesterday  - Proxy: Scotty, brother  - DNR/DNI signed  - Limited medical interventions, antibiotics case dependent, continue to come to hospital for acute issues  - HCP wants to discuss with patients own Oncologist regarding further Chemotherapy and then decide Home VS Home Hospice VS Rehab    # Hypokalemia - Resolved  - Potassium stable s/p KCl, now 4.4  - Possibly dietary d/t poor po intake and medication use (HCTZ)  - Hold HCTZ  - Check AM BMP    # Hypercalcemia - Resolved  - Calcium stable now, 8.6  - Alb 2.6    # Thrombocytopenia  - Likely secondary to chemotherapy, Bevacizumab  - Last dose was 12/17, pt receives it q2 weeks  - Check AM CBC    # HTN - Controlled  - Currently controlled   - Continue Toprol ER 50mg BID  - Continue Amlodipine 5mg   - Continue Losartan 50mg   - Hold HCTZ    - Continue to monitor blood pressure     # HLD - Stable  - Lipid panel: Total Cholesterol 163, , HDL 35,   - Continue Lipitor 40mg    # Osteoarthritis  - Tylenol 650mg PRN q6h for pain    # DVT PPx  - Heparin 5000 units SQ held due to thrombocytopenia  - Venodynes

## 2018-12-24 NOTE — PROGRESS NOTE ADULT - SUBJECTIVE AND OBJECTIVE BOX
SUBJECTIVE:     Pt is an 62 yo F w/ PMHx of glioblastoma multiforme s/p cyberknife+RT+CT/+Craniotomy w/ residual left sided weakness, HTN, HLD, TIA, and osteoarthritis who presented with worsening of left leg weakness admitted for weakness.     12/24/2018: Pt seen and evaluated at bedside. Resting comfortably. VSS. No overnight events. Status Quo. Pt consistently asking if she's dying. She wants to find her "port" (stress ball). Weakness is the same as always. She denies any problems.    REVIEW OF SYSTEMS:    CONSTITUTIONAL: Weakness, NO fevers or chills  EYES/ENT: No visual changes;  No vertigo or throat pain   NECK: No pain or stiffness  RESPIRATORY: No cough, wheezing, hemoptysis; No shortness of breath  CARDIOVASCULAR: No chest pain or palpitations  GASTROINTESTINAL: No abdominal or epigastric pain. No nausea, vomiting, or hematemesis; No diarrhea or constipation. No melena or hematochezia.  GENITOURINARY: No dysuria, frequency or hematuria  NEUROLOGICAL: No numbness or weakness  SKIN: No itching, burning, rashes, or lesions   All other review of systems is negative unless indicated above    Vital Signs Last 24 Hrs  T(C): 36.8 (24 Dec 2018 05:00), Max: 36.8 (24 Dec 2018 05:00)  T(F): 98.3 (24 Dec 2018 05:00), Max: 98.3 (24 Dec 2018 05:00)  HR: 93 (24 Dec 2018 05:00) (86 - 93)  BP: 124/72 (24 Dec 2018 05:00) (124/72 - 139/75)  RR: 18 (24 Dec 2018 05:00) (18 - 18)  SpO2: 96% (24 Dec 2018 05:00) (96% - 96%)    I&O's Summary    23 Dec 2018 07:01  -  24 Dec 2018 07:00  --------------------------------------------------------  IN: 0 mL / OUT: 400 mL / NET: -400 mL        CAPILLARY BLOOD GLUCOSE      POCT Blood Glucose.: 107 mg/dL (24 Dec 2018 12:02)  POCT Blood Glucose.: 119 mg/dL (24 Dec 2018 08:40)  POCT Blood Glucose.: 134 mg/dL (23 Dec 2018 21:22)  POCT Blood Glucose.: 119 mg/dL (23 Dec 2018 16:41)      PHYSICAL EXAM:    Constitutional: NAD, awake and alert, obese, laying in bed  HEENT: PERR, EOMI, Normal Hearing, MMM  Neck: Soft and supple, No LAD, No JVD  Respiratory: Breath sounds are clear bilaterally, No wheezing, rales or rhonchi  Cardiovascular: S1 and S2, regular rate and rhythm, no Murmurs, gallops or rubs  Gastrointestinal: Bowel Sounds present, soft, nontender, nondistended, no guarding, no rebound  Extremities: No peripheral edema  Vascular: 2+ peripheral pulses  Neurological: A/O x 3, face sensation equal bilaterally, nasolabial droop on left side when smiling, eyebrows equal when raised, no dysarthria, right arm 4/5 strength, left arm 4/5 strength but much less than right arm, left lower extremity able to lift high off bed much stronger than 2 days ago, right lower extremity able to lift to 45% momentarily, sensation intact and equal bilaterally   Skin: No rashes      MEDICATIONS:  MEDICATIONS  (STANDING):  amLODIPine   Tablet 5 milliGRAM(s) Oral daily  aspirin  chewable 81 milliGRAM(s) Oral daily  atorvastatin 40 milliGRAM(s) Oral at bedtime  clotrimazole 2% Vaginal Cream 1 Applicatorful Vaginal at bedtime  dexamethasone  Injectable 2 milliGRAM(s) IV Push every 8 hours  dextrose 5%. 1000 milliLiter(s) (50 mL/Hr) IV Continuous <Continuous>  dextrose 50% Injectable 12.5 Gram(s) IV Push once  dextrose 50% Injectable 25 Gram(s) IV Push once  dextrose 50% Injectable 25 Gram(s) IV Push once  influenza   Vaccine 0.5 milliLiter(s) IntraMuscular once  insulin lispro (HumaLOG) corrective regimen sliding scale   SubCutaneous three times a day before meals  insulin lispro (HumaLOG) corrective regimen sliding scale   SubCutaneous at bedtime  levETIRAcetam 1000 milliGRAM(s) Oral at bedtime  levETIRAcetam 500 milliGRAM(s) Oral daily  losartan 50 milliGRAM(s) Oral daily  metoprolol succinate ER 50 milliGRAM(s) Oral two times a day  mineral oil enema 133 milliLiter(s) Rectal once  multivitamin 1 Tablet(s) Oral daily  pantoprazole  Injectable 40 milliGRAM(s) IV Push every 12 hours  QUEtiapine 12.5 milliGRAM(s) Oral at bedtime      LABS: All Labs Reviewed:               10.4   8.11  )-----------( 88       ( 22 Dec 2018 07:53 )             31.1     12-22    142  |  111<H>  |  16  ----------------------------<  128<H>  4.4   |  23  |  0.83    Ca    8.6      22 Dec 2018 07:53  Phos  2.1     12-22  Mg     2.2     12-22    TPro  6.2  /  Alb  2.4<L>  /  TBili  0.3  /  DBili  x   /  AST  23  /  ALT  34  /  AlkPhos  86  12-22          Blood Culture: 12-20 @ 20:19  Organism --  Gram Stain Blood -- Gram Stain --  Specimen Source .Urine None  Culture-Blood --    12-20 @ 16:20  Organism --  Gram Stain Blood -- Gram Stain --  Specimen Source .Blood None  Culture-Blood --    < from: MR Head w/wo IV Cont (12.21.18 @ 22:37) >  FINDINGS:   Limited by motion. There are persistent postoperative changes right   pterional craniotomy with residual blood products. There is heterogeneous   infiltrative disease and with irregular thickened enhancement in the   right frontal and temporoparietal lobes which overall is similar to the   previous exam, the intensity of the enhancement is marginally diminished.   There is persistent enhancement at the genu of the corpus callosum. There   is associated nonenhancing T2 signal abnormality and vasogenic edema   which is also overall stable. The size and appearance of the ventricles   and cortical sulci are stable. There is no midline shift or acute   hydrocephalus.Small focus of gliosis in the subcortical white left   posterior  parietal lobe, without enhancement unchanged as compared to previous exam.  There is no evidence of acute infarct, or hemorrhage.  Major intra-arterial flow voids are unremarkable.      IMPRESSION:   Persistent infiltrative disease in the right frontal and temporoparietal   lobes with extensive irregular thickened nodular enhancement and T2   signal abnormality. As compared to previous exam, postsurgical cavity   appears to be a bit more retracted and surrounding enhancement is   marginally less intense however, otherwise the extent of the disease   remains unchanged. No midline shift or hydrocephalus.  DISPOSITION:

## 2018-12-25 NOTE — PROGRESS NOTE ADULT - ASSESSMENT
Pt is an 62 yo F w/ PMHx of glioblastoma multiforme s/p cyberknife+RT+CT/+Craniotomy w/ residual left sided weakness, HTN, HLD, TIA, and osteoarthritis who presented with worsening of left-sided weakness for 1 week.     #Left-sided weakness  - likely due to cerebral edema secondary to glioblastoma multiforme given continued weakness despite resolution of potassium and calcium   - improving now s/p IV steroids  - CT head shows no new acute abnormality, hemorrhage, midline shift or mass.   - MRI brain: persistent infiltrative disease in right frontal and temporoparietal lobes, post surgical cavity a bit more retracted, extent of disease remains unchanged, no midline shift or hydrocephalus  - neurosurgery: change IV steroids to dexamethasone 2 mg PO q12h and continue protonix  - Continue Keppra 500mg am and 1000mg bedtime   - Continue multivitamins   - Continue xanax 0.25mg for agitation   - PT evaluation: needs assistance to ambulate with rolling walker  - Follow fall and Seizure precautions  - Neuro checks q4hrs  - Continue to monitor for acute change in status  - Neurosurgery recs appreciated    #Glioblastoma Multiforme  - Continue Keppra 500mg am and 1000mg bedtime   - On Bevacizumab q2 weeks, last dose 12/17  - Continue multivitamins   - Continue xanax 0.25mg for agitation   - Follow fall and Seizure precautions  - Neuro checks q4hrs  - Family meeting with Aleyda Benites earlier today  - Proxy: Scotty, brother  - DNR/DNI  - limited medical interventions, antibiotics case dependent, continue to come to hospital for acute issues  - Brother to decide if they want to continue chemotherapy and rehab vs hospice     #Hypokalemia  - resolve and stable, now 4.0  - possibly dietary d/t poor po intake and medication use (HCTZ)  - Hold HCTZ  - no need to continue to monitor    #Hypercalcemia  - resolved and stable now, 8.2  - no need to continue to monitor    #Thrombocytopenia  - likely secondary to chemotherapy, Bevacizumab  - last dose was 12/17, pt receives it q2 weeks  - continue to monitor    #HTN  - Currently controlled   - Continue Toprol ER 50mg BID  - Continue Amlodipine 5mg   - Continue Losartan 50mg   - Hold HCTZ    - Continue to monitor blood pressure     #HLD  - Lipid panel: Total Cholesterol 163, , HDL 35,   - Continue Lipitor 40mg    #Osteoarthritis  - Tylenol 650mg PRN q6h for pain    #DVT PPx  - heparin 5000 units SQ held due to thrombocytopenia  - venodynes

## 2018-12-25 NOTE — PROGRESS NOTE ADULT - SUBJECTIVE AND OBJECTIVE BOX
Pt is an 62 yo F w/ PMHx of glioblastoma multiforme s/p cyberknife+RT+CT/+Craniotomy w/ residual left sided weakness, HTN, HLD, TIA, and osteoarthritis who presented with worsening of left leg weakness admitted for weakness.     SUBJECTIVE:   Pt feeling well today. She reports she is back to her normal strength. She denies any complaints.     Attempted to call brother, Scotty, but phone kept breaking. Unable to have a meaningful conversation.     REVIEW OF SYSTEMS, however pt is an unreliable historian:  CONSTITUTIONAL: No weakness, fevers or chills  EYES/ENT: No visual changes;  No vertigo or throat pain   NECK: No pain or stiffness  RESPIRATORY: No cough, wheezing, hemoptysis; No shortness of breath  CARDIOVASCULAR: No chest pain or palpitations  GASTROINTESTINAL: No abdominal or epigastric pain. No nausea, vomiting, or hematemesis; No diarrhea or constipation. No melena or hematochezia.  GENITOURINARY: No dysuria, frequency or hematuria  NEUROLOGICAL: No new numbness or weakness  SKIN: No itching, burning, rashes, or lesions   All other review of systems is negative unless indicated above    Vital Signs Last 24 Hrs  T(C): 36.6 (25 Dec 2018 05:23), Max: 36.8 (24 Dec 2018 20:11)  T(F): 97.9 (25 Dec 2018 05:23), Max: 98.2 (24 Dec 2018 20:11)  HR: 68 (25 Dec 2018 05:23) (68 - 80)  BP: 137/56 (25 Dec 2018 05:23) (137/56 - 145/81)  BP(mean): --  RR: 19 (25 Dec 2018 05:23) (17 - 19)  SpO2: 98% (25 Dec 2018 05:23) (96% - 98%)    I&O's Summary    24 Dec 2018 07:01  -  25 Dec 2018 07:00  --------------------------------------------------------  IN: 0 mL / OUT: 150 mL / NET: -150 mL        CAPILLARY BLOOD GLUCOSE      POCT Blood Glucose.: 106 mg/dL (25 Dec 2018 12:32)  POCT Blood Glucose.: 105 mg/dL (25 Dec 2018 08:09)  POCT Blood Glucose.: 117 mg/dL (24 Dec 2018 21:10)  POCT Blood Glucose.: 130 mg/dL (24 Dec 2018 16:33)    PHYSICAL EXAM:  Constitutional: NAD, awake and alert, obese, laying in bed  HEENT: PERR, EOMI, Normal Hearing, MMM  Neck: Soft and supple, No LAD, No JVD  Respiratory: Breath sounds are clear bilaterally, No wheezing, rales or rhonchi  Cardiovascular: S1 and S2, regular rate and rhythm, no Murmurs, gallops or rubs  Gastrointestinal: Bowel Sounds present, soft, nontender, nondistended, no guarding, no rebound  Extremities: No peripheral edema  Vascular: 2+ peripheral pulses  Neurological: A/O x 3, face sensation equal bilaterally, nasolabial droop on left side when smiling, eyebrows equal when raised, no dysarthria, right arm 4/5 strength, left arm 4/5 strength but much less than right arm, left lower extremity able to lift high off bed, right lower extremity able to lift to 45% momentarily, sensation intact and equal bilaterally   Skin: No rashes    MEDICATIONS:  MEDICATIONS  (STANDING):  amLODIPine   Tablet 5 milliGRAM(s) Oral daily  aspirin  chewable 81 milliGRAM(s) Oral daily  atorvastatin 40 milliGRAM(s) Oral at bedtime  dexamethasone  Injectable 2 milliGRAM(s) IV Push every 8 hours  dextrose 5%. 1000 milliLiter(s) (50 mL/Hr) IV Continuous <Continuous>  dextrose 50% Injectable 12.5 Gram(s) IV Push once  dextrose 50% Injectable 25 Gram(s) IV Push once  dextrose 50% Injectable 25 Gram(s) IV Push once  influenza   Vaccine 0.5 milliLiter(s) IntraMuscular once  insulin lispro (HumaLOG) corrective regimen sliding scale   SubCutaneous three times a day before meals  insulin lispro (HumaLOG) corrective regimen sliding scale   SubCutaneous at bedtime  levETIRAcetam 1000 milliGRAM(s) Oral at bedtime  levETIRAcetam 500 milliGRAM(s) Oral daily  losartan 50 milliGRAM(s) Oral daily  metoprolol succinate ER 50 milliGRAM(s) Oral two times a day  mineral oil enema 133 milliLiter(s) Rectal once  multivitamin 1 Tablet(s) Oral daily  pantoprazole  Injectable 40 milliGRAM(s) IV Push every 12 hours  QUEtiapine 12.5 milliGRAM(s) Oral at bedtime      LABS: All Labs Reviewed:                        10.9   15.28 )-----------( 79       ( 25 Dec 2018 05:31 )             32.4     12-25    142  |  111<H>  |  31<H>  ----------------------------<  121<H>  4.0   |  24  |  0.81    Ca    8.2<L>      25 Dec 2018 05:31            Blood Culture: 12-20 @ 20:19  Organism --  Gram Stain Blood -- Gram Stain --  Specimen Source .Urine None  Culture-Blood --    12-20 @ 16:20  Organism --  Gram Stain Blood -- Gram Stain --  Specimen Source .Blood None  Culture-Blood --

## 2018-12-26 NOTE — PROGRESS NOTE ADULT - ASSESSMENT
Pt is an 64 yo F w/ PMHx of glioblastoma multiforme s/p cyberknife+RT+CT/+Craniotomy w/ residual left sided weakness, HTN, HLD, TIA, and osteoarthritis who presented with worsening of left-sided weakness for 1 week now with improvement and stable for rehab.     #Left-sided weakness  - likely due to cerebral edema secondary to glioblastoma multiforme given continued weakness despite resolution of potassium and calcium   - improving now s/p IV steroids  - CT head shows no new acute abnormality, hemorrhage, midline shift or mass.   - MRI brain: persistent infiltrative disease in right frontal and temporoparietal lobes, post surgical cavity a bit more retracted, extent of disease remains unchanged, no midline shift or hydrocephalus  - neurosurgery: change IV steroids to dexamethasone 2 mg PO q12h and continue protonix  - Continue Keppra 500mg am and 1000mg bedtime   - Continue multivitamins   - Continue xanax 0.25mg for agitation   - PT evaluation: needs assistance to ambulate with rolling walker  - Follow fall and Seizure precautions  - Neuro checks q4hrs while inpatient  - Continue to monitor for acute change in status while inpatient  - Neurosurgery recs appreciated    #Glioblastoma Multiforme  - Continue Keppra 500mg am and 1000mg bedtime   - On Bevacizumab q2 weeks, last dose 12/17  - Continue multivitamins   - Continue xanax 0.25mg for agitation   - Follow fall and Seizure precautions  - Neuro checks q4hrs  - Family meeting was 11/23  - Proxy: Scotty, brother  - DNR/DNI  - limited medical interventions, antibiotics case dependent, continue to come to hospital for acute issues  - Brother to decide if they want to continue chemotherapy and rehab vs hospice, for now will continue with rehab +/- chemo and brother will continue to think about hospice while pt is at rehab, brother would like [facility] as pt has been there before and liked it there    #Thrombocytopenia  - likely secondary to chemotherapy, Bevacizumab  - last dose was 12/17, pt receives it q2 weeks  - continue to monitor    #HTN  - Currently controlled   - Continue Toprol ER 50mg BID  - Continue Amlodipine 5mg   - Continue Losartan 50mg   - Hold HCTZ    - Continue to monitor blood pressure     #HLD  - Lipid panel: Total Cholesterol 163, , HDL 35,   - Continue Lipitor 40mg    #Osteoarthritis  - Tylenol 650mg PRN q6h for pain    #DVT PPx  - heparin 5000 units SQ held due to thrombocytopenia  - venodynes

## 2018-12-26 NOTE — PROGRESS NOTE ADULT - SUBJECTIVE AND OBJECTIVE BOX
Pt is an 64 yo F w/ PMHx of glioblastoma multiforme s/p cyberknife+RT+CT/+Craniotomy w/ residual left sided weakness, HTN, HLD, TIA, and osteoarthritis who presented with worsening of left leg weakness now with improved strength.     SUBJECTIVE:  Pt worried about if she's doing better or not. She is excited about being well enough to go to rehab. She says the weakness in her leg is good now. She is unsure about the weakness in her arms. She has no other complaints.      REVIEW OF SYSTEMS:  CONSTITUTIONAL: No weakness, fevers or chills  EYES/ENT: No visual changes;  No vertigo or throat pain   NECK: No pain or stiffness  RESPIRATORY: No cough, wheezing, hemoptysis; No shortness of breath  CARDIOVASCULAR: No chest pain or palpitations  GASTROINTESTINAL: No abdominal or epigastric pain. No nausea, vomiting, or hematemesis; No diarrhea or constipation. No melena or hematochezia.  GENITOURINARY: No dysuria, frequency or hematuria  NEUROLOGICAL: No numbness or weakness  SKIN: No itching, burning, rashes, or lesions   All other review of systems is negative unless indicated above    Vital Signs Last 24 Hrs  T(C): 36.4 (26 Dec 2018 05:36), Max: 36.9 (25 Dec 2018 13:41)  T(F): 97.6 (26 Dec 2018 05:36), Max: 98.5 (25 Dec 2018 13:41)  HR: 70 (26 Dec 2018 05:36) (70 - 82)  BP: 144/72 (26 Dec 2018 05:36) (133/77 - 155/89)  BP(mean): --  RR: 18 (26 Dec 2018 05:36) (18 - 18)  SpO2: 97% (26 Dec 2018 05:36) (97% - 100%)    I&O's Summary      CAPILLARY BLOOD GLUCOSE      POCT Blood Glucose.: 98 mg/dL (26 Dec 2018 08:17)  POCT Blood Glucose.: 80 mg/dL (25 Dec 2018 21:05)  POCT Blood Glucose.: 107 mg/dL (25 Dec 2018 17:14)  POCT Blood Glucose.: 106 mg/dL (25 Dec 2018 12:32)    PHYSICAL EXAM:  Constitutional: NAD, awake and alert, obese, laying in bed  HEENT: PERR, EOMI, Normal Hearing, MMM  Neck: Soft and supple, No LAD, No JVD  Respiratory: Breath sounds are clear bilaterally, No wheezing, rales or rhonchi  Cardiovascular: S1 and S2, regular rate and rhythm, no Murmurs, gallops or rubs  Gastrointestinal: Bowel Sounds present, soft, nontender, nondistended, no guarding, no rebound  Extremities: No peripheral edema  Vascular: 2+ peripheral pulses  Neurological: A/O x 3, nasolabial droop on left side when smiling, eyebrows equal when raised, no dysarthria, right arm 4/5 strength, left arm 4/5 strength but much less than right arm, left lower extremity able to lift high off bed, right lower extremity able to lift to 45% and hold for a few seconds improved from yesterday, sensation intact bilaterally   Skin: No rashes    MEDICATIONS:  MEDICATIONS  (STANDING):  amLODIPine   Tablet 5 milliGRAM(s) Oral daily  aspirin  chewable 81 milliGRAM(s) Oral daily  atorvastatin 40 milliGRAM(s) Oral at bedtime  dexamethasone     Tablet 2 milliGRAM(s) Oral every 12 hours  dextrose 5%. 1000 milliLiter(s) (50 mL/Hr) IV Continuous <Continuous>  dextrose 50% Injectable 12.5 Gram(s) IV Push once  dextrose 50% Injectable 25 Gram(s) IV Push once  dextrose 50% Injectable 25 Gram(s) IV Push once  influenza   Vaccine 0.5 milliLiter(s) IntraMuscular once  insulin lispro (HumaLOG) corrective regimen sliding scale   SubCutaneous three times a day before meals  insulin lispro (HumaLOG) corrective regimen sliding scale   SubCutaneous at bedtime  levETIRAcetam 1000 milliGRAM(s) Oral at bedtime  levETIRAcetam 500 milliGRAM(s) Oral daily  losartan 50 milliGRAM(s) Oral daily  metoprolol succinate ER 50 milliGRAM(s) Oral two times a day  mineral oil enema 133 milliLiter(s) Rectal once  multivitamin 1 Tablet(s) Oral daily  pantoprazole  Injectable 40 milliGRAM(s) IV Push every 12 hours  QUEtiapine 12.5 milliGRAM(s) Oral at bedtime      LABS: All Labs Reviewed:                        10.9   15.28 )-----------( 79       ( 25 Dec 2018 05:31 )             32.4     12-25    142  |  111<H>  |  31<H>  ----------------------------<  121<H>  4.0   |  24  |  0.81    Ca    8.2<L>      25 Dec 2018 05:31 Pt is an 62 yo F w/ PMHx of glioblastoma multiforme s/p cyberknife+RT+CT/+Craniotomy w/ residual left sided weakness, HTN, HLD, TIA, and osteoarthritis who presented with worsening of left leg weakness now with improved strength.     SUBJECTIVE:  Pt worried about if she's doing better or not. She is excited about being well enough to go to rehab. She says the weakness in her leg is good now. She is unsure about the weakness in her arms. She has no other complaints.      REVIEW OF SYSTEMS  EYES/ENT: No visual changes;  No vertigo or throat pain   NECK: No pain or stiffness  RESPIRATORY: No cough, wheezing, hemoptysis; No shortness of breath  CARDIOVASCULAR: No chest pain or palpitations  GASTROINTESTINAL: No abdominal or epigastric pain. No nausea, vomiting, or hematemesis; No diarrhea or constipation. No melena or hematochezia.  GENITOURINARY: No dysuria, frequency or hematuria  SKIN: No itching, burning, rashes, or lesions   All other review of systems is negative unless indicated above    Vital Signs Last 24 Hrs  T(C): 36.4 (26 Dec 2018 05:36), Max: 36.9 (25 Dec 2018 13:41)  T(F): 97.6 (26 Dec 2018 05:36), Max: 98.5 (25 Dec 2018 13:41)  HR: 70 (26 Dec 2018 05:36) (70 - 82)  BP: 144/72 (26 Dec 2018 05:36) (133/77 - 155/89)  BP(mean): --  RR: 18 (26 Dec 2018 05:36) (18 - 18)  SpO2: 97% (26 Dec 2018 05:36) (97% - 100%)    I&O's Summary      CAPILLARY BLOOD GLUCOSE      POCT Blood Glucose.: 98 mg/dL (26 Dec 2018 08:17)  POCT Blood Glucose.: 80 mg/dL (25 Dec 2018 21:05)  POCT Blood Glucose.: 107 mg/dL (25 Dec 2018 17:14)  POCT Blood Glucose.: 106 mg/dL (25 Dec 2018 12:32)    PHYSICAL EXAM:  Constitutional: NAD, awake and alert, obese, laying in bed  HEENT: PERR, EOMI, Normal Hearing, MMM  Neck: Soft and supple, No LAD, No JVD  Respiratory: Breath sounds are clear bilaterally, No wheezing, rales or rhonchi  Cardiovascular: S1 and S2, regular rate and rhythm, no Murmurs, gallops or rubs  Gastrointestinal: Bowel Sounds present, soft, nontender, nondistended, no guarding, no rebound  Extremities: No peripheral edema  Vascular: 2+ peripheral pulses  Neurological: A/O x 3, nasolabial droop on left side when smiling, eyebrows equal when raised, no dysarthria, right arm 4/5 strength, left arm 4/5 strength but much less than right arm, left lower extremity able to lift high off bed, right lower extremity able to lift to 45% and hold for a few seconds improved from yesterday, sensation intact bilaterally   Skin: No rashes    MEDICATIONS:  MEDICATIONS  (STANDING):  amLODIPine   Tablet 5 milliGRAM(s) Oral daily  aspirin  chewable 81 milliGRAM(s) Oral daily  atorvastatin 40 milliGRAM(s) Oral at bedtime  dexamethasone     Tablet 2 milliGRAM(s) Oral every 12 hours  dextrose 5%. 1000 milliLiter(s) (50 mL/Hr) IV Continuous <Continuous>  dextrose 50% Injectable 12.5 Gram(s) IV Push once  dextrose 50% Injectable 25 Gram(s) IV Push once  dextrose 50% Injectable 25 Gram(s) IV Push once  influenza   Vaccine 0.5 milliLiter(s) IntraMuscular once  insulin lispro (HumaLOG) corrective regimen sliding scale   SubCutaneous three times a day before meals  insulin lispro (HumaLOG) corrective regimen sliding scale   SubCutaneous at bedtime  levETIRAcetam 1000 milliGRAM(s) Oral at bedtime  levETIRAcetam 500 milliGRAM(s) Oral daily  losartan 50 milliGRAM(s) Oral daily  metoprolol succinate ER 50 milliGRAM(s) Oral two times a day  mineral oil enema 133 milliLiter(s) Rectal once  multivitamin 1 Tablet(s) Oral daily  pantoprazole  Injectable 40 milliGRAM(s) IV Push every 12 hours  QUEtiapine 12.5 milliGRAM(s) Oral at bedtime      LABS: All Labs Reviewed:                        10.9   15.28 )-----------( 79       ( 25 Dec 2018 05:31 )             32.4     12-25    142  |  111<H>  |  31<H>  ----------------------------<  121<H>  4.0   |  24  |  0.81    Ca    8.2<L>      25 Dec 2018 05:31

## 2018-12-26 NOTE — PROGRESS NOTE ADULT - SUBJECTIVE AND OBJECTIVE BOX
HPI: Ms. Reno is a 63y old Female coming from home with hx of HLD, HTN, HSV infection of the face, GBM (dx 2017), Chronic Left sided weakness 2/2 this, s/p cyberknife and craniotomy, TIA, Osteoarthritis of b/l knee, 3rd admission this year, last 8/18 for CVA, now admitted 12/20 for complaint of not being able to walk x few days and poor po intake. She was able to ambulate at her baseline before then. Found here to have hypokalemia, CTH with no acute changes from previous imaging, unchanged focal areas of hypoattenuation seen (awaiting MRI for further workup), and normal CXR. Palliative Care consulted to assist with establishing GOC.  12/22/18 Seen and examined at bedside for follow up symptom management. Pt denies pain or dyspnea. Appears extremely anxious and disoriented this morning.   12/23/18 Seen and examined at bedside with brother Scotty present. Pt is anxious and disoriented this morning. Denies pain or dyspnea  12/26/18 Seen and examined at bedside. OOB/chair this am. Still anxious however appears somewhat calmer  PAIN: ( )Yes   (x )No  DYSPNEA: ( ) Yes  (x ) No    PAST MEDICAL & SURGICAL HISTORY:  HSV (herpes simplex virus) infection: to lip and chin  Obesity  Osteoarthritis of both knees, unspecified osteoarthritis type  Glioblastoma multiforme  High cholesterol  Transient cerebral ischemia, unspecified type: 2015  Essential hypertension  H/O craniotomy: 8/2017      SOCIAL HX: Lives at home with significant other with aide support    Hx opiate tolerance ( )YES  ( x)NO    Baseline ADLs  (Prior to Admission)- needs assistance with all ADLs, has two 12-hr aides   ( ) Independent   (x )Dependent    FAMILY HISTORY:  Family history of prostate cancer in father (Father)  Brother Prostate Ca    Review of Systems:    Anxiety- yes  Depression- yes, as above  Constipation- at times, Miralax helpful for this  Diarrhea- denies  Nausea- denies  Vomiting- denies  Anorexia- yes  Weight Loss- unsure   Cough- no  Secretions- no  Fatigue- yes  Weakness- yes, especially on left  Delirium- yes    All other systems reviewed and negative      PHYSICAL EXAM:  ICU Vital Signs Last 24 Hrs  T(C): 36.5 (26 Dec 2018 13:01), Max: 36.9 (25 Dec 2018 13:41)  T(F): 97.7 (26 Dec 2018 13:01), Max: 98.5 (25 Dec 2018 13:41)  HR: 83 (26 Dec 2018 13:01) (70 - 83)  BP: 146/68 (26 Dec 2018 13:01) (133/77 - 155/89)  BP(mean): --  ABP: --  ABP(mean): --  RR: 18 (26 Dec 2018 13:01) (18 - 18)  SpO2: 95% (26 Dec 2018 13:01) (95% - 100%)    PPSV2: 30  %  General: Older female sitting up in bed, anxious and disoriented  Mental Status: AOx2 disoriented as to why she is in the hospital.  HEENT: mmm, perrl, eomi, L facial droop, some temporal wasting  Lungs: clear  Cardiac: +s1 s2 rrr  GI: soft nt nd +bs  : voids  Ext: moves all extremities, though less robustly on left, some pedal edema  Neuro: left hemiparesis, some word-finding difficulty and occasional L-sided neglect      LABS:                           11.6   16.72 )-----------( 68       ( 26 Dec 2018 09:10 )             35.0                          12-25    142  |  111<H>  |  31<H>  ----------------------------<  121<H>  4.0   |  24  |  0.81    Ca    8.2<L>      25 Dec 2018 05:31      Allergies    MSG (Rash)  No Known Drug Allergies    Intolerances

## 2018-12-26 NOTE — PROGRESS NOTE ADULT - ASSESSMENT
63y old Female coming from home with hx of HLD, HTN, HSV infection of the face, GBM (dx 2017), Chronic Left sided weakness 2/2 this, s/p cyberknife and craniotomy, TIA, Osteoarthritis of b/l knee, 3rd admission this year, last 8/18 for CVA, now admitted 12/20 for complaint of not being able to walk x few days and poor po intake. She was able to ambulate at her baseline before then. Found here to have hypokalemia, CTH with no acute changes from previous imaging, unchanged focal areas of hypoattenuation seen (awaiting MRI for further workup), and normal CXR. Palliative Care consulted to assist with establishing GOC.    1) AMS  - hx of GBM  - possible component of metabolic encephalopathy as well due to electrolyte abn  - seems to fluctuate  - frequent reorientation and fall precautions      2) Anxiety  - cont xanax 0.25 mg q6h prn  received 2 doses of 0.25 mg over the past 24 hrs  - however if delirious would avoid benzos as this can exacerbate delirium, prefer use of seroquel if able to take po or IV haldol low dose 0.5 mg if needed  -cont Seroquel 12 mg PO HS   -add Seroquel 12 mg PO in am    3) Weakness/GBM  - neurosurgery notes appreciated  - imaging appreciated  - CTH showing known focal areas of hypoattenuation, no acute abnormalities  - neurosurg  spoke with family re: MRI results  -no disease progression  - fall precautions  - c/w keppra    4) Debility/Malnutrition  - PPS<40%  - need for assistance with all ADLs  - PT note appreciated- recs TBD, home with PT vs. [facility]  - dietary notes appreciated- more information needed for full assessment noted (pt poor historian and family not present during this evaluation)  -brother Scotty HCP requests [facility] if PT recommends    5) Prognosis  - guarded  - awaiting follow up scans and neurosurgery impression for more clarity on this. However, it is likely poor given GBM and debility, PPS<40%, need for 24h aid support at baseline, and poor intake with high risk for malnutrition noted    6) GOC/Advanced Directives  - pt's capacity waxes and wanes  - HCP on file naming her brother : Scotty Reno [phone]  - DNR/DNI on [facility] completed  - GOC meeting 12/23 at 11am  -Discussed [facility] vs home vs inpatient hospice  -See GOC discussion  -Transition to [facility] when medically stable

## 2018-12-26 NOTE — CHART NOTE - NSCHARTNOTEFT_GEN_A_CORE
Assessment:     Previous Assessment   Pt is at high risk for malnutrition 2/2 brain CA & possible decreased PO intake, currently unable to meet criteria with given information. Will f/u to re-evaluate for malnutrition. Recommendations: 1) continue DASH diet 2) add gelatein 1 daily 3)weekly      Diet Prescription: Diet, DASH/TLC:   Sodium & Cholesterol Restricted  Prosource Gelatein Plus     Qty per Day:  1 (12-21-18 @ 15:32)    · Weight Used for Calculation  adjusted  73kg (IBW for protein)    Estimated Energy Needs (25-30 calories/kg):  · Weight  (lbs)  160.9 lb  · Weight (kg)  73 kg  · From (25cal/kg)  1825  · To (30cal/kg)  2190    Other Calculation:  · Other Calculation  Ht: 5'5"  Wt: 117Kg    BMI: 43.3   IBW: 58Kg    Pt is at: 204% IBW    Estimated Protein Needs (1.4-1.6 g/kg):  · Weight  (lbs)  127.8  · Weight (kg)  58 kg  · From (1.4 g/kg)  81.2  · To (1.6 g/kg)  92.8    Estimated Fluid Needs (25-30 ml/kg):  · Weight  (lbs)  160.9  · Weight (kg)  73  · From (25 ml/kg)  1825  · To (30 ml/kg)  2190      Wt Hx:  Height (cm): 165.1 (12-20-18 @ 15:19), 162.6 (12-03-18 @ 13:04)  Weight (kg): 117.597989178 (12-20-18 @ 15:19), 111.13 (12-03-18 @ 13:04)  BMI (kg/m2): 43.2 (12-20-18 @ 15:19), 42 (12-03-18 @ 13:04)    Pertinent Medications: MEDICATIONS  (STANDING):  amLODIPine   Tablet 5 milliGRAM(s) Oral daily  aspirin  chewable 81 milliGRAM(s) Oral daily  atorvastatin 40 milliGRAM(s) Oral at bedtime  dexamethasone     Tablet 2 milliGRAM(s) Oral every 12 hours  dextrose 5%. 1000 milliLiter(s) (50 mL/Hr) IV Continuous <Continuous>  dextrose 50% Injectable 12.5 Gram(s) IV Push once  dextrose 50% Injectable 25 Gram(s) IV Push once  dextrose 50% Injectable 25 Gram(s) IV Push once  influenza   Vaccine 0.5 milliLiter(s) IntraMuscular once  insulin lispro (HumaLOG) corrective regimen sliding scale   SubCutaneous three times a day before meals  insulin lispro (HumaLOG) corrective regimen sliding scale   SubCutaneous at bedtime  levETIRAcetam 1000 milliGRAM(s) Oral at bedtime  levETIRAcetam 500 milliGRAM(s) Oral daily  losartan 50 milliGRAM(s) Oral daily  metoprolol succinate ER 50 milliGRAM(s) Oral two times a day  mineral oil enema 133 milliLiter(s) Rectal once  multivitamin 1 Tablet(s) Oral daily  pantoprazole  Injectable 40 milliGRAM(s) IV Push every 12 hours  QUEtiapine 12.5 milliGRAM(s) Oral at bedtime    MEDICATIONS  (PRN):  acetaminophen   Tablet .. 650 milliGRAM(s) Oral every 6 hours PRN Mild Pain (1 - 3), Moderate Pain (4 - 6), Severe Pain (7 - 10)  ALPRAZolam 0.25 milliGRAM(s) Oral every 6 hours PRN anxiety or agitation  dextrose 40% Gel 15 Gram(s) Oral once PRN Blood Glucose LESS THAN 70 milliGRAM(s)/deciliter  glucagon  Injectable 1 milliGRAM(s) IntraMuscular once PRN Glucose LESS THAN 70 milligrams/deciliter  LORazepam   Injectable 0.5 milliGRAM(s) IV Push every 4 hours PRN Anxiety  polyethylene glycol 3350 17 Gram(s) Oral daily PRN Constipation  QUEtiapine 12.5 milliGRAM(s) Oral daily PRN anxiety    Pertinent Labs: 12-25 Na142 mmol/L Glu 121 mg/dL<H> K+ 4.0 mmol/L Cr  0.81 mg/dL BUN 31 mg/dL<H> 12-22 Phos 2.1 mg/dL<L> 12-22 Alb 2.4 g/dL<L> 12-21 [id number] 5.5 % 12-21 Chol 163 mg/dL  mg/dL HDL 35 mg/dL<L> Trig 134 mg/dL     CAPILLARY BLOOD GLUCOSE      POCT Blood Glucose.: 99 mg/dL (26 Dec 2018 11:19)  POCT Blood Glucose.: 98 mg/dL (26 Dec 2018 08:17)  POCT Blood Glucose.: 80 mg/dL (25 Dec 2018 21:05)  POCT Blood Glucose.: 107 mg/dL (25 Dec 2018 17:14)  POCT Blood Glucose.: 106 mg/dL (25 Dec 2018 12:32)      Skin: eric score =       Estimated Needs:   [ ] no change since previous assessment        Nutrition Diagnosis is [ ] ongoing  [ ] resolved [ ] not applicable       Previous Goal is [ ] met [ ] partially met [ ] not met [ ] no longer applicable [ ] improving    New Nutrition Diagnosis: [ ] not applicable     Recommendations:      Monitoring and Evaluation:   [x] PO intake/Nutr support infusion [ x ] Tolerance to Nutr [ x ] weights [ x ] labs[ x ] follow up per protocol  [ ] other: Assessment:   On follow up and conversation with patient  Pt meets criteria for moderate protein-calorie malnutrition in context of chronic disease   Pt shows signs of moderate/severe  muscle wasting (interosseus), moderate muscle wasting ( temples)  mild muscle wasting (scapula, clavicles)  Pt reports that she has not been eating as well as she used to  and reports that she believes she's lost 20 pounds, timeline unknown  Given her physical condition, signs of muscle wasting, reports of weakness,   Pt meets criteria for moderate protein-calorie weakness.    Previous Assessment   Pt is at high risk for malnutrition 2/2 brain CA & possible decreased PO intake, currently unable to meet criteria with given information. Will f/u to re-evaluate for malnutrition. Recommendations: 1) continue DASH diet 2) add gelatein 1 daily 3)weekly      Diet Prescription: Diet, DASH/TLC:   Sodium & Cholesterol Restricted  Prosource Gelatein Plus     Qty per Day:  1 (12-21-18 @ 15:32)    · Weight Used for Calculation  adjusted  73kg (IBW for protein)    Estimated Energy Needs (25-30 calories/kg):  · Weight  (lbs)  160.9 lb  · Weight (kg)  73 kg  · From (25cal/kg)  1825  · To (30cal/kg)  2190    Other Calculation:  · Other Calculation  Ht: 5'5"  Wt: 117Kg    BMI: 43.3   IBW: 58Kg    Pt is at: 204% IBW    Estimated Protein Needs (1.4-1.6 g/kg):  · Weight  (lbs)  127.8  · Weight (kg)  58 kg  · From (1.4 g/kg)  81.2  · To (1.6 g/kg)  92.8    Estimated Fluid Needs (25-30 ml/kg):  · Weight  (lbs)  160.9  · Weight (kg)  73  · From (25 ml/kg)  1825  · To (30 ml/kg)  2190      Wt Hx:  Height (cm): 165.1 (12-20-18 @ 15:19), 162.6 (12-03-18 @ 13:04)  Weight (kg): 117.905802442 (12-20-18 @ 15:19), 111.13 (12-03-18 @ 13:04)  BMI (kg/m2): 43.2 (12-20-18 @ 15:19), 42 (12-03-18 @ 13:04)    Pertinent Medications: MEDICATIONS  (STANDING):  amLODIPine   Tablet 5 milliGRAM(s) Oral daily  aspirin  chewable 81 milliGRAM(s) Oral daily  atorvastatin 40 milliGRAM(s) Oral at bedtime  dexamethasone     Tablet 2 milliGRAM(s) Oral every 12 hours  dextrose 5%. 1000 milliLiter(s) (50 mL/Hr) IV Continuous <Continuous>  dextrose 50% Injectable 12.5 Gram(s) IV Push once  dextrose 50% Injectable 25 Gram(s) IV Push once  dextrose 50% Injectable 25 Gram(s) IV Push once  influenza   Vaccine 0.5 milliLiter(s) IntraMuscular once  insulin lispro (HumaLOG) corrective regimen sliding scale   SubCutaneous three times a day before meals  insulin lispro (HumaLOG) corrective regimen sliding scale   SubCutaneous at bedtime  levETIRAcetam 1000 milliGRAM(s) Oral at bedtime  levETIRAcetam 500 milliGRAM(s) Oral daily  losartan 50 milliGRAM(s) Oral daily  metoprolol succinate ER 50 milliGRAM(s) Oral two times a day  mineral oil enema 133 milliLiter(s) Rectal once  multivitamin 1 Tablet(s) Oral daily  pantoprazole  Injectable 40 milliGRAM(s) IV Push every 12 hours  QUEtiapine 12.5 milliGRAM(s) Oral at bedtime    MEDICATIONS  (PRN):  acetaminophen   Tablet .. 650 milliGRAM(s) Oral every 6 hours PRN Mild Pain (1 - 3), Moderate Pain (4 - 6), Severe Pain (7 - 10)  ALPRAZolam 0.25 milliGRAM(s) Oral every 6 hours PRN anxiety or agitation  dextrose 40% Gel 15 Gram(s) Oral once PRN Blood Glucose LESS THAN 70 milliGRAM(s)/deciliter  glucagon  Injectable 1 milliGRAM(s) IntraMuscular once PRN Glucose LESS THAN 70 milligrams/deciliter  LORazepam   Injectable 0.5 milliGRAM(s) IV Push every 4 hours PRN Anxiety  polyethylene glycol 3350 17 Gram(s) Oral daily PRN Constipation  QUEtiapine 12.5 milliGRAM(s) Oral daily PRN anxiety    Pertinent Labs: 12-25 Na142 mmol/L Glu 121 mg/dL<H> K+ 4.0 mmol/L Cr  0.81 mg/dL BUN 31 mg/dL<H> 12-22 Phos 2.1 mg/dL<L> 12-22 Alb 2.4 g/dL<L> 12-21 [id number] 5.5 % 12-21 Chol 163 mg/dL  mg/dL HDL 35 mg/dL<L> Trig 134 mg/dL     CAPILLARY BLOOD GLUCOSE      POCT Blood Glucose.: 99 mg/dL (26 Dec 2018 11:19)  POCT Blood Glucose.: 98 mg/dL (26 Dec 2018 08:17)  POCT Blood Glucose.: 80 mg/dL (25 Dec 2018 21:05)  POCT Blood Glucose.: 107 mg/dL (25 Dec 2018 17:14)  POCT Blood Glucose.: 106 mg/dL (25 Dec 2018 12:32)      Skin: eric score =       Estimated Needs:   [ ] no change since previous assessment        Nutrition Diagnosis is [ ] ongoing  [ ] resolved [ ] not applicable       Previous Goal is [ ] met [ ] partially met [ ] not met [ ] no longer applicable [ ] improving    New Nutrition Diagnosis: [ ] not applicable     Recommendations:      Monitoring and Evaluation:   [x] PO intake/Nutr support infusion [ x ] Tolerance to Nutr [ x ] weights [ x ] labs[ x ] follow up per protocol  [ ] other:

## 2018-12-26 NOTE — CHART NOTE - NSCHARTNOTEFT_GEN_A_CORE
Upon Nutritional Assessment by the Registered Dietitian your patient was determined to meet criteria / has evidence of the following diagnosis/diagnoses:          [ ]  Mild Protein Calorie Malnutrition        [ ]  Moderate Protein Calorie Malnutrition        [x ] Severe Protein Calorie Malnutrition        [ ] Unspecified Protein Calorie Malnutrition        [ ] Underweight / BMI <19        [ ] Morbid Obesity / BMI > 40      Findings as based on:  •  Comprehensive nutrition assessment and consultation  •  Calorie counts (nutrient intake analysis)  •  Food acceptance and intake status from observations by staff  •  Follow up  •  Patient education  •  Intervention secondary to interdisciplinary rounds  •   concerns  Pt meets criteria for moderate protein-calorie malnutrition in context of chronic disease   Pt shows signs of moderate/severe  muscle wasting (interosseus), moderate muscle wasting ( temples)  mild muscle wasting (scapula, clavicles)  Pt reports that she has not been eating as well as she used to  Possible wt loss of 20 lbs in past 6 months        Treatment:    The following diet has been recommended:      Suggest maintain current DASH diet  add MVI w minerals  Weekly weights  Monitor PO intakes monitor labs and weights    PROVIDER Section:     By signing this assessment you are acknowledging and agree with the diagnosis/diagnoses assigned by the Registered Dietitian    Comments:

## 2018-12-27 NOTE — PROGRESS NOTE ADULT - ASSESSMENT
Pt is an 62 yo F w/ PMHx of glioblastoma multiforme s/p cyberknife+RT+CT/+Craniotomy w/ residual left sided weakness, HTN, HLD, TIA, and osteoarthritis who presented with worsening of left-sided weakness for 1 week now with improvement and stable for rehab.     #Left-sided weakness  - likely due to cerebral edema secondary to glioblastoma multiforme given continued weakness despite resolution of potassium and calcium   - improving now s/p IV steroids  - CT head shows no new acute abnormality, hemorrhage, midline shift or mass.   - MRI brain: persistent infiltrative disease in right frontal and temporoparietal lobes, post surgical cavity a bit more retracted, extent of disease remains unchanged, no midline shift or hydrocephalus  - continue dexamethasone 2 mg PO q12h and continue protonix  - Continue Keppra 500mg am and 1000mg bedtime   - Continue multivitamins   - Continue xanax 0.25mg for agitation   - PT evaluation: needs assistance to ambulate with rolling walker  - Follow fall and Seizure precautions  - Neuro checks q4hrs while inpatient  - Continue to monitor for acute change in status while inpatient  - Neurosurgery recs appreciated    #Glioblastoma Multiforme  - Continue Keppra 500mg am and 1000mg bedtime   - On Bevacizumab q2 weeks, last dose 12/17  - Continue multivitamins   - Continue xanax 0.25mg for agitation   - continue seroquel 12.5 mg at night and PRN during day  - Follow fall and Seizure precautions  - Neuro checks q4hrs  - Family meeting was 11/23  - Proxy: Scotty, brother  - DNR/DNI  - limited medical interventions, antibiotics case dependent, continue to come to hospital for acute issues  - Brother to decide if they want to continue chemotherapy and rehab vs hospice, for now will continue with rehab +/- chemo and brother will continue to think about hospice while pt is at rehab, brother would like [facility] as pt has been there before and liked it there    #Thrombocytopenia  - likely secondary to chemotherapy, Bevacizumab  - continuing to decrease  - last dose was 12/17, pt receives it q2 weeks  - continue to monitor while inpatient     #HTN  - Currently controlled   - Continue Toprol ER 50mg BID  - Continue Amlodipine 5mg   - Continue Losartan 50mg   - Hold HCTZ    - Continue to monitor blood pressure     #HLD  - Lipid panel: Total Cholesterol 163, , HDL 35,   - Continue Lipitor 40mg    #Osteoarthritis  - Tylenol 650mg PRN q6h for pain    #DVT PPx  - heparin 5000 units SQ held due to thrombocytopenia  - venodynes Pt is an 62 yo F w/ PMHx of glioblastoma multiforme s/p cyberknife+RT+CT/+Craniotomy w/ residual left sided weakness, HTN, HLD, TIA, and osteoarthritis who presented with worsening of left-sided weakness for 1 week now with improvement and stable for rehab.     #Left-sided weakness  - likely due to cerebral edema secondary to glioblastoma multiforme given continued weakness despite resolution of potassium and calcium   - improving now s/p IV steroids  - CT head shows no new acute abnormality, hemorrhage, midline shift or mass.   - MRI brain: persistent infiltrative disease in right frontal and temporoparietal lobes, post surgical cavity a bit more retracted, extent of disease remains unchanged, no midline shift or hydrocephalus  - continue dexamethasone 2 mg PO q12h and continue protonix  - Continue Keppra 500mg am and 1000mg bedtime   - Continue multivitamins   - Continue xanax 0.25mg for agitation   - PT evaluation: needs assistance to ambulate with rolling walker  - Follow fall and Seizure precautions  - Neuro checks q4hrs while inpatient  - Continue to monitor for acute change in status while inpatient  - Neurosurgery recs appreciated    #Glioblastoma Multiforme  - Continue Keppra 500mg am and 1000mg bedtime   - On Bevacizumab q2 weeks, last dose 12/17  - Pt's proxy has decided to hold chemotherapy sessions until after rehab   - Continue multivitamins   - Continue xanax 0.25mg for agitation   - continue seroquel 12.5 mg at night and PRN during day  - Follow fall and Seizure precautions  - Neuro checks q4hrs  - Family meeting was 11/23  - Proxy: Scotty, brother  - DNR/DNI  - limited medical interventions, antibiotics case dependent, continue to come to hospital for acute issues  - Brother to decide if they want to continue chemotherapy and rehab vs hospice, for now will continue with rehab +/- chemo and brother will continue to think about hospice while pt is at rehab, brother would like [facility] as pt has been there before and liked it there    #Thrombocytopenia  - likely secondary to chemotherapy, Bevacizumab  - continuing to decrease  - last dose was 12/17, pt receives it q2 weeks  - continue to monitor while inpatient     #HTN  - Currently controlled   - Continue Toprol ER 50mg BID  - Continue Amlodipine 5mg   - Continue Losartan 50mg   - Hold HCTZ    - Continue to monitor blood pressure     #HLD  - Lipid panel: Total Cholesterol 163, , HDL 35,   - Continue Lipitor 40mg    #Osteoarthritis  - Tylenol 650mg PRN q6h for pain    #DVT PPx  - heparin 5000 units SQ held due to thrombocytopenia  - venodynes

## 2018-12-27 NOTE — PROGRESS NOTE ADULT - ASSESSMENT
63y old Female coming from home with hx of HLD, HTN, HSV infection of the face, GBM (dx 2017), Chronic Left sided weakness 2/2 this, s/p cyberknife and craniotomy, TIA, Osteoarthritis of b/l knee, 3rd admission this year, last 8/18 for CVA, now admitted 12/20 for complaint of not being able to walk x few days and poor po intake. She was able to ambulate at her baseline before then. Found here to have hypokalemia, CTH with no acute changes from previous imaging, unchanged focal areas of hypoattenuation seen (awaiting MRI for further workup), and normal CXR. Palliative Care consulted to assist with establishing GOC.    1) AMS  - hx of GBM  - improved  - possible component of metabolic encephalopathy as well due to electrolyte abn  - seems to fluctuate  - frequent reorientation and fall precautions      2) Anxiety  - cont xanax 0.25 mg q6h prn  received 3 doses of 0.25 mg over the past 24 hrs  -cont Seroquel 12 mg PO HS   -cont Seroquel 12 mg PO in am    3) Weakness/GBM  - neurosurgery notes appreciated  - imaging appreciated  - CTH showing known focal areas of hypoattenuation, no acute abnormalities  - neuro surg  spoke with family re: MRI results  - no disease progression  - fall precautions  - c/w keppra    4) Debility/Malnutrition  - PPS<40%  - need for assistance with all ADLs  - PT note appreciated- recs TBD, home with PT vs. [facility]  - brother Scotty HCP requests [facility] at [facility]    5) Prognosis  - guarded  - awaiting follow up scans and neurosurgery impression for more clarity on this. However, it is likely poor given GBM and debility, PPS<40%, need for 24h aid support at baseline, and poor intake with high risk for malnutrition noted    6) GOC/Advanced Directives  - pt's capacity waxes and wanes  - HCP on file naming her brother : Scotty Reno [phone]  - DNR/DNI on [facility]OLST completed  - GOC meeting 12/23 at 11am  -Discussed [facility] vs home hospice  -See GOC discussion  -Transition to [facility] when medically stable

## 2018-12-27 NOTE — PROGRESS NOTE ADULT - SUBJECTIVE AND OBJECTIVE BOX
HPI: Ms. Reno is a 63y old Female coming from home with hx of HLD, HTN, HSV infection of the face, GBM (dx 2017), Chronic Left sided weakness 2/2 this, s/p cyberknife and craniotomy, TIA, Osteoarthritis of b/l knee, 3rd admission this year, last 8/18 for CVA, now admitted 12/20 for complaint of not being able to walk x few days and poor po intake. She was able to ambulate at her baseline before then. Found here to have hypokalemia, CTH with no acute changes from previous imaging, unchanged focal areas of hypoattenuation seen (awaiting MRI for further workup), and normal CXR. Palliative Care consulted to assist with establishing GOC.  12/22/18 Seen and examined at bedside for follow up symptom management. Pt denies pain or dyspnea. Appears extremely anxious and disoriented this morning.   12/23/18 Seen and examined at bedside with brother Scotty present. Pt is anxious and disoriented this morning. Denies pain or dyspnea  12/26/18 Seen and examined at bedside. OOB/chair this am. Still anxious however appears somewhat calmer  12/27/18 Seen and examined at bedside. Appears comfortable and somewhat calmer today.   PAIN: ( )Yes   (x )No  DYSPNEA: ( ) Yes  (x ) No    PAST MEDICAL & SURGICAL HISTORY:  HSV (herpes simplex virus) infection: to lip and chin  Obesity  Osteoarthritis of both knees, unspecified osteoarthritis type  Glioblastoma multiforme  High cholesterol  Transient cerebral ischemia, unspecified type: 2015  Essential hypertension  H/O craniotomy: 8/2017      SOCIAL HX: Lives at home with significant other with aide support    Hx opiate tolerance ( )YES  ( x)NO    Baseline ADLs  (Prior to Admission)- needs assistance with all ADLs, has two 12-hr aides   ( ) Independent   (x )Dependent    FAMILY HISTORY:  Family history of prostate cancer in father (Father)  Brother Prostate Ca    Review of Systems:    Anxiety- yes  Depression- yes, as above  Constipation- at times, Miralax helpful for this  Diarrhea- denies  Nausea- denies  Vomiting- denies  Anorexia- yes  Weight Loss- unsure   Cough- no  Secretions- no  Fatigue- yes  Weakness- yes, especially on left  Delirium- improved    All other systems reviewed and negative      PHYSICAL EXAM:    ICU Vital Signs Last 24 Hrs  T(C): 36.2 (27 Dec 2018 11:48), Max: 37.1 (26 Dec 2018 21:11)  T(F): 97.1 (27 Dec 2018 11:48), Max: 98.7 (26 Dec 2018 21:11)  HR: 85 (27 Dec 2018 11:48) (60 - 87)  BP: 143/84 (27 Dec 2018 11:48) (122/53 - 151/78)  BP(mean): --  ABP: --  ABP(mean): --  RR: 18 (27 Dec 2018 11:48) (18 - 18)  SpO2: 98% (27 Dec 2018 11:48) (97% - 99%)    PPSV2: 30  %  General: Older female sitting up in bed, anxious and disoriented  Mental Status: AOx3  HEENT: mmm, perrl, eomi, L facial droop, some temporal wasting  Lungs: clear  Cardiac: +s1 s2 rrr  GI: soft nt nd +bs  : voids  Ext: moves all extremities, though less robustly on left, some pedal edema  Neuro: left hemiparesis      LABS:                                 10.5   10.95 )-----------( 69       ( 27 Dec 2018 06:36 )             32.6                    Ca    8.2<L>      25 Dec 2018 05:31      Allergies    MSG (Rash)  No Known Drug Allergies    Intolerances

## 2018-12-27 NOTE — PROGRESS NOTE ADULT - SUBJECTIVE AND OBJECTIVE BOX
Pt is an 64 yo F w/ PMHx of glioblastoma multiforme s/p cyberknife+RT+CT/+Craniotomy w/ residual left sided weakness, HTN, HLD, TIA, and osteoarthritis who presented with worsening of left leg weakness now with improved strength.     SUBJECTIVE:   Pt feels her weakness is okay and she wants to go. She has been working with her stress ball because she was told to keep working. She denies all other complaints including chest pain, palpitations, sob.     REVIEW OF SYSTEMS:  CONSTITUTIONAL: No new weakness, fevers or chills  EYES/ENT: No visual changes;  No vertigo or throat pain   NECK: No pain or stiffness  RESPIRATORY: No cough, wheezing, hemoptysis; No shortness of breath  CARDIOVASCULAR: No chest pain or palpitations  GASTROINTESTINAL: No abdominal or epigastric pain. No nausea, vomiting, or hematemesis; No diarrhea or constipation. No melena or hematochezia.  GENITOURINARY: No dysuria, frequency or hematuria  NEUROLOGICAL: No numbness or new weakness  SKIN: No itching, burning, rashes, or lesions   All other review of systems is negative unless indicated above    Vital Signs Last 24 Hrs  T(C): 36.2 (27 Dec 2018 11:48), Max: 37.1 (26 Dec 2018 21:11)  T(F): 97.1 (27 Dec 2018 11:48), Max: 98.7 (26 Dec 2018 21:11)  HR: 85 (27 Dec 2018 11:48) (60 - 87)  BP: 143/84 (27 Dec 2018 11:48) (122/53 - 151/78)  BP(mean): --  RR: 18 (27 Dec 2018 11:48) (18 - 18)  SpO2: 98% (27 Dec 2018 11:48) (95% - 99%)    I&O's Summary    27 Dec 2018 07:01  -  27 Dec 2018 12:41  --------------------------------------------------------  IN: 0 mL / OUT: 400 mL / NET: -400 mL        CAPILLARY BLOOD GLUCOSE      POCT Blood Glucose.: 103 mg/dL (27 Dec 2018 12:08)  POCT Blood Glucose.: 104 mg/dL (27 Dec 2018 07:55)  POCT Blood Glucose.: 112 mg/dL (26 Dec 2018 21:56)  POCT Blood Glucose.: 100 mg/dL (26 Dec 2018 16:55)    PHYSICAL EXAM:  Constitutional: NAD, awake and alert, obese, sitting up in chair  HEENT: EOMI, Normal Hearing, MMM  Neck: Soft and supple, No LAD, No JVD  Respiratory: Breath sounds are clear bilaterally, No wheezing, rales or rhonchi  Cardiovascular: S1 and S2, regular rate and rhythm, no Murmurs, gallops or rubs  Gastrointestinal: Bowel Sounds present, soft, nontender, nondistended, no guarding, no rebound  Extremities: No peripheral edema  Vascular: 2+ peripheral pulses  Neurological: A/O x 3, nasolabial droop on left side when smiling, eyebrows equal when raised, no dysarthria, right arm 4/5 strength, left arm 4/5 strength but much less than right arm, wrist extension 3/5, left lower extremity able to lift high off bed, right lower extremity able to lift to 45% and hold for a few seconds about same as yesterday, sensation intact bilaterally   Skin: No rashes    MEDICATIONS:  MEDICATIONS  (STANDING):  amLODIPine   Tablet 5 milliGRAM(s) Oral daily  aspirin  chewable 81 milliGRAM(s) Oral daily  atorvastatin 40 milliGRAM(s) Oral at bedtime  dexamethasone     Tablet 2 milliGRAM(s) Oral every 12 hours  dextrose 5%. 1000 milliLiter(s) (50 mL/Hr) IV Continuous <Continuous>  dextrose 50% Injectable 12.5 Gram(s) IV Push once  dextrose 50% Injectable 25 Gram(s) IV Push once  dextrose 50% Injectable 25 Gram(s) IV Push once  influenza   Vaccine 0.5 milliLiter(s) IntraMuscular once  insulin lispro (HumaLOG) corrective regimen sliding scale   SubCutaneous three times a day before meals  insulin lispro (HumaLOG) corrective regimen sliding scale   SubCutaneous at bedtime  levETIRAcetam 1000 milliGRAM(s) Oral at bedtime  levETIRAcetam 500 milliGRAM(s) Oral daily  losartan 50 milliGRAM(s) Oral daily  metoprolol succinate ER 50 milliGRAM(s) Oral two times a day  mineral oil enema 133 milliLiter(s) Rectal once  multivitamin 1 Tablet(s) Oral daily  pantoprazole    Tablet 40 milliGRAM(s) Oral before breakfast  QUEtiapine 12.5 milliGRAM(s) Oral at bedtime  QUEtiapine 12.5 milliGRAM(s) Oral daily      LABS: All Labs Reviewed:                        10.5   10.95 )-----------( 69       ( 27 Dec 2018 06:36 )             32.6

## 2018-12-28 NOTE — PROGRESS NOTE ADULT - PROVIDER SPECIALTY LIST ADULT
Family Medicine
Hospitalist
Palliative Care
Neurosurgery

## 2018-12-28 NOTE — PROGRESS NOTE ADULT - ASSESSMENT
Pt is an 64 yo F w/ PMHx of glioblastoma multiforme s/p cyberknife+RT+CT/+Craniotomy w/ residual left sided weakness, HTN, HLD, TIA, and osteoarthritis who presented with worsening of left-sided weakness for 1 week now with improvement and stable for rehab.     #Left-sided weakness  - likely due to cerebral edema secondary to glioblastoma multiforme given continued weakness despite resolution of potassium and calcium   - improving now s/p IV steroids  - CT head shows no new acute abnormality, hemorrhage, midline shift or mass.   - MRI brain: persistent infiltrative disease in right frontal and temporoparietal lobes, post surgical cavity a bit more retracted, extent of disease remains unchanged, no midline shift or hydrocephalus  - continue dexamethasone 2 mg PO q12h and continue protonix  - Continue Keppra 500mg am and 1000mg bedtime   - Continue multivitamins   - Continue xanax 0.25mg for agitation   - PT evaluation: needs assistance to ambulate with rolling walker  - Follow fall and Seizure precautions  - Neuro checks q4hrs while inpatient  - Continue to monitor for acute change in status while inpatient  - Neurosurgery recs appreciated  - stable for discharge    #Glioblastoma Multiforme  - Continue Keppra 500mg am and 1000mg bedtime   - On Bevacizumab q2 weeks, last dose 12/17  - Pt's proxy has decided to hold chemotherapy sessions until after rehab   - Continue multivitamins   - Continue xanax 0.25mg for agitation   - continue seroquel 12.5 mg at night and PRN during day  - Follow fall and Seizure precautions  - Neuro checks q4hrs while inpatient  - Continue to monitor for acute change in status while inpatient  - Neurosurgery recs appreciated  - stable for discharge    #Glioblastoma Multiforme  - Continue Keppra 500mg am and 1000mg bedtime   - On Bevacizumab q2 weeks, last dose 12/17  - Pt's proxy has decided to hold chemotherapy sessions until after rehab   - Continue multivitamins   - Continue xanax 0.25mg for agitation   - continue seroquel 12.5 mg at night and PRN during day  - Follow fall and Seizure precautions  - Neuro checks q4hrs  - Family meeting was 11/23  - Proxy: Scotty, brother  - DNR/DNI  - limited medical interventions, antibiotics case dependent, continue to come to hospital for acute issues  - Brother to decide if they want to continue chemotherapy and rehab vs hospice, for now will continue with rehab +/- chemo and brother will continue to think about hospice while pt is at rehab, brother would like [facility] as pt has been there before and liked it there    #Thrombocytopenia  - likely secondary to chemotherapy, Bevacizumab  - continuing to decrease  - last dose was 12/17, pt receives it q2 weeks  - stable for discharge    #HTN  - Currently controlled   - Continue Toprol ER 50mg BID  - Continue Amlodipine 5mg   - Continue Losartan 50mg   - Hold HCTZ    - stable for discharge     #HLD  - Lipid panel: Total Cholesterol 163, , HDL 35,   - Continue Lipitor 40mg  - stable for discharge     #Osteoarthritis  - Tylenol 650mg PRN q6h for pain  - stable for discharge     #DVT PPx  - heparin 5000 units SQ held due to thrombocytopenia  - venodynes

## 2018-12-28 NOTE — PROGRESS NOTE ADULT - ASSESSMENT
63y old Female coming from home with hx of HLD, HTN, HSV infection of the face, GBM (dx 2017), Chronic Left sided weakness 2/2 this, s/p cyberknife and craniotomy, TIA, Osteoarthritis of b/l knee, 3rd admission this year, last 8/18 for CVA, now admitted 12/20 for complaint of not being able to walk x few days and poor po intake. She was able to ambulate at her baseline before then. Found here to have hypokalemia, CTH with no acute changes from previous imaging, unchanged focal areas of hypoattenuation seen (awaiting MRI for further workup), and normal CXR. Palliative Care consulted to assist with establishing GOC.    1) AMS  - hx of GBM  - improved  - possible component of metabolic encephalopathy as well due to electrolyte abn  - seems to fluctuate  - frequent reorientation and fall precautions      2) Anxiety  - cont xanax 0.25 mg q6h prn  -cont Seroquel 12 mg PO HS   -cont Seroquel 12 mg PO in am    3) Weakness/GBM  - neurosurgery notes appreciated  - imaging appreciated  - CTH showing known focal areas of hypoattenuation, no acute abnormalities  - neuro surg  spoke with family re: MRI results  - no disease progression  - fall precautions  - c/w keppra    4) Debility/Malnutrition  - PPS<40%  - need for assistance with all ADLs  - PT note appreciated- recs TBD, home with PT vs. [facility]  - brother Scotty HCP requests [facility] at [facility]    5) Prognosis  - guarded  - awaiting follow up scans and neurosurgery impression for more clarity on this. However, it is likely poor given GBM and debility, PPS<40%, need for 24h aid support at baseline, and poor intake with high risk for malnutrition noted    6) GOC/Advanced Directives  - pt's capacity waxes and wanes  - HCP on file naming her brother : Scotty Reno [phone]  - DNR/DNI on [facility] completed  - GOC meeting 12/23 at 11am  -Discussed [facility] vs home hospice  -See GOC discussion  -Transition to [facility] when bed available  -Will sign off

## 2018-12-28 NOTE — PROGRESS NOTE ADULT - ATTENDING COMMENTS
Patient seen and examined with Family Medicine Residents Monica Dumas,  Tammy Figueroa, Lacy Posada and Nadege Soliz on the Family Medicine Teaching Service.  Agree with history, physical, labs and plan which were reviewed in detail after a face to face encounter with the patient.
on exam- comfortable   -cvs-s1s2 normal     #d/c plan and d/c once social issue resolved
on exam- comfortable   -rs-aeeb, cta   -cvs-s1s2 normal     #ct supportive care     #Discussed with Dr. David, palliative team evaluation to follow, possible d/c tomorrow with hospice care
Patient seen and examined with Family Medicine Residents Monica Dumas,  Tammy Figueroa, Lacy Posada and Nadege Soliz on the Family Medicine Teaching Service.  Agree with history, physical, labs and plan which were reviewed in detail after a face to face encounter with the patient.
on exam- comfortable   rs- aeeb, cta   -cvs-s1s2 normal     #d/c plan     #ct supportive care
on exam- comfortable  -cvs-s1s2 normal     #discharge plan, d/c to rehab once bed is available
on exam- comfortable   -rs-aeeb, cta     #ct supportive care     #Discussed with Dr. David. Palliative team evaluation. Brother wants probably hospice care and does not want any aggressive measure
on exam- comfortable   -weakness in leg- improved   -rs-aeeb, cta     #weakness- exacerbated by hypokalemia- has improved and now appears at baseline     #palliative care evaluation     #Discussed with NS team

## 2018-12-28 NOTE — PROGRESS NOTE ADULT - REASON FOR ADMISSION
Weakness
left sided increased weakness  brain tumor
Weakness

## 2018-12-28 NOTE — PROGRESS NOTE ADULT - SUBJECTIVE AND OBJECTIVE BOX
Pt is an 64 yo F w/ PMHx of glioblastoma multiforme s/p cyberknife+RT+CT/+Craniotomy w/ residual left sided weakness, HTN, HLD, TIA, and osteoarthritis who presented with worsening of left leg weakness now with improved strength.     SUBJECTIVE:   Pt feeling stronger. She is fixated on her strength and getting stronger. Pt is eager to go to rehab. Pt denies any complaints.     REVIEW OF SYSTEMS:  CONSTITUTIONAL: see above, otherwise no fevers or chills  EYES/ENT: No visual changes;  No vertigo or throat pain   NECK: No pain or stiffness  RESPIRATORY: No cough, wheezing, hemoptysis; No shortness of breath  CARDIOVASCULAR: No chest pain or palpitations  GASTROINTESTINAL: No abdominal or epigastric pain. No nausea, vomiting, or hematemesis; No diarrhea or constipation. No melena or hematochezia.  GENITOURINARY: No dysuria, frequency or hematuria  NEUROLOGICAL: +weakness, no numbness   SKIN: No itching, burning, rashes, or lesions   All other review of systems is negative unless indicated above    Vital Signs Last 24 Hrs  T(C): 36.6 (28 Dec 2018 12:28), Max: 36.6 (28 Dec 2018 04:30)  T(F): 97.8 (28 Dec 2018 12:28), Max: 97.8 (28 Dec 2018 04:30)  HR: 71 (28 Dec 2018 12:28) (66 - 71)  BP: 168/67 (28 Dec 2018 12:28) (122/59 - 168/67)  BP(mean): --  RR: 16 (28 Dec 2018 12:28) (16 - 18)  SpO2: 100% (28 Dec 2018 12:28) (98% - 100%)    I&O's Summary    27 Dec 2018 07:01  -  28 Dec 2018 07:00  --------------------------------------------------------  IN: 0 mL / OUT: 400 mL / NET: -400 mL    28 Dec 2018 07:01  -  28 Dec 2018 21:53  --------------------------------------------------------  IN: 0 mL / OUT: 250 mL / NET: -250 mL        CAPILLARY BLOOD GLUCOSE      POCT Blood Glucose.: 112 mg/dL (28 Dec 2018 16:46)  POCT Blood Glucose.: 112 mg/dL (28 Dec 2018 12:08)  POCT Blood Glucose.: 99 mg/dL (28 Dec 2018 07:48)    PHYSICAL EXAM:  Constitutional: NAD, awake and alert, obese, sitting up in chair  HEENT: EOMI, Normal Hearing, MMM  Neck: Soft and supple, No LAD, No JVD  Respiratory: Breath sounds are clear bilaterally, No wheezing, rales or rhonchi  Cardiovascular: S1 and S2, regular rate and rhythm, no Murmurs, gallops or rubs  Gastrointestinal: Bowel Sounds present, soft, nontender, nondistended, no guarding, no rebound  Extremities: No peripheral edema  Vascular: 2+ peripheral pulses  Neurological: A/O x 3, nasolabial droop on left side when smiling, eyebrows equal when raised, no dysarthria, right arm 4/5 strength, left arm 4/5 strength but much less than right arm, wrist extension 3/5 a little stronger than yesterday, left lower extremity able to lift high off bed, right lower extremity able to lift to 45% and hold for a few seconds about same as yesterday, sensation intact bilaterally   Skin: No rashes    MEDICATIONS:  MEDICATIONS  (STANDING):  amLODIPine   Tablet 5 milliGRAM(s) Oral daily  aspirin  chewable 81 milliGRAM(s) Oral daily  atorvastatin 40 milliGRAM(s) Oral at bedtime  dexamethasone     Tablet 2 milliGRAM(s) Oral every 12 hours  dextrose 5%. 1000 milliLiter(s) (50 mL/Hr) IV Continuous <Continuous>  dextrose 50% Injectable 12.5 Gram(s) IV Push once  dextrose 50% Injectable 25 Gram(s) IV Push once  dextrose 50% Injectable 25 Gram(s) IV Push once  influenza   Vaccine 0.5 milliLiter(s) IntraMuscular once  insulin lispro (HumaLOG) corrective regimen sliding scale   SubCutaneous three times a day before meals  insulin lispro (HumaLOG) corrective regimen sliding scale   SubCutaneous at bedtime  levETIRAcetam 1000 milliGRAM(s) Oral at bedtime  levETIRAcetam 500 milliGRAM(s) Oral daily  losartan 50 milliGRAM(s) Oral daily  metoprolol succinate ER 50 milliGRAM(s) Oral two times a day  mineral oil enema 133 milliLiter(s) Rectal once  multivitamin 1 Tablet(s) Oral daily  pantoprazole    Tablet 40 milliGRAM(s) Oral before breakfast  QUEtiapine 12.5 milliGRAM(s) Oral at bedtime  QUEtiapine 12.5 milliGRAM(s) Oral daily      LABS: All Labs Reviewed:                        11.1   10.35 )-----------( 61       ( 28 Dec 2018 06:11 )             34.1

## 2018-12-28 NOTE — PROGRESS NOTE ADULT - SUBJECTIVE AND OBJECTIVE BOX
HPI: Ms. Reno is a 63y old Female coming from home with hx of HLD, HTN, HSV infection of the face, GBM (dx 2017), Chronic Left sided weakness 2/2 this, s/p cyberknife and craniotomy, TIA, Osteoarthritis of b/l knee, 3rd admission this year, last 8/18 for CVA, now admitted 12/20 for complaint of not being able to walk x few days and poor po intake. She was able to ambulate at her baseline before then. Found here to have hypokalemia, CTH with no acute changes from previous imaging, unchanged focal areas of hypoattenuation seen (awaiting MRI for further workup), and normal CXR. Palliative Care consulted to assist with establishing GOC.  12/22/18 Seen and examined at bedside for follow up symptom management. Pt denies pain or dyspnea. Appears extremely anxious and disoriented this morning.   12/23/18 Seen and examined at bedside with brother Scotty present. Pt is anxious and disoriented this morning. Denies pain or dyspnea  12/26/18 Seen and examined at bedside. OOB/chair this am. Still anxious however appears somewhat calmer  12/27/18 Seen and examined at bedside. Appears comfortable and somewhat calmer today.   12/28/18 Seen and examined at bedside with no family present.  PAIN: ( )Yes   (x )No  DYSPNEA: ( ) Yes  (x ) No    PAST MEDICAL & SURGICAL HISTORY:  HSV (herpes simplex virus) infection: to lip and chin  Obesity  Osteoarthritis of both knees, unspecified osteoarthritis type  Glioblastoma multiforme  High cholesterol  Transient cerebral ischemia, unspecified type: 2015  Essential hypertension  H/O craniotomy: 8/2017      SOCIAL HX: Lives at home with significant other with aide support    Hx opiate tolerance ( )YES  ( x)NO    Baseline ADLs  (Prior to Admission)- needs assistance with all ADLs, has two 12-hr aides   ( ) Independent   (x )Dependent    FAMILY HISTORY:  Family history of prostate cancer in father (Father)  Brother Prostate Ca    Review of Systems:    Anxiety- yes  Depression- yes, as above  Constipation- at times, Miralax helpful for this  Diarrhea- denies  Nausea- denies  Vomiting- denies  Anorexia- yes  Weight Loss- unsure   Cough- no  Secretions- no  Fatigue- yes  Weakness- yes, especially on left  Delirium- improved    All other systems reviewed and negative      PHYSICAL EXAM:  ICU Vital Signs Last 24 Hrs  T(C): 36.6 (28 Dec 2018 12:28), Max: 36.6 (27 Dec 2018 20:58)  T(F): 97.8 (28 Dec 2018 12:28), Max: 97.9 (27 Dec 2018 20:58)  HR: 71 (28 Dec 2018 12:28) (66 - 81)  BP: 168/67 (28 Dec 2018 12:28) (122/59 - 168/67)  BP(mean): --  ABP: --  ABP(mean): --  RR: 16 (28 Dec 2018 12:28) (16 - 18)  SpO2: 100% (28 Dec 2018 12:28) (98% - 100%)    PPSV2: 30  %  General: Older female sitting up in bed, anxious and disoriented  Mental Status: AOx3  HEENT: mmm, perrl, eomi, L facial droop, some temporal wasting  Lungs: clear  Cardiac: +s1 s2 rrr  GI: soft nt nd +bs  : voids  Ext: moves all extremities, though less robustly on left, some pedal edema  Neuro: left hemiparesis      LABS:                            11.1   10.35 )-----------( 61       ( 28 Dec 2018 06:11 )             34.1                                          Ca    8.2<L>      25 Dec 2018 05:31      Allergies    MSG (Rash)  No Known Drug Allergies    Intolerances

## 2019-01-01 ENCOUNTER — APPOINTMENT (OUTPATIENT)
Dept: NEUROLOGY | Facility: CLINIC | Age: 64
End: 2019-01-01

## 2019-01-01 ENCOUNTER — OUTPATIENT (OUTPATIENT)
Dept: OUTPATIENT SERVICES | Facility: HOSPITAL | Age: 64
LOS: 1 days | Discharge: HOME | End: 2019-01-01

## 2019-01-01 ENCOUNTER — APPOINTMENT (OUTPATIENT)
Dept: MRI IMAGING | Facility: CLINIC | Age: 64
End: 2019-01-01

## 2019-01-01 ENCOUNTER — APPOINTMENT (OUTPATIENT)
Dept: INFUSION THERAPY | Facility: HOSPITAL | Age: 64
End: 2019-01-01

## 2019-01-01 DIAGNOSIS — M62.3 IMMOBILITY SYNDROME (PARAPLEGIC): ICD-10-CM

## 2019-01-01 DIAGNOSIS — G12.20 MOTOR NEURON DISEASE, UNSPECIFIED: ICD-10-CM

## 2019-01-01 DIAGNOSIS — M17.0 BILATERAL PRIMARY OSTEOARTHRITIS OF KNEE: ICD-10-CM

## 2019-01-01 DIAGNOSIS — C71.9 MALIGNANT NEOPLASM OF BRAIN, UNSPECIFIED: ICD-10-CM

## 2019-01-01 DIAGNOSIS — Z66 DO NOT RESUSCITATE: ICD-10-CM

## 2019-01-01 DIAGNOSIS — G93.89 OTHER SPECIFIED DISORDERS OF BRAIN: ICD-10-CM

## 2019-01-01 DIAGNOSIS — E83.52 HYPERCALCEMIA: ICD-10-CM

## 2019-01-01 DIAGNOSIS — Z98.890 OTHER SPECIFIED POSTPROCEDURAL STATES: Chronic | ICD-10-CM

## 2019-01-01 DIAGNOSIS — R62.7 ADULT FAILURE TO THRIVE: ICD-10-CM

## 2019-01-01 DIAGNOSIS — D69.59 OTHER SECONDARY THROMBOCYTOPENIA: ICD-10-CM

## 2019-01-01 DIAGNOSIS — E43 UNSPECIFIED SEVERE PROTEIN-CALORIE MALNUTRITION: ICD-10-CM

## 2019-01-01 DIAGNOSIS — E78.5 HYPERLIPIDEMIA, UNSPECIFIED: ICD-10-CM

## 2019-01-01 DIAGNOSIS — F32.9 MAJOR DEPRESSIVE DISORDER, SINGLE EPISODE, UNSPECIFIED: ICD-10-CM

## 2019-01-01 DIAGNOSIS — B00.1 HERPESVIRAL VESICULAR DERMATITIS: ICD-10-CM

## 2019-01-01 DIAGNOSIS — F41.9 ANXIETY DISORDER, UNSPECIFIED: ICD-10-CM

## 2019-01-01 DIAGNOSIS — E87.6 HYPOKALEMIA: ICD-10-CM

## 2019-01-01 DIAGNOSIS — I10 ESSENTIAL (PRIMARY) HYPERTENSION: ICD-10-CM

## 2019-01-01 DIAGNOSIS — E78.00 PURE HYPERCHOLESTEROLEMIA, UNSPECIFIED: ICD-10-CM

## 2019-01-01 DIAGNOSIS — I69.354 HEMIPLEGIA AND HEMIPARESIS FOLLOWING CEREBRAL INFARCTION AFFECTING LEFT NON-DOMINANT SIDE: ICD-10-CM

## 2019-01-01 DIAGNOSIS — Z51.5 ENCOUNTER FOR PALLIATIVE CARE: ICD-10-CM

## 2019-01-01 DIAGNOSIS — R53.1 WEAKNESS: ICD-10-CM

## 2019-01-01 DIAGNOSIS — T45.1X5A ADVERSE EFFECT OF ANTINEOPLASTIC AND IMMUNOSUPPRESSIVE DRUGS, INITIAL ENCOUNTER: ICD-10-CM

## 2019-01-01 DIAGNOSIS — C71.8 MALIGNANT NEOPLASM OF OVERLAPPING SITES OF BRAIN: ICD-10-CM

## 2019-01-01 DIAGNOSIS — E66.9 OBESITY, UNSPECIFIED: ICD-10-CM

## 2019-04-26 NOTE — PATIENT PROFILE ADULT - LIVES WITH
Gastrointestinal Colonoscopy - Lower Exam Discharge Instructions  1. Call Dr. Meryle Bolder for any problems or questions. 2. Contact the doctors office for follow up appointment as directed  3. Medication may cause drowsiness for several hours, therefore, do not drive or operate machinery for remainder of the day. 4. No alcohol today. 5. Do not make any important decisions such signing legal paperwork. 6. Ordinarily, you may resume regular diet and activity after exam unless otherwise specified by your physician. 7. Because of air put into your colon during exam, you may experience some abdominal distension, relieved by the passage of gas, for several hours. 8. Contact your physician if you have any of the following:  a. Excessive amount of bleeding - large amount when having a bowel movement. Occasional specks of blood with bowel movement would not be unusual.  b. Severe abdominal pain  c. Fever or Chills  9. Polyp Removal - follow these additional instructions  a. Take Metamucil - 1 tablespoon in juice every morning for 3 days, if needed. b. No Aspirin, Advil, Aleve, Nuprin, Ibuprofen, or medications that contain these drugs for 2 weeks. Any additional instructions:   Repeat colonoscopy in 1-2 years. Instructions given to Salvatore Alvarado and other family members. sibling(s)

## 2019-07-08 NOTE — H&P ADULT - HISTORY OF PRESENT ILLNESS
This 62 year old female presents to the ER as a transfer from [facility]. Patient was sent there by her neurologist for workup of outpatient MRI of brain showing lesion. Metastatic workup revealed pelvic mass as well. Patient denies any blurry vision, headaches, weakness, numbness, neurological symptoms, pelvic pain or pressure, vaginal bleeding, fevers or any other complaints. Patient reports that her LMP was in 2011. She reports that she last took her ASA and plavix yesterday.
DISPLAY PLAN FREE TEXT

## 2019-07-10 ENCOUNTER — INBOUND DOCUMENT (OUTPATIENT)
Age: 64
End: 2019-07-10

## 2019-10-29 NOTE — DIETITIAN INITIAL EVALUATION ADULT. - PERTINENT MEDS FT
MEDICATIONS  (STANDING):  amLODIPine   Tablet 5 milliGRAM(s) Oral daily  aspirin  chewable 81 milliGRAM(s) Oral daily  atorvastatin 40 milliGRAM(s) Oral at bedtime  dextrose 5%. 1000 milliLiter(s) (50 mL/Hr) IV Continuous <Continuous>  dextrose 50% Injectable 12.5 Gram(s) IV Push once  dextrose 50% Injectable 25 Gram(s) IV Push once  dextrose 50% Injectable 25 Gram(s) IV Push once  heparin  Injectable 5000 Unit(s) SubCutaneous every 8 hours  insulin lispro (HumaLOG) corrective regimen sliding scale   SubCutaneous three times a day before meals  insulin lispro (HumaLOG) corrective regimen sliding scale   SubCutaneous at bedtime  levETIRAcetam 1000 milliGRAM(s) Oral at bedtime  levETIRAcetam 500 milliGRAM(s) Oral daily  losartan 50 milliGRAM(s) Oral daily  metoprolol succinate ER 50 milliGRAM(s) Oral two times a day  multivitamin 1 Tablet(s) Oral daily  pantoprazole  Injectable 40 milliGRAM(s) IV Push every 12 hours    MEDICATIONS  (PRN):  acetaminophen   Tablet .. 650 milliGRAM(s) Oral every 6 hours PRN Mild Pain (1 - 3), Moderate Pain (4 - 6), Severe Pain (7 - 10)  dextrose 40% Gel 15 Gram(s) Oral once PRN Blood Glucose LESS THAN 70 milliGRAM(s)/deciliter  glucagon  Injectable 1 milliGRAM(s) IntraMuscular once PRN Glucose LESS THAN 70 milligrams/deciliter
No

## 2020-10-02 NOTE — DISCHARGE NOTE ADULT - CARE PROVIDERS DIRECT ADDRESSES
Pending Prescriptions:                       Disp   Refills    hydrochlorothiazide (HYDRODIURIL) 12.5 MG*90 tab*2          atorvastatin (LIPITOR) 40 MG tablet       90 tab*2           Atorvastatin:   Medication is being filled for 1 time refill only due to:  Patient needs to be seen because it has been more than one year since last visit.     Hydrochlorothiazide:  Routing refill request to provider for review/approval because:  BP out of range    Amiigot message sent advising patient to schedule an appointment before next refill.     
,[email or web address]

## 2020-12-16 PROBLEM — Z87.440 HISTORY OF URINARY TRACT INFECTION: Status: RESOLVED | Noted: 2018-01-01 | Resolved: 2020-12-16

## 2021-02-16 NOTE — H&P ADULT - CARDIOVASCULAR
details… Cosentyx Pregnancy And Lactation Text: This medication is Pregnancy Category B and is considered safe during pregnancy. It is unknown if this medication is excreted in breast milk. detailed exam

## 2021-06-07 NOTE — DIETITIAN INITIAL EVALUATION ADULT. - OTHER INFO
Received VM from patient requesting refill of morphine.     Last refill: 5/10/2021  Last office visit: 4/15/2021  Scheduled for follow up 7/21/2021    Will route request to MD for review.     Reviewed [place]  Report.    
Nutrition consult for pressure ulcer stage 2 or >. Pt is a 62yo female admitted with CVA- left sided facial droop. History of obesity, HSV infection, HTM H/O craniotomy. Pending SLP evaluation to initiate po diet. Currently remains NPO. Pt c/o of being hungry and asking for food/liquids. PTA chose poor food choices indicated by BMI 44.4 and medical history. Skin: Pressure ulcer coccyx stage 2 (may not be nutritionally related due to pt seditary or pt reports possibly scratching in that area) no edema documented. Diet instruction provided on general healthy diet and discussed better choices to promote safe intentional weight loss. Pt reports no significant weight changes. Recommendations: 1) advance po diet when feasible with DASH/TLC diet + consistency modifiet diet s/p SLP evaluation. 2) VItamin C 500mg po BID , Zinc sulfate 220mg po BID x 10 days for wound healing. 3) Safe intentional weight loss to decrease BMI secondary to obesity status.

## 2021-06-28 NOTE — H&P PST ADULT - ALCOHOL USE HISTORY SINGLE SELECT
[Initial Consultation] : an initial consultation [FreeTextEntry2] : wt loss and lymphadenopathy never

## 2021-08-05 NOTE — ED PROCEDURE NOTE - CPROC ED INFORMED CONSENT1
Benefits, risks, and possible complications of procedure explained to patient/caregiver who verbalized understanding and gave verbal consent. 9

## 2022-03-16 NOTE — PATIENT PROFILE ADULT. - AS SC BRADEN SENSORY
Evidence of active labor, admit to labor and delivery, cleared for epidural placement  - plan discussed with   - GBS positive, for Ampicillin  - admission consents completed  - Covid PCR result in HIE, negative (3/15/2022) (4) no impairment

## 2022-03-31 NOTE — PHYSICAL THERAPY INITIAL EVALUATION ADULT - SITTING BALANCE: STATIC
Continuity of Care Form    Patient Name: Cesar Austin   :  1952  MRN:  [id number]    [facility] date:  3/23/2022  Discharge date:  ***    Code Status Order: DNR-CCA   Advance Directives:   Advance Care Flowsheet Documentation       Date/Time Healthcare Directive Type of Healthcare Directive Copy in [address] Agent's Name Healthcare Agent's Phone Number    [phone] Yes, patient has an advance directive for healthcare treatment Durable power of  for health care;Living will No, copy requested from family Adult [facility] --            Admitting Physician:  Shawnee Braun MD  PCP: Nelli Alcantar    Discharging Nurse: [facility] Unit/Room#: 5920/6331-76  Discharging Unit Phone Number: ***    Emergency Contact:   Extended Emergency Contact Information  [facility] Phone: [phone]  Mobile Phone: [phone]  Relation: Unknown  Secondary Emergency Contact: [address] [place] [address] Phone: [phone]  Relation: Child    Past Surgical History:  Past Surgical History:   Procedure Laterality Date    CARDIAC CATHETERIZATION  2010    no stents     CARPAL TUNNEL RELEASE Left 2002    CATARACT REMOVAL      CERVICAL FUSION  2018    anterior cervical fusion C5-6    CERVICAL FUSION N/A 3/24/2022    C2-5 FUSION, C2-4 LAMINECTOMY performed by Prashant Sheridan DO at [address] [place] [address] 140 Left 2018    [facility]  2022    C2-5 FUSION, C2-4 LAMINECTOMY    MIDDLE EAR SURGERY  2018    MIDDLE EAR REBUILT AND EAR DRUM REPLACED    MOUTH SURGERY  2018    5 TEETH REMOVED    OTHER SURGICAL HISTORY  2018    : ANTERIOR CERVICAL CORRPECTOMY C5-6, SYNTHES, DEPUY, REG TABLE, SUPINE,     MA OFFICE/OUTPT VISIT,PROCEDURE ONLY N/A 2018    ANTERIOR CERVICAL CORRPECTOMY AND FUSION C5-6 performed by Prashant Sheridan DO at [facility] ADENOIDECTOMY  1960    VASECTOMY  12/1983       Immunization History:   Immunization History   Administered Date(s) Administered    Influenza Vaccine, unspecified formulation 10/26/2011, 11/14/2012, 01/01/2014, 08/30/2016    Tdap (Boostrix, Adacel) 01/16/2007, 01/17/2017    Zoster Live (Zostavax) 11/14/2016       Active Problems:  Patient Active Problem List   Diagnosis Code    Hypertension I10    Hyperlipidemia E78.5    Diabetes mellitus ([address] E11.9    Contusion of right shoulder S40.011A    Bipolar disorder, mixed ([address] F31.60    First known suicide attempt ([facility] T14.91XA    Erectile dysfunction N52.9    Cervical stenosis of spinal canal M48.02    Incomplete spinal cord lesion at C5-C7 level ([address] S14.155A    Stenosis of cervical spine with myelopathy ([address] M48.02, G99.2    Cervical spondylosis with radiculopathy M47.22    Acute blood loss anemia D62    Chest pain R07.9    Depression with suicidal ideation F32. A, R45.851    Severe recurrent major depression without psychotic features (HCC) F33.2    Frequent falls R29.6    Hyponatremia E87.1    Cervical spinal cord compression (HCC) G95.20    Cord compression (HCC) G95.20    Quadriplegia, C1-C4 incomplete (HCC) G82.52    Encephalopathy G93.40    Hospital-acquired pneumonia J18.9, Y95    Atelectasis J98.11       Isolation/Infection:   Isolation            No Isolation          Patient Infection Status       None to display            Nurse Assessment:  Last Vital Signs: BP (!) 143/87   Pulse 85   Temp 97.7 °F (36.5 °C) (Oral)   Resp 26   Ht 5' 3\" (1.6 m)   Wt 153 lb 6.4 oz (69.6 kg)   SpO2 95%   BMI 27.17 kg/m²     Last documented pain score (0-10 scale): Pain Level: 0  Last Weight:   Wt Readings from Last 1 Encounters:   03/28/22 153 lb 6.4 oz (69.6 kg)     Mental Status:   Alert, OX4    IV Access:  - None    Nursing Mobility/ADLs:  Walking   Dependent  Transfer  Dependent  Bathing  Dependent  Dressing  Dependent  Toileting  Dependent  Feeding Dependent  Med Admin  Dependent  Med Delivery   whole    Wound Care Documentation and Therapy:  Incision 04/19/18 Neck (Active)   Number of days: 0568       Incision 04/29/18 Back Mid;Lower (Active)   Number of days: 0572       Wound 03/20/22 Arm Left (Active)   Wound Etiology Other 03/31/22 1210   Dressing Status Other (Comment) 03/31/22 1210   Dressing/Treatment Open to air 03/31/22 1210   Drainage Amount None 03/31/22 1210   Odor None 03/31/22 1210   Nereida-wound Assessment Other (Comment) 03/31/22 1210   Number of days: 10        Elimination:  Continence: Bowel: No  Bladder: No  Urinary Catheter: None   Colostomy/Ileostomy/Ileal Conduit: No       Date of Last BM: 3/31/22    Intake/Output Summary (Last 24 hours) at 3/31/2022 1553  Last data filed at 3/31/2022 1228  Gross per 24 hour   Intake 435 ml   Output 600 ml   Net -165 ml     I/O last 3 completed shifts:  In: -   Out: 600 [Urine:600]    Safety Concerns:     Hx of multiple recent falls    Impairments/Disabilities:      Severe cervical canal stenosis with myelopathy and incomplete quadriplegia    Nutrition Therapy:  Current Nutrition Therapy:   - Oral Diet:  General and easy to chew with Oral nutrition supplement (high calorie/protein) with lunch and dinner    Routes of Feeding: Oral  Liquids: No Liquids  Daily Fluid Restriction: no  Last Modified Barium Swallow with Video (Video Swallowing Test): not done    Treatments at the Time of Hospital Discharge:   Respiratory Treatments: none  Oxygen Therapy:  is on oxygen at 2 L/min per nasal cannula. Ventilator:    - Patient was ordered to use home CPAP but did not bring in and has not been using CPAP    Rehab Therapies: Physical Therapy and Occupational Therapy  Weight Bearing Status/Restrictions: No weight bearing restrictions Unable to bear weight independently. Requires 2 assist and standing left device, such as [place].      Other Medical Equipment (for information only, NOT a DME order):  walker, bath bench, bedside commode, hospital bed  Other Treatments:   s/p laminectomy - staples intact. Wound care orders as follows: Okay to leave incision site open to air. Keep site dry and clean. Okay to shower with mild soap. Pat dry with dry towel. Ice therapy routine, prn    Cervical collar - Collar on while out of bed. Ok to remove while resting In bed eating and drinking in bed    3/28/22 - Venous Doppler BLE completed, results included the following: Left Impression: The small saphenous vein is non compressible. Patient's personal belongings (please select all that are sent with patient):  Julian Sweet, cell phone with , [place] issued tablet. RN SIGNATURE:  Electronically signed by Yasmine Contreras RN on 3/31/22 at 4:11 PM EDT    CASE MANAGEMENT/SOCIAL WORK SECTION    Inpatient Status Date: ***    Readmission Risk Assessment Score:  Readmission Risk              Risk of Unplanned Readmission:  14           Discharging to Facility/ Agency   Name:   Address:  Phone:  Fax:    Dialysis Facility (if applicable)   Name:  Address:  Dialysis Schedule:  Phone:  Fax:    / signature: {Esignature:[id number]}    PHYSICIAN SECTION    Prognosis: {Prognosis:0821119234}    Condition at Discharge: [address] Patient Condition:614388686}    Rehab Potential (if transferring to Rehab): {Prognosis:2507108724}    Recommended Labs or Other Treatments After Discharge: ***    Physician Certification: I certify the above information and transfer of Sara Vale  is necessary for the continuing treatment of the diagnosis listed and that he requires {Admit to Appropriate Level of Care:49926} for {GREATER/LESS:638376983} 30 days.      Update Admission H&P: {CHP DME Changes in JZUZL:347850911}    PHYSICIAN SIGNATURE:  {Esignature:[id number]} good balance

## 2022-06-20 NOTE — SWALLOW BEDSIDE ASSESSMENT ADULT - CONSISTENCIES ADMINISTERED
Urinary symptoms x 1 week, seen at urgent care today and dx with UTI, referred to ED due to pain. Pt reports taking 1g of Tylenol q2h.
puree/solid/thin liquid

## 2022-07-16 NOTE — ED PROVIDER NOTE - GASTROINTESTINAL, MLM
Problem: At Risk for Injury Due to Fall  Goal: # Patient does not fall  Outcome: Outcome Met, Continue evaluating goal progress toward completion  Goal: # Takes action to control condition specific risks  Outcome: Outcome Met, Continue evaluating goal progress toward completion  Goal: # Verbalizes understanding of fall-related injury personal risks  Description: Document education using the patient education activity  Outcome: Outcome Met, Continue evaluating goal progress toward completion     Problem: Pressure Injury, Risk for  Goal: # Skin remains intact  Outcome: Outcome Met, Continue evaluating goal progress toward completion  Goal: No new pressure injury (PI) development  Outcome: Outcome Met, Continue evaluating goal progress toward completion  Goal: # Verbalizes understanding of PI risk factors and prevention strategies  Description: Document education using the patient education activity. Outcome: Outcome Met, Continue evaluating goal progress toward completion     Problem: Delirium, Risk for  Goal: # No symptoms of delirium  Description: Evaluate delirium symptoms under active problem when present  Outcome: Outcome Met, Continue evaluating goal progress toward completion  Goal: # Verbalizes understanding of delirium preventive strategies  Description: Document on Patient Education Activity   Outcome: Outcome Met, Continue evaluating goal progress toward completion     Problem: Impaired Physical Mobility  Goal: # Bed mobility, ambulation, and ADLs are maintained or returned to baseline during hospitalization  Outcome: Outcome Met, Continue evaluating goal progress toward completion     Problem: Pain  Goal: #Acceptable pain level achieved/maintained at rest using NRS/Faces  Description: This goal is used for patients who can self-report.   Acceptable means the level is at or below the identified comfort/function goal.  Outcome: Outcome Met, Continue evaluating goal progress toward completion  Goal: # Acceptable pain level achieved/maintained at rest using NRS/Faces without oversedation (opioid naive or PCA/Epidural infusion)  Description: This goal is used if Opioid-naÃ¯ve or on PCA/Epidural Infusion. Outcome: Outcome Met, Continue evaluating goal progress toward completion  Goal: # Acceptable pain level achieved/maintained with activity using NRS/Faces  Description: This goal is used for patients who can self-report and are not achieving acceptable pain control during activity. Outcome: Outcome Met, Continue evaluating goal progress toward completion  Goal: Acceptable pain/comfort level is achieved/maintained at rest (based on Pain Behaviors Scale)  Description:  This goal is used for patients who are not able to self-report pain and are assessed for pain using the Pain Behaviors Scale  Outcome: Outcome Met, Continue evaluating goal progress toward completion  Goal: # Verbalizes understanding of pain management  Description: Documented in Patient Education Activity  Outcome: Outcome Met, Continue evaluating goal progress toward completion     Problem: Cervical Spine Surgery  Goal: Neurological function is maintained/restored to baseline  Outcome: Outcome Met, Continue evaluating goal progress toward completion  Goal: Vital signs are maintained within parameters  Outcome: Outcome Met, Continue evaluating goal progress toward completion  Goal: Elimination status is maintained/returned to baseline  Outcome: Outcome Met, Continue evaluating goal progress toward completion  Goal: Oral intake is resumed and tolerated  Outcome: Outcome Met, Continue evaluating goal progress toward completion  Goal: Mobility and self-care ability maintained/returned to baseline  Outcome: Outcome Met, Continue evaluating goal progress toward completion  Goal: Verbalizes understanding of post-op cervical spine surgery care  Outcome: Outcome Met, Continue evaluating goal progress toward completion     Problem: Breathing Pattern Ineffective  Goal: Air exchange is effective, demonstrated by Sp02 sat of greater then or = 92% (or as ordered)  Outcome: Outcome Met, Continue evaluating goal progress toward completion  Goal: Respiratory pattern is quiet and regular without report of SOB  Outcome: Outcome Met, Continue evaluating goal progress toward completion  Goal: Breathing pattern demonstrates minimal apnea during sleep with appropriate use of airway pressure support devices  Outcome: Outcome Met, Continue evaluating goal progress toward completion  Goal: Verbalizes/demonstrates effective breathing management strategies  Description: Document education using the patient education activity.    Outcome: Outcome Met, Continue evaluating goal progress toward completion  Goal: Minimize respiratory effort related to dyspnea/shortness of breath (Hospice)  Outcome: Outcome Met, Continue evaluating goal progress toward completion Abdomen soft, non-tender, no guarding.

## 2022-08-09 NOTE — PHYSICAL THERAPY INITIAL EVALUATION ADULT - BED MOBILITY LIMITATIONS, REHAB EVAL
Subjective   Patient doing better  Getting his hemodialysis at present time, tolerating treatment well  Total net UF is 2 L today  Patient eating better  Objective   No complaints  I/O's    Intake/Output Summary (Last 24 hours) at 8/9/2022 1104  Last data filed at 8/9/2022 0600  Gross per 24 hour   Intake 1445 ml   Output 1500 ml   Net -55 ml       Last Recorded Vitals  Blood pressure 132/87, pulse (!) 101, temperature 97.9 °F (36.6 °C), temperature source Temporal, resp. rate 17, height 5' 4\" (1.626 m), weight 44.5 kg (98 lb 1.7 oz), SpO2 97 %.  Body mass index is 16.84 kg/m².  Constitutional:       General: He is not in acute distress.  HENT:      Head: Normocephalic and atraumatic.      Neck: Neck supple. No rigidity.   Eyes:      Conjunctiva/sclera: Conjunctivae normal.   Cardiovascular:      Heart sounds: Normal heart sounds. No murmur heard.  Pulmonary:      Effort: Pulmonary effort is normal. No respiratory distress.      Breath sounds: Normal breath sounds.   Abdominal:      General: Abdomen is flat. There is no distension.      Palpations: Abdomen is soft.   Musculoskeletal:      Right lower leg: No edema.      Left lower leg: No edema.   Skin:     General: Skin is warm and dry.      Coloration: Skin is not jaundiced.   Neurological:      Mental Status: He is alert. He is oriented.      Cranial Nerves: No cranial nerve deficit.      Comments: Residual previous hemiparesis deficit         Labs   Recent Results (from the past 24 hour(s))   GLUCOSE, BEDSIDE - POINT OF CARE    Collection Time: 08/08/22 11:18 AM   Result Value Ref Range    GLUCOSE, BEDSIDE - POINT OF CARE 209 (H) 70 - 99 mg/dL   GLUCOSE, BEDSIDE - POINT OF CARE    Collection Time: 08/08/22  4:50 PM   Result Value Ref Range    GLUCOSE, BEDSIDE - POINT OF CARE 143 (H) 70 - 99 mg/dL   GLUCOSE, BEDSIDE - POINT OF CARE    Collection Time: 08/08/22  8:59 PM   Result Value Ref Range    GLUCOSE, BEDSIDE - POINT OF CARE 145 (H) 70 - 99 mg/dL   Iron And  total Iron Binding Capacity    Collection Time: 08/09/22  4:59 AM   Result Value Ref Range    Iron 37 (L) 65 - 175 mcg/dL    Iron Binding Capacity 282 250 - 450 mcg/dL    Iron, Percent Saturation 13 (L) 15 - 45 %   CBC No Differential    Collection Time: 08/09/22  4:59 AM   Result Value Ref Range    WBC 6.5 4.2 - 11.0 K/mcL    RBC 3.22 (L) 4.50 - 5.90 mil/mcL    HGB 8.4 (L) 13.0 - 17.0 g/dL    HCT 26.6 (L) 39.0 - 51.0 %    MCV 82.6 78.0 - 100.0 fl    MCH 26.1 26.0 - 34.0 pg    MCHC 31.6 (L) 32.0 - 36.5 g/dL     140 - 450 K/mcL    RDW-CV 13.2 11.0 - 15.0 %    RDW-SD 40.3 39.0 - 50.0 fL    NRBC 0 <=0 /100 WBC   Basic Metabolic Panel    Collection Time: 08/09/22  5:35 AM   Result Value Ref Range    Fasting Status      Sodium 135 135 - 145 mmol/L    Potassium 4.7 3.4 - 5.1 mmol/L    Chloride 101 97 - 110 mmol/L    Carbon Dioxide 29 21 - 32 mmol/L    Anion Gap 10 7 - 19 mmol/L    Glucose 170 (H) 70 - 99 mg/dL    BUN 36 (H) 6 - 20 mg/dL    Creatinine 4.73 (H) 0.67 - 1.17 mg/dL    Glomerular Filtration Rate 14 (L) >=60    BUN/ Creatinine Ratio 8 7 - 25    Calcium 8.4 8.4 - 10.2 mg/dL   Magnesium    Collection Time: 08/09/22  5:35 AM   Result Value Ref Range    Magnesium 2.4 1.7 - 2.4 mg/dL   GLUCOSE, BEDSIDE - POINT OF CARE    Collection Time: 08/09/22  7:10 AM   Result Value Ref Range    GLUCOSE, BEDSIDE - POINT OF CARE 173 (H) 70 - 99 mg/dL   GLUCOSE, BEDSIDE - POINT OF CARE    Collection Time: 08/09/22 10:48 AM   Result Value Ref Range    GLUCOSE, BEDSIDE - POINT OF CARE 143 (H) 70 - 99 mg/dL         Imaging  No images are attached to the encounter.    Assessment & Plan   1-ESRD/uremia  2- acute mental status changes  3- type 2 diabetes mellitus  4- hypertension  5-history of previous stroke many years ago  6-s/p RRT   7- coronary artery disease multiple lesion s/p stenting in RCA  8- UTI  9- severe malnutrition     Plan:   On hemodialysis right now  Will continue dialysis per schedule TTS    Supply Venofer 300  mg IV x3 doses                    decreased ability to use arms for pushing/pulling

## 2022-08-22 NOTE — ED PROVIDER NOTE - TIMING
Routed Note    Author: Turner Burr MD Service: -- Author Type: Physician   Filed: 8/22/2022  8:50 AM Encounter Date: 8/17/2022 Status: Signed   : Turner Burr MD (Physician)          EMG nerve test was normal        Electrophysiological conclusions:  There is no electrophysiological evidence of L2-S1 acute/active radiculopathy, lumbo-sacral plexopathy, myopathy, entrapment neuropathy, or peripheral polyneuropathy at bilateral lower limbs.              none

## 2022-12-12 NOTE — H&P PST ADULT - HEMATOLOGY/LYMPHATICS
What is MERARY? Obstructive sleep apnea is a common and serious sleep disorder that causes you to stop breathing during sleep  The airway repeatedly becomes blocked, limiting the amount of air that reaches your lungs  When this happens, you may snore loudly or making choking noises as you try to breathe  Your brain and body becomes oxygen deprived and you may wake up  This may happen a few times a night, or in more severe cases, several hundred times a night  Sleep apnea can make you wake up in the morning feeling tired or unrefreshed even though you have had a full night of sleep  During the day, you may feel fatigued, have difficulty concentrating or you may even unintentionally fall asleep  This is because your body is waking up numerous times throughout the night, even though you might not be conscious of each awakening  The lack of oxygen your body receives can have negative long-term consequences for your health  This includes:  High blood pressure  Heart disease  Irregular heart rhythms  Stroke  Pre-diabetes and diabetes  Depression  Testing  An objective evaluation of your sleep may be needed before your board certified sleep physician can make a diagnosis  Options include:   In-lab overnight sleep study  This type of sleep study requires you to stay overnight at a sleep center, in a bed that may resemble a hotel room  You will sleep with sensors hooked up to various parts of your body  These sensors record your brain waves, heartbeat, breathing and movement  An overnight sleep study also provides your doctor with the most complete information about your sleep  Learn more about an overnight sleep study  Home sleep apnea test  Some patients with high risk factors for obstructive sleep apnea and no other medical disorders may be candidates for a home sleep apnea test  The testing equipment differs in that it is less complicated than what is used in an overnight sleep study   As such, does not give all the data an in-lab will and if negative, may not mean you do not have the problem  Treatment for sleep apnea includes using a continuous positive airway pressure (CPAP) machine to keep your airway open during sleep  A mask is placed over your nose and mouth, or just your nose  The mask is hooked to the CPAP machine that blows a gentle stream of air into the mask when you breathe  This helps keep your airway open so you can breathe more regularly  Extra oxygen may be given to you through the machine  You may be given a mouth device  It looks like a mouth guard or dental retainer and stops your tongue and mouth tissues from blocking your throat while you sleep  Surgery may be needed to remove extra tissues that block your mouth, throat, or nose  Manage sleep apnea:   Do not smoke  Nicotine and other chemicals in cigarettes and cigars can cause lung damage  Ask your healthcare provider for information if you currently smoke and need help to quit  E-cigarettes or smokeless tobacco still contain nicotine  Talk to your healthcare provider before you use these products  Do not drink alcohol or take sedative medicine before you go to sleep  Alcohol and sedatives can relax the muscles and tissues around your throat  This can block the airflow to your lungs  Maintain a healthy weight  Excess tissue around your throat may restrict your breathing  Ask your healthcare provider for information if you need to lose weight  Sleep on your side or use pillows designed to prevent sleep apnea  This prevents your tongue or other tissues from blocking your throat  You can also raise the head of your bed  Driving Safety  Refrain from driving when drowsy  Follow up with your healthcare provider as directed: Write down your questions so you remember to ask them during your visits  Go to [employer] website for more information: Sleepeducation  org  What is MERARY?    Obstructive sleep apnea is a common and serious sleep disorder that causes you to stop breathing during sleep  The airway repeatedly becomes blocked, limiting the amount of air that reaches your lungs  When this happens, you may snore loudly or making choking noises as you try to breathe  Your brain and body becomes oxygen deprived and you may wake up  This may happen a few times a night, or in more severe cases, several hundred times a night  Sleep apnea can make you wake up in the morning feeling tired or unrefreshed even though you have had a full night of sleep  During the day, you may feel fatigued, have difficulty concentrating or you may even unintentionally fall asleep  This is because your body is waking up numerous times throughout the night, even though you might not be conscious of each awakening  The lack of oxygen your body receives can have negative long-term consequences for your health  This includes:  High blood pressure  Heart disease  Irregular heart rhythms  Stroke  Pre-diabetes and diabetes  Depression  Testing  An objective evaluation of your sleep may be needed before your board certified sleep physician can make a diagnosis  Options include:   In-lab overnight sleep study  This type of sleep study requires you to stay overnight at a sleep center, in a bed that may resemble a hotel room  You will sleep with sensors hooked up to various parts of your body  These sensors record your brain waves, heartbeat, breathing and movement  An overnight sleep study also provides your doctor with the most complete information about your sleep  Learn more about an overnight sleep study  Home sleep apnea test  Some patients with high risk factors for obstructive sleep apnea and no other medical disorders may be candidates for a home sleep apnea test  The testing equipment differs in that it is less complicated than what is used in an overnight sleep study  As such, does not give all the data an in-lab will and if negative, may not mean you do not have the problem    Treatment for sleep apnea includes using a continuous positive airway pressure (CPAP) machine to keep your airway open during sleep  A mask is placed over your nose and mouth, or just your nose  The mask is hooked to the CPAP machine that blows a gentle stream of air into the mask when you breathe  This helps keep your airway open so you can breathe more regularly  Extra oxygen may be given to you through the machine  You may be given a mouth device  It looks like a mouth guard or dental retainer and stops your tongue and mouth tissues from blocking your throat while you sleep  Surgery may be needed to remove extra tissues that block your mouth, throat, or nose  Manage sleep apnea:   Do not smoke  Nicotine and other chemicals in cigarettes and cigars can cause lung damage  Ask your healthcare provider for information if you currently smoke and need help to quit  E-cigarettes or smokeless tobacco still contain nicotine  Talk to your healthcare provider before you use these products  Do not drink alcohol or take sedative medicine before you go to sleep  Alcohol and sedatives can relax the muscles and tissues around your throat  This can block the airflow to your lungs  Maintain a healthy weight  Excess tissue around your throat may restrict your breathing  Ask your healthcare provider for information if you need to lose weight  Sleep on your side or use pillows designed to prevent sleep apnea  This prevents your tongue or other tissues from blocking your throat  You can also raise the head of your bed  Driving Safety  Refrain from driving when drowsy  Follow up with your healthcare provider as directed: Write down your questions so you remember to ask them during your visits  Go to AAS website for more information: Sleepeducation  org  Nursing Support:  When: Monday through Friday 7A-5PM except holidays  Where: Our direct line is [phone]  If you are having a true emergency please call 911    In the event that the line is busy or it is after hours please leave a voice message and we will return your call  Please speak clearly, leaving your full name, birth date, best number to reach you and the reason for your call  Medication refills: We will need the name of the medication, the dosage, the ordering provider, whether you get a 30 or 90 day refill, and the pharmacy name and address  Medications will be ordered by the provider only  Nurses cannot call in prescriptions  Please allow 7 days for medication refills  Physician requested updates: If your provider requested that you call with an update after starting medication, please be ready to provide us the medication and dosage, what time you take your medication, the time you attempt to fall asleep, time you fall asleep, when you wake up, and what time you get out of bed  Sleep Study Results: We will contact you with sleep study results and/or next steps after the physician has reviewed your testing  negative

## 2023-02-02 NOTE — PATIENT PROFILE ADULT. - MENTAL HEALTH CONDITIONS/SYMPTOMS, PROFILE
Airway  Urgency: elective    Date/Time: 2/2/2023 10:14 AM  Airway not difficult    General Information and Staff    Patient location during procedure: OR  CRNA/CAA: Calos Neri CRNA    Indications and Patient Condition  Indications for airway management: airway protection    Preoxygenated: yes  Mask difficulty assessment: 1 - vent by mask    Final Airway Details  Final airway type: endotracheal airway      Successful airway: ETT  Cuffed: yes   Successful intubation technique: direct laryngoscopy  Facilitating devices/methods: intubating stylet  Endotracheal tube insertion site: oral  Blade: Lainez  Blade size: 2  ETT size (mm): 7.5  Cormack-Lehane Classification: grade I - full view of glottis  Placement verified by: chest auscultation and capnometry   Measured from: lips  ETT/EBT  to lips (cm): 21  Number of attempts at approach: 1  Assessment: lips, teeth, and gum same as pre-op and atraumatic intubation           none

## 2023-11-27 NOTE — DISCHARGE NOTE ADULT - IF YOU ARE A SMOKER, IT IS IMPORTANT FOR YOUR HEALTH TO STOP SMOKING. PLEASE BE AWARE THAT SECOND HAND SMOKE IS ALSO HARMFUL.
Patient up to date with screenings ECG and vision checked today Patient to get me copy of living will Patient failed vision testing and will contact eye doctor   Statement Selected

## 2024-01-15 NOTE — PROGRESS NOTE ADULT - I WAS PHYSICALLY PRESENT FOR THE KEY PORTIONS OF THE EVALUATION AND MANAGEMENT (E/M) SERVICE PROVIDED.  I AGREE WITH THE ABOVE HISTORY, PHYSICAL, AND PLAN WHICH I HAVE REVIEWED AND EDITED WHERE APPROPRIATE
Statement Selected
Detail Level: Zone
Hide Additional Notes?: No
Detail Level: Simple

## 2024-01-18 NOTE — H&P ADULT - NSHPLABSRESULTS_GEN_ALL_CORE
Subjective   Patient ID: Narayan Tracy is a 62 y.o. adult who presents for Med Refill. Past medical history notable for cervical radiculopathy, DLD, HTN, hypoT, prior hyperCa, sensorineural hearing loss (idiopathic). He presents today for follow up visit.     ACUTE CONCERNS:   #R Inguinal Hernia   - palpable on exam  [  ] general surgery referral     #Elevated CAC scoring   [  ] updated imaging ordered, last 2018  [  ] lipid panel; statin refill; consider uptitration with goal LDL <70 pending repeat CAC scoring    #Unilateral hearing loss 8.2022 s/p ENT referral, neg MRI IAC 9.22 with some recovery s/p steroid injxns with ENT ([facility])   [  ] consider hearing assistive devices      #R shoulder OA, Cervical radiculopathy with prominent R sided symptoms early 2022 s/p referral to Dr. Dixon and subsequent cervical spine surgery 4.2022   - s/p C5-7 ACDF     #Herpes zoster 2020   [X] Shingrix complete      #HTN, DLD, lone episode remote AF, +CAC 200s in 2018   - atorva 20   - lisin 20   - CAC 200s in 2018   [  ] labs  [  ] med refills  [  ] updated CAC scoring      #HyperCa   - resolved per recent labs  [  ] updated CMP today      #Hypothyroidism   - ltx 200 mcg   [  ] due labs   [  ] refill      #Chronic LBP      #Cataracts s/p phaco, uveitisi history      #HM   - C-scope due 2.23 with dr. Villarreal  [  ] updated requisition   - Tdap 2022   - Flu 2023 utd   - Shingrix x2 utd   - updated COVID and RSV vaccines advised   - screen labs due: ordered today   HPI   As above     Review of Systems  As above     Objective   /76   Pulse 86   Ht 1.829 m (6')   Wt 80.3 kg (177 lb)   SpO2 96%   BMI 24.01 kg/m²     Physical Exam  General: well appearing  male, NAD  HEENT: NCAT, MMM  CV: RRR  PULM: CTAB  ABD: Soft, NT, ND  : R inguinal hernia, reducible, non-painful on exam  EXT: WWP  NEURO: A&Ox4, no gross motor or sensory deficits  PSYCH: pleasant mood, appropriate affect     Assessment/Plan   Problem List Items  Addressed This Visit    None  Visit Diagnoses         Codes    Inguinal hernia of right side without obstruction or gangrene    -  Primary K40.90    Relevant Orders    Referral to General Surgery    Primary hypertension     I10    Relevant Medications    lisinopril 20 mg tablet    Hypothyroidism, unspecified type     E03.9    Relevant Medications    levothyroxine (Synthroid, Levoxyl) 200 mcg tablet    Other Relevant Orders    TSH with reflex to Free T4 if abnormal    Dyslipidemia     E78.5    Relevant Medications    atorvastatin (Lipitor) 20 mg tablet    Other Relevant Orders    CT cardiac scoring wo IV contrast    Lipid Panel    Colon cancer screening     Z12.11    Relevant Orders    Colonoscopy Screening; Average Risk Patient    Impaired fasting glucose     R73.01    Relevant Orders    Comprehensive Metabolic Panel    Hemoglobin A1C    Mild vitamin D deficiency     E55.9    Relevant Orders    Vitamin D 25 hydroxy    Prostate cancer screening     Z12.5    Relevant Orders    PSA, Total and Free    BMI 24.0-24.9, adult     Z68.24    Relevant Orders    CBC          Rui Lee MD       < from: CT Abdomen and Pelvis w/ IV Cont (08.07.17 @ 23:46) >    EXAM:  CT ABDOMEN AND PELVIS IC                          PROCEDURE DATE:  08/07/2017      INTERPRETATION:  CLINICAL INFORMATION: Brain lesion, assess metastasis.    PROCEDURE:   CT of the chest, abdomen and pelvis was performed with intravenous   contrast. 90 mls of Omnipaque-350 administered without complication. 10   mls discarded. Coronal and sagittal reconstruction images were obtained.      COMPARISON: None.    FINDINGS:    CHEST:   LUNGS AND AIRWAYS: Patent central airways. No focal consolidation.   PLEURA: No pleural effusion or pneumothorax.  HEART: Normal size. No pericardial effusion.  VESSELS: Atherosclerotic change of the thoracic aorta, great vessels and   coronary arteries. Bovine arch.  CHEST WALL AND LOWER NECK: Within normal limits.   MEDIASTINUM AND DES: Subcentimeter mediastinal lymph nodes without   lymphadenopathy.    ABDOMEN/PELVIS:   LIVER/BILE DUCTS: No biliary dilatation. Scattered cysts measuring up to   2.9 x 1.7 cm and subcentimeter hypodense foci,too small to characterize.  GALLBLADDER: No radiopaque gallstone, significant gallbladder wall   thickening or pericholecystic fluid.    PANCREAS: Unremarkable.  SPLEEN: Unremarkable.    ADRENALS: Unremarkable.  KIDNEYS/URETERS: No hydronephrosis, hydroureter or perinephric stranding.   BLADDER: Unremarkable.    REPRODUCTIVE ORGANS: Calcified uterine fibroids. A 15.1 x 11.8 x 13.1 cm   cystic left adnexal lesion.    BOWEL: No bowel obstruction. Unremarkable appendix. No significant bowel   wall thickening or inflammatory change. Colon diverticulosis.   PERITONEUM: No drainable fluid collection or intraperitoneal free air.  VESSELS: Atherosclerotic change of the abdominal aorta and its branches.   RETROPERITONEUM: No lymphadenopathy.    ABDOMINAL WALL/SOFT TISSUES: Small fat-containing umbilical hernia.     BONES: Degenerative changes of the spine. Grade 1 anterolisthesis of L4   on L5.      IMPRESSION:    Indeterminate 15.1 x 11.8 x 13.1 cm left adnexal cystic lesion. Neoplasm   is included in the differential diagnosis, particularly in a   postmenopausal patient. Please note ovarian torsion cannot be excluded on   imaging given the size. Recommend clinical correlation.    LOUIE COLLAZO   This document has been electronically signed. Aug  8 2017  1:12AM    < end of copied text >

## 2024-01-30 NOTE — ED ADULT TRIAGE NOTE - CHIEF COMPLAINT QUOTE
hx of brain tumor removal 10 days ago, pt c/o nose bleed on and off day.  as per EMS brother called EMS because patient sounded confused on phone.  EMS states patient was altered, but no resolved. Statement Selected

## 2024-03-14 NOTE — PROGRESS NOTE ADULT - SUBJECTIVE AND OBJECTIVE BOX
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     6/8 - Pt seen and examined earlier today, in NAD. Appears comfortable, denies new complaints, no overnight events.    6/9- pt seen and examined this morning, pt doing well today. Denies any headache, n/v, numbness, tingling visual disturbances, pt is ambulating well to the bathroom, tolerating PO well.       acetaminophen  IVPB. 1000 milliGRAM(s) IV Intermittent once PRN  acetaminophen  IVPB. 1000 milliGRAM(s) IV Intermittent once PRN  amLODIPine   Tablet 5 milliGRAM(s) Oral daily  atorvastatin 40 milliGRAM(s) Oral at bedtime  docusate sodium 100 milliGRAM(s) Oral three times a day  famotidine    Tablet 20 milliGRAM(s) Oral every 12 hours  fentaNYL    Injectable 25 MICROGram(s) IV Push every 30 minutes PRN  hydrochlorothiazide 25 milliGRAM(s) Oral daily  levETIRAcetam 1000 milliGRAM(s) Oral at bedtime  levETIRAcetam 500 milliGRAM(s) Oral <User Schedule>  losartan 50 milliGRAM(s) Oral two times a day  metoclopramide Injectable 10 milliGRAM(s) IV Push once PRN  metoprolol succinate ER 50 milliGRAM(s) Oral two times a day  multivitamin 1 Tablet(s) Oral daily  ondansetron Injectable 4 milliGRAM(s) IV Push every 6 hours PRN  senna 2 Tablet(s) Oral at bedtime  sodium chloride 0.9%. 1000 milliLiter(s) IV Continuous <Continuous>      Vital Signs Last 24 Hrs  T(C): 37 (08 Jun 2018 14:00), Max: 37 (08 Jun 2018 14:00)  T(F): 98.6 (08 Jun 2018 14:00), Max: 98.6 (08 Jun 2018 14:00)  HR: 80 (08 Jun 2018 16:00) (80 - 99)  BP: 136/57 (08 Jun 2018 16:00) (102/58 - 147/65)  BP(mean): 78 (08 Jun 2018 16:00) (48 - 83)  RR: 22 (08 Jun 2018 16:00) (14 - 23)  SpO2: 100% (08 Jun 2018 16:00) (78% - 100%)                          10.0   11.34 )-----------( 252      ( 08 Jun 2018 05:29 )             30.2       06-08    143  |  110<H>  |  14  ----------------------------<  110<H>  3.5   |  27  |  0.66    Ca    8.0<L>      08 Jun 2018 05:29    TPro  6.7  /  Alb  3.0<L>  /  TBili  0.3  /  DBili  x   /  AST  12<L>  /  ALT  18  /  AlkPhos  94  06-08        Radiology:  CT Head No Cont (06.08.18 @ 10:36) >  Status post right frontal parietal craniotomies for resection of an   enhancing lesion in the right parietal lobe. Small amount of postsurgical   hemorrhagic blood products and air is identified within the surgical   cavity. There is surrounding vasogenic edema and/or nonenhancing tumor   resulting in mild mass effect upon the ventricular system. There is   minimal right to left midline shift of approximately 1 to 2 mm.       Constitutional: awake and alert.  HEENT: PERRLA, EOMI, dressing intact c/d/I  Neck: Supple.  Respiratory: Breath sounds are clear bilaterally  Cardiovascular: S1 and S2, regular  rhythm  Gastrointestinal: soft, nontender  Extremities:  no edema  Musculoskeletal: no joint swelling/tenderness, no abnormal movements  Skin: No rashes, dressing changed, sutures intact c/d, no erythema no drainage noted     Neurological exam:  HF: A x O x 3. Appropriately interactive, normal affect. Speech fluent, No Aphasia or paraphasic errors. Naming /repetition intact   CN: ANGELITA, EOMI, VFF, facial sensation normal,   Motor: No pronator drift, Strength 5/5 in all 4 ext, normal bulk and tone, no tremor, rigidity or bradykinesia.    Sens: Intact to light touch   Gait/Balance: nml bars per day (one for breakfast and one in the afternoon) and having a protein shake once per day. Dinner is \"light\". Breakfast is otherwise oatmeal or cream of wheat. Snack choices include yogurt, protein bar, cottage cheese. Pt is eating refined carbohydrate foods (bread, pasta, rice, potatoes) in moderation. Pt is eating sweets/desserts in moderation/minimal. Pt is using mostly healthy cooking methods. Pt is eating meals prepared outside of the home none. Pt is drinking water and has been practicing 30/30 rule.        Physical Activity/Exercise  We talked about the importance of increasing daily physical activity and beginning to develop an exercise regimen/routine. We talked about exercise as being an important part of long term weight loss after surgery.     Comments:  During class, I discussed with patient the importance of getting into an exercise routine.  Pt is currently doing bed exercises. Reports walking has been limited d/t hip pain. Pt has been encouraged to maintain and increase as tolerated.     Behavior Modification       We talked about how to eat more mindfully. Tips and recommendations for how to make these changes were provided. Pt was encouraged to keep a food journal and record what they were taking in daily.         Overall Assessment: Pt demonstrates appropriate lifestyle changes evidenced by reported changes and overall weight loss. Will continue to assess.     Patient-Set Goals:   1. Nutrition - protein first at meals and at each meal/snack  2. Exercise - maintain bed/chair exercises   3. Behavior -1/2 cup per meal after surgery     Tara Peraza, DL  3/14/2024

## 2024-03-18 NOTE — PATIENT PROFILE ADULT. - SURGICAL SITE INCISION
This patient has been assessed with a concern for Malnutrition and was treated during this hospitalization for the following Nutrition diagnosis/diagnoses:     -  03/17/2024: Severe protein-calorie malnutrition   -  03/17/2024: Underweight (BMI < 19)   no

## 2024-08-05 NOTE — ED ADULT NURSE NOTE - PRIMARY CARE PROVIDER
[facility] Hospitalist Discharge Summary      MRN: [id number]    Admission Dt/Tm     7/25/2024  4:17 PM    Discharge DT/TM  8/5/2024    Discharge Diagnosis  Patient Active Problem List   Diagnosis    Acrochordon    Arthritis    Bradycardia    BPH (benign prostatic hyperplasia)    Cataract    Common wart    Deviated septum    Constipation    DJD (degenerative joint disease), lumbar    Enlarged prostate without lower urinary tract symptoms (luts)    Debility    Hyperlipidemia    H/O blood transfusion reaction    Inguinal hernia    Seborrheic keratosis    Nocturia    MARY (obstructive sleep apnea)    Spondylosis of cervical region without myelopathy or radiculopathy    Ventricular tachycardia  (CMD)    ICD (implantable cardioverter-defibrillator) in place    Chronic systolic CHF (congestive heart failure)  (CMD)    Essential (primary) hypertension    Lumbar stenosis with neurogenic claudication    Lumbar radiculitis    Dilated cardiomyopathy  (CMD)    History of colon cancer    Adrenal nodule  (CMD)    Granuloma of liver    Lesion of spleen    Sigmoid diverticulosis    Recurrent major depressive disorder, in partial remission (CMD)    Insomnia    Chronic total occlusion of coronary artery    Malignant melanoma of skin of scalp  (CMD)    Abnormal cardiovascular stress test    Anxiety disorder    Chronic depression    Ischemic cardiomyopathy with implantable cardioverter-defibrillator (ICD)    Hypothyroidism    Osteoarthritis of left knee    Kidney stone    Smoker    Unsteadiness    Closed fracture of multiple ribs of left side, initial encounter    Rib fracture    Hypertensive heart 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   -OR on 7/29.  Status post ORIF right patellar fracture  -Patient will be on aspirin 81 mg twice daily twice daily for 6-week per Ortho  -PT/OT     #COVID  -Patient spiked temperature, chest x-ray with atelectasis/consolidation  -Completed remdesivir 3 days  -As needed albuterol and Tessalon     #Heart failure reduced ejection fraction, status post ICD,   #hypertensive heart disease,  #atrial fibrillation:   #Hyperlipidemia  -Continue amiodarone,  aspirin, statin, isosorbide dinitrate,   -As patient is bradycardic will decrease metoprolol from 100 to 50 mg  -Continue hydralazine, furosemide , spironolactone ( some may be held according to blood pressure)     #GERD: PPI, tums prn      #Thrombocytopenia, h/o malignant melanoma s/p chemotherapy: Chart review shows chronic thrombocytopenia. Follows with Dr. Cooper. No signs of bleeding. Monitor cbc     #CKD stage II: Creatinine at baseline.  Monitor.     #Depression: Continue bupropion     #BPH: Continue Flomax     #Hypothyroid: Continues levothyroxine       Vitals  Vital Last Value 24 Hour Range   Temperature 98.6 °F (37 °C) (08/05/24 1051) Temp  Min: 97.5 °F (36.4 °C)  Max: 98.6 °F (37 °C)   Pulse (!) 51 (08/05/24 1051) Pulse  Min: 51  Max: 82   Respiratory 16 (08/05/24 0525) Resp  Min: 15  Max: 16   Non-Invasive  Blood Pressure 94/56 (08/05/24 1051) BP  Min: 94/56  Max: 130/79   Pulse Oximetry 94 % (08/05/24 1051) SpO2  Min: 94 %  Max: 98 %   Arterial   Blood Pressure   No data recorded     Labs  Recent Labs     08/04/24  0529   WBC 3.5*   RBC 4.02*   HGB 11.0*   HCT 35.8*   *   MCV 89.1   MCH 27.4   MCHC 30.7*   NRBCRE 0         Recent Labs     08/03/24  1708 08/04/24  0529 08/05/24  0534   SODIUM 138  --   --    POTASSIUM 4.0 3.8 3.9   CO2 29  --   --    ANIONGAP 6*  --   --    GLUCOSE 101*  --   --    BUN 19  --   --    CREATININE 1.09  --   --    CALCIUM 8.7  --   --         Recent Labs   Lab  08/05/24  0534 08/04/24  0529 08/03/24  1708 08/03/24  0523 08/02/24  0513 08/01/24  0520 07/31/24  1413   SODIUM  --   --  138  --  141 138 138   POTASSIUM 3.9 3.8 4.0   < > 3.6 4.0 3.9   CHLORIDE  --   --  107  --  109 108 105   CO2  --   --  29  --  27 28 26   BUN  --   --  19  --  23* 26* 24*   CREATININE  --   --  1.09  --  1.12 1.17 1.29*   GLUCOSE  --   --  101*  --  104* 91 113*   ALBUMIN  --   --   --   --  2.6* 2.5* 2.9*   AST  --   --   --   --  23 26 20   BILIRUBIN  --   --   --   --  0.5 0.4 0.7    < > = values in this interval not displayed.       No results for input(s): \"PCT\" in the last 72 hours.     No results found     No results for input(s): \"INR\", \"PT\", \"PTT\" in the last 72 hours.    No results available in last 24 hours    Imaging    XR CHEST AP OR PA   Final Result   There is new opacity in the left lower chest implying   fluid/atelectasis/consolidation. Remaining lungs are clear. Cardiomegaly is   redemonstrated.      Electronically Signed by: ANAHY FORD M.D.    Signed on: 7/31/2024 10:01 AM    Workstation ID: [id number]      XR KNEE 1 OR 2 VIEWS RIGHT   Final Result      ORIF of distracted patellar fracture, fixed in anatomic alignment.               Electronically Signed by: DESMOND BAKER M.D.    Signed on: 7/29/2024 9:50 AM    Workstation ID: [id number]      FL INTRAOPERATIVE C ARM NO REPORT   Final Result      XR KNEE 1 OR 2 VIEWS RIGHT   Final Result   Comminuted, moderate to severely distracted fracture of the right patella,   with a gap of nearly 4 cm. Diffuse surrounding subcutaneous edema and small   right knee joint effusion.      Electronically Signed by: FREDY RANDLE M.D.    Signed on: 7/25/2024 7:08 PM    Workstation ID: [id number]      XR TIBIA FIBULA 2 VIEWS RIGHT   Final Result   Comminuted, moderate to severely distracted fracture of the right patella,   with a gap of nearly 4 cm. Diffuse surrounding subcutaneous edema and small   right knee joint effusion.       Electronically Signed by: FREDY RANDLE M.D.    Signed on: 7/25/2024 7:08 PM    Workstation ID: [id number]      XRAY OUTSIDE STUDY   Final Result          Pathology  No specimens collected during this procedure.    Test Results Pending At Discharge      Physical Exam  General: Alert and oriented, no acute distress  Eyes: no scleral icterus, no conjunctival erythema   Cardio: S1, S2, RRR, no murmur, rub, gallop or thrills noted.   Pulm: Lungs clear to auscultation bilaterally, no wheeze or rhonchi noted. No chest wall tenderness  GI: Soft, non-tender, nondistended. Normal bowel sounds auscultated x4 quadrants  : No suprapubic Tenderness, no CVA tenderness bilaterally  Ext: No upper or lower extremity edema noted. No cords palpated.   Musculoskeletal: Right knee immobilizer  Skin: No abnormal bruising or discoloration noted. No jaundice.   Psych: Appropriate mood and affect. Good Insight and Judgment  Neuro: No focal motor or sensory deficits noted. Pt appropriately follows commands.      Plan  -Follow-up with Ortho  -Monitor patient blood pressure and adjust medication accordingly    Discharge Instructions  -Follow-up with PCP    Discharge Medications       Summary of your Discharge Medications        Take these Medications        Details   acetaminophen 500 MG tablet  Commonly known as: TYLENOL   Take 1,000 mg by mouth in the morning and 1,000 mg in the evening.     AMIODarone 200 MG tablet  Commonly known as: PACERONE   TAKE 1 TABLET BY MOUTH DAILY     * aspirin 81 MG chewable tablet   Chew 81 mg by mouth daily.     * aspirin 81 MG chewable tablet   Chew 1 tablet by mouth in the morning and 1 tablet in the evening.     atorvastatin 20 MG tablet  Commonly known as: LIPITOR   TAKE 1 TABLET BY MOUTH DAILY     buPROPion  MG 24 hr tablet  Commonly known as: WELLBUTRIN XL   Take 300 mg by mouth daily.     CENTRUM SILVER 50+MEN PO   Take 1 tablet by mouth daily.     clonazePAM 0.5 MG tablet  Commonly  known as: KlonoPIN   Take 1 tablet by mouth 2 times daily as needed for Anxiety.     docusate sodium 100 MG capsule  Commonly known as: Colace   Take 1 capsule by mouth 2 times daily as needed for Constipation. Please hold for diarrhea     empagliflozin 10 MG tablet  Commonly known as: Jardiance   Take 1 tablet by mouth daily (before breakfast).  Comment: ZERO refills remain on this prescription. Your patient is requesting advance approval of refills for this medication to PREVENT ANY MISSED DOSES     furosemide 20 MG tablet  Commonly known as: LASIX   Take 1 tablet by mouth daily.     hydrALAZINE 25 MG tablet  Commonly known as: APRESOLINE   TAKE 1 AND 1/2 TABLETS BY MOUTH IN THE MORNING AND IN THE EVENING  Comment: ZERO refills remain on this prescription. Your patient is requesting advance approval of refills for this medication to PREVENT ANY MISSED DOSES     isosorbide dinitrate 20 MG tablet  Commonly known as: ISORDIL   TAKE 1 TABLET BY MOUTH IN THE MORNING AND IN THE EVENING     levothyroxine 100 MCG tablet   TAKE 1 TABLET BY MOUTH DAILY     metoPROLOL succinate 50 MG 24 hr tablet  Commonly known as: TOPROL-XL  Start taking on: August 6, 2024   Take 1 tablet by mouth daily.     rabeprazole 20 MG tablet  Commonly known as: ACIPHEX   Take 1 tablet by mouth daily. Interchanged with omeprazole while at [facility]     ranolazine 500 MG 12 hr tablet  Commonly known as: RANEXA   Take 1 tablet by mouth in the morning and 1 tablet in the evening.     sertraline 100 MG tablet  Commonly known as: ZOLOFT   Take 1 tablet by mouth daily.     spironolactone 25 MG tablet  Commonly known as: ALDACTONE   Take 0.5 tablets by mouth daily. HOLD FOR NOW AS BP on the low side ,resume when his BP is more stable     tamsulosin 0.4 MG Cap  Commonly known as: FLOMAX   Take 0.4 mg by mouth every evening.     * traMADol 50 MG tablet  Commonly known as: ULTRAM   Take 1 tablet by mouth every 6 hours as needed for Pain.     * traMADol 50 MG  tablet  Commonly known as: ULTRAM   Take 1 tablet by mouth every 12 hours as needed for Pain.     * Vitamin D3 50 mcg (2,000 units) capsule   Take 50 mcg by mouth every evening.     * cholecalciferol 25 mcg(1,000 units) tablet  Commonly known as: VITAMIN D   Take 4 tablets by mouth daily.     zolpidem 5 MG tablet  Commonly known as: AMBIEN   Take 1 tablet by mouth nightly as needed for Sleep.           * This list has 6 medication(s) that are the same as other medications prescribed for you. Read the directions carefully, and ask your doctor or other care provider to review them with you.                    PCP  Nathaniel Bundy DO      I spent 40 minutes completing this patient's discharge.   This includes the following: Reviewed all vitals, medications, new orders, I/O, labs, micro, radiology, nurses notes, pertinent consultant notes, as well as discussing patient's diagnosis and plan of care.     Anne Ochoa MD,  8/5/2024 12:05 PM

## 2024-09-10 NOTE — PATIENT PROFILE ADULT. - AS SC BRADEN ACTIVITY
Please follow up with your oral surgeon today.    You can use 500-1000mg Tylenol every 6 hours for pain - as needed.  This is an over-the-counter medications - please respect the warnings on the label. This medication come with certain risks and side effects that you need to discuss with your doctor, especially if you are taking it for a prolonged period.    You can use 400-600mg Ibuprofen (such as motrin or advil) every 6 to 8 hours as needed for pain control.  Take ibuprofen with food or milk to lessen stomach upset.  This is an over-the-counter medication please respect the warnings on the label. All medications come with certain risks and side effects that you need to discuss with your doctor, especially if you are taking them for a prolonged period.    A prescription was also sent to your pharmacy for augmentin.    Return to the ED for new or worsening symptoms.
(1) bedfast

## 2024-12-17 NOTE — DISCHARGE NOTE ADULT - CARE PROVIDERS DIRECT ADDRESSES
I spoke with patient, made aware of results and recommendations.  She verbalized understanding     [email or web address]

## 2025-01-08 NOTE — PATIENT PROFILE ADULT. - ABILITY TO HEAR (WITH HEARING AID OR HEARING APPLIANCE IF NORMALLY USED):
Adequate: hears normal conversation without difficulty
For information on Fall & Injury Prevention, visit: [email or web address] OR  [email or web address] OR  https://www.cdc.gov/steadi/patient.html

## 2025-06-29 NOTE — H&P ADULT - PROBLEM SELECTOR PROBLEM 1
Bed/Stretcher in lowest position, wheels locked, appropriate side rails in place/Call bell, personal items and telephone in reach/Instruct patient to call for assistance before getting out of bed/chair/stretcher/Non-slip footwear applied when patient is off stretcher/[place] to call system/Physically safe environment - no spills, clutter or unnecessary equipment/Purposeful proactive rounding/Room/bathroom lighting operational, light cord in reach Pelvic mass in female
